# Patient Record
Sex: FEMALE | Race: BLACK OR AFRICAN AMERICAN | NOT HISPANIC OR LATINO | Employment: OTHER | URBAN - METROPOLITAN AREA
[De-identification: names, ages, dates, MRNs, and addresses within clinical notes are randomized per-mention and may not be internally consistent; named-entity substitution may affect disease eponyms.]

---

## 2017-01-02 ENCOUNTER — TELEPHONE (OUTPATIENT)
Dept: FAMILY MEDICINE CLINIC | Facility: CLINIC | Age: 82
End: 2017-01-02

## 2017-01-02 DIAGNOSIS — M48.00 CENTRAL SPINAL STENOSIS: Primary | ICD-10-CM

## 2017-01-02 NOTE — TELEPHONE ENCOUNTER
Talked patient to inform her of her severe central spinal stenosis also some foramen which are no nerve root have told this patient with I will set her up with pain management she absolutely refuses any kind of surgery so will not send her to a neurosurgeon have put a request through to Tamara for this referral to pain management

## 2017-02-23 RX ORDER — LEVOTHYROXINE SODIUM 0.03 MG/1
TABLET ORAL
Qty: 90 TABLET | Refills: 1 | Status: SHIPPED | OUTPATIENT
Start: 2017-02-23 | End: 2017-09-06 | Stop reason: SDUPTHER

## 2017-02-23 RX ORDER — SERTRALINE HYDROCHLORIDE 100 MG/1
TABLET, FILM COATED ORAL
Qty: 30 TABLET | Refills: 2 | Status: SHIPPED | OUTPATIENT
Start: 2017-02-23 | End: 2017-05-31 | Stop reason: SDUPTHER

## 2017-03-01 ENCOUNTER — OFFICE VISIT (OUTPATIENT)
Dept: FAMILY MEDICINE CLINIC | Facility: CLINIC | Age: 82
End: 2017-03-01

## 2017-03-01 VITALS
HEART RATE: 85 BPM | TEMPERATURE: 99.1 F | SYSTOLIC BLOOD PRESSURE: 140 MMHG | WEIGHT: 180 LBS | HEIGHT: 61 IN | BODY MASS INDEX: 33.99 KG/M2 | OXYGEN SATURATION: 98 % | DIASTOLIC BLOOD PRESSURE: 74 MMHG

## 2017-03-01 DIAGNOSIS — E11.22 TYPE 2 DIABETES MELLITUS WITH STAGE 1 CHRONIC KIDNEY DISEASE, WITHOUT LONG-TERM CURRENT USE OF INSULIN (HCC): ICD-10-CM

## 2017-03-01 DIAGNOSIS — Z87.19 HISTORY OF DIVERTICULITIS OF COLON: ICD-10-CM

## 2017-03-01 DIAGNOSIS — F02.80 EARLY ONSET ALZHEIMER'S DEMENTIA WITHOUT BEHAVIORAL DISTURBANCE (HCC): ICD-10-CM

## 2017-03-01 DIAGNOSIS — E03.4 HYPOTHYROIDISM DUE TO ACQUIRED ATROPHY OF THYROID: ICD-10-CM

## 2017-03-01 DIAGNOSIS — K21.9 GASTROESOPHAGEAL REFLUX DISEASE WITHOUT ESOPHAGITIS: ICD-10-CM

## 2017-03-01 DIAGNOSIS — Z79.899 HIGH RISK MEDICATION USE: ICD-10-CM

## 2017-03-01 DIAGNOSIS — G30.0 EARLY ONSET ALZHEIMER'S DEMENTIA WITHOUT BEHAVIORAL DISTURBANCE (HCC): ICD-10-CM

## 2017-03-01 DIAGNOSIS — N28.9 RENAL INSUFFICIENCY: Primary | ICD-10-CM

## 2017-03-01 DIAGNOSIS — N18.1 TYPE 2 DIABETES MELLITUS WITH STAGE 1 CHRONIC KIDNEY DISEASE, WITHOUT LONG-TERM CURRENT USE OF INSULIN (HCC): ICD-10-CM

## 2017-03-01 DIAGNOSIS — E78.5 HYPERLIPIDEMIA, UNSPECIFIED HYPERLIPIDEMIA TYPE: ICD-10-CM

## 2017-03-01 PROCEDURE — 99214 OFFICE O/P EST MOD 30 MIN: CPT | Performed by: FAMILY MEDICINE

## 2017-03-01 RX ORDER — SENNOSIDES 8.6 MG
CAPSULE ORAL
COMMUNITY
End: 2022-08-22 | Stop reason: SDUPTHER

## 2017-03-01 NOTE — PROGRESS NOTES
HPI  Wendie Houser is a 82 y.o. female who is here for follow up after recent hospitalization at [facility] apparently for renal insufficiency and hypoglycemia.  Reports metformin was discontinued completely again apparently because of renal insufficiency.  Also Bumex discontinued but patient resumed because of abdominal swelling and chest discomfort.  Has noted some yellowish discoloration of her stool and urine.  Also has had some hoarseness and sore throat.      Review of Systems   HENT: Positive for hearing loss, postnasal drip, sinus pressure, sore throat, trouble swallowing and voice change.    Respiratory: Positive for cough and chest tightness.    Gastrointestinal: Positive for abdominal distention, constipation and nausea.        PPI therapy discontinued when discharged from the hospital but had to resume because of reflux symptoms   Genitourinary: Positive for frequency.   Musculoskeletal: Positive for arthralgias and back pain.   Neurological: Positive for weakness.   Psychiatric/Behavioral: Positive for sleep disturbance.   All other systems reviewed and are negative.        Past Medical History   Diagnosis Date   • Anemia    • Arthritis    • CHF (congestive heart failure)    • Congestive heart failure    • Depression    • Diabetes mellitus      type 2   • Diverticulitis of colon 08/25/2016     Acute uncomplicated diverticulitis at the distal transverse colon   • Early onset Alzheimer's dementia    • GERD (gastroesophageal reflux disease)    • Heart disease    • Hyperlipidemia    • Hypertension    • Hypothyroidism    • Kidney disease    • Sleep apnea        Past Surgical History   Procedure Laterality Date   • Gallbladder surgery  1960     Open   • Total knee arthroplasty Left 2012     Dr. Soler   • Eye surgery  2009   • Endoscopy  04/15/2011   • Colonoscopy  2011     Dr. Cain   • Breast cyst excision Right 1989     Benign   • Vaginal hysterectomy  1999       Family History   Problem  Relation Age of Onset   • Other Mother      cardiac disorder   • Hypertension Mother    • Heart disease Mother    • Hypertension Sister    • Heart attack Sister    • Cancer Brother    • Hypertension Brother    • Heart attack Brother    • Heart disease Father        Social History     Social History   • Marital status:      Spouse name: N/A   • Number of children: N/A   • Years of education: N/A     Occupational History   • Not on file.     Social History Main Topics   • Smoking status: Never Smoker   • Smokeless tobacco: Not on file   • Alcohol use No   • Drug use: No   • Sexual activity: No     Other Topics Concern   • Not on file     Social History Narrative         Physical Exam   Constitutional: She is oriented to person, place, and time. She appears well-developed and well-nourished.   HENT:   Head: Normocephalic.   Mouth/Throat: Oropharynx is clear and moist.   Voice is slightly hoarse   Eyes: Conjunctivae and EOM are normal. Pupils are equal, round, and reactive to light.   Neck: Normal range of motion. Neck supple. No JVD present. No tracheal deviation present.   Cardiovascular: Normal rate and regular rhythm.    Pulmonary/Chest: Effort normal and breath sounds normal. She has no wheezes. She exhibits no tenderness.   Abdominal: She exhibits no distension and no mass.   Musculoskeletal: She exhibits deformity. She exhibits no edema.   Osteoarthritic changes, ambulates with a cane   Lymphadenopathy:     She has no cervical adenopathy.   Neurological: She is alert and oriented to person, place, and time. She exhibits normal muscle tone. Coordination normal.   Skin: Skin is warm and dry.   Psychiatric: She has a normal mood and affect. Her behavior is normal. Judgment and thought content normal.   Vitals reviewed.        Assessment/Plan    Wendie was seen today for follow-up, chronic kidney disease and hypoglycemia.    Diagnoses and all orders for this visit:    Renal insufficiency  -     Comprehensive  Metabolic Panel    Hypothyroidism due to acquired atrophy of thyroid  -     TSH+Free T4    Type 2 diabetes mellitus with stage 1 chronic kidney disease, without long-term current use of insulin  -     Hemoglobin A1c    Early onset Alzheimer's dementia without behavioral disturbance    High risk medication use  -     Comprehensive Metabolic Panel    Hyperlipidemia, unspecified hyperlipidemia type  -     Lipid Panel    History of diverticulitis of colon    Gastroesophageal reflux disease without esophagitis      Patient with multiple underlying medical issues as noted reviewing previous records especially from Dr. Bustos.  Was followed in the past by renal specialist and reports being hospitalized last month with renal insufficiency and hypoglycemia.  All records reviewed and actually creatinine levels were not significantly elevated when left the hospital.  Reports some yellowish discoloration of urine and concerned about liver function.  Known history of diverticulitis but no current symptoms.  Discussed taking Bumex when necessary only.  Otherwise continue current therapy and follow-up in 3 months.  Further adjustments as needed especially depending on above noted blood tests    This note includes information entered using a voice recognition dictation system.  Though reviewed, some nonsensible errors may remain.

## 2017-03-02 LAB
ALBUMIN SERPL-MCNC: 4.1 G/DL (ref 3.5–5.2)
ALBUMIN/GLOB SERPL: 1.2 G/DL
ALP SERPL-CCNC: 79 U/L (ref 39–117)
ALT SERPL-CCNC: 9 U/L (ref 1–33)
AST SERPL-CCNC: 14 U/L (ref 1–32)
BILIRUB SERPL-MCNC: 0.5 MG/DL (ref 0.1–1.2)
BUN SERPL-MCNC: 12 MG/DL (ref 8–23)
BUN/CREAT SERPL: 12 (ref 7–25)
CALCIUM SERPL-MCNC: 9.4 MG/DL (ref 8.6–10.5)
CHLORIDE SERPL-SCNC: 102 MMOL/L (ref 98–107)
CHOLEST SERPL-MCNC: 160 MG/DL (ref 0–200)
CO2 SERPL-SCNC: 24.9 MMOL/L (ref 22–29)
CREAT SERPL-MCNC: 1 MG/DL (ref 0.57–1)
GLOBULIN SER CALC-MCNC: 3.3 GM/DL
GLUCOSE SERPL-MCNC: 125 MG/DL (ref 65–99)
HBA1C MFR BLD: 6 % (ref 4.8–5.6)
HDLC SERPL-MCNC: 54 MG/DL (ref 40–60)
INTERPRETATION: NORMAL
LDLC SERPL CALC-MCNC: 74 MG/DL (ref 0–100)
Lab: NORMAL
POTASSIUM SERPL-SCNC: 3.5 MMOL/L (ref 3.5–5.2)
PROT SERPL-MCNC: 7.4 G/DL (ref 6–8.5)
SODIUM SERPL-SCNC: 142 MMOL/L (ref 136–145)
T4 FREE SERPL-MCNC: 0.88 NG/DL (ref 0.93–1.7)
TRIGL SERPL-MCNC: 158 MG/DL (ref 0–150)
TSH SERPL DL<=0.005 MIU/L-ACNC: 2.75 MIU/ML (ref 0.27–4.2)
VLDLC SERPL CALC-MCNC: 31.6 MG/DL (ref 5–40)

## 2017-03-03 ENCOUNTER — TELEPHONE (OUTPATIENT)
Dept: FAMILY MEDICINE CLINIC | Facility: CLINIC | Age: 82
End: 2017-03-03

## 2017-03-03 RX ORDER — AZITHROMYCIN 250 MG/1
TABLET, FILM COATED ORAL
Qty: 6 TABLET | Refills: 0 | Status: SHIPPED | OUTPATIENT
Start: 2017-03-03 | End: 2017-05-02

## 2017-03-03 RX ORDER — GUAIFENESIN AND CODEINE PHOSPHATE 100; 10 MG/5ML; MG/5ML
5 SOLUTION ORAL EVERY 4 HOURS PRN
Qty: 180 ML | Refills: 0 | Status: SHIPPED | OUTPATIENT
Start: 2017-03-03 | End: 2017-05-02

## 2017-03-03 NOTE — TELEPHONE ENCOUNTER
Says she was seen this week and is still coughing up mucus, throat hurts due to cough,etc. Asking if she can have something called to [facility] in chart..    [phone]

## 2017-03-09 RX ORDER — VALSARTAN 80 MG/1
80 TABLET ORAL DAILY
Qty: 90 TABLET | Refills: 3 | Status: SHIPPED | OUTPATIENT
Start: 2017-03-09 | End: 2017-05-02 | Stop reason: SDUPTHER

## 2017-04-24 ENCOUNTER — DOCUMENTATION (OUTPATIENT)
Dept: FAMILY MEDICINE CLINIC | Facility: CLINIC | Age: 82
End: 2017-04-24

## 2017-04-25 ENCOUNTER — TELEPHONE (OUTPATIENT)
Dept: FAMILY MEDICINE CLINIC | Facility: CLINIC | Age: 82
End: 2017-04-25

## 2017-04-25 NOTE — TELEPHONE ENCOUNTER
----- Message from Luis Mcqueen MD sent at 2017  6:02 PM EDT -----  Inform daughter letter printed.    ----- Message -----     From: Nat Amara     Sent: 2017   1:59 PM       To: Luis Mcqueen MD    PT'S DAUGHTER (GODFREY MOODY,  [birth year]/60) IS REQUESTING A LETTER FOR HER JOB SAYING SHE'S BEEN HELPING HER MOTHER WITH HER COOKING AND TRANSPORTING HER TO THE DOCTOR.  SHE HAD FMLA PAPERS COMPLETED BY DR MAGALLANES AND THEY ARE IN THE CHART 16.  SHE SAYS SHE HAS NOT WORKED SINCE  AND FEELS SHE STILL CAN'T GO BACK TO WORK.    GODFREY [phone]

## 2017-05-01 ENCOUNTER — TELEPHONE (OUTPATIENT)
Dept: FAMILY MEDICINE CLINIC | Facility: CLINIC | Age: 82
End: 2017-05-01

## 2017-05-02 ENCOUNTER — OFFICE VISIT (OUTPATIENT)
Dept: FAMILY MEDICINE CLINIC | Facility: CLINIC | Age: 82
End: 2017-05-02

## 2017-05-02 VITALS
TEMPERATURE: 98.1 F | RESPIRATION RATE: 17 BRPM | SYSTOLIC BLOOD PRESSURE: 130 MMHG | WEIGHT: 188.8 LBS | OXYGEN SATURATION: 98 % | BODY MASS INDEX: 35.65 KG/M2 | DIASTOLIC BLOOD PRESSURE: 76 MMHG | HEIGHT: 61 IN | HEART RATE: 67 BPM

## 2017-05-02 DIAGNOSIS — R53.83 FATIGUE, UNSPECIFIED TYPE: ICD-10-CM

## 2017-05-02 DIAGNOSIS — IMO0002 DIVERTICULITIS OF GASTROINTESTINAL TRACT: ICD-10-CM

## 2017-05-02 DIAGNOSIS — E03.4 HYPOTHYROIDISM DUE TO ACQUIRED ATROPHY OF THYROID: ICD-10-CM

## 2017-05-02 DIAGNOSIS — E11.22 TYPE 2 DIABETES MELLITUS WITH STAGE 1 CHRONIC KIDNEY DISEASE, WITHOUT LONG-TERM CURRENT USE OF INSULIN (HCC): Primary | ICD-10-CM

## 2017-05-02 DIAGNOSIS — IMO0002 ADULT BODY MASS INDEX GREATER THAN 30: ICD-10-CM

## 2017-05-02 DIAGNOSIS — I10 ESSENTIAL HYPERTENSION: ICD-10-CM

## 2017-05-02 DIAGNOSIS — N28.9 RENAL INSUFFICIENCY: ICD-10-CM

## 2017-05-02 DIAGNOSIS — N18.1 TYPE 2 DIABETES MELLITUS WITH STAGE 1 CHRONIC KIDNEY DISEASE, WITHOUT LONG-TERM CURRENT USE OF INSULIN (HCC): Primary | ICD-10-CM

## 2017-05-02 DIAGNOSIS — M48.061 LUMBAR CANAL STENOSIS: ICD-10-CM

## 2017-05-02 DIAGNOSIS — E87.8 DISORDER OF ELECTROLYTES: ICD-10-CM

## 2017-05-02 DIAGNOSIS — Z79.899 HIGH RISK MEDICATION USE: ICD-10-CM

## 2017-05-02 PROBLEM — E87.5 HYPERKALEMIA: Status: ACTIVE | Noted: 2017-02-02

## 2017-05-02 PROBLEM — E16.2 HYPOGLYCEMIA: Status: ACTIVE | Noted: 2017-02-02

## 2017-05-02 PROBLEM — S37.009A INJURY OF KIDNEY: Status: ACTIVE | Noted: 2017-02-02

## 2017-05-02 PROBLEM — K44.9 HIATAL HERNIA: Status: ACTIVE | Noted: 2017-05-02

## 2017-05-02 PROBLEM — K58.9 IRRITABLE BOWEL SYNDROME: Status: ACTIVE | Noted: 2017-05-02

## 2017-05-02 LAB
BASOPHILS # BLD AUTO: 0.02 10*3/MM3 (ref 0–0.2)
BASOPHILS NFR BLD AUTO: 0.3 % (ref 0–1.5)
BUN SERPL-MCNC: 12 MG/DL (ref 8–23)
BUN/CREAT SERPL: 13.3 (ref 7–25)
CALCIUM SERPL-MCNC: 9.1 MG/DL (ref 8.6–10.5)
CHLORIDE SERPL-SCNC: 104 MMOL/L (ref 98–107)
CO2 SERPL-SCNC: 26.4 MMOL/L (ref 22–29)
CREAT SERPL-MCNC: 0.9 MG/DL (ref 0.57–1)
EOSINOPHIL # BLD AUTO: 0.17 10*3/MM3 (ref 0–0.7)
EOSINOPHIL NFR BLD AUTO: 2.4 % (ref 0.3–6.2)
ERYTHROCYTE [DISTWIDTH] IN BLOOD BY AUTOMATED COUNT: 15.7 % (ref 11.7–13)
ERYTHROCYTE [SEDIMENTATION RATE] IN BLOOD BY WESTERGREN METHOD: 15 MM/HR (ref 0–30)
GLUCOSE SERPL-MCNC: 122 MG/DL (ref 65–99)
HBA1C MFR BLD: 5.7 % (ref 4.8–5.6)
HCT VFR BLD AUTO: 36.7 % (ref 35.6–45.5)
HGB BLD-MCNC: 11.6 G/DL (ref 11.9–15.5)
IMM GRANULOCYTES # BLD: 0 10*3/MM3 (ref 0–0.03)
IMM GRANULOCYTES NFR BLD: 0 % (ref 0–0.5)
LYMPHOCYTES # BLD AUTO: 3.44 10*3/MM3 (ref 0.9–4.8)
LYMPHOCYTES NFR BLD AUTO: 49 % (ref 19.6–45.3)
MCH RBC QN AUTO: 27.3 PG (ref 26.9–32)
MCHC RBC AUTO-ENTMCNC: 31.6 G/DL (ref 32.4–36.3)
MCV RBC AUTO: 86.4 FL (ref 80.5–98.2)
MONOCYTES # BLD AUTO: 0.65 10*3/MM3 (ref 0.2–1.2)
MONOCYTES NFR BLD AUTO: 9.3 % (ref 5–12)
NEUTROPHILS # BLD AUTO: 2.74 10*3/MM3 (ref 1.9–8.1)
NEUTROPHILS NFR BLD AUTO: 39 % (ref 42.7–76)
PLATELET # BLD AUTO: 217 10*3/MM3 (ref 140–500)
POTASSIUM SERPL-SCNC: 3.7 MMOL/L (ref 3.5–5.2)
RBC # BLD AUTO: 4.25 10*6/MM3 (ref 3.9–5.2)
SODIUM SERPL-SCNC: 143 MMOL/L (ref 136–145)
T4 FREE SERPL-MCNC: 1.03 NG/DL (ref 0.93–1.7)
TSH SERPL DL<=0.005 MIU/L-ACNC: 2.36 MIU/ML (ref 0.27–4.2)
WBC # BLD AUTO: 7.02 10*3/MM3 (ref 4.5–10.7)

## 2017-05-02 PROCEDURE — 99214 OFFICE O/P EST MOD 30 MIN: CPT | Performed by: FAMILY MEDICINE

## 2017-05-02 RX ORDER — VALSARTAN 160 MG/1
160 TABLET ORAL DAILY
Qty: 30 TABLET | Refills: 5 | Status: SHIPPED | OUTPATIENT
Start: 2017-05-02 | End: 2017-12-04 | Stop reason: SDUPTHER

## 2017-05-02 RX ORDER — METHYLPREDNISOLONE 4 MG/1
TABLET ORAL
Qty: 21 TABLET | Refills: 0 | Status: SHIPPED | OUTPATIENT
Start: 2017-05-02 | End: 2017-06-05

## 2017-05-05 DIAGNOSIS — E78.5 HYPERLIPIDEMIA, UNSPECIFIED HYPERLIPIDEMIA TYPE: ICD-10-CM

## 2017-05-05 RX ORDER — PRAVASTATIN SODIUM 40 MG
40 TABLET ORAL DAILY
Qty: 90 TABLET | Refills: 1 | Status: SHIPPED | OUTPATIENT
Start: 2017-05-05 | End: 2017-11-15 | Stop reason: SDUPTHER

## 2017-05-31 RX ORDER — SERTRALINE HYDROCHLORIDE 100 MG/1
TABLET, FILM COATED ORAL
Qty: 30 TABLET | Refills: 3 | Status: SHIPPED | OUTPATIENT
Start: 2017-05-31 | End: 2017-10-09 | Stop reason: SDUPTHER

## 2017-06-05 ENCOUNTER — OFFICE VISIT (OUTPATIENT)
Dept: FAMILY MEDICINE CLINIC | Facility: CLINIC | Age: 82
End: 2017-06-05

## 2017-06-05 VITALS
HEART RATE: 73 BPM | TEMPERATURE: 98.3 F | HEIGHT: 61 IN | WEIGHT: 185 LBS | DIASTOLIC BLOOD PRESSURE: 74 MMHG | SYSTOLIC BLOOD PRESSURE: 136 MMHG | RESPIRATION RATE: 16 BRPM | OXYGEN SATURATION: 94 % | BODY MASS INDEX: 34.93 KG/M2

## 2017-06-05 DIAGNOSIS — F02.80 EARLY ONSET ALZHEIMER'S DEMENTIA WITHOUT BEHAVIORAL DISTURBANCE (HCC): ICD-10-CM

## 2017-06-05 DIAGNOSIS — G30.0 EARLY ONSET ALZHEIMER'S DEMENTIA WITHOUT BEHAVIORAL DISTURBANCE (HCC): ICD-10-CM

## 2017-06-05 DIAGNOSIS — N28.9 RENAL INSUFFICIENCY: Primary | ICD-10-CM

## 2017-06-05 DIAGNOSIS — D64.9 ANEMIA, UNSPECIFIED TYPE: ICD-10-CM

## 2017-06-05 DIAGNOSIS — J06.9 VIRAL UPPER RESPIRATORY TRACT INFECTION: ICD-10-CM

## 2017-06-05 DIAGNOSIS — R39.9 UTI SYMPTOMS: ICD-10-CM

## 2017-06-05 DIAGNOSIS — J30.9 ALLERGIC RHINITIS, UNSPECIFIED ALLERGIC RHINITIS TRIGGER, UNSPECIFIED RHINITIS SEASONALITY: ICD-10-CM

## 2017-06-05 LAB
BASOPHILS # BLD AUTO: 0.03 10*3/MM3 (ref 0–0.2)
BASOPHILS NFR BLD AUTO: 0.3 % (ref 0–1.5)
BILIRUB BLD-MCNC: NEGATIVE MG/DL
BUN SERPL-MCNC: 11 MG/DL (ref 8–23)
BUN/CREAT SERPL: 12.8 (ref 7–25)
CALCIUM SERPL-MCNC: 9.2 MG/DL (ref 8.6–10.5)
CHLORIDE SERPL-SCNC: 99 MMOL/L (ref 98–107)
CLARITY, POC: CLEAR
CO2 SERPL-SCNC: 26.6 MMOL/L (ref 22–29)
COLOR UR: ABNORMAL
CREAT SERPL-MCNC: 0.86 MG/DL (ref 0.57–1)
EOSINOPHIL # BLD AUTO: 0.17 10*3/MM3 (ref 0–0.7)
EOSINOPHIL NFR BLD AUTO: 2 % (ref 0.3–6.2)
ERYTHROCYTE [DISTWIDTH] IN BLOOD BY AUTOMATED COUNT: 15.5 % (ref 11.7–13)
GLUCOSE SERPL-MCNC: 130 MG/DL (ref 65–99)
GLUCOSE UR STRIP-MCNC: NEGATIVE MG/DL
HCT VFR BLD AUTO: 40 % (ref 35.6–45.5)
HGB BLD-MCNC: 12.5 G/DL (ref 11.9–15.5)
IMM GRANULOCYTES # BLD: 0.03 10*3/MM3 (ref 0–0.03)
IMM GRANULOCYTES NFR BLD: 0.3 % (ref 0–0.5)
KETONES UR QL: NEGATIVE
LEUKOCYTE EST, POC: NEGATIVE
LYMPHOCYTES # BLD AUTO: 3.67 10*3/MM3 (ref 0.9–4.8)
LYMPHOCYTES NFR BLD AUTO: 42.8 % (ref 19.6–45.3)
MCH RBC QN AUTO: 26.5 PG (ref 26.9–32)
MCHC RBC AUTO-ENTMCNC: 31.3 G/DL (ref 32.4–36.3)
MCV RBC AUTO: 84.9 FL (ref 80.5–98.2)
MONOCYTES # BLD AUTO: 0.8 10*3/MM3 (ref 0.2–1.2)
MONOCYTES NFR BLD AUTO: 9.3 % (ref 5–12)
NEUTROPHILS # BLD AUTO: 3.88 10*3/MM3 (ref 1.9–8.1)
NEUTROPHILS NFR BLD AUTO: 45.3 % (ref 42.7–76)
NITRITE UR-MCNC: NEGATIVE MG/ML
PH UR: 6 [PH] (ref 5–8)
PLATELET # BLD AUTO: 222 10*3/MM3 (ref 140–500)
POTASSIUM SERPL-SCNC: 3.2 MMOL/L (ref 3.5–5.2)
PROT UR STRIP-MCNC: NEGATIVE MG/DL
RBC # BLD AUTO: 4.71 10*6/MM3 (ref 3.9–5.2)
RBC # UR STRIP: ABNORMAL /UL
SODIUM SERPL-SCNC: 140 MMOL/L (ref 136–145)
SP GR UR: 1.02 (ref 1–1.03)
UROBILINOGEN UR QL: NORMAL
WBC # BLD AUTO: 8.58 10*3/MM3 (ref 4.5–10.7)

## 2017-06-05 PROCEDURE — 99214 OFFICE O/P EST MOD 30 MIN: CPT | Performed by: FAMILY MEDICINE

## 2017-06-05 PROCEDURE — 81003 URINALYSIS AUTO W/O SCOPE: CPT | Performed by: FAMILY MEDICINE

## 2017-06-05 NOTE — PROGRESS NOTES
HPI  Wendie Houser is a 82 y.o. female who is here for follow up of dementia as well as hypertension.  Recently started on a new medication which she is concerned because she was told it was for heart failure and she was told she does not have heart failure.  This was discussed, specifically medicine is primarily for hypertension.  Daughter also is concerned about high dose and discussed with patient cannot go by milligrams etc.  Reports just general fatigue and has had some congestion and cough.  Does take allergy medication and has had significant mucus in her lungs.  This started 2 weeks ago and does seem to be improving.      Review of Systems   Constitutional: Positive for fatigue.   HENT: Positive for congestion and sore throat.    Respiratory: Positive for cough.    Gastrointestinal: Negative for blood in stool.        Reports mucus in stool   Neurological: Positive for weakness.   Psychiatric/Behavioral: Positive for confusion.   All other systems reviewed and are negative.        Past Medical History:   Diagnosis Date   • Anemia    • Arthritis    • CHF (congestive heart failure)    • Congestive heart failure    • Depression    • Diabetes mellitus     type 2   • Diverticulitis of colon 08/25/2016    Acute uncomplicated diverticulitis at the distal transverse colon   • Early onset Alzheimer's dementia    • GERD (gastroesophageal reflux disease)    • Heart disease    • Hyperlipidemia    • Hypertension    • Hypothyroidism    • Kidney disease    • Sleep apnea        Past Surgical History:   Procedure Laterality Date   • BREAST CYST EXCISION Right 1989    Benign   • COLONOSCOPY  2011    Dr. Cain   • ENDOSCOPY  04/15/2011   • EYE SURGERY  2009   • GALLBLADDER SURGERY  1960    Open   • TOTAL KNEE ARTHROPLASTY Left 2012    Dr. Soler   • VAGINAL HYSTERECTOMY  1999       Family History   Problem Relation Age of Onset   • Other Mother      cardiac disorder   • Hypertension Mother    • Heart disease Mother    •  Hypertension Sister    • Heart attack Sister    • Cancer Brother    • Hypertension Brother    • Heart attack Brother    • Heart disease Father        Social History     Social History   • Marital status:      Spouse name: N/A   • Number of children: N/A   • Years of education: N/A     Occupational History   • Not on file.     Social History Main Topics   • Smoking status: Never Smoker   • Smokeless tobacco: Not on file   • Alcohol use No   • Drug use: No   • Sexual activity: No     Other Topics Concern   • Not on file     Social History Narrative         Physical Exam   Constitutional: She is oriented to person, place, and time. She appears well-developed and well-nourished. No distress.   HENT:   Head: Normocephalic.   Mouth/Throat: Oropharynx is clear and moist.   Eyes: Conjunctivae are normal. Pupils are equal, round, and reactive to light. No scleral icterus.   Neck: Normal range of motion. No JVD present.   Cardiovascular: Normal rate and regular rhythm.    Pulmonary/Chest: Effort normal. No respiratory distress. She has no wheezes. She has no rales.   Abdominal: Soft.   Musculoskeletal: She exhibits no edema.   Lymphadenopathy:     She has no cervical adenopathy.   Neurological: She is alert and oriented to person, place, and time. She exhibits normal muscle tone. Coordination normal.   Skin: Skin is warm and dry.   Psychiatric: She has a normal mood and affect. Her behavior is normal. Judgment and thought content normal.   Nursing note and vitals reviewed.        Assessment/Plan    Wendie was seen today for diabetes, cough, uri, fatigue and altered mental status.    Diagnoses and all orders for this visit:    Renal insufficiency  -     Basic Metabolic Panel    Early onset Alzheimer's dementia without behavioral disturbance    Anemia, unspecified type  -     CBC & Differential    Viral upper respiratory tract infection    Allergic rhinitis, unspecified allergic rhinitis trigger, unspecified rhinitis  seasonality        Patient is here for follow-up of hypertension and dementia.  Reports general fatigue and has had some upper respiratory symptoms for the last couple of weeks which seem to be improving.  Most likely an allergic component as well as possible viral illness.  Since improving recommend continuing symptomatic therapy.  Recently started on new blood pressure medication and will get routine lab.  Was noted to be borderline anemic on previous lab work which will be rechecked.  Overall status seems fairly stable on current regimen which will be continued including follow-up in 3 months.    This note includes information entered using a voice recognition dictation system.  Though reviewed, some nonsensible errors may remain.

## 2017-06-06 RX ORDER — POTASSIUM CHLORIDE 750 MG/1
10 TABLET, FILM COATED, EXTENDED RELEASE ORAL 2 TIMES DAILY
Qty: 180 TABLET | Refills: 3 | Status: SHIPPED | OUTPATIENT
Start: 2017-06-06 | End: 2018-08-13 | Stop reason: SDUPTHER

## 2017-06-12 RX ORDER — DONEPEZIL HYDROCHLORIDE 10 MG/1
TABLET, FILM COATED ORAL
Qty: 90 TABLET | Refills: 1 | Status: SHIPPED | OUTPATIENT
Start: 2017-06-12 | End: 2017-12-18 | Stop reason: SDUPTHER

## 2017-07-21 ENCOUNTER — OFFICE VISIT (OUTPATIENT)
Dept: FAMILY MEDICINE CLINIC | Facility: CLINIC | Age: 82
End: 2017-07-21

## 2017-07-21 VITALS
DIASTOLIC BLOOD PRESSURE: 68 MMHG | HEIGHT: 61 IN | WEIGHT: 186.4 LBS | RESPIRATION RATE: 17 BRPM | HEART RATE: 83 BPM | BODY MASS INDEX: 35.19 KG/M2 | SYSTOLIC BLOOD PRESSURE: 132 MMHG | OXYGEN SATURATION: 97 % | TEMPERATURE: 98.6 F

## 2017-07-21 DIAGNOSIS — J06.9 ACUTE URI: Primary | ICD-10-CM

## 2017-07-21 DIAGNOSIS — F02.80 EARLY ONSET ALZHEIMER'S DEMENTIA WITHOUT BEHAVIORAL DISTURBANCE (HCC): ICD-10-CM

## 2017-07-21 DIAGNOSIS — IMO0002 ADULT BODY MASS INDEX GREATER THAN 30: ICD-10-CM

## 2017-07-21 DIAGNOSIS — G30.0 EARLY ONSET ALZHEIMER'S DEMENTIA WITHOUT BEHAVIORAL DISTURBANCE (HCC): ICD-10-CM

## 2017-07-21 PROCEDURE — 99213 OFFICE O/P EST LOW 20 MIN: CPT | Performed by: FAMILY MEDICINE

## 2017-07-21 RX ORDER — AZITHROMYCIN 250 MG/1
TABLET, FILM COATED ORAL
Qty: 6 TABLET | Refills: 0 | Status: SHIPPED | OUTPATIENT
Start: 2017-07-21 | End: 2017-10-12

## 2017-07-21 NOTE — PROGRESS NOTES
HPI  Wendie Houser is a 82 y.o. female who is here for follow up of general fatigue and weakness.  Was recently at a [place] emergency room with negative evaluation but was later called urine culture indicated infection and she was placed on antibiotic probably amoxicillin.  She reports symptoms improved while she was taking the antibiotic but recurred as soon as they were stopped.  Denies any specific symptoms except for persistent cough and congestion.  Does have known diverticulosis but denies diarrhea or blood in the stool.  No fever or chills.      Review of Systems   Constitutional: Positive for fatigue.   HENT: Positive for congestion.    Respiratory: Positive for cough and shortness of breath.    Gastrointestinal: Negative for abdominal distention, abdominal pain, blood in stool, diarrhea, nausea and vomiting.   Genitourinary: Negative for dysuria, frequency and urgency.   Neurological: Positive for weakness.         Past Medical History:   Diagnosis Date   • Anemia    • Arthritis    • CHF (congestive heart failure)    • Congestive heart failure    • Depression    • Diabetes mellitus     type 2   • Diverticulitis of colon 08/25/2016    Acute uncomplicated diverticulitis at the distal transverse colon   • Early onset Alzheimer's dementia    • GERD (gastroesophageal reflux disease)    • Heart disease    • Hyperlipidemia    • Hypertension    • Hypothyroidism    • Kidney disease    • Sleep apnea        Past Surgical History:   Procedure Laterality Date   • BREAST CYST EXCISION Right 1989    Benign   • COLONOSCOPY  2011    Dr. Cain   • ENDOSCOPY  04/15/2011   • EYE SURGERY  2009   • GALLBLADDER SURGERY  1960    Open   • TOTAL KNEE ARTHROPLASTY Left 2012    Dr. Soler   • VAGINAL HYSTERECTOMY  1999       Family History   Problem Relation Age of Onset   • Other Mother      cardiac disorder   • Hypertension Mother    • Heart disease Mother    • Hypertension Sister    • Heart attack Sister    • Cancer Brother     • Hypertension Brother    • Heart attack Brother    • Heart disease Father        Social History     Social History   • Marital status:      Spouse name: N/A   • Number of children: N/A   • Years of education: N/A     Occupational History   • Not on file.     Social History Main Topics   • Smoking status: Never Smoker   • Smokeless tobacco: Not on file   • Alcohol use No   • Drug use: No   • Sexual activity: No     Other Topics Concern   • Not on file     Social History Narrative         Physical Exam   Constitutional: She is oriented to person, place, and time. She appears well-developed and well-nourished. No distress.   HENT:   Head: Normocephalic.   Mouth/Throat: Oropharynx is clear and moist.   Eyes: EOM are normal. Pupils are equal, round, and reactive to light.   Neck: Normal range of motion. Neck supple. No JVD present. No thyromegaly present.   Cardiovascular: Normal rate, regular rhythm and normal heart sounds.    Pulmonary/Chest: Effort normal and breath sounds normal.   Abdominal: Soft. Bowel sounds are normal. She exhibits no distension. There is no tenderness.   Musculoskeletal: She exhibits no edema or deformity.   Neurological: She is alert and oriented to person, place, and time. Coordination normal.   Skin: Skin is warm and dry.   Psychiatric: She has a normal mood and affect. Her behavior is normal. Judgment and thought content normal. Cognition and memory are impaired.   Nursing note and vitals reviewed.        Assessment/Plan    Wendie was seen today for fatigue, sore throat, nasal congestion, cough and shortness of breath.    Diagnoses and all orders for this visit:    Acute URI  -     azithromycin (ZITHROMAX) 250 MG tablet; Take 2 tablets the first day, then 1 tablet daily for 4 days.    Early onset Alzheimer's dementia without behavioral disturbance    Adult body mass index greater than 30      Patient is here with very nonspecific symptoms.  Complains of generalized weakness and  fatigue.  Recently given antibiotic for urinary tract infection and reports improvement of symptoms which rapidly recurred after antibiotics stopped.  There is no symptoms of urinary tract infection and in fact last culture actually grew less than 100,000 organisms.  Has known diverticulosis but there is absolutely no abdominal tenderness nor other evidence of diverticulitis.  Had recent lab work done twice and basically unremarkable.  Potassium level decreased but improved with supplement.  Reports persistent respiratory symptoms and will give empiric antibiotic therapy.  If symptoms persist consider repeating lab work and adding magnesium and vitamin D levels?    This note includes information entered using a voice recognition dictation system.  Though reviewed, some nonsensible errors may remain.

## 2017-09-06 RX ORDER — LEVOTHYROXINE SODIUM 0.03 MG/1
TABLET ORAL
Qty: 90 TABLET | Refills: 1 | Status: SHIPPED | OUTPATIENT
Start: 2017-09-06 | End: 2018-03-08 | Stop reason: SDUPTHER

## 2017-10-09 ENCOUNTER — TELEPHONE (OUTPATIENT)
Dept: FAMILY MEDICINE CLINIC | Facility: CLINIC | Age: 82
End: 2017-10-09

## 2017-10-09 RX ORDER — SERTRALINE HYDROCHLORIDE 100 MG/1
TABLET, FILM COATED ORAL
Qty: 30 TABLET | Refills: 5 | Status: SHIPPED | OUTPATIENT
Start: 2017-10-09 | End: 2018-05-01 | Stop reason: SDUPTHER

## 2017-10-09 NOTE — TELEPHONE ENCOUNTER
----- Message from Nat Maloney sent at 10/9/2017  2:01 PM EDT -----  sertraline (ZOLOFT) 100 MG tablet   Sig: TAKE 1 TABLET BY MOUTH ONCE DAILY.      [id number] [place], [place] - [address] [facility] [phone] [facility] [phone]

## 2017-10-12 ENCOUNTER — OFFICE VISIT (OUTPATIENT)
Dept: FAMILY MEDICINE CLINIC | Facility: CLINIC | Age: 82
End: 2017-10-12

## 2017-10-12 VITALS
DIASTOLIC BLOOD PRESSURE: 72 MMHG | HEIGHT: 61 IN | HEART RATE: 75 BPM | BODY MASS INDEX: 35.87 KG/M2 | OXYGEN SATURATION: 98 % | SYSTOLIC BLOOD PRESSURE: 130 MMHG | RESPIRATION RATE: 16 BRPM | TEMPERATURE: 98.4 F | WEIGHT: 190 LBS

## 2017-10-12 DIAGNOSIS — Z23 IMMUNIZATION DUE: ICD-10-CM

## 2017-10-12 DIAGNOSIS — E11.22 TYPE 2 DIABETES MELLITUS WITH STAGE 1 CHRONIC KIDNEY DISEASE, WITHOUT LONG-TERM CURRENT USE OF INSULIN (HCC): ICD-10-CM

## 2017-10-12 DIAGNOSIS — K58.1 IRRITABLE BOWEL SYNDROME WITH CONSTIPATION: Primary | ICD-10-CM

## 2017-10-12 DIAGNOSIS — F02.80 EARLY ONSET ALZHEIMER'S DEMENTIA WITHOUT BEHAVIORAL DISTURBANCE (HCC): ICD-10-CM

## 2017-10-12 DIAGNOSIS — Z79.899 HIGH RISK MEDICATION USE: ICD-10-CM

## 2017-10-12 DIAGNOSIS — K21.9 GASTROESOPHAGEAL REFLUX DISEASE WITHOUT ESOPHAGITIS: ICD-10-CM

## 2017-10-12 DIAGNOSIS — E78.5 HYPERLIPIDEMIA, UNSPECIFIED HYPERLIPIDEMIA TYPE: ICD-10-CM

## 2017-10-12 DIAGNOSIS — N28.9 RENAL INSUFFICIENCY: ICD-10-CM

## 2017-10-12 DIAGNOSIS — G30.0 EARLY ONSET ALZHEIMER'S DEMENTIA WITHOUT BEHAVIORAL DISTURBANCE (HCC): ICD-10-CM

## 2017-10-12 DIAGNOSIS — E03.4 HYPOTHYROIDISM DUE TO ACQUIRED ATROPHY OF THYROID: ICD-10-CM

## 2017-10-12 DIAGNOSIS — M48.061 SPINAL STENOSIS OF LUMBAR REGION, UNSPECIFIED WHETHER NEUROGENIC CLAUDICATION PRESENT: ICD-10-CM

## 2017-10-12 DIAGNOSIS — N18.1 TYPE 2 DIABETES MELLITUS WITH STAGE 1 CHRONIC KIDNEY DISEASE, WITHOUT LONG-TERM CURRENT USE OF INSULIN (HCC): ICD-10-CM

## 2017-10-12 DIAGNOSIS — J30.9 ALLERGIC RHINITIS, UNSPECIFIED CHRONICITY, UNSPECIFIED SEASONALITY, UNSPECIFIED TRIGGER: ICD-10-CM

## 2017-10-12 DIAGNOSIS — Z12.31 ENCOUNTER FOR SCREENING MAMMOGRAM FOR BREAST CANCER: ICD-10-CM

## 2017-10-12 DIAGNOSIS — Z00.00 HEALTH CARE MAINTENANCE: ICD-10-CM

## 2017-10-12 PROCEDURE — 99214 OFFICE O/P EST MOD 30 MIN: CPT | Performed by: FAMILY MEDICINE

## 2017-10-12 PROCEDURE — G0008 ADMIN INFLUENZA VIRUS VAC: HCPCS | Performed by: FAMILY MEDICINE

## 2017-10-12 PROCEDURE — 90662 IIV NO PRSV INCREASED AG IM: CPT | Performed by: FAMILY MEDICINE

## 2017-10-12 RX ORDER — SULFAMETHOXAZOLE AND TRIMETHOPRIM 800; 160 MG/1; MG/1
TABLET ORAL
Refills: 0 | COMMUNITY
Start: 2017-10-03 | End: 2018-06-08

## 2017-10-12 NOTE — PROGRESS NOTES
HPI  Wendie Houser is a 82 y.o. female who is here for follow up of diabetes hypertension arthritis and multiple medical problems.  Recently seen by urologist and placed on continuous antibiotic therapy she says because of intestinal infection?  SPECT for recurrent urinary tract infections.  Previous diagnosis of IBS with constipation and treatments discussed.  Patient insists needs antibiotic therapy for multiple infections etc.      Review of Systems   Gastrointestinal: Positive for abdominal pain and constipation. Negative for blood in stool.   Musculoskeletal: Positive for arthralgias, back pain and gait problem.   Psychiatric/Behavioral: Positive for sleep disturbance and suicidal ideas.         Past Medical History:   Diagnosis Date   • Anemia    • Arthritis    • CHF (congestive heart failure)    • Congestive heart failure    • Depression    • Diabetes mellitus     type 2   • Diverticulitis of colon 08/25/2016    Acute uncomplicated diverticulitis at the distal transverse colon   • Early onset Alzheimer's dementia    • GERD (gastroesophageal reflux disease)    • Heart disease    • Hyperlipidemia    • Hypertension    • Hypothyroidism    • Kidney disease    • Sleep apnea        Past Surgical History:   Procedure Laterality Date   • BREAST CYST EXCISION Right 1989    Benign   • COLONOSCOPY  2011    Dr. Cain   • ENDOSCOPY  04/15/2011   • EYE SURGERY  2009   • GALLBLADDER SURGERY  1960    Open   • TOTAL KNEE ARTHROPLASTY Left 2012    Dr. Soler   • VAGINAL HYSTERECTOMY  1999       Family History   Problem Relation Age of Onset   • Other Mother      cardiac disorder   • Hypertension Mother    • Heart disease Mother    • Hypertension Sister    • Heart attack Sister    • Cancer Brother    • Hypertension Brother    • Heart attack Brother    • Heart disease Father        Social History     Social History   • Marital status:      Spouse name: N/A   • Number of children: N/A   • Years of education: N/A      Occupational History   • Not on file.     Social History Main Topics   • Smoking status: Never Smoker   • Smokeless tobacco: Never Used   • Alcohol use No   • Drug use: No   • Sexual activity: No     Other Topics Concern   • Not on file     Social History Narrative         Physical Exam   Constitutional: She is oriented to person, place, and time. She appears well-developed and well-nourished. No distress.   HENT:   Head: Normocephalic.   Mouth/Throat: Oropharynx is clear and moist.   Eyes: Conjunctivae are normal. Pupils are equal, round, and reactive to light.   Neck: Normal range of motion. No thyromegaly present.   Cardiovascular: Normal rate and regular rhythm.    Pulmonary/Chest: Effort normal. No respiratory distress.   Abdominal: Soft. She exhibits no distension and no mass. There is no tenderness.   Musculoskeletal: She exhibits edema and deformity.   Ambulates with walker   Neurological: She is alert and oriented to person, place, and time. Coordination abnormal.   Skin: Skin is warm and dry.   Psychiatric: She has a normal mood and affect. Her behavior is normal. Judgment and thought content normal.   Vitals reviewed.        Assessment/Plan    Wendie was seen today for diabetes and hypothyroidism.    Diagnoses and all orders for this visit:    Irritable bowel syndrome with constipation    Renal insufficiency    Hypothyroidism due to acquired atrophy of thyroid  -     TSH+Free T4    High risk medication use  -     CBC & Differential  -     Comprehensive Metabolic Panel    Health care maintenance    Type 2 diabetes mellitus with stage 1 chronic kidney disease, without long-term current use of insulin  -     Comprehensive Metabolic Panel  -     Hemoglobin A1c  -     Microalbumin / Creatinine Urine Ratio - Urine, Clean Catch    Hyperlipidemia, unspecified hyperlipidemia type  -     Lipid Panel    Allergic rhinitis, unspecified chronicity, unspecified seasonality, unspecified trigger    Early onset  Alzheimer's dementia without behavioral disturbance    Encounter for screening mammogram for breast cancer  -     Mammo Screening Bilateral With CAD; Future    Gastroesophageal reflux disease without esophagitis    Spinal stenosis of lumbar region, unspecified whether neurogenic claudication present    Other orders  -     linaclotide (LINZESS) 145 MCG capsule capsule; Take 1 capsule by mouth Every Morning Before Breakfast.      Patient is here for routine follow-up of multiple medical problems some of which are noted above.  Reports continued abdominal discomfort and requesting anabiotic therapy but is getting Bactrim from recent visit to urologist.  Does have history of diverticulosis but see no physical evidence of diverticulitis.  Reported history of IBS with constipation.  Uses daily laxative.  Recommend changing over to Linzess to see if gets relief from intestinal discomfort as well as constipation issues.  Routine lab will be obtained as discussed and follow-up in 3 months or as needed.    This note includes information entered using a voice recognition dictation system.  Though reviewed, some nonsensible errors may remain.

## 2017-10-13 LAB
ALBUMIN SERPL-MCNC: 4 G/DL (ref 3.5–5.2)
ALBUMIN/CREAT UR: ABNORMAL MG/G CREAT (ref 0–30)
ALBUMIN/GLOB SERPL: 1.2 G/DL
ALP SERPL-CCNC: 85 U/L (ref 39–117)
ALT SERPL-CCNC: 12 U/L (ref 1–33)
AST SERPL-CCNC: 15 U/L (ref 1–32)
BASOPHILS # BLD AUTO: 0.03 10*3/MM3 (ref 0–0.2)
BASOPHILS NFR BLD AUTO: 0.3 % (ref 0–1.5)
BILIRUB SERPL-MCNC: 0.2 MG/DL (ref 0.1–1.2)
BUN SERPL-MCNC: 14 MG/DL (ref 8–23)
BUN/CREAT SERPL: 12 (ref 7–25)
CALCIUM SERPL-MCNC: 9.3 MG/DL (ref 8.6–10.5)
CHLORIDE SERPL-SCNC: 101 MMOL/L (ref 98–107)
CHOLEST SERPL-MCNC: 150 MG/DL (ref 0–200)
CO2 SERPL-SCNC: 24.8 MMOL/L (ref 22–29)
CREAT SERPL-MCNC: 1.17 MG/DL (ref 0.57–1)
CREAT UR-MCNC: 123.7 MG/DL
EOSINOPHIL # BLD AUTO: 0.16 10*3/MM3 (ref 0–0.7)
EOSINOPHIL NFR BLD AUTO: 1.7 % (ref 0.3–6.2)
ERYTHROCYTE [DISTWIDTH] IN BLOOD BY AUTOMATED COUNT: 16.2 % (ref 11.7–13)
GLOBULIN SER CALC-MCNC: 3.4 GM/DL
GLUCOSE SERPL-MCNC: 113 MG/DL (ref 65–99)
HBA1C MFR BLD: 6.13 % (ref 4.8–5.6)
HCT VFR BLD AUTO: 39.3 % (ref 35.6–45.5)
HDLC SERPL-MCNC: 49 MG/DL (ref 40–60)
HGB BLD-MCNC: 12.2 G/DL (ref 11.9–15.5)
IMM GRANULOCYTES # BLD: 0.02 10*3/MM3 (ref 0–0.03)
IMM GRANULOCYTES NFR BLD: 0.2 % (ref 0–0.5)
INTERPRETATION: NORMAL
LDLC SERPL CALC-MCNC: 76 MG/DL (ref 0–100)
LYMPHOCYTES # BLD AUTO: 4.64 10*3/MM3 (ref 0.9–4.8)
LYMPHOCYTES NFR BLD AUTO: 48.2 % (ref 19.6–45.3)
MCH RBC QN AUTO: 26.7 PG (ref 26.9–32)
MCHC RBC AUTO-ENTMCNC: 31 G/DL (ref 32.4–36.3)
MCV RBC AUTO: 86 FL (ref 80.5–98.2)
MICROALBUMIN UR-MCNC: <3 UG/ML
MONOCYTES # BLD AUTO: 0.77 10*3/MM3 (ref 0.2–1.2)
MONOCYTES NFR BLD AUTO: 8 % (ref 5–12)
NEUTROPHILS # BLD AUTO: 4 10*3/MM3 (ref 1.9–8.1)
NEUTROPHILS NFR BLD AUTO: 41.6 % (ref 42.7–76)
PLATELET # BLD AUTO: 229 10*3/MM3 (ref 140–500)
POTASSIUM SERPL-SCNC: 4 MMOL/L (ref 3.5–5.2)
PROT SERPL-MCNC: 7.4 G/DL (ref 6–8.5)
RBC # BLD AUTO: 4.57 10*6/MM3 (ref 3.9–5.2)
SODIUM SERPL-SCNC: 138 MMOL/L (ref 136–145)
T4 FREE SERPL-MCNC: 0.87 NG/DL (ref 0.93–1.7)
TRIGL SERPL-MCNC: 124 MG/DL (ref 0–150)
TSH SERPL DL<=0.005 MIU/L-ACNC: 2.78 MIU/ML (ref 0.27–4.2)
VLDLC SERPL CALC-MCNC: 24.8 MG/DL (ref 5–40)
WBC # BLD AUTO: 9.62 10*3/MM3 (ref 4.5–10.7)

## 2017-10-18 ENCOUNTER — TRANSCRIBE ORDERS (OUTPATIENT)
Dept: ADMINISTRATIVE | Facility: HOSPITAL | Age: 82
End: 2017-10-18

## 2017-10-18 DIAGNOSIS — Z12.31 VISIT FOR SCREENING MAMMOGRAM: Primary | ICD-10-CM

## 2017-11-15 DIAGNOSIS — E78.5 HYPERLIPIDEMIA, UNSPECIFIED HYPERLIPIDEMIA TYPE: ICD-10-CM

## 2017-11-15 RX ORDER — PRAVASTATIN SODIUM 40 MG
TABLET ORAL
Qty: 90 TABLET | Refills: 3 | Status: SHIPPED | OUTPATIENT
Start: 2017-11-15 | End: 2018-10-23 | Stop reason: SDUPTHER

## 2017-11-29 ENCOUNTER — OFFICE VISIT (OUTPATIENT)
Dept: FAMILY MEDICINE CLINIC | Facility: CLINIC | Age: 82
End: 2017-11-29

## 2017-11-29 VITALS
HEART RATE: 81 BPM | HEIGHT: 61 IN | OXYGEN SATURATION: 98 % | SYSTOLIC BLOOD PRESSURE: 122 MMHG | WEIGHT: 185 LBS | DIASTOLIC BLOOD PRESSURE: 68 MMHG | BODY MASS INDEX: 34.93 KG/M2 | TEMPERATURE: 98.7 F | RESPIRATION RATE: 16 BRPM

## 2017-11-29 DIAGNOSIS — J06.9 ACUTE URI: Primary | ICD-10-CM

## 2017-11-29 LAB
EXPIRATION DATE: NORMAL
FLUAV AG NPH QL: NEGATIVE
FLUBV AG NPH QL: NEGATIVE
INTERNAL CONTROL: NORMAL
Lab: NORMAL

## 2017-11-29 PROCEDURE — 87804 INFLUENZA ASSAY W/OPTIC: CPT | Performed by: FAMILY MEDICINE

## 2017-11-29 PROCEDURE — 99213 OFFICE O/P EST LOW 20 MIN: CPT | Performed by: FAMILY MEDICINE

## 2017-11-29 RX ORDER — CETIRIZINE HYDROCHLORIDE 10 MG/1
5 TABLET ORAL DAILY
Qty: 30 TABLET | Refills: 2 | Status: SHIPPED | OUTPATIENT
Start: 2017-11-29 | End: 2022-08-22 | Stop reason: SDUPTHER

## 2017-11-29 RX ORDER — GUAIFENESIN 600 MG/1
1200 TABLET, EXTENDED RELEASE ORAL 2 TIMES DAILY
Qty: 40 TABLET | Refills: 2 | Status: SHIPPED | OUTPATIENT
Start: 2017-11-29 | End: 2018-06-08

## 2017-11-29 RX ORDER — AZITHROMYCIN 250 MG/1
TABLET, FILM COATED ORAL
Qty: 6 TABLET | Refills: 0 | Status: SHIPPED | OUTPATIENT
Start: 2017-11-29 | End: 2018-01-19

## 2017-11-29 NOTE — PROGRESS NOTES
HPI  Wendie Houser is a 82 y.o. female who is here for 1 week history of fatigue congestion runny nose and cough.  Some abdominal distention as well as musculoskeletal aches and pains.  Generalized weakness and fatigue.      Review of Systems   Constitutional: Positive for chills and fatigue. Negative for diaphoresis and fever.   HENT: Positive for congestion, rhinorrhea and sinus pressure.    Respiratory: Positive for cough.    Gastrointestinal: Positive for abdominal distention.   Musculoskeletal: Positive for arthralgias, back pain and neck stiffness.   Neurological: Positive for weakness.   All other systems reviewed and are negative.        Past Medical History:   Diagnosis Date   • Anemia    • Arthritis    • CHF (congestive heart failure)    • Congestive heart failure    • Depression    • Diabetes mellitus     type 2   • Diverticulitis of colon 08/25/2016    Acute uncomplicated diverticulitis at the distal transverse colon   • Early onset Alzheimer's dementia    • GERD (gastroesophageal reflux disease)    • Heart disease    • Hyperlipidemia    • Hypertension    • Hypothyroidism    • Kidney disease    • Sleep apnea        Past Surgical History:   Procedure Laterality Date   • BREAST CYST EXCISION Right 1989    Benign   • COLONOSCOPY  2011    Dr. Cain   • ENDOSCOPY  04/15/2011   • EYE SURGERY  2009   • GALLBLADDER SURGERY  1960    Open   • TOTAL KNEE ARTHROPLASTY Left 2012    Dr. Soler   • VAGINAL HYSTERECTOMY  1999       Family History   Problem Relation Age of Onset   • Other Mother      cardiac disorder   • Hypertension Mother    • Heart disease Mother    • Hypertension Sister    • Heart attack Sister    • Cancer Brother    • Hypertension Brother    • Heart attack Brother    • Heart disease Father        Social History     Social History   • Marital status:      Spouse name: N/A   • Number of children: N/A   • Years of education: N/A     Occupational History   • Not on file.     Social History  Main Topics   • Smoking status: Never Smoker   • Smokeless tobacco: Never Used   • Alcohol use No   • Drug use: No   • Sexual activity: No     Other Topics Concern   • Not on file     Social History Narrative         Physical Exam   Constitutional: She is oriented to person, place, and time. She appears well-developed and well-nourished. No distress.   HENT:   Head: Normocephalic.   Nose: Nose normal.   Mouth/Throat: Oropharynx is clear and moist.   Eyes: Conjunctivae are normal. Pupils are equal, round, and reactive to light.   Neck: Neck supple.   Cardiovascular: Normal rate and regular rhythm.    Pulmonary/Chest: Effort normal. No respiratory distress. She has no wheezes. She has no rales.   Abdominal: Soft. There is no tenderness.   Lymphadenopathy:     She has no cervical adenopathy.   Neurological: She is alert and oriented to person, place, and time. Coordination normal.   Skin: Skin is warm and dry. No rash noted.   Psychiatric: She has a normal mood and affect. Her behavior is normal. Judgment and thought content normal.   Vitals reviewed.        Assessment/Plan    Wendie was seen today for cough, nasal congestion, generalized body aches, extremity weakness and coughing up blood.    Diagnoses and all orders for this visit:    Acute URI  -     azithromycin (ZITHROMAX) 250 MG tablet; Take 2 tablets the first day, then 1 tablet daily for 4 days.  -     guaiFENesin (MUCINEX) 600 MG 12 hr tablet; Take 2 tablets by mouth 2 (Two) Times a Day.  -     cetirizine (zyrTEC) 10 MG tablet; Take 0.5 tablets by mouth Daily.  -     POCT Influenza A/B        Patient presents with symptoms consistent with acute upper respiratory infection.  Screen for influenza was negative.  Will start empiric therapy as noted above and call if symptoms worsen or persist    This note includes information entered using a voice recognition dictation system.  Though reviewed, some nonsensible errors may remain.

## 2017-12-04 DIAGNOSIS — I10 ESSENTIAL HYPERTENSION: ICD-10-CM

## 2017-12-04 RX ORDER — VALSARTAN 160 MG/1
TABLET ORAL
Qty: 30 TABLET | Refills: 5 | Status: SHIPPED | OUTPATIENT
Start: 2017-12-04 | End: 2018-07-23 | Stop reason: RX

## 2017-12-18 RX ORDER — DONEPEZIL HYDROCHLORIDE 10 MG/1
TABLET, FILM COATED ORAL
Qty: 90 TABLET | Refills: 1 | Status: SHIPPED | OUTPATIENT
Start: 2017-12-18 | End: 2018-06-25 | Stop reason: SDUPTHER

## 2018-01-19 ENCOUNTER — OFFICE VISIT (OUTPATIENT)
Dept: FAMILY MEDICINE CLINIC | Facility: CLINIC | Age: 83
End: 2018-01-19

## 2018-01-19 VITALS
TEMPERATURE: 98.3 F | SYSTOLIC BLOOD PRESSURE: 122 MMHG | HEART RATE: 108 BPM | OXYGEN SATURATION: 98 % | HEIGHT: 61 IN | BODY MASS INDEX: 35.3 KG/M2 | WEIGHT: 187 LBS | DIASTOLIC BLOOD PRESSURE: 64 MMHG

## 2018-01-19 DIAGNOSIS — G30.0 EARLY ONSET ALZHEIMER'S DEMENTIA WITHOUT BEHAVIORAL DISTURBANCE (HCC): ICD-10-CM

## 2018-01-19 DIAGNOSIS — E11.22 TYPE 2 DIABETES MELLITUS WITH STAGE 1 CHRONIC KIDNEY DISEASE, WITHOUT LONG-TERM CURRENT USE OF INSULIN (HCC): ICD-10-CM

## 2018-01-19 DIAGNOSIS — Z79.899 HIGH RISK MEDICATION USE: ICD-10-CM

## 2018-01-19 DIAGNOSIS — N28.9 RENAL INSUFFICIENCY: ICD-10-CM

## 2018-01-19 DIAGNOSIS — E03.4 HYPOTHYROIDISM DUE TO ACQUIRED ATROPHY OF THYROID: ICD-10-CM

## 2018-01-19 DIAGNOSIS — K58.1 IRRITABLE BOWEL SYNDROME WITH CONSTIPATION: ICD-10-CM

## 2018-01-19 DIAGNOSIS — N18.1 TYPE 2 DIABETES MELLITUS WITH STAGE 1 CHRONIC KIDNEY DISEASE, WITHOUT LONG-TERM CURRENT USE OF INSULIN (HCC): ICD-10-CM

## 2018-01-19 DIAGNOSIS — J30.9 ALLERGIC RHINITIS, UNSPECIFIED CHRONICITY, UNSPECIFIED SEASONALITY, UNSPECIFIED TRIGGER: Primary | ICD-10-CM

## 2018-01-19 DIAGNOSIS — M19.90 ARTHRITIS: ICD-10-CM

## 2018-01-19 DIAGNOSIS — M48.061 SPINAL STENOSIS OF LUMBAR REGION, UNSPECIFIED WHETHER NEUROGENIC CLAUDICATION PRESENT: ICD-10-CM

## 2018-01-19 DIAGNOSIS — F02.80 EARLY ONSET ALZHEIMER'S DEMENTIA WITHOUT BEHAVIORAL DISTURBANCE (HCC): ICD-10-CM

## 2018-01-19 LAB
BUN SERPL-MCNC: 17 MG/DL (ref 8–23)
BUN/CREAT SERPL: 15.6 (ref 7–25)
CALCIUM SERPL-MCNC: 9.2 MG/DL (ref 8.6–10.5)
CHLORIDE SERPL-SCNC: 99 MMOL/L (ref 98–107)
CO2 SERPL-SCNC: 21.6 MMOL/L (ref 22–29)
CREAT SERPL-MCNC: 1.09 MG/DL (ref 0.57–1)
GLUCOSE SERPL-MCNC: 149 MG/DL (ref 65–99)
HBA1C MFR BLD: 6.41 % (ref 4.8–5.6)
POTASSIUM SERPL-SCNC: 4.1 MMOL/L (ref 3.5–5.2)
SODIUM SERPL-SCNC: 135 MMOL/L (ref 136–145)

## 2018-01-19 PROCEDURE — 99213 OFFICE O/P EST LOW 20 MIN: CPT | Performed by: FAMILY MEDICINE

## 2018-01-19 RX ORDER — HYDROCODONE BITARTRATE AND ACETAMINOPHEN 5; 325 MG/1; MG/1
1 TABLET ORAL EVERY 4 HOURS PRN
Qty: 40 TABLET | Refills: 0 | Status: SHIPPED | OUTPATIENT
Start: 2018-01-19 | End: 2018-04-20 | Stop reason: SDUPTHER

## 2018-01-19 NOTE — PROGRESS NOTES
HPI  Wendie Houser is a 82 y.o. female who is here for follow up of diabetes hypertension and hypothyroidism and other medical problems listed below.  Also some questionable mild dementia.  Continues to investigate assisted living situations but overall is holding fairly steady at this time.  Continues with arthritic pains and is requesting pain medications for occasional when necessary use      Review of Systems   Respiratory: Negative for cough and shortness of breath.    Cardiovascular: Negative for chest pain.   Musculoskeletal: Positive for arthralgias.   Allergic/Immunologic: Positive for environmental allergies.   All other systems reviewed and are negative.        Past Medical History:   Diagnosis Date   • Anemia    • Arthritis    • CHF (congestive heart failure)    • Congestive heart failure    • Depression    • Diabetes mellitus     type 2   • Diverticulitis of colon 08/25/2016    Acute uncomplicated diverticulitis at the distal transverse colon   • Early onset Alzheimer's dementia    • GERD (gastroesophageal reflux disease)    • Heart disease    • Hyperlipidemia    • Hypertension    • Hypothyroidism    • Kidney disease    • Sleep apnea        Past Surgical History:   Procedure Laterality Date   • BREAST CYST EXCISION Right 1989    Benign   • COLONOSCOPY  2011    Dr. Cain   • ENDOSCOPY  04/15/2011   • EYE SURGERY  2009   • GALLBLADDER SURGERY  1960    Open   • TOTAL KNEE ARTHROPLASTY Left 2012    Dr. Soler   • VAGINAL HYSTERECTOMY  1999       Family History   Problem Relation Age of Onset   • Other Mother      cardiac disorder   • Hypertension Mother    • Heart disease Mother    • Hypertension Sister    • Heart attack Sister    • Cancer Brother    • Hypertension Brother    • Heart attack Brother    • Heart disease Father        Social History     Social History   • Marital status:      Spouse name: N/A   • Number of children: N/A   • Years of education: N/A     Occupational History   • Not  on file.     Social History Main Topics   • Smoking status: Never Smoker   • Smokeless tobacco: Never Used   • Alcohol use No   • Drug use: No   • Sexual activity: No     Other Topics Concern   • Not on file     Social History Narrative         Physical Exam   Constitutional: She is oriented to person, place, and time. She appears well-developed and well-nourished. No distress.   HENT:   Head: Normocephalic.   Mouth/Throat: Oropharynx is clear and moist.   Eyes: Conjunctivae are normal. Pupils are equal, round, and reactive to light.   Neck: Normal range of motion.   Cardiovascular: Normal rate and regular rhythm.    Pulmonary/Chest: Effort normal. No respiratory distress.   Abdominal: Soft. She exhibits no distension. There is no tenderness.   Musculoskeletal: She exhibits deformity. She exhibits no edema.   Osteoarthritic changes especially knees.  Ambulates using a cane with significant limps   Neurological: She is alert and oriented to person, place, and time. She exhibits normal muscle tone. Coordination normal.   Skin: Skin is warm and dry.   Psychiatric: She has a normal mood and affect. Her behavior is normal. Judgment and thought content normal.   Nursing note and vitals reviewed.        Assessment/Plan    Wendie was seen today for diabetes, hypothyroidism and hyperlipidemia.    Diagnoses and all orders for this visit:    Allergic rhinitis, unspecified chronicity, unspecified seasonality, unspecified trigger    Type 2 diabetes mellitus with stage 1 chronic kidney disease, without long-term current use of insulin  -     Hemoglobin A1c    Hypothyroidism due to acquired atrophy of thyroid    Early onset Alzheimer's dementia without behavioral disturbance    High risk medication use    Renal insufficiency  -     Basic Metabolic Panel    Spinal stenosis of lumbar region, unspecified whether neurogenic claudication present    Irritable bowel syndrome with constipation    Arthritis  -     HYDROcodone-acetaminophen  (NORCO) 5-325 MG per tablet; Take 1 tablet by mouth Every 4 (Four) Hours As Needed for Moderate Pain .      Patient is here for routine follow-up especially of above-noted medical problems.  Seems to be very stable physically and mentally over the last 3 months.  Reviewed most recent lab work which was all basically unremarkable.  Will again follow-up in 3 months including thyroid panel at that time.    This note includes information entered using a voice recognition dictation system.  Though reviewed, some nonsensible errors may remain.

## 2018-03-08 RX ORDER — LEVOTHYROXINE SODIUM 0.03 MG/1
TABLET ORAL
Qty: 90 TABLET | Refills: 3 | Status: SHIPPED | OUTPATIENT
Start: 2018-03-08 | End: 2018-10-23 | Stop reason: SDUPTHER

## 2018-04-20 ENCOUNTER — OFFICE VISIT (OUTPATIENT)
Dept: FAMILY MEDICINE CLINIC | Facility: CLINIC | Age: 83
End: 2018-04-20

## 2018-04-20 VITALS
DIASTOLIC BLOOD PRESSURE: 60 MMHG | SYSTOLIC BLOOD PRESSURE: 114 MMHG | WEIGHT: 178.8 LBS | HEIGHT: 61 IN | TEMPERATURE: 98 F | RESPIRATION RATE: 16 BRPM | OXYGEN SATURATION: 98 % | HEART RATE: 75 BPM | BODY MASS INDEX: 33.76 KG/M2

## 2018-04-20 DIAGNOSIS — E03.4 HYPOTHYROIDISM DUE TO ACQUIRED ATROPHY OF THYROID: Primary | ICD-10-CM

## 2018-04-20 DIAGNOSIS — N28.9 RENAL INSUFFICIENCY: ICD-10-CM

## 2018-04-20 DIAGNOSIS — G30.0 EARLY ONSET ALZHEIMER'S DEMENTIA WITHOUT BEHAVIORAL DISTURBANCE (HCC): ICD-10-CM

## 2018-04-20 DIAGNOSIS — E11.22 TYPE 2 DIABETES MELLITUS WITH STAGE 1 CHRONIC KIDNEY DISEASE, WITHOUT LONG-TERM CURRENT USE OF INSULIN (HCC): ICD-10-CM

## 2018-04-20 DIAGNOSIS — Z79.899 HIGH RISK MEDICATION USE: ICD-10-CM

## 2018-04-20 DIAGNOSIS — N18.1 TYPE 2 DIABETES MELLITUS WITH STAGE 1 CHRONIC KIDNEY DISEASE, WITHOUT LONG-TERM CURRENT USE OF INSULIN (HCC): ICD-10-CM

## 2018-04-20 DIAGNOSIS — F02.80 EARLY ONSET ALZHEIMER'S DEMENTIA WITHOUT BEHAVIORAL DISTURBANCE (HCC): ICD-10-CM

## 2018-04-20 DIAGNOSIS — M19.90 ARTHRITIS: ICD-10-CM

## 2018-04-20 LAB
ALBUMIN SERPL-MCNC: 3.7 G/DL (ref 3.5–5.2)
ALBUMIN/GLOB SERPL: 1.1 G/DL
ALP SERPL-CCNC: 67 U/L (ref 39–117)
ALT SERPL-CCNC: 7 U/L (ref 1–33)
AST SERPL-CCNC: 12 U/L (ref 1–32)
BASOPHILS # BLD AUTO: 0.05 10*3/MM3 (ref 0–0.2)
BASOPHILS NFR BLD AUTO: 0.5 % (ref 0–1.5)
BILIRUB SERPL-MCNC: <0.2 MG/DL (ref 0.1–1.2)
BUN SERPL-MCNC: 22 MG/DL (ref 8–23)
BUN/CREAT SERPL: 22.4 (ref 7–25)
CALCIUM SERPL-MCNC: 9.4 MG/DL (ref 8.6–10.5)
CHLORIDE SERPL-SCNC: 103 MMOL/L (ref 98–107)
CO2 SERPL-SCNC: 22.9 MMOL/L (ref 22–29)
CREAT SERPL-MCNC: 0.98 MG/DL (ref 0.57–1)
EOSINOPHIL # BLD AUTO: 0.14 10*3/MM3 (ref 0–0.7)
EOSINOPHIL NFR BLD AUTO: 1.5 % (ref 0.3–6.2)
ERYTHROCYTE [DISTWIDTH] IN BLOOD BY AUTOMATED COUNT: 16.4 % (ref 11.7–13)
GFR SERPLBLD CREATININE-BSD FMLA CKD-EPI: 54 ML/MIN/1.73
GFR SERPLBLD CREATININE-BSD FMLA CKD-EPI: 66 ML/MIN/1.73
GLOBULIN SER CALC-MCNC: 3.3 GM/DL
GLUCOSE SERPL-MCNC: 105 MG/DL (ref 65–99)
HBA1C MFR BLD: 6.3 % (ref 4.8–5.6)
HCT VFR BLD AUTO: 40.3 % (ref 35.6–45.5)
HGB BLD-MCNC: 12.6 G/DL (ref 11.9–15.5)
IMM GRANULOCYTES # BLD: 0.02 10*3/MM3 (ref 0–0.03)
IMM GRANULOCYTES NFR BLD: 0.2 % (ref 0–0.5)
LYMPHOCYTES # BLD AUTO: 3.93 10*3/MM3 (ref 0.9–4.8)
LYMPHOCYTES NFR BLD AUTO: 41.7 % (ref 19.6–45.3)
MCH RBC QN AUTO: 27.2 PG (ref 26.9–32)
MCHC RBC AUTO-ENTMCNC: 31.3 G/DL (ref 32.4–36.3)
MCV RBC AUTO: 86.9 FL (ref 80.5–98.2)
MONOCYTES # BLD AUTO: 0.6 10*3/MM3 (ref 0.2–1.2)
MONOCYTES NFR BLD AUTO: 6.4 % (ref 5–12)
NEUTROPHILS # BLD AUTO: 4.68 10*3/MM3 (ref 1.9–8.1)
NEUTROPHILS NFR BLD AUTO: 49.7 % (ref 42.7–76)
PLATELET # BLD AUTO: 271 10*3/MM3 (ref 140–500)
POTASSIUM SERPL-SCNC: 4.3 MMOL/L (ref 3.5–5.2)
PROT SERPL-MCNC: 7 G/DL (ref 6–8.5)
RBC # BLD AUTO: 4.64 10*6/MM3 (ref 3.9–5.2)
SODIUM SERPL-SCNC: 140 MMOL/L (ref 136–145)
T4 FREE SERPL-MCNC: 0.93 NG/DL (ref 0.93–1.7)
TSH SERPL DL<=0.005 MIU/L-ACNC: 1.66 MIU/ML (ref 0.27–4.2)
WBC # BLD AUTO: 9.42 10*3/MM3 (ref 4.5–10.7)

## 2018-04-20 PROCEDURE — 99213 OFFICE O/P EST LOW 20 MIN: CPT | Performed by: FAMILY MEDICINE

## 2018-04-20 RX ORDER — HYDROCODONE BITARTRATE AND ACETAMINOPHEN 5; 325 MG/1; MG/1
1 TABLET ORAL EVERY 4 HOURS PRN
Qty: 40 TABLET | Refills: 0 | Status: SHIPPED | OUTPATIENT
Start: 2018-04-20 | End: 2018-06-08 | Stop reason: SDUPTHER

## 2018-04-20 NOTE — PROGRESS NOTES
HPI  Wendie Houser is a 83 y.o. female who is here for follow up of diabetes hypothyroidism and hypertension.  Overall denies any new complaints and seems to be doing well on current regimen.      Review of Systems   All other systems reviewed and are negative.        Past Medical History:   Diagnosis Date   • Anemia    • Arthritis    • CHF (congestive heart failure)    • Congestive heart failure    • Depression    • Diabetes mellitus     type 2   • Diverticulitis of colon 08/25/2016    Acute uncomplicated diverticulitis at the distal transverse colon   • Early onset Alzheimer's dementia    • GERD (gastroesophageal reflux disease)    • Heart disease    • Hyperlipidemia    • Hypertension    • Hypothyroidism    • Kidney disease    • Sleep apnea        Past Surgical History:   Procedure Laterality Date   • BREAST CYST EXCISION Right 1989    Benign   • COLONOSCOPY  2011    Dr. Cain   • ENDOSCOPY  04/15/2011   • EYE SURGERY  2009   • GALLBLADDER SURGERY  1960    Open   • TOTAL KNEE ARTHROPLASTY Left 2012    Dr. Soler   • VAGINAL HYSTERECTOMY  1999       Family History   Problem Relation Age of Onset   • Other Mother      cardiac disorder   • Hypertension Mother    • Heart disease Mother    • Hypertension Sister    • Heart attack Sister    • Cancer Brother    • Hypertension Brother    • Heart attack Brother    • Heart disease Father        Social History     Social History   • Marital status:      Spouse name: N/A   • Number of children: N/A   • Years of education: N/A     Occupational History   • Not on file.     Social History Main Topics   • Smoking status: Never Smoker   • Smokeless tobacco: Never Used   • Alcohol use No   • Drug use: No   • Sexual activity: No     Other Topics Concern   • Not on file     Social History Narrative   • No narrative on file         Physical Exam   Constitutional: She is oriented to person, place, and time. She appears well-developed and well-nourished. No distress.   HENT:    Head: Normocephalic and atraumatic.   Nose: Nose normal.   Mouth/Throat: Oropharynx is clear and moist.   Eyes: EOM are normal. Pupils are equal, round, and reactive to light.   Neck: Normal range of motion. Neck supple.   Cardiovascular: Normal rate and regular rhythm.    Pulmonary/Chest: Effort normal. No respiratory distress.   Musculoskeletal: She exhibits no edema or deformity.   Neurological: She is alert and oriented to person, place, and time. Coordination normal.   Skin: Skin is warm and dry.   Psychiatric: She has a normal mood and affect. Her behavior is normal. Judgment and thought content normal.   Vitals reviewed.        Assessment/Plan    Wendie was seen today for diabetes.    Diagnoses and all orders for this visit:    Hypothyroidism due to acquired atrophy of thyroid  -     TSH+Free T4    Type 2 diabetes mellitus with stage 1 chronic kidney disease, without long-term current use of insulin  -     Hemoglobin A1c    Renal insufficiency  -     Comprehensive Metabolic Panel    High risk medication use  -     CBC & Differential  -     Comprehensive Metabolic Panel    Early onset Alzheimer's dementia without behavioral disturbance    Arthritis  -     HYDROcodone-acetaminophen (NORCO) 5-325 MG per tablet; Take 1 tablet by mouth Every 4 (Four) Hours As Needed for Moderate Pain .        Patient is here for routine follow-up of above-noted medical problems.  Seems to be doing very well on current regimen which will be continued including follow-up every 3 months.    This note includes information entered using a voice recognition dictation system.  Though reviewed, some nonsensible errors may remain.

## 2018-05-01 ENCOUNTER — TELEPHONE (OUTPATIENT)
Dept: FAMILY MEDICINE CLINIC | Facility: CLINIC | Age: 83
End: 2018-05-01

## 2018-05-01 RX ORDER — SERTRALINE HYDROCHLORIDE 100 MG/1
TABLET, FILM COATED ORAL
Qty: 90 TABLET | Refills: 3 | Status: SHIPPED | OUTPATIENT
Start: 2018-05-01 | End: 2018-10-23 | Stop reason: SDUPTHER

## 2018-06-08 ENCOUNTER — OFFICE VISIT (OUTPATIENT)
Dept: FAMILY MEDICINE CLINIC | Facility: CLINIC | Age: 83
End: 2018-06-08

## 2018-06-08 VITALS
HEART RATE: 83 BPM | TEMPERATURE: 98.6 F | SYSTOLIC BLOOD PRESSURE: 106 MMHG | BODY MASS INDEX: 33.04 KG/M2 | DIASTOLIC BLOOD PRESSURE: 70 MMHG | HEIGHT: 61 IN | WEIGHT: 175 LBS | RESPIRATION RATE: 16 BRPM

## 2018-06-08 DIAGNOSIS — E03.4 HYPOTHYROIDISM DUE TO ACQUIRED ATROPHY OF THYROID: ICD-10-CM

## 2018-06-08 DIAGNOSIS — Z02.9 ADMINISTRATIVE ENCOUNTER: Primary | ICD-10-CM

## 2018-06-08 DIAGNOSIS — E11.22 TYPE 2 DIABETES MELLITUS WITH STAGE 1 CHRONIC KIDNEY DISEASE, WITHOUT LONG-TERM CURRENT USE OF INSULIN (HCC): ICD-10-CM

## 2018-06-08 DIAGNOSIS — M19.90 ARTHRITIS: ICD-10-CM

## 2018-06-08 DIAGNOSIS — M48.061 SPINAL STENOSIS OF LUMBAR REGION, UNSPECIFIED WHETHER NEUROGENIC CLAUDICATION PRESENT: ICD-10-CM

## 2018-06-08 DIAGNOSIS — N18.1 TYPE 2 DIABETES MELLITUS WITH STAGE 1 CHRONIC KIDNEY DISEASE, WITHOUT LONG-TERM CURRENT USE OF INSULIN (HCC): ICD-10-CM

## 2018-06-08 PROCEDURE — 99213 OFFICE O/P EST LOW 20 MIN: CPT | Performed by: FAMILY MEDICINE

## 2018-06-08 RX ORDER — HYDROCODONE BITARTRATE AND ACETAMINOPHEN 5; 325 MG/1; MG/1
1 TABLET ORAL EVERY 4 HOURS PRN
Qty: 40 TABLET | Refills: 0 | Status: SHIPPED | OUTPATIENT
Start: 2018-06-08 | End: 2018-07-23 | Stop reason: SDUPTHER

## 2018-06-08 NOTE — PROGRESS NOTES
HPI  Wendie Houser is a 83 y.o. female who is here to have forms completed for assisted living.  Multiple forms filled out.  Main problem is arthritis and spinal stenosis which is causing a lot of pain.  Also requesting pain pills which she uses on a limited basis.       Review of Systems   Musculoskeletal: Positive for arthralgias, back pain and gait problem.   All other systems reviewed and are negative.        Past Medical History:   Diagnosis Date   • Anemia    • Arthritis    • CHF (congestive heart failure)    • Congestive heart failure    • Depression    • Diabetes mellitus     type 2   • Diverticulitis of colon 08/25/2016    Acute uncomplicated diverticulitis at the distal transverse colon   • Early onset Alzheimer's dementia    • GERD (gastroesophageal reflux disease)    • Heart disease    • Hyperlipidemia    • Hypertension    • Hypothyroidism    • Kidney disease    • Sleep apnea        Past Surgical History:   Procedure Laterality Date   • BREAST CYST EXCISION Right 1989    Benign   • COLONOSCOPY  2011    Dr. Cain   • ENDOSCOPY  04/15/2011   • EYE SURGERY  2009   • GALLBLADDER SURGERY  1960    Open   • TOTAL KNEE ARTHROPLASTY Left 2012    Dr. Soler   • VAGINAL HYSTERECTOMY  1999       Family History   Problem Relation Age of Onset   • Other Mother         cardiac disorder   • Hypertension Mother    • Heart disease Mother    • Hypertension Sister    • Heart attack Sister    • Cancer Brother    • Hypertension Brother    • Heart attack Brother    • Heart disease Father        Social History     Social History   • Marital status:      Spouse name: N/A   • Number of children: N/A   • Years of education: N/A     Occupational History   • Not on file.     Social History Main Topics   • Smoking status: Never Smoker   • Smokeless tobacco: Never Used   • Alcohol use No   • Drug use: No   • Sexual activity: No     Other Topics Concern   • Not on file     Social History Narrative   • No narrative on file          Physical Exam   Constitutional: She is oriented to person, place, and time. She appears well-developed and well-nourished. No distress.   HENT:   Head: Normocephalic.   Nose: Nose normal.   Mouth/Throat: Oropharynx is clear and moist.   Eyes: EOM are normal. Pupils are equal, round, and reactive to light.   Neck: Normal range of motion. No thyromegaly present.   Cardiovascular: Normal rate and regular rhythm.    Pulmonary/Chest: Effort normal. No respiratory distress.   Abdominal: Soft. She exhibits no distension.   Musculoskeletal: Normal range of motion. She exhibits no edema or deformity.   Ambulates with great difficulty using a cane or walker   Neurological: She is alert and oriented to person, place, and time. Coordination normal.   Skin: Skin is warm and dry.   Psychiatric: She has a normal mood and affect. Her speech is normal and behavior is normal. Judgment and thought content normal. Cognition and memory are normal.   Patient was started on Aricept by previous physician because of strong family history of Alzheimer's dementia   Nursing note and vitals reviewed.        Assessment/Plan    Wendie was seen today for annual exam.    Diagnoses and all orders for this visit:    Administrative encounter    Arthritis  -     HYDROcodone-acetaminophen (NORCO) 5-325 MG per tablet; Take 1 tablet by mouth Every 4 (Four) Hours As Needed for Moderate Pain .    Hypothyroidism due to acquired atrophy of thyroid    Type 2 diabetes mellitus with stage 1 chronic kidney disease, without long-term current use of insulin    Spinal stenosis of lumbar region, unspecified whether neurogenic claudication present      Patient with multiple ongoing medical issues some of which are noted above.  Mostly here to have several pages of forms completed to qualify for assisted living.  Has obvious severe arthritic and spinal changes.  Overall seems to be doing very well on current regimen including occasional use of pain medication.   No evidence of misuse nor abuse of medicine which will be continued.  Will keep close monitoring every 3 months.    This note includes information entered using a voice recognition dictation system.  Though reviewed, some nonsensible errors may remain.

## 2018-06-26 RX ORDER — DONEPEZIL HYDROCHLORIDE 10 MG/1
TABLET, FILM COATED ORAL
Qty: 90 TABLET | Refills: 1 | Status: SHIPPED | OUTPATIENT
Start: 2018-06-26 | End: 2018-10-23 | Stop reason: SDUPTHER

## 2018-07-23 ENCOUNTER — OFFICE VISIT (OUTPATIENT)
Dept: FAMILY MEDICINE CLINIC | Facility: CLINIC | Age: 83
End: 2018-07-23

## 2018-07-23 VITALS
DIASTOLIC BLOOD PRESSURE: 68 MMHG | TEMPERATURE: 98.2 F | BODY MASS INDEX: 32.81 KG/M2 | OXYGEN SATURATION: 98 % | HEIGHT: 61 IN | WEIGHT: 173.8 LBS | SYSTOLIC BLOOD PRESSURE: 128 MMHG | HEART RATE: 93 BPM

## 2018-07-23 DIAGNOSIS — E03.4 HYPOTHYROIDISM DUE TO ACQUIRED ATROPHY OF THYROID: ICD-10-CM

## 2018-07-23 DIAGNOSIS — J30.9 ALLERGIC RHINITIS, UNSPECIFIED CHRONICITY, UNSPECIFIED SEASONALITY, UNSPECIFIED TRIGGER: ICD-10-CM

## 2018-07-23 DIAGNOSIS — K58.1 IRRITABLE BOWEL SYNDROME WITH CONSTIPATION: ICD-10-CM

## 2018-07-23 DIAGNOSIS — Z79.899 HIGH RISK MEDICATION USE: ICD-10-CM

## 2018-07-23 DIAGNOSIS — N28.9 RENAL INSUFFICIENCY: ICD-10-CM

## 2018-07-23 DIAGNOSIS — F02.80 EARLY ONSET ALZHEIMER'S DEMENTIA WITHOUT BEHAVIORAL DISTURBANCE (HCC): Primary | ICD-10-CM

## 2018-07-23 DIAGNOSIS — G30.0 EARLY ONSET ALZHEIMER'S DEMENTIA WITHOUT BEHAVIORAL DISTURBANCE (HCC): Primary | ICD-10-CM

## 2018-07-23 DIAGNOSIS — E11.9 CONTROLLED TYPE 2 DIABETES MELLITUS WITHOUT COMPLICATION, WITHOUT LONG-TERM CURRENT USE OF INSULIN (HCC): ICD-10-CM

## 2018-07-23 DIAGNOSIS — I10 HYPERTENSION, UNSPECIFIED TYPE: ICD-10-CM

## 2018-07-23 DIAGNOSIS — M19.90 ARTHRITIS: ICD-10-CM

## 2018-07-23 LAB
BUN SERPL-MCNC: 20 MG/DL (ref 8–23)
BUN/CREAT SERPL: 13.7 (ref 7–25)
CALCIUM SERPL-MCNC: 9.2 MG/DL (ref 8.6–10.5)
CHLORIDE SERPL-SCNC: 99 MMOL/L (ref 98–107)
CO2 SERPL-SCNC: 22.8 MMOL/L (ref 22–29)
CREAT SERPL-MCNC: 1.46 MG/DL (ref 0.57–1)
GLUCOSE SERPL-MCNC: 92 MG/DL (ref 65–99)
HBA1C MFR BLD: 5.9 % (ref 4.8–5.6)
POTASSIUM SERPL-SCNC: 4.9 MMOL/L (ref 3.5–5.2)
SODIUM SERPL-SCNC: 136 MMOL/L (ref 136–145)
T4 FREE SERPL-MCNC: 1.04 NG/DL (ref 0.93–1.7)
TSH SERPL DL<=0.005 MIU/L-ACNC: 1.33 MIU/ML (ref 0.27–4.2)

## 2018-07-23 PROCEDURE — 99213 OFFICE O/P EST LOW 20 MIN: CPT | Performed by: FAMILY MEDICINE

## 2018-07-23 RX ORDER — HYDROCODONE BITARTRATE AND ACETAMINOPHEN 5; 325 MG/1; MG/1
1 TABLET ORAL EVERY 4 HOURS PRN
Qty: 40 TABLET | Refills: 0 | Status: SHIPPED | OUTPATIENT
Start: 2018-07-23 | End: 2018-10-23 | Stop reason: SDUPTHER

## 2018-07-23 RX ORDER — LOSARTAN POTASSIUM 50 MG/1
50 TABLET ORAL DAILY
Qty: 30 TABLET | Refills: 5 | Status: SHIPPED | OUTPATIENT
Start: 2018-07-23 | End: 2018-10-23 | Stop reason: SDUPTHER

## 2018-07-23 NOTE — PROGRESS NOTES
HPI  Wendie Houser is a 83 y.o. female who is here for follow up of hypertension diabetes hypothyroidism and reported dementia.  Living at assisted living facility and reports getting a better diet and is losing some weight.  Overall denies any new complaints.  Has decreased blood pressure medicine to every other day and discussed changing to a lower dose especially with current recall of valsartan?      Review of Systems   Constitutional: Negative for unexpected weight change.   Gastrointestinal: Positive for diarrhea and nausea.   Genitourinary: Positive for decreased urine volume.   Musculoskeletal: Positive for arthralgias and back pain.   Neurological: Positive for dizziness.   All other systems reviewed and are negative.        Past Medical History:   Diagnosis Date   • Anemia    • Arthritis    • CHF (congestive heart failure) (CMS/HCC)    • Congestive heart failure (CMS/HCC)    • Depression    • Diabetes mellitus (CMS/HCC)     type 2   • Diverticulitis of colon 08/25/2016    Acute uncomplicated diverticulitis at the distal transverse colon   • Early onset Alzheimer's dementia    • GERD (gastroesophageal reflux disease)    • Heart disease    • Hyperlipidemia    • Hypertension    • Hypothyroidism    • Kidney disease    • Sleep apnea        Past Surgical History:   Procedure Laterality Date   • BREAST CYST EXCISION Right 1989    Benign   • COLONOSCOPY  2011    Dr. Cain   • ENDOSCOPY  04/15/2011   • EYE SURGERY  2009   • GALLBLADDER SURGERY  1960    Open   • TOTAL KNEE ARTHROPLASTY Left 2012    Dr. Soler   • VAGINAL HYSTERECTOMY  1999       Family History   Problem Relation Age of Onset   • Other Mother         cardiac disorder   • Hypertension Mother    • Heart disease Mother    • Hypertension Sister    • Heart attack Sister    • Cancer Brother    • Hypertension Brother    • Heart attack Brother    • Heart disease Father        Social History     Social History   • Marital status:      Spouse  name: N/A   • Number of children: N/A   • Years of education: N/A     Occupational History   • Not on file.     Social History Main Topics   • Smoking status: Never Smoker   • Smokeless tobacco: Never Used   • Alcohol use No   • Drug use: No   • Sexual activity: No     Other Topics Concern   • Not on file     Social History Narrative   • No narrative on file         Physical Exam   Constitutional: She is oriented to person, place, and time. She appears well-developed and well-nourished. No distress.   HENT:   Head: Normocephalic and atraumatic.   Nose: Nose normal.   Mouth/Throat: Oropharynx is clear and moist.   Eyes: Pupils are equal, round, and reactive to light. EOM are normal.   Neck: Normal range of motion. Neck supple.   Cardiovascular: Normal rate and regular rhythm.    Pulmonary/Chest: Effort normal.   Abdominal: Soft. She exhibits no distension.   Musculoskeletal: She exhibits no edema or deformity.   Neurological: She is alert and oriented to person, place, and time. Coordination normal.   Skin: Skin is warm and dry.   Psychiatric: She has a normal mood and affect. Her behavior is normal. Judgment and thought content normal.   Vitals reviewed.        Assessment/Plan    Wendie was seen today for hypothyroidism.    Diagnoses and all orders for this visit:    Early onset Alzheimer's dementia without behavioral disturbance    Allergic rhinitis, unspecified chronicity, unspecified seasonality, unspecified trigger    Hypothyroidism due to acquired atrophy of thyroid  -     TSH+Free T4    High risk medication use    Renal insufficiency  -     Basic Metabolic Panel    Irritable bowel syndrome with constipation    Hypertension, unspecified type  -     losartan (COZAAR) 50 MG tablet; Take 1 tablet by mouth Daily.    Controlled type 2 diabetes mellitus without complication, without long-term current use of insulin (CMS/[facility])  -     losartan (COZAAR) 50 MG tablet; Take 1 tablet by mouth Daily.  -     Basic Metabolic  Panel  -     Hemoglobin A1c    Arthritis  -     HYDROcodone-acetaminophen (NORCO) 5-325 MG per tablet; Take 1 tablet by mouth Every 4 (Four) Hours As Needed for Moderate Pain .        Patient is here for routine follow-up of above-noted medical problems.  Reports getting a better diet and is losing some weight.  Had decreased blood pressure medicine to every other day and discussed changing blood pressure medication and monitoring blood pressure.  Especially in view of recent recall of valsartan?  Uses occasional pain medication for her arthritis and obviously there is no evidence of misuse or abuse of medication.  Follow-up in 3 months or as needed.    This note includes information entered using a voice recognition dictation system.  Though reviewed, some nonsensible errors may remain.

## 2018-08-13 ENCOUNTER — OFFICE VISIT (OUTPATIENT)
Dept: FAMILY MEDICINE CLINIC | Facility: CLINIC | Age: 83
End: 2018-08-13

## 2018-08-13 VITALS
OXYGEN SATURATION: 98 % | SYSTOLIC BLOOD PRESSURE: 120 MMHG | BODY MASS INDEX: 33.3 KG/M2 | HEIGHT: 61 IN | TEMPERATURE: 98.3 F | HEART RATE: 82 BPM | DIASTOLIC BLOOD PRESSURE: 62 MMHG | WEIGHT: 176.4 LBS

## 2018-08-13 DIAGNOSIS — N18.1 TYPE 2 DIABETES MELLITUS WITH STAGE 1 CHRONIC KIDNEY DISEASE, WITHOUT LONG-TERM CURRENT USE OF INSULIN (HCC): ICD-10-CM

## 2018-08-13 DIAGNOSIS — E11.22 TYPE 2 DIABETES MELLITUS WITH STAGE 1 CHRONIC KIDNEY DISEASE, WITHOUT LONG-TERM CURRENT USE OF INSULIN (HCC): ICD-10-CM

## 2018-08-13 DIAGNOSIS — M25.551 ARTHRALGIA OF RIGHT HIP: ICD-10-CM

## 2018-08-13 DIAGNOSIS — M19.90 ARTHRITIS: Primary | ICD-10-CM

## 2018-08-13 DIAGNOSIS — N28.9 RENAL INSUFFICIENCY: ICD-10-CM

## 2018-08-13 PROCEDURE — 99214 OFFICE O/P EST MOD 30 MIN: CPT | Performed by: FAMILY MEDICINE

## 2018-08-13 RX ORDER — BLOOD-GLUCOSE METER
1 KIT MISCELLANEOUS DAILY
Qty: 1 EACH | Refills: 12 | Status: SHIPPED | OUTPATIENT
Start: 2018-08-13 | End: 2021-07-27

## 2018-08-13 RX ORDER — METHYLPREDNISOLONE 4 MG/1
TABLET ORAL
Qty: 21 TABLET | Refills: 0 | Status: SHIPPED | OUTPATIENT
Start: 2018-08-13 | End: 2018-10-23

## 2018-08-13 RX ORDER — POTASSIUM CHLORIDE 750 MG/1
10 TABLET, FILM COATED, EXTENDED RELEASE ORAL 2 TIMES DAILY WITH MEALS
Qty: 180 TABLET | Refills: 3 | Status: SHIPPED | OUTPATIENT
Start: 2018-08-13 | End: 2018-10-23 | Stop reason: SDUPTHER

## 2018-08-13 NOTE — PROGRESS NOTES
HPI  Wendie Houser is a 83 y.o. female who is here for a man right groin which led to emergency room visit last Friday.  X-rays and urinalysis were obtained and was told she had severe arthritis in her right hip.  No treatment was given.  Patient is requesting a scooter to help her get down to the cafeteria to eat.  Discussed with patient this would probably require physical therapy evaluation.  Patient also reports some excess sweating though no fever or chills specifically.      Review of Systems   Constitutional: Positive for diaphoresis and fatigue.   Gastrointestinal: Positive for diarrhea.   Musculoskeletal: Positive for arthralgias, back pain and myalgias.   All other systems reviewed and are negative.        Past Medical History:   Diagnosis Date   • Anemia    • Arthritis    • CHF (congestive heart failure) (CMS/HCC)    • Congestive heart failure (CMS/HCC)    • Depression    • Diabetes mellitus (CMS/HCC)     type 2   • Diverticulitis of colon 08/25/2016    Acute uncomplicated diverticulitis at the distal transverse colon   • Early onset Alzheimer's dementia    • GERD (gastroesophageal reflux disease)    • Heart disease    • Hyperlipidemia    • Hypertension    • Hypothyroidism    • Kidney disease    • Sleep apnea        Past Surgical History:   Procedure Laterality Date   • BREAST CYST EXCISION Right 1989    Benign   • COLONOSCOPY  2011    Dr. Cain   • ENDOSCOPY  04/15/2011   • EYE SURGERY  2009   • GALLBLADDER SURGERY  1960    Open   • TOTAL KNEE ARTHROPLASTY Left 2012    Dr. Soler   • VAGINAL HYSTERECTOMY  1999       Family History   Problem Relation Age of Onset   • Other Mother         cardiac disorder   • Hypertension Mother    • Heart disease Mother    • Hypertension Sister    • Heart attack Sister    • Cancer Brother    • Hypertension Brother    • Heart attack Brother    • Heart disease Father        Social History     Social History   • Marital status:      Spouse name: N/A   • Number  of children: N/A   • Years of education: N/A     Occupational History   • Not on file.     Social History Main Topics   • Smoking status: Never Smoker   • Smokeless tobacco: Never Used   • Alcohol use No   • Drug use: No   • Sexual activity: No     Other Topics Concern   • Not on file     Social History Narrative   • No narrative on file         Physical Exam   Constitutional: She is oriented to person, place, and time. She appears well-developed and well-nourished.   HENT:   Head: Normocephalic and atraumatic.   Mouth/Throat: Oropharynx is clear and moist.   Eyes: Pupils are equal, round, and reactive to light. Conjunctivae and EOM are normal.   Neck: Normal range of motion.   Cardiovascular: Normal rate and regular rhythm.    Pulmonary/Chest: Effort normal. No respiratory distress.   Abdominal: Soft. She exhibits no distension. There is no tenderness.   Musculoskeletal: She exhibits edema and deformity.   Ambulates with significant limp related to right hip pain   Neurological: She is alert and oriented to person, place, and time. Coordination abnormal.   Skin: Skin is warm and dry.   Psychiatric: She has a normal mood and affect. Her behavior is normal. Judgment and thought content normal.   Vitals reviewed.        Assessment/Plan    Wendie was seen today for arthritis and back pain.    Diagnoses and all orders for this visit:    Arthritis  -     MethylPREDNISolone (MEDROL) 4 MG tablet; follow package directions  -     CBC & Differential    Arthralgia of right hip  -     MethylPREDNISolone (MEDROL) 4 MG tablet; follow package directions  -     Ambulatory Referral to Home Health  -     CBC & Differential    Renal insufficiency  -     Basic Metabolic Panel  -     CBC & Differential    Type 2 diabetes mellitus with stage 1 chronic kidney disease, without long-term current use of insulin (CMS/[facility])    Other orders  -     glucose monitor monitoring kit; 1 each Daily.        Patient presents with right hip pain which  apparently is limiting her mobility.  Discussed orthopedic consultation but patient refuses any consideration of surgical intervention.  Discussed possible cortisone injection but this is not done at this office.  Will give brief course of Medrol dose pack to see if helps.  Informed patient may affect her diabetes briefly.    This note includes information entered using a voice recognition dictation system.  Though reviewed, some nonsensible errors may remain.

## 2018-08-14 ENCOUNTER — TELEPHONE (OUTPATIENT)
Dept: FAMILY MEDICINE CLINIC | Facility: CLINIC | Age: 83
End: 2018-08-14

## 2018-08-14 DIAGNOSIS — E11.22 TYPE 2 DIABETES MELLITUS WITH STAGE 1 CHRONIC KIDNEY DISEASE, WITHOUT LONG-TERM CURRENT USE OF INSULIN (HCC): Primary | ICD-10-CM

## 2018-08-14 DIAGNOSIS — N18.1 TYPE 2 DIABETES MELLITUS WITH STAGE 1 CHRONIC KIDNEY DISEASE, WITHOUT LONG-TERM CURRENT USE OF INSULIN (HCC): Primary | ICD-10-CM

## 2018-08-14 LAB
BASOPHILS # BLD AUTO: 0 X10E3/UL (ref 0–0.2)
BASOPHILS NFR BLD AUTO: 0 %
BUN SERPL-MCNC: 19 MG/DL (ref 8–27)
BUN/CREAT SERPL: 18 (ref 12–28)
CALCIUM SERPL-MCNC: 9.2 MG/DL (ref 8.7–10.3)
CHLORIDE SERPL-SCNC: 107 MMOL/L (ref 96–106)
CO2 SERPL-SCNC: 21 MMOL/L (ref 20–29)
CREAT SERPL-MCNC: 1.06 MG/DL (ref 0.57–1)
EOSINOPHIL # BLD AUTO: 0.2 X10E3/UL (ref 0–0.4)
EOSINOPHIL NFR BLD AUTO: 2 %
ERYTHROCYTE [DISTWIDTH] IN BLOOD BY AUTOMATED COUNT: 15.5 % (ref 12.3–15.4)
GLUCOSE SERPL-MCNC: 111 MG/DL (ref 65–99)
HCT VFR BLD AUTO: 38.4 % (ref 34–46.6)
HGB BLD-MCNC: 12.5 G/DL (ref 11.1–15.9)
IMM GRANULOCYTES # BLD: 0 X10E3/UL (ref 0–0.1)
IMM GRANULOCYTES NFR BLD: 0 %
LYMPHOCYTES # BLD AUTO: 3.8 X10E3/UL (ref 0.7–3.1)
LYMPHOCYTES NFR BLD AUTO: 47 %
MCH RBC QN AUTO: 27.3 PG (ref 26.6–33)
MCHC RBC AUTO-ENTMCNC: 32.6 G/DL (ref 31.5–35.7)
MCV RBC AUTO: 84 FL (ref 79–97)
MONOCYTES # BLD AUTO: 0.5 X10E3/UL (ref 0.1–0.9)
MONOCYTES NFR BLD AUTO: 6 %
NEUTROPHILS # BLD AUTO: 3.6 X10E3/UL (ref 1.4–7)
NEUTROPHILS NFR BLD AUTO: 45 %
PLATELET # BLD AUTO: 247 X10E3/UL (ref 150–379)
POTASSIUM SERPL-SCNC: 5 MMOL/L (ref 3.5–5.2)
RBC # BLD AUTO: 4.58 X10E6/UL (ref 3.77–5.28)
SODIUM SERPL-SCNC: 141 MMOL/L (ref 134–144)
WBC # BLD AUTO: 8.1 X10E3/UL (ref 3.4–10.8)

## 2018-08-14 RX ORDER — LANCETS 30 GAUGE
1 EACH MISCELLANEOUS DAILY
Qty: 100 EACH | Refills: 5 | Status: SHIPPED | OUTPATIENT
Start: 2018-08-14 | End: 2021-07-27

## 2018-08-14 NOTE — TELEPHONE ENCOUNTER
----- Message from Daksha Gonzalez sent at 8/14/2018  2:18 PM EDT -----  esvin ordered the meter but not the supplies. Please send a script for the lancets and test strips     srini alfonso in computer

## 2018-10-02 ENCOUNTER — TRANSCRIBE ORDERS (OUTPATIENT)
Dept: ADMINISTRATIVE | Facility: HOSPITAL | Age: 83
End: 2018-10-02

## 2018-10-02 DIAGNOSIS — Z12.31 VISIT FOR SCREENING MAMMOGRAM: Primary | ICD-10-CM

## 2018-10-16 ENCOUNTER — APPOINTMENT (OUTPATIENT)
Dept: MAMMOGRAPHY | Facility: HOSPITAL | Age: 83
End: 2018-10-16
Attending: FAMILY MEDICINE

## 2018-10-23 ENCOUNTER — OFFICE VISIT (OUTPATIENT)
Dept: FAMILY MEDICINE CLINIC | Facility: CLINIC | Age: 83
End: 2018-10-23

## 2018-10-23 VITALS
WEIGHT: 179 LBS | BODY MASS INDEX: 33.79 KG/M2 | TEMPERATURE: 98 F | HEART RATE: 78 BPM | HEIGHT: 61 IN | OXYGEN SATURATION: 98 %

## 2018-10-23 DIAGNOSIS — J30.9 ALLERGIC RHINITIS, UNSPECIFIED SEASONALITY, UNSPECIFIED TRIGGER: Primary | ICD-10-CM

## 2018-10-23 DIAGNOSIS — E03.4 HYPOTHYROIDISM DUE TO ACQUIRED ATROPHY OF THYROID: ICD-10-CM

## 2018-10-23 DIAGNOSIS — G47.30 SLEEP APNEA, UNSPECIFIED TYPE: ICD-10-CM

## 2018-10-23 DIAGNOSIS — N18.1 TYPE 2 DIABETES MELLITUS WITH STAGE 1 CHRONIC KIDNEY DISEASE, WITHOUT LONG-TERM CURRENT USE OF INSULIN (HCC): ICD-10-CM

## 2018-10-23 DIAGNOSIS — E11.22 TYPE 2 DIABETES MELLITUS WITH STAGE 1 CHRONIC KIDNEY DISEASE, WITHOUT LONG-TERM CURRENT USE OF INSULIN (HCC): ICD-10-CM

## 2018-10-23 DIAGNOSIS — E87.5 HYPERKALEMIA: ICD-10-CM

## 2018-10-23 DIAGNOSIS — Z79.899 POLYPHARMACY: ICD-10-CM

## 2018-10-23 DIAGNOSIS — E83.42 HYPOMAGNESEMIA: ICD-10-CM

## 2018-10-23 DIAGNOSIS — I10 HYPERTENSION, UNSPECIFIED TYPE: ICD-10-CM

## 2018-10-23 DIAGNOSIS — K21.9 GASTROESOPHAGEAL REFLUX DISEASE WITHOUT ESOPHAGITIS: ICD-10-CM

## 2018-10-23 DIAGNOSIS — G30.0 EARLY ONSET ALZHEIMER'S DEMENTIA WITHOUT BEHAVIORAL DISTURBANCE (HCC): ICD-10-CM

## 2018-10-23 DIAGNOSIS — M19.90 ARTHRITIS: ICD-10-CM

## 2018-10-23 DIAGNOSIS — F02.80 EARLY ONSET ALZHEIMER'S DEMENTIA WITHOUT BEHAVIORAL DISTURBANCE (HCC): ICD-10-CM

## 2018-10-23 DIAGNOSIS — E78.5 HYPERLIPIDEMIA, UNSPECIFIED HYPERLIPIDEMIA TYPE: ICD-10-CM

## 2018-10-23 DIAGNOSIS — N28.9 RENAL INSUFFICIENCY: ICD-10-CM

## 2018-10-23 DIAGNOSIS — Z79.899 HIGH RISK MEDICATION USE: ICD-10-CM

## 2018-10-23 DIAGNOSIS — F41.8 MIXED ANXIETY DEPRESSIVE DISORDER: ICD-10-CM

## 2018-10-23 DIAGNOSIS — E11.9 CONTROLLED TYPE 2 DIABETES MELLITUS WITHOUT COMPLICATION, WITHOUT LONG-TERM CURRENT USE OF INSULIN (HCC): ICD-10-CM

## 2018-10-23 LAB
ALBUMIN SERPL-MCNC: 4.2 G/DL (ref 3.5–5.2)
ALBUMIN/GLOB SERPL: 1.2 G/DL
ALP SERPL-CCNC: 84 U/L (ref 39–117)
ALT SERPL-CCNC: 9 U/L (ref 1–33)
AST SERPL-CCNC: 12 U/L (ref 1–32)
BASOPHILS # BLD AUTO: 0.04 10*3/MM3 (ref 0–0.2)
BASOPHILS NFR BLD AUTO: 0.5 % (ref 0–1.5)
BILIRUB SERPL-MCNC: 0.2 MG/DL (ref 0.1–1.2)
BUN SERPL-MCNC: 22 MG/DL (ref 8–23)
BUN/CREAT SERPL: 20.2 (ref 7–25)
CALCIUM SERPL-MCNC: 9.7 MG/DL (ref 8.6–10.5)
CHLORIDE SERPL-SCNC: 101 MMOL/L (ref 98–107)
CO2 SERPL-SCNC: 25.3 MMOL/L (ref 22–29)
CREAT SERPL-MCNC: 1.09 MG/DL (ref 0.57–1)
EOSINOPHIL # BLD AUTO: 0.18 10*3/MM3 (ref 0–0.7)
EOSINOPHIL NFR BLD AUTO: 2.1 % (ref 0.3–6.2)
ERYTHROCYTE [DISTWIDTH] IN BLOOD BY AUTOMATED COUNT: 15.6 % (ref 11.7–13)
GLOBULIN SER CALC-MCNC: 3.4 GM/DL
GLUCOSE SERPL-MCNC: 100 MG/DL (ref 65–99)
HCT VFR BLD AUTO: 40.5 % (ref 35.6–45.5)
HGB BLD-MCNC: 12.7 G/DL (ref 11.9–15.5)
IMM GRANULOCYTES # BLD: 0.02 10*3/MM3 (ref 0–0.03)
IMM GRANULOCYTES NFR BLD: 0.2 % (ref 0–0.5)
LYMPHOCYTES # BLD AUTO: 4.4 10*3/MM3 (ref 0.9–4.8)
LYMPHOCYTES NFR BLD AUTO: 52.1 % (ref 19.6–45.3)
MCH RBC QN AUTO: 26.7 PG (ref 26.9–32)
MCHC RBC AUTO-ENTMCNC: 31.4 G/DL (ref 32.4–36.3)
MCV RBC AUTO: 85.1 FL (ref 80.5–98.2)
MONOCYTES # BLD AUTO: 0.58 10*3/MM3 (ref 0.2–1.2)
MONOCYTES NFR BLD AUTO: 6.9 % (ref 5–12)
NEUTROPHILS # BLD AUTO: 3.24 10*3/MM3 (ref 1.9–8.1)
NEUTROPHILS NFR BLD AUTO: 38.4 % (ref 42.7–76)
PLATELET # BLD AUTO: 226 10*3/MM3 (ref 140–500)
POTASSIUM SERPL-SCNC: 4.4 MMOL/L (ref 3.5–5.2)
PROT SERPL-MCNC: 7.6 G/DL (ref 6–8.5)
RBC # BLD AUTO: 4.76 10*6/MM3 (ref 3.9–5.2)
SODIUM SERPL-SCNC: 138 MMOL/L (ref 136–145)
WBC # BLD AUTO: 8.44 10*3/MM3 (ref 4.5–10.7)

## 2018-10-23 PROCEDURE — 99213 OFFICE O/P EST LOW 20 MIN: CPT | Performed by: FAMILY MEDICINE

## 2018-10-23 RX ORDER — HYDROCODONE BITARTRATE AND ACETAMINOPHEN 5; 325 MG/1; MG/1
1 TABLET ORAL EVERY 4 HOURS PRN
Qty: 40 TABLET | Refills: 0 | Status: SHIPPED | OUTPATIENT
Start: 2018-10-23 | End: 2019-01-08 | Stop reason: SDUPTHER

## 2018-10-23 RX ORDER — LOSARTAN POTASSIUM 50 MG/1
25 TABLET ORAL DAILY
Qty: 45 TABLET | Refills: 3 | Status: SHIPPED | OUTPATIENT
Start: 2018-10-23 | End: 2019-12-07 | Stop reason: SDUPTHER

## 2018-10-23 RX ORDER — DONEPEZIL HYDROCHLORIDE 10 MG/1
10 TABLET, FILM COATED ORAL NIGHTLY
Qty: 90 TABLET | Refills: 3 | Status: SHIPPED | OUTPATIENT
Start: 2018-10-23 | End: 2019-04-23 | Stop reason: SDUPTHER

## 2018-10-23 RX ORDER — LEVOTHYROXINE SODIUM 0.03 MG/1
25 TABLET ORAL DAILY
Qty: 90 TABLET | Refills: 3 | Status: SHIPPED | OUTPATIENT
Start: 2018-10-23 | End: 2019-11-22 | Stop reason: SDUPTHER

## 2018-10-23 RX ORDER — POTASSIUM CHLORIDE 750 MG/1
10 TABLET, FILM COATED, EXTENDED RELEASE ORAL 2 TIMES DAILY WITH MEALS
Qty: 180 TABLET | Refills: 3 | Status: SHIPPED | OUTPATIENT
Start: 2018-10-23 | End: 2019-11-26 | Stop reason: SDUPTHER

## 2018-10-23 RX ORDER — PRAVASTATIN SODIUM 40 MG
40 TABLET ORAL NIGHTLY
Qty: 90 TABLET | Refills: 3 | Status: SHIPPED | OUTPATIENT
Start: 2018-10-23 | End: 2019-11-22 | Stop reason: SDUPTHER

## 2018-10-23 RX ORDER — OMEPRAZOLE 20 MG/1
20 CAPSULE, DELAYED RELEASE ORAL DAILY
Qty: 90 CAPSULE | Refills: 3 | Status: SHIPPED | OUTPATIENT
Start: 2018-10-23 | End: 2021-04-27 | Stop reason: SDUPTHER

## 2018-10-23 RX ORDER — SERTRALINE HYDROCHLORIDE 100 MG/1
TABLET, FILM COATED ORAL
Qty: 90 TABLET | Refills: 3 | Status: SHIPPED | OUTPATIENT
Start: 2018-10-23 | End: 2020-01-13

## 2018-10-23 NOTE — PROGRESS NOTES
HPI  Wendie Houser is a 83 y.o. female who is here for follow up multiple ongoing medical issues as noted below.  Patient complains of general arthritic pains which is well controlled with occasional pain medication.  Needs prescriptions sent to mail-order pharmacy and this has all been taken care of.  Overall patient seems to be doing extremely well on current regimen which will be continued including close follow-up every 3 months      Review of Systems   Musculoskeletal: Positive for arthralgias and back pain.         Past Medical History:   Diagnosis Date   • Anemia    • Arthritis    • CHF (congestive heart failure) (CMS/HCC)    • Congestive heart failure (CMS/HCC)    • Depression    • Diabetes mellitus (CMS/HCC)     type 2   • Diverticulitis of colon 08/25/2016    Acute uncomplicated diverticulitis at the distal transverse colon   • Early onset Alzheimer's dementia    • GERD (gastroesophageal reflux disease)    • Heart disease    • Hyperlipidemia    • Hypertension    • Hypothyroidism    • Kidney disease    • Sleep apnea        Past Surgical History:   Procedure Laterality Date   • BREAST CYST EXCISION Right 1989    Benign   • COLONOSCOPY  2011    Dr. Cain   • ENDOSCOPY  04/15/2011   • EYE SURGERY  2009   • GALLBLADDER SURGERY  1960    Open   • TOTAL KNEE ARTHROPLASTY Left 2012    Dr. Soler   • VAGINAL HYSTERECTOMY  1999       Family History   Problem Relation Age of Onset   • Other Mother         cardiac disorder   • Hypertension Mother    • Heart disease Mother    • Hypertension Sister    • Heart attack Sister    • Cancer Brother    • Hypertension Brother    • Heart attack Brother    • Heart disease Father        Social History     Social History   • Marital status:      Spouse name: N/A   • Number of children: N/A   • Years of education: N/A     Occupational History   • Not on file.     Social History Main Topics   • Smoking status: Never Smoker   • Smokeless tobacco: Never Used   • Alcohol use  No   • Drug use: No   • Sexual activity: No     Other Topics Concern   • Not on file     Social History Narrative   • No narrative on file         Physical Exam   Constitutional: She is oriented to person, place, and time. She appears well-developed and well-nourished. No distress.   HENT:   Head: Normocephalic and atraumatic.   Mouth/Throat: Oropharynx is clear and moist.   Eyes: Pupils are equal, round, and reactive to light. Conjunctivae are normal.   Neck: Normal range of motion. No thyromegaly present.   Cardiovascular: Normal rate, regular rhythm and normal heart sounds.    Pulmonary/Chest: Effort normal and breath sounds normal.   Abdominal: Soft.   Musculoskeletal: She exhibits edema and deformity.   Ambulates with walker   Neurological: She is alert and oriented to person, place, and time. Coordination normal.   Skin: Skin is warm and dry.   Psychiatric: She has a normal mood and affect. Her behavior is normal. Judgment and thought content normal.   Nursing note and vitals reviewed.        Assessment/Plan    Wendie was seen today for med refill.    Diagnoses and all orders for this visit:    Allergic rhinitis, unspecified seasonality, unspecified trigger    Sleep apnea, unspecified type    Gastroesophageal reflux disease without esophagitis  -     omeprazole (priLOSEC) 20 MG capsule; Take 1 capsule by mouth Daily.    Hypothyroidism due to acquired atrophy of thyroid  -     levothyroxine (SYNTHROID, LEVOTHROID) 25 MCG tablet; Take 1 tablet by mouth Daily.    Type 2 diabetes mellitus with stage 1 chronic kidney disease, without long-term current use of insulin (CMS/[facility])    Early onset Alzheimer's dementia without behavioral disturbance  -     donepezil (ARICEPT) 10 MG tablet; Take 1 tablet by mouth Every Night.    Arthritis  -     HYDROcodone-acetaminophen (NORCO) 5-325 MG per tablet; Take 1 tablet by mouth Every 4 (Four) Hours As Needed for Moderate Pain .    Renal insufficiency  -     CBC & Differential  -      Comprehensive Metabolic Panel    High risk medication use  -     CBC & Differential  -     Comprehensive Metabolic Panel    Hyperkalemia    Hypomagnesemia    Hyperlipidemia, unspecified hyperlipidemia type  -     pravastatin (PRAVACHOL) 40 MG tablet; Take 1 tablet by mouth Every Night.    Hypertension, unspecified type  -     potassium chloride (K-DUR) 10 MEQ CR tablet; Take 1 tablet by mouth 2 (Two) Times a Day With Meals.  -     losartan (COZAAR) 50 MG tablet; Take 0.5 tablets by mouth Daily.    Controlled type 2 diabetes mellitus without complication, without long-term current use of insulin (CMS/[facility])  -     losartan (COZAAR) 50 MG tablet; Take 0.5 tablets by mouth Daily.    Mixed anxiety depressive disorder  -     sertraline (ZOLOFT) 100 MG tablet; TAKE 1 TABLET BY MOUTH ONCE DAILY.    Polypharmacy      Patient here for routine follow-up of multiple medical problems as noted above.  Patient is doing amazingly well on current regimen which will be continued including close follow-up every 3 months.  She takes an occasional pain pill for arthritic pains with good control.  Obviously patient is not abusing or misusing her medications.  Will continue to monitor closely including office visits every 3 months.    This note includes information entered using a voice recognition dictation system.  Though reviewed, some nonsensible errors may remain.

## 2018-11-19 ENCOUNTER — APPOINTMENT (OUTPATIENT)
Dept: CT IMAGING | Facility: HOSPITAL | Age: 83
End: 2018-11-19

## 2018-11-19 ENCOUNTER — HOSPITAL ENCOUNTER (EMERGENCY)
Facility: HOSPITAL | Age: 83
Discharge: HOME | End: 2018-11-19
Attending: EMERGENCY MEDICINE | Admitting: EMERGENCY MEDICINE

## 2018-11-19 VITALS
OXYGEN SATURATION: 98 % | BODY MASS INDEX: 34.96 KG/M2 | TEMPERATURE: 98.2 F | HEART RATE: 76 BPM | DIASTOLIC BLOOD PRESSURE: 65 MMHG | RESPIRATION RATE: 16 BRPM | SYSTOLIC BLOOD PRESSURE: 130 MMHG | HEIGHT: 60 IN

## 2018-11-19 DIAGNOSIS — N94.89 ADNEXAL MASS: ICD-10-CM

## 2018-11-19 DIAGNOSIS — K57.92 ACUTE DIVERTICULITIS: ICD-10-CM

## 2018-11-19 DIAGNOSIS — M54.6 ACUTE RIGHT-SIDED THORACIC BACK PAIN: Primary | ICD-10-CM

## 2018-11-19 LAB
BILIRUB UR QL STRIP: NEGATIVE
CLARITY UR: CLEAR
COLOR UR: YELLOW
GLUCOSE UR STRIP-MCNC: NEGATIVE MG/DL
HGB UR QL STRIP.AUTO: NEGATIVE
KETONES UR QL STRIP: NEGATIVE
LEUKOCYTE ESTERASE UR QL STRIP.AUTO: NEGATIVE
NITRITE UR QL STRIP: NEGATIVE
PH UR STRIP.AUTO: 6 [PH] (ref 5–8)
PROT UR QL STRIP: NEGATIVE
SP GR UR STRIP: 1.01 (ref 1–1.03)
UROBILINOGEN UR QL STRIP: NORMAL

## 2018-11-19 PROCEDURE — 74176 CT ABD & PELVIS W/O CONTRAST: CPT

## 2018-11-19 PROCEDURE — 99284 EMERGENCY DEPT VISIT MOD MDM: CPT

## 2018-11-19 PROCEDURE — 96372 THER/PROPH/DIAG INJ SC/IM: CPT

## 2018-11-19 PROCEDURE — 81003 URINALYSIS AUTO W/O SCOPE: CPT | Performed by: EMERGENCY MEDICINE

## 2018-11-19 PROCEDURE — 25010000002 HYDROMORPHONE PER 4 MG: Performed by: EMERGENCY MEDICINE

## 2018-11-19 RX ORDER — ONDANSETRON 4 MG/1
4 TABLET, ORALLY DISINTEGRATING ORAL ONCE
Status: COMPLETED | OUTPATIENT
Start: 2018-11-19 | End: 2018-11-19

## 2018-11-19 RX ORDER — METRONIDAZOLE 500 MG/1
500 TABLET ORAL 3 TIMES DAILY
Qty: 21 TABLET | Refills: 0 | Status: SHIPPED | OUTPATIENT
Start: 2018-11-19 | End: 2019-07-09

## 2018-11-19 RX ORDER — CEPHALEXIN 500 MG/1
500 CAPSULE ORAL 3 TIMES DAILY
Qty: 21 CAPSULE | Refills: 0 | Status: SHIPPED | OUTPATIENT
Start: 2018-11-19 | End: 2019-01-08

## 2018-11-19 RX ORDER — HYDROMORPHONE HYDROCHLORIDE 1 MG/ML
0.5 INJECTION, SOLUTION INTRAMUSCULAR; INTRAVENOUS; SUBCUTANEOUS ONCE
Status: COMPLETED | OUTPATIENT
Start: 2018-11-19 | End: 2018-11-19

## 2018-11-19 RX ORDER — CYCLOBENZAPRINE HCL 5 MG
5 TABLET ORAL 3 TIMES DAILY PRN
Qty: 15 TABLET | Refills: 0 | Status: SHIPPED | OUTPATIENT
Start: 2018-11-19 | End: 2019-01-08 | Stop reason: SDUPTHER

## 2018-11-19 RX ADMIN — ONDANSETRON 4 MG: 4 TABLET, ORALLY DISINTEGRATING ORAL at 18:59

## 2018-11-19 RX ADMIN — HYDROMORPHONE HYDROCHLORIDE 0.5 MG: 1 INJECTION, SOLUTION INTRAMUSCULAR; INTRAVENOUS; SUBCUTANEOUS at 18:59

## 2018-11-19 NOTE — ED PROVIDER NOTES
" EMERGENCY DEPARTMENT ENCOUNTER    Room Number:  38/38  PCP: Luis Mcqueen MD  Historian: Patient, Family      HPI  Chief Complaint: Back Pain  Context: Wendie Houser is an 83 y.o. female with h/o chronic pain for which pt takes Oxycodone currently. Pt presents to the ED c/o intermittent episodes of \"spasmic\" right-sided mid/lower back pain onset earlier today. No radiation. No known injury sustained to the back recently. Pt reports that her back pain worsens with movement and improves with remaining still. Pt denies skin rash, documented fever, bowel/bladder incontinence, motor/sensory deficits, saddle anesthesia, urinary retention, abdominal pain, dysuria, hematuria, and chest pain. However, pt has had urinary frequency, nausea, and vomiting. There are no other complaints at this time.       Pain Location: Right mid/lower back  Radiation: None  Character: \"spasmic\"  Duration: Onset earlier today  Severity: Moderate  Progression: Waxes and wanes  Aggravating Factors: Movement  Alleviating Factors: Remaining still         PAST MEDICAL HISTORY  Active Ambulatory Problems     Diagnosis Date Noted   • Arthritis 01/25/2016   • Early onset Alzheimer's dementia 01/25/2016   • Fatigue 01/25/2016   • Gastroesophageal reflux disease 01/25/2016   • Hypomagnesemia 01/25/2016   • Sleep apnea 01/25/2016   • Type 2 diabetes mellitus (CMS/[facility]) 01/25/2016   • Renal insufficiency 10/11/2016   • Hypothyroidism 10/11/2016   • Lumbar canal stenosis 10/11/2016   • Shortness of breath 03/20/2016   • Disorder of electrolytes 07/29/2014   • Hiatal hernia 05/02/2017   • Hyperkalemia 02/02/2017   • Hypoglycemia 02/02/2017   • Irritable bowel syndrome 05/02/2017   • Allergic rhinitis 06/05/2017   • High risk medication use 10/12/2017   • Mixed anxiety depressive disorder 10/23/2018   • Polypharmacy 10/23/2018     Resolved Ambulatory Problems     Diagnosis Date Noted   • Abdominal pain 01/25/2016   • Anemia 01/25/2016   • Anxiety " 01/25/2016   • Diverticulitis of intestine 01/25/2016   • Gastritis 01/25/2016   • Hyperlipidemia 01/25/2016   • Hypertension 01/25/2016   • Cramps of lower extremity 01/25/2016   • Left lower quadrant pain 01/25/2016   • Nausea 01/25/2016   • Renal insufficiency 01/25/2016   • Flu-like symptoms 02/23/2016   • Alopecia 02/23/2016   • 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   • Chest pain in adult 03/22/2016   • Chronic systolic congestive heart failure (CMS/HCC) 03/28/2016   • Diverticulosis large intestine w/o perforation or abscess w/o bleeding 03/28/2016   • Weight gain 03/28/2016   • Vomiting without nausea 08/25/2016   • Fever and chills 08/25/2016   • Diverticulitis of gastrointestinal tract 08/25/2016   • Pain of upper abdomen 08/25/2016   • Muscle cramps 08/25/2016   • Congestive heart failure (CMS/HCC) 10/11/2016     Past Medical History:   Diagnosis Date   • Anemia    • Arthritis    • CHF (congestive heart failure) (CMS/[facility])    • Congestive heart failure (CMS/[facility])    • Depression    • Diabetes mellitus (CMS/[facility])    • Diverticulitis of colon 08/25/2016   • Early onset Alzheimer's dementia    • GERD (gastroesophageal reflux disease)    • Heart disease    • Hyperlipidemia    • Hypertension    • Hypothyroidism    • Kidney disease    • Sleep apnea          PAST SURGICAL HISTORY  Past Surgical History:   Procedure Laterality Date   • BREAST CYST EXCISION Right 1989    Benign   • COLONOSCOPY  2011    Dr. Cain   • ENDOSCOPY  04/15/2011   • EYE SURGERY  2009   • GALLBLADDER SURGERY  1960    Open   • TOTAL KNEE ARTHROPLASTY Left 2012    Dr. Soler   • VAGINAL HYSTERECTOMY  1999         FAMILY HISTORY  Family History   Problem Relation Age of Onset   • Other Mother         cardiac disorder   • Hypertension Mother    • Heart disease Mother    • Hypertension Sister    • Heart attack Sister    • Cancer Brother    • Hypertension Brother    • Heart attack Brother    • Heart disease Father          SOCIAL HISTORY  Social History      Socioeconomic History   • Marital status:      Spouse name: Not on file   • Number of children: Not on file   • Years of education: Not on file   • Highest education level: Not on file   Social Needs   • Financial resource strain: Not on file   • Food insecurity - worry: Not on file   • Food insecurity - inability: Not on file   • Transportation needs - medical: Not on file   • Transportation needs - non-medical: Not on file   Occupational History   • Not on file   Tobacco Use   • Smoking status: Never Smoker   • Smokeless tobacco: Never Used   Substance and Sexual Activity   • Alcohol use: No   • Drug use: No   • Sexual activity: No   Other Topics Concern   • Not on file   Social History Narrative   • Not on file         ALLERGIES  Patient has no known allergies.        REVIEW OF SYSTEMS  Review of Systems   Constitutional: Negative for chills and fever.   HENT: Negative for congestion, rhinorrhea and sore throat.    Eyes: Negative for pain.   Respiratory: Negative for cough.    Cardiovascular: Negative for chest pain, palpitations and leg swelling.   Gastrointestinal: Positive for nausea and vomiting. Negative for abdominal pain and diarrhea.   Genitourinary: Positive for frequency (urinary). Negative for difficulty urinating, dysuria and hematuria.   Musculoskeletal: Positive for back pain. Negative for myalgias, neck pain and neck stiffness.   Skin: Negative for rash.   Neurological: Negative for dizziness, speech difficulty, weakness, light-headedness, numbness and headaches.   Psychiatric/Behavioral: Negative.    All other systems reviewed and are negative.           PHYSICAL EXAM  ED Triage Vitals [11/19/18 1654]   Temp Heart Rate Resp BP SpO2   97.2 °F (36.2 °C) 73 16 130/50 99 %      Temp src Heart Rate Source Patient Position BP Location FiO2 (%)   Tympanic -- -- -- --       Physical Exam   Constitutional: She is oriented to person, place, and time. No distress.   HENT:   Head: Normocephalic.    Mouth/Throat: Mucous membranes are normal.   Eyes: EOM are normal. Pupils are equal, round, and reactive to light.   Neck: Normal range of motion. Neck supple.   Cardiovascular: Normal rate, regular rhythm and normal heart sounds.   Pulmonary/Chest: Effort normal and breath sounds normal. No respiratory distress. She has no decreased breath sounds. She has no wheezes. She has no rhonchi. She has no rales.   Abdominal: Soft. There is no tenderness. There is CVA tenderness (right-sided). There is no rebound and no guarding.   Musculoskeletal: Normal range of motion.   Sensation is intact to light touch throughout the bilateral lower extremities. Muscle strength is 5/5 and symmetrical with plantar flexion and dorsiflexion. Patellar reflexes are 2+ and equal bilaterally. DP and PT pulses are 2+ bilaterally.    Neurological: She is alert and oriented to person, place, and time. She has normal sensation.   Skin: Skin is warm and dry.   Psychiatric: Mood and affect normal.   Nursing note and vitals reviewed.          LAB RESULTS  Recent Results (from the past 24 hour(s))   Urinalysis With Microscopic If Indicated (No Culture) - Urine, Clean Catch    Collection Time: 11/19/18  7:09 PM   Result Value Ref Range    Color, UA Yellow Yellow, Straw    Appearance, UA Clear Clear    pH, UA 6.0 5.0 - 8.0    Specific Gravity, UA 1.013 1.005 - 1.030    Glucose, UA Negative Negative    Ketones, UA Negative Negative    Bilirubin, UA Negative Negative    Blood, UA Negative Negative    Protein, UA Negative Negative    Leuk Esterase, UA Negative Negative    Nitrite, UA Negative Negative    Urobilinogen, UA 0.2 E.U./dL 0.2 - 1.0 E.U./dL       Ordered the above labs and reviewed the results.        RADIOLOGY  CT Abdomen Pelvis Without Contrast   Final Result   1.  Findings of acute diverticulitis involving the mid to distal   descending colon.   2.  1.6 cm left adnexal cystic lesion which is minimally increased in   size since 2016.  Findings are atypical for a patient of this age and   further evaluation with nonemergent pelvic sonogram is recommended.       Findings were discussed with Drew Thomas by me by telephone at 7:20   PM 11/19/2018.       This report was finalized on 11/19/2018 7:29 PM by Dr. Spenser Warren M.D.               Ordered the above noted radiological studies. Reviewed by me in PACS.  Spoke with Dr. Warren (radiologist) regarding CT Abd scan results.          PROCEDURES  Procedures      MEDICATIONS GIVEN IN ER  Medications   HYDROmorphone (DILAUDID) injection 0.5 mg (0.5 mg Intramuscular Given 11/19/18 1859)   ondansetron ODT (ZOFRAN-ODT) disintegrating tablet 4 mg (4 mg Oral Given 11/19/18 1859)             PROGRESS AND CONSULTS  ED Course as of Nov 19 2320 Mon Nov 19, 2018 1952 7:52 PM  Patient here with right sided back pain.  Seems very musculoskeletal.  CT to make sure that it is not a stone shows no kidney stones but inflammation consistent with diverticulitis.  Will treat with muscle relaxer.  Keflex and flagyl.   She will also need ultrasound follow up for adnexal cyst.    [SL]      ED Course User Index  [SL] Brennen Thomas MD       6:40 PM:  UA and CT Abd ordered for further evaluation. Dilaudid and Zofran ordered to treat for back pain.     7:48 PM:  Rechecked pt. Pt reports that she feels better after administration of pain medicine. Informed pt and family that pt's CT Abd shows acute diverticulitis. There is also incidental finding of a left adnexal cyst for which pt was strongly advised to f/u with a GYN for pelvic US. Pt will be prescribed with rx for antibiotics and muscle relaxer. Pt was advised to f/u with PMD. Strict RTER warnings given. Pt and family agree with plan for discharge.             MEDICAL DECISION MAKING      MDM  Number of Diagnoses or Management Options     Amount and/or Complexity of Data Reviewed  Clinical lab tests: ordered and reviewed (Pt's UA results were  reviewed.)  Tests in the radiology section of CPT®: ordered and reviewed (CT Abd:  1.  Findings of acute diverticulitis involving the mid to distal  descending colon.  2.  1.6 cm left adnexal cystic lesion which is minimally increased in  size since 2016. Findings are atypical for a patient of this age and  further evaluation with nonemergent pelvic sonogram is recommended.)  Discussion of test results with the performing providers: yes (CT Abd results d/w radiologist.   )    Patient Progress  Patient progress: stable             DIAGNOSIS  Final diagnoses:   Acute right-sided thoracic back pain   Acute diverticulitis   Adnexal mass           DISPOSITION  Pt discharged.    DISCHARGE    Patient discharged in stable condition.    Reviewed implications of results, diagnosis, meds, responsibility to follow up, warning signs and symptoms of possible worsening, potential complications and reasons to return to ER.    Patient/Family voiced understanding of above instructions.    Discussed plan for discharge, as there is no emergent indication for admission. Pt/family is agreeable and understands need for follow up and repeat testing. Pt is aware that discharge does not mean that nothing is wrong but it indicates no emergency is present that requires admission and they must continue care with follow-up as given below or physician of their choice.     FOLLOW-UP  Luis Mcqueen MD  [address] [id number] [phone]    Schedule an appointment as soon as possible for a visit            Medication List      New Prescriptions    cephalexin 500 MG capsule  Commonly known as:  KEFLEX  Take 1 capsule by mouth 3 (Three) Times a Day.     cyclobenzaprine 5 MG tablet  Commonly known as:  FLEXERIL  Take 1 tablet by mouth 3 (Three) Times a Day As Needed for Muscle Spasms.     metroNIDAZOLE 500 MG tablet  Commonly known as:  FLAGYL  Take 1 tablet by mouth 3 (Three) Times a Day.             Latest Documented Vital  Signs:  As of 11:02 PM  BP- 130/65 HR- 76 Temp- 98.2 °F (36.8 °C) (Oral) O2 sat- 98%        --  Documentation assistance provided by chiquis Kowalski for Dr. William MD.  Information recorded by the scribe was done at my direction and has been verified and validated by me.         Caity Kowalski  11/19/18 8505       Brennen Thomas MD  11/20/18 2108

## 2018-11-20 ENCOUNTER — EPISODE CHANGES (OUTPATIENT)
Dept: SOCIAL WORK | Facility: HOSPITAL | Age: 83
End: 2018-11-20

## 2018-11-20 ENCOUNTER — PATIENT OUTREACH (OUTPATIENT)
Dept: CASE MANAGEMENT | Facility: OTHER | Age: 83
End: 2018-11-20

## 2018-11-20 NOTE — OUTREACH NOTE
Care Management Plan 11/20/2018   Lifestyle Goals Decrease falls risk;Eat a healthy diet;Fewer ER/urgent care visits;Fewer exacerbations;Less pain;Medication management;Routine eye exam;Routine follow-up with doctor(s);Routine foot care;Self monitor blood sugar   Barriers Pain   Self Management Get flu/pneumonia shot;Home Glucose Monitoring;Medication Adherence   Suggested Appointments Other (See Comment)   Suggested Appointments PCP for follow-up following ED visit 1/19/18   Annual Wellness Visit:  (No Data)   Annual Wellness Visit:  CA has been informed by office that PCP does physicals and not AWV's.   Care Gaps Addressed Flu Shot;Diabetic A1C   Specific Disease Process Teaching Diabetes   Does patient have depression diagnosis? Yes   Advanced Directives: Send Information   Ed Visits past 12 months: 3 or more   Medication Adherence Medications understood   Health Literacy Fair     The main concerns and/or symptoms the patient would like to address are: Diverticulitis, herniated disc, and muscle spasms      Education/instruction provided by Care Coordinator: Patient manages medication regimen and self administration of medications at  [facility].  States adherent to regimen and discussed the importance of completing the entire prescribed antibiotic.  States relief of muscle spasms with Flexeril and pain management regimen.  Reports has been off medication for DM for several years and controlled by diet as provided by [employer] [place].  Adherent to blood glucose monitoring daily and this morning blood sugar was 77.  States PCP monitors A1C routinely.  Requests CA assist with scheduling a follow-up appt with Dr. Mcqueen.  Called PCP office and answering service requests I call back, office closed for lunch currently. She requested information be mailed regarding advance directives and enrollment for MY CHART.  EMail to .to forward informational packet to patient Follow-up call to patient regarding PCP appt  and review of gaps in care planned.. Spoke with Dr. Richar Mcdonald's office and appt scheduled for Monday, 11/26/18 at 2PM.  Patient notified and agreeable..          Follow Up Outreach Due: As needed.    Vinay Fonseca RN

## 2018-11-26 ENCOUNTER — OFFICE VISIT (OUTPATIENT)
Dept: FAMILY MEDICINE CLINIC | Facility: CLINIC | Age: 83
End: 2018-11-26

## 2018-11-26 VITALS
DIASTOLIC BLOOD PRESSURE: 60 MMHG | OXYGEN SATURATION: 97 % | TEMPERATURE: 97.9 F | HEIGHT: 60 IN | SYSTOLIC BLOOD PRESSURE: 90 MMHG | BODY MASS INDEX: 35.38 KG/M2 | WEIGHT: 180.2 LBS | RESPIRATION RATE: 16 BRPM | HEART RATE: 87 BPM

## 2018-11-26 DIAGNOSIS — N94.9 ADNEXAL CYST: Primary | ICD-10-CM

## 2018-11-26 DIAGNOSIS — K57.20 DIVERTICULITIS OF LARGE INTESTINE WITH PERFORATION AND ABSCESS WITHOUT BLEEDING: ICD-10-CM

## 2018-11-26 PROCEDURE — 99213 OFFICE O/P EST LOW 20 MIN: CPT | Performed by: FAMILY MEDICINE

## 2018-11-26 NOTE — PROGRESS NOTES
ALEXANDRA Houser is a 83 y.o. female who is here for follow up after recent emergency room visit for right abdominal back pain.  CT scan showed diverticulitis of transverse colon and patient was placed on antibiotic therapy.  Also noted to have an adnexal cyst and ultrasound recommended.  Patient was given muscle relaxers because diagnosed with muscle spasms.  Overall seems to be doing well.  No fever nor chills.  Abdomen exam today is totally nontender      Review of Systems   Constitutional: Negative for chills, diaphoresis and fever.   Gastrointestinal: Positive for abdominal distention. Negative for blood in stool, diarrhea, nausea and vomiting.   Musculoskeletal: Positive for arthralgias.         Past Medical History:   Diagnosis Date   • Anemia    • Arthritis    • CHF (congestive heart failure) (CMS/HCC)    • Congestive heart failure (CMS/HCC)    • Depression    • Diabetes mellitus (CMS/HCC)     type 2   • Diverticulitis of colon 08/25/2016    Acute uncomplicated diverticulitis at the distal transverse colon   • Early onset Alzheimer's dementia    • GERD (gastroesophageal reflux disease)    • Heart disease    • Hyperlipidemia    • Hypertension    • Hypothyroidism    • Kidney disease    • Sleep apnea        Past Surgical History:   Procedure Laterality Date   • BREAST CYST EXCISION Right 1989    Benign   • COLONOSCOPY  2011    Dr. Cain   • ENDOSCOPY  04/15/2011   • EYE SURGERY  2009   • GALLBLADDER SURGERY  1960    Open   • TOTAL KNEE ARTHROPLASTY Left 2012    Dr. Soler   • VAGINAL HYSTERECTOMY  1999       Family History   Problem Relation Age of Onset   • Other Mother         cardiac disorder   • Hypertension Mother    • Heart disease Mother    • Hypertension Sister    • Heart attack Sister    • Cancer Brother    • Hypertension Brother    • Heart attack Brother    • Heart disease Father        Social History     Socioeconomic History   • Marital status:      Spouse name: Not on file   • Number  of children: Not on file   • Years of education: Not on file   • Highest education level: Not on file   Social Needs   • Financial resource strain: Not on file   • Food insecurity - worry: Not on file   • Food insecurity - inability: Not on file   • Transportation needs - medical: Not on file   • Transportation needs - non-medical: Not on file   Occupational History   • Not on file   Tobacco Use   • Smoking status: Never Smoker   • Smokeless tobacco: Never Used   Substance and Sexual Activity   • Alcohol use: No   • Drug use: No   • Sexual activity: No   Other Topics Concern   • Not on file   Social History Narrative   • Not on file         Physical Exam   Constitutional: She appears well-developed and well-nourished. No distress.   HENT:   Head: Normocephalic.   Nose: Nose normal.   Mouth/Throat: Oropharynx is clear and moist.   Eyes: Conjunctivae are normal. Pupils are equal, round, and reactive to light. No scleral icterus.   Cardiovascular: Normal rate and regular rhythm.   Pulmonary/Chest: Effort normal. No respiratory distress.   Abdominal: Soft. She exhibits no distension and no mass. There is no tenderness. There is no guarding.   Neurological: She is alert. Coordination normal.   Skin: Skin is warm and dry.   Psychiatric: She has a normal mood and affect. Her behavior is normal. Judgment and thought content normal.   Nursing note and vitals reviewed.        Assessment/Plan    Wendie was seen today for diverticulitis and spasms.    Diagnoses and all orders for this visit:    Adnexal cyst  -     US Pelvis Complete; Future    Diverticulitis of large intestine with perforation and abscess without bleeding    Patient here for follow-up of recent emergency room visit for diverticulitis.  Symptoms seem to have totally resolved.  CT scan also indicated   Cyst and ultrasound recommended which will be ordered.  Blood pressure is somewhat low and patient had increased blood pressure medication but will decrease back to  one half tablet daily.  Denies any dizziness or instability at this time.  Otherwise keep routine follow-up appointment or if symptoms recur.  Do recommend finishing antibiotic given by emergency room.    This note includes information entered using a voice recognition dictation system.  Though reviewed, some nonsensible errors may remain.

## 2018-11-28 DIAGNOSIS — N94.9 ADNEXAL CYST: Primary | ICD-10-CM

## 2018-12-05 ENCOUNTER — HOSPITAL ENCOUNTER (OUTPATIENT)
Dept: ULTRASOUND IMAGING | Facility: HOSPITAL | Age: 83
End: 2018-12-05
Attending: FAMILY MEDICINE

## 2018-12-11 ENCOUNTER — HOSPITAL ENCOUNTER (OUTPATIENT)
Dept: ULTRASOUND IMAGING | Facility: HOSPITAL | Age: 83
Discharge: HOME | End: 2018-12-11
Attending: FAMILY MEDICINE | Admitting: FAMILY MEDICINE

## 2018-12-11 ENCOUNTER — HOSPITAL ENCOUNTER (OUTPATIENT)
Dept: MAMMOGRAPHY | Facility: HOSPITAL | Age: 83
Discharge: HOME | End: 2018-12-11
Attending: FAMILY MEDICINE

## 2018-12-11 DIAGNOSIS — Z12.31 VISIT FOR SCREENING MAMMOGRAM: ICD-10-CM

## 2018-12-11 DIAGNOSIS — N94.9 ADNEXAL CYST: ICD-10-CM

## 2018-12-11 PROCEDURE — 76830 TRANSVAGINAL US NON-OB: CPT

## 2018-12-11 PROCEDURE — 77063 BREAST TOMOSYNTHESIS BI: CPT

## 2018-12-11 PROCEDURE — 76857 US EXAM PELVIC LIMITED: CPT

## 2018-12-11 PROCEDURE — 77067 SCR MAMMO BI INCL CAD: CPT

## 2018-12-12 ENCOUNTER — PATIENT OUTREACH (OUTPATIENT)
Dept: CASE MANAGEMENT | Facility: OTHER | Age: 83
End: 2018-12-12

## 2018-12-12 NOTE — OUTREACH NOTE
No issues or concerns voiced.  Had ultrasound of the pelvis and mammogram and waiting to hear results from . Participating in [place] activities at [facility].

## 2019-01-08 ENCOUNTER — OFFICE VISIT (OUTPATIENT)
Dept: FAMILY MEDICINE CLINIC | Facility: CLINIC | Age: 84
End: 2019-01-08

## 2019-01-08 ENCOUNTER — PATIENT OUTREACH (OUTPATIENT)
Dept: CASE MANAGEMENT | Facility: OTHER | Age: 84
End: 2019-01-08

## 2019-01-08 VITALS
DIASTOLIC BLOOD PRESSURE: 70 MMHG | HEART RATE: 82 BPM | SYSTOLIC BLOOD PRESSURE: 120 MMHG | HEIGHT: 60 IN | BODY MASS INDEX: 35.53 KG/M2 | WEIGHT: 181 LBS | TEMPERATURE: 98.6 F | OXYGEN SATURATION: 98 % | RESPIRATION RATE: 16 BRPM

## 2019-01-08 DIAGNOSIS — G30.0 EARLY ONSET ALZHEIMER'S DEMENTIA WITHOUT BEHAVIORAL DISTURBANCE (HCC): ICD-10-CM

## 2019-01-08 DIAGNOSIS — Z02.9 ADMINISTRATIVE ENCOUNTER: ICD-10-CM

## 2019-01-08 DIAGNOSIS — M19.90 ARTHRITIS: ICD-10-CM

## 2019-01-08 DIAGNOSIS — F02.80 EARLY ONSET ALZHEIMER'S DEMENTIA WITHOUT BEHAVIORAL DISTURBANCE (HCC): ICD-10-CM

## 2019-01-08 DIAGNOSIS — N28.9 RENAL INSUFFICIENCY: ICD-10-CM

## 2019-01-08 DIAGNOSIS — N18.1 TYPE 2 DIABETES MELLITUS WITH STAGE 1 CHRONIC KIDNEY DISEASE, WITHOUT LONG-TERM CURRENT USE OF INSULIN (HCC): Primary | ICD-10-CM

## 2019-01-08 DIAGNOSIS — E03.4 HYPOTHYROIDISM DUE TO ACQUIRED ATROPHY OF THYROID: ICD-10-CM

## 2019-01-08 DIAGNOSIS — E11.22 TYPE 2 DIABETES MELLITUS WITH STAGE 1 CHRONIC KIDNEY DISEASE, WITHOUT LONG-TERM CURRENT USE OF INSULIN (HCC): Primary | ICD-10-CM

## 2019-01-08 LAB
BUN SERPL-MCNC: 14 MG/DL (ref 8–23)
BUN/CREAT SERPL: 14.6 (ref 7–25)
CALCIUM SERPL-MCNC: 9.6 MG/DL (ref 8.6–10.5)
CHLORIDE SERPL-SCNC: 102 MMOL/L (ref 98–107)
CO2 SERPL-SCNC: 22.3 MMOL/L (ref 22–29)
CREAT SERPL-MCNC: 0.96 MG/DL (ref 0.57–1)
GLUCOSE SERPL-MCNC: 118 MG/DL (ref 65–99)
HBA1C MFR BLD: 6 % (ref 4.8–5.6)
POTASSIUM SERPL-SCNC: 4.5 MMOL/L (ref 3.5–5.2)
SODIUM SERPL-SCNC: 137 MMOL/L (ref 136–145)
T4 FREE SERPL-MCNC: 0.89 NG/DL (ref 0.93–1.7)
TSH SERPL DL<=0.005 MIU/L-ACNC: 1 MIU/ML (ref 0.27–4.2)

## 2019-01-08 PROCEDURE — 99214 OFFICE O/P EST MOD 30 MIN: CPT | Performed by: FAMILY MEDICINE

## 2019-01-08 RX ORDER — CYCLOBENZAPRINE HCL 5 MG
5 TABLET ORAL 3 TIMES DAILY PRN
Qty: 15 TABLET | Refills: 5 | Status: SHIPPED | OUTPATIENT
Start: 2019-01-08 | End: 2019-01-08 | Stop reason: SDUPTHER

## 2019-01-08 RX ORDER — SULFAMETHOXAZOLE AND TRIMETHOPRIM 400; 80 MG/1; MG/1
TABLET ORAL
Refills: 12 | COMMUNITY
Start: 2018-12-27 | End: 2019-04-09

## 2019-01-08 RX ORDER — CYCLOBENZAPRINE HCL 5 MG
5 TABLET ORAL 3 TIMES DAILY PRN
Qty: 15 TABLET | Refills: 5 | Status: SHIPPED | OUTPATIENT
Start: 2019-01-08 | End: 2020-03-06

## 2019-01-08 RX ORDER — HYDROCODONE BITARTRATE AND ACETAMINOPHEN 5; 325 MG/1; MG/1
1 TABLET ORAL EVERY 4 HOURS PRN
Qty: 40 TABLET | Refills: 0 | Status: SHIPPED | OUTPATIENT
Start: 2019-01-08 | End: 2019-04-09 | Stop reason: SDUPTHER

## 2019-01-08 RX ORDER — MAGNESIUM OXIDE 400 MG/1
400 TABLET ORAL DAILY
COMMUNITY
End: 2022-05-10

## 2019-01-08 NOTE — PROGRESS NOTES
HPI  Wendie Houser is a 83 y.o. female who is here for follow up of multiple medical problems including heart failure diabetes hypothyroidism generalized arthritis and spinal stenosis.  Also requesting forms completed for VA assistance with housing?  Reports recent visit to hospital for muscle spasms etc.  Continues to use occasional pain medication.      Review of Systems   HENT: Positive for sinus pressure and sore throat.    Musculoskeletal: Positive for arthralgias, back pain and myalgias.   Psychiatric/Behavioral: Positive for sleep disturbance.   All other systems reviewed and are negative.        Past Medical History:   Diagnosis Date   • Anemia    • Arthritis    • CHF (congestive heart failure) (CMS/[facility])    • Congestive heart failure (CMS/HCC)    • Depression    • Diabetes mellitus (CMS/[facility])     type 2   • Diverticulitis of colon 08/25/2016    Acute uncomplicated diverticulitis at the distal transverse colon   • Early onset Alzheimer's dementia    • GERD (gastroesophageal reflux disease)    • Heart disease    • Hyperlipidemia    • Hypertension    • Hypothyroidism    • Kidney disease    • Sleep apnea        Past Surgical History:   Procedure Laterality Date   • BREAST CYST EXCISION Right 1989    Benign   • COLONOSCOPY  2011    Dr. Cain   • ENDOSCOPY  04/15/2011   • EYE SURGERY  2009   • GALLBLADDER SURGERY  1960    Open   • TOTAL KNEE ARTHROPLASTY Left 2012    Dr. Soler   • VAGINAL HYSTERECTOMY  1999       Family History   Problem Relation Age of Onset   • Other Mother         cardiac disorder   • Hypertension Mother    • Heart disease Mother    • Hypertension Sister    • Heart attack Sister    • Cancer Brother    • Hypertension Brother    • Heart attack Brother    • Heart disease Father        Social History     Socioeconomic History   • Marital status:      Spouse name: Not on file   • Number of children: Not on file   • Years of education: Not on file   • Highest education level: Not on file    Social Needs   • Financial resource strain: Not on file   • Food insecurity - worry: Not on file   • Food insecurity - inability: Not on file   • Transportation needs - medical: Not on file   • Transportation needs - non-medical: Not on file   Occupational History   • Not on file   Tobacco Use   • Smoking status: Never Smoker   • Smokeless tobacco: Never Used   Substance and Sexual Activity   • Alcohol use: No   • Drug use: No   • Sexual activity: No   Other Topics Concern   • Not on file   Social History Narrative   • Not on file         Physical Exam   Constitutional: She is oriented to person, place, and time. She appears well-developed and well-nourished.   HENT:   Nose: Nose normal.   Mouth/Throat: Oropharynx is clear and moist.   Eyes: Conjunctivae and EOM are normal. Pupils are equal, round, and reactive to light.   Neck: Normal range of motion.   Cardiovascular: Normal rate, regular rhythm and normal heart sounds.   Pulmonary/Chest: Effort normal. No respiratory distress. She has no wheezes. She has no rales.   Abdominal: Soft. She exhibits no distension. There is no tenderness.   Musculoskeletal: She exhibits deformity.   Osteoarthritic changes especially of the knees.  Ambulates with walker   Lymphadenopathy:     She has no cervical adenopathy.   Neurological: She is alert and oriented to person, place, and time.   Skin: Skin is warm and dry. No rash noted. No pallor.   Psychiatric: She has a normal mood and affect. Her behavior is normal. Judgment normal.   Nursing note and vitals reviewed.        Assessment/Plan    Wendie was seen today for diabetes and hypothyroidism.    Diagnoses and all orders for this visit:    Type 2 diabetes mellitus with stage 1 chronic kidney disease, without long-term current use of insulin (CMS/[facility])  -     Hemoglobin A1c    Hypothyroidism due to acquired atrophy of thyroid  -     TSH+Free T4    Early onset Alzheimer's dementia without behavioral disturbance    Renal  insufficiency  -     Basic Metabolic Panel  -     Hemoglobin A1c    Arthritis  -     HYDROcodone-acetaminophen (NORCO) 5-325 MG per tablet; Take 1 tablet by mouth Every 4 (Four) Hours As Needed for Moderate Pain .    Administrative encounter    Other orders  -     Discontinue: cyclobenzaprine (FLEXERIL) 5 MG tablet; Take 1 tablet by mouth 3 (Three) Times a Day As Needed for Muscle Spasms.  -     cyclobenzaprine (FLEXERIL) 5 MG tablet; Take 1 tablet by mouth 3 (Three) Times a Day As Needed for Muscle Spasms.      Patient with multiple ongoing medical issues some of which are noted above.  Requesting forms completed for housing assistance.  These page is completed.  Overall status seems to be fairly stable on current regimen which will be continued including occasional pain pills and muscle relaxers.  We will continue to monitor closely every 3 months or as needed.  There is no evidence of abuse or misuse of medications.    This note includes information entered using a voice recognition dictation system.  Though reviewed, some nonsensible errors may remain.

## 2019-01-16 ENCOUNTER — PATIENT OUTREACH (OUTPATIENT)
Dept: CASE MANAGEMENT | Facility: OTHER | Age: 84
End: 2019-01-16

## 2019-01-16 NOTE — OUTREACH NOTE
Complains of hip pain due to arthritis.  Review of pain management regimen and flexeril schedule which she administers on a as needed basis.Self administration of medications at [facility] and adherent to regimen. Has follow-up to discuss the possibility of a total hip replacement.  Reports did not receive letter from Dr. Mcqueen with lab results that was mailed 1/9/19.  Reviewed the contents of the letter and without questions and informed of lab results from 1/8/19.

## 2019-02-14 ENCOUNTER — PATIENT OUTREACH (OUTPATIENT)
Dept: CASE MANAGEMENT | Facility: OTHER | Age: 84
End: 2019-02-14

## 2019-02-14 NOTE — OUTREACH NOTE
Patient has met care plan goals, all care gaps have been addressed, and the patient with the support from the [facility] has a strong sense of health self-management. Requested assistance today from PCP Practice Manager, Daksha, to schedule AWV.. Mychart activation has been discussed and declined.  Daughter is listed as health care surrogate in the EMR.  Voicemail was full today.

## 2019-02-15 ENCOUNTER — PATIENT OUTREACH (OUTPATIENT)
Dept: CASE MANAGEMENT | Facility: OTHER | Age: 84
End: 2019-02-15

## 2019-02-15 NOTE — OUTREACH NOTE
"AWV scheduled for 4/9/19 at 9AM per Daksha.  CA alerted patient of time change and reports unable to \"make early morning appts,\" needs for appts to be scheduled in the afternoon or late morning.  Email to [facility] and notified that patient unable to be scheduled for 9AM appts.  "

## 2019-02-18 ENCOUNTER — EPISODE CHANGES (OUTPATIENT)
Dept: CASE MANAGEMENT | Facility: OTHER | Age: 84
End: 2019-02-18

## 2019-03-24 ENCOUNTER — DOCUMENTATION (OUTPATIENT)
Dept: FAMILY MEDICINE CLINIC | Facility: CLINIC | Age: 84
End: 2019-03-24

## 2019-03-24 RX ORDER — LEVOFLOXACIN 500 MG/1
500 TABLET, FILM COATED ORAL DAILY
Qty: 7 TABLET | Refills: 0 | Status: SHIPPED | OUTPATIENT
Start: 2019-03-24 | End: 2019-03-24 | Stop reason: SDUPTHER

## 2019-03-24 RX ORDER — LEVOFLOXACIN 500 MG/1
500 TABLET, FILM COATED ORAL DAILY
Qty: 7 TABLET | Refills: 0 | Status: SHIPPED | OUTPATIENT
Start: 2019-03-24 | End: 2019-04-09

## 2019-03-28 ENCOUNTER — PATIENT OUTREACH (OUTPATIENT)
Dept: CASE MANAGEMENT | Facility: OTHER | Age: 84
End: 2019-03-28

## 2019-03-28 ENCOUNTER — APPOINTMENT (OUTPATIENT)
Dept: GENERAL RADIOLOGY | Facility: HOSPITAL | Age: 84
End: 2019-03-28

## 2019-03-28 ENCOUNTER — HOSPITAL ENCOUNTER (EMERGENCY)
Facility: HOSPITAL | Age: 84
Discharge: HOME | End: 2019-03-28
Attending: EMERGENCY MEDICINE | Admitting: EMERGENCY MEDICINE

## 2019-03-28 ENCOUNTER — EPISODE CHANGES (OUTPATIENT)
Dept: CASE MANAGEMENT | Facility: OTHER | Age: 84
End: 2019-03-28

## 2019-03-28 VITALS
BODY MASS INDEX: 33.42 KG/M2 | RESPIRATION RATE: 18 BRPM | HEART RATE: 83 BPM | DIASTOLIC BLOOD PRESSURE: 55 MMHG | SYSTOLIC BLOOD PRESSURE: 115 MMHG | HEIGHT: 62 IN | OXYGEN SATURATION: 97 % | TEMPERATURE: 97.5 F | WEIGHT: 181.6 LBS

## 2019-03-28 DIAGNOSIS — M17.11 ARTHRITIS OF KNEE, RIGHT: ICD-10-CM

## 2019-03-28 DIAGNOSIS — G89.29 CHRONIC PAIN OF RIGHT KNEE: Primary | ICD-10-CM

## 2019-03-28 DIAGNOSIS — M25.561 CHRONIC PAIN OF RIGHT KNEE: Primary | ICD-10-CM

## 2019-03-28 PROCEDURE — 99284 EMERGENCY DEPT VISIT MOD MDM: CPT

## 2019-03-28 PROCEDURE — 73560 X-RAY EXAM OF KNEE 1 OR 2: CPT

## 2019-03-28 RX ORDER — HYDROCODONE BITARTRATE AND ACETAMINOPHEN 5; 325 MG/1; MG/1
1 TABLET ORAL EVERY 6 HOURS PRN
Qty: 16 TABLET | Refills: 0 | Status: SHIPPED | OUTPATIENT
Start: 2019-03-28 | End: 2019-04-09 | Stop reason: SDUPTHER

## 2019-03-28 RX ORDER — OXYCODONE HYDROCHLORIDE AND ACETAMINOPHEN 5; 325 MG/1; MG/1
1 TABLET ORAL ONCE
Status: COMPLETED | OUTPATIENT
Start: 2019-03-28 | End: 2019-03-28

## 2019-03-28 RX ADMIN — OXYCODONE HYDROCHLORIDE AND ACETAMINOPHEN 1 TABLET: 5; 325 TABLET ORAL at 02:37

## 2019-03-28 NOTE — OUTREACH NOTE
Review of ED AVS and plans to have PCP, Dr. Mcqueen, enter a referral for an orthopedic referral versus the follow-up with Dr. Soler for a total knee  Replacement. [facility] has placed a call to Dr. Mcqueen's office today.  Discussed follow-upa ppt scheduled with Dr. Mcqueen  on 4/9/19(CA previously scheduled this appt for AWV). .  Son or [facility] transportation services provide transport for appts.    Son is picking up patient's pain medication today.  Educated regarding principles of pain medication regimen and has senna for bowel regimen to prevent constipation.  has been difficult to sleep due to knee pain and feeling some relief today.  Facility able to provide meals in her room. Manages medication administration but [facility] there is [facility] staff available to assist with medication administration if needed.  Has a walker and wheelchair and maintains fall preventive safety measures.No further needs or concerns voiced.

## 2019-04-09 ENCOUNTER — OFFICE VISIT (OUTPATIENT)
Dept: FAMILY MEDICINE CLINIC | Facility: CLINIC | Age: 84
End: 2019-04-09

## 2019-04-09 VITALS
TEMPERATURE: 98.2 F | DIASTOLIC BLOOD PRESSURE: 72 MMHG | WEIGHT: 177 LBS | HEIGHT: 62 IN | BODY MASS INDEX: 32.57 KG/M2 | OXYGEN SATURATION: 95 % | SYSTOLIC BLOOD PRESSURE: 128 MMHG | HEART RATE: 94 BPM

## 2019-04-09 DIAGNOSIS — N28.9 RENAL INSUFFICIENCY: Primary | ICD-10-CM

## 2019-04-09 DIAGNOSIS — M48.061 SPINAL STENOSIS OF LUMBAR REGION, UNSPECIFIED WHETHER NEUROGENIC CLAUDICATION PRESENT: ICD-10-CM

## 2019-04-09 DIAGNOSIS — R30.0 DYSURIA: ICD-10-CM

## 2019-04-09 DIAGNOSIS — Z79.899 HIGH RISK MEDICATION USE: ICD-10-CM

## 2019-04-09 DIAGNOSIS — K21.9 GASTROESOPHAGEAL REFLUX DISEASE WITHOUT ESOPHAGITIS: ICD-10-CM

## 2019-04-09 DIAGNOSIS — G30.0 EARLY ONSET ALZHEIMER'S DEMENTIA WITHOUT BEHAVIORAL DISTURBANCE (HCC): ICD-10-CM

## 2019-04-09 DIAGNOSIS — E03.4 HYPOTHYROIDISM DUE TO ACQUIRED ATROPHY OF THYROID: ICD-10-CM

## 2019-04-09 DIAGNOSIS — M19.90 ARTHRITIS: ICD-10-CM

## 2019-04-09 DIAGNOSIS — F02.80 EARLY ONSET ALZHEIMER'S DEMENTIA WITHOUT BEHAVIORAL DISTURBANCE (HCC): ICD-10-CM

## 2019-04-09 DIAGNOSIS — K58.1 IRRITABLE BOWEL SYNDROME WITH CONSTIPATION: ICD-10-CM

## 2019-04-09 DIAGNOSIS — F41.8 MIXED ANXIETY DEPRESSIVE DISORDER: ICD-10-CM

## 2019-04-09 PROCEDURE — 99214 OFFICE O/P EST MOD 30 MIN: CPT | Performed by: FAMILY MEDICINE

## 2019-04-09 RX ORDER — HYDROCODONE BITARTRATE AND ACETAMINOPHEN 5; 325 MG/1; MG/1
1 TABLET ORAL EVERY 4 HOURS PRN
Qty: 40 TABLET | Refills: 0 | Status: SHIPPED | OUTPATIENT
Start: 2019-04-09 | End: 2019-07-09 | Stop reason: SDUPTHER

## 2019-04-09 NOTE — PROGRESS NOTES
HPI  Wendie Houser is a 84 y.o. female who is here for follow up of ongoing medical issues including heart failure renal failure generalized arthritis.  Apparently has had previous knee replacement but says cannot tolerate another procedure.  Asking about injections in the knee and this was discussed.  Apparently has read about some type of clinic with special injections?  Also ask about stem cell injections.  Recommend discussing this with her orthopedist but she says he does not give these injections?  Also ask about daily antibiotic therapy to prevent bladder infections.  Apparently stopped when recently placed on antibiotic by covering physician and has not resumed.  Does report urinary urgency and frequency.  Has noted some hyperpigmentation on one hand but this seems to be nonspecific.  Reported diagnosis of diabetes but actually A1c levels have been below diabetic level without any medications.      Review of Systems   Endocrine: Positive for heat intolerance and polydipsia.   Genitourinary: Positive for frequency.   Musculoskeletal: Positive for arthralgias and back pain.   Neurological: Positive for weakness.         Past Medical History:   Diagnosis Date   • Anemia    • Arthritis    • CHF (congestive heart failure) (CMS/HCC)    • Congestive heart failure (CMS/HCC)    • Depression    • Diabetes mellitus (CMS/HCC)     type 2   • Diverticulitis of colon 08/25/2016    Acute uncomplicated diverticulitis at the distal transverse colon   • Early onset Alzheimer's dementia    • GERD (gastroesophageal reflux disease)    • Heart disease    • Hyperlipidemia    • Hypertension    • Hypothyroidism    • Kidney disease    • Sleep apnea        Past Surgical History:   Procedure Laterality Date   • BREAST CYST EXCISION Right 1989    Benign   • COLONOSCOPY  2011    Dr. Cain   • ENDOSCOPY  04/15/2011   • EYE SURGERY  2009   • GALLBLADDER SURGERY  1960    Open   • TOTAL KNEE ARTHROPLASTY Left 2012    Dr. Soler   •  VAGINAL HYSTERECTOMY  1999       Family History   Problem Relation Age of Onset   • Other Mother         cardiac disorder   • Hypertension Mother    • Heart disease Mother    • Hypertension Sister    • Heart attack Sister    • Cancer Brother    • Hypertension Brother    • Heart attack Brother    • Heart disease Father        Social History     Socioeconomic History   • Marital status:      Spouse name: Not on file   • Number of children: Not on file   • Years of education: Not on file   • Highest education level: Not on file   Tobacco Use   • Smoking status: Never Smoker   • Smokeless tobacco: Never Used   Substance and Sexual Activity   • Alcohol use: No   • Drug use: No   • Sexual activity: No         Physical Exam   Constitutional: She is oriented to person, place, and time. She appears well-developed and well-nourished. No distress.   HENT:   Nose: Nose normal.   Mouth/Throat: Oropharynx is clear and moist. No oropharyngeal exudate.   Eyes: Conjunctivae are normal. Pupils are equal, round, and reactive to light.   Neck: Normal range of motion.   Cardiovascular: Normal rate, regular rhythm and normal heart sounds.   Pulmonary/Chest: Effort normal. No respiratory distress. She has no wheezes. She has no rales.   Abdominal: Soft. She exhibits no distension. There is no tenderness.   Musculoskeletal: She exhibits deformity. She exhibits no edema.   Osteoarthritic changes of the knees.  Minimal arthritis changes of the hands.  Ambulates with walker   Lymphadenopathy:     She has no cervical adenopathy.   Neurological: She is alert and oriented to person, place, and time. She exhibits normal muscle tone. Coordination normal.   Skin: Skin is warm and dry.        Psychiatric: She has a normal mood and affect. Her behavior is normal. Judgment and thought content normal.   Nursing note and vitals reviewed.        Assessment/Plan    Wendie was seen today for follow-up and diabetes.    Diagnoses and all orders for  this visit:    Renal insufficiency  -     POC Urinalysis Dipstick, Automated    High risk medication use  -     CBC & Differential  -     Comprehensive Metabolic Panel    Spinal stenosis of lumbar region, unspecified whether neurogenic claudication present    Mixed anxiety depressive disorder    Irritable bowel syndrome with constipation    Early onset Alzheimer's dementia without behavioral disturbance    Hypothyroidism due to acquired atrophy of thyroid  -     TSH+Free T4    Dysuria  -     POC Urinalysis Dipstick, Automated    Arthritis  -     HYDROcodone-acetaminophen (NORCO) 5-325 MG per tablet; Take 1 tablet by mouth Every 4 (Four) Hours As Needed for Moderate Pain .    Gastroesophageal reflux disease without esophagitis        Patient overall here for routine follow-up of above-noted medical problems.  Basically has no new complaints.  Has stopped antibiotic therapy because had recurrent urinary tract infections even while taking?  Complains of severe arthritis of the knees with pain and treatment options discussed.  Reports cannot undergo another knee replacement because of medical issues.  Informed injections not well proven to be beneficial.  Says cannot tolerate cortisone injections.  Reported history of diabetes and checks blood sugars at home but most recent A1c levels indicate below 6.5 without any medication.  Overall stable on current regimen which will be continued including follow-up every 3 months.  Does get pain medication for severe knee pain which obviously is not being misused or abused.  Will continue to monitor every 3 months.    Spent 25 minutes discussing treatment options for knees.  Also discussed risks versus benefits of prophylactic antibiotic therapy for urinary tract infections.  For now recommend staying off antibiotic and will recheck urine and send for culture if indicated.  Hyperpigmentation and arthritic changes all discussed also.    This note includes information entered using  a voice recognition dictation system.  Though reviewed, some nonsensible errors may remain.  Patient has been erroneously marked as diabetic. Based on the available clinical information, she does not have diabetes and should therefore be excluded from diabetic health maintenance and quality measures for the remainder of the reporting period.    This note includes information entered using a voice recognition dictation system.  Though reviewed, some nonsensible errors may remain.

## 2019-04-10 ENCOUNTER — PATIENT OUTREACH (OUTPATIENT)
Dept: CASE MANAGEMENT | Facility: OTHER | Age: 84
End: 2019-04-10

## 2019-04-10 LAB
ALBUMIN SERPL-MCNC: 4.3 G/DL (ref 3.5–5.2)
ALBUMIN/GLOB SERPL: 1.3 G/DL
ALP SERPL-CCNC: 94 U/L (ref 39–117)
ALT SERPL-CCNC: 8 U/L (ref 1–33)
AST SERPL-CCNC: 13 U/L (ref 1–32)
BASOPHILS # BLD AUTO: 0.04 10*3/MM3 (ref 0–0.2)
BASOPHILS NFR BLD AUTO: 0.5 % (ref 0–1.5)
BILIRUB SERPL-MCNC: 0.3 MG/DL (ref 0.2–1.2)
BUN SERPL-MCNC: 16 MG/DL (ref 8–23)
BUN/CREAT SERPL: 13.4 (ref 7–25)
CALCIUM SERPL-MCNC: 9.6 MG/DL (ref 8.6–10.5)
CHLORIDE SERPL-SCNC: 103 MMOL/L (ref 98–107)
CO2 SERPL-SCNC: 23.8 MMOL/L (ref 22–29)
CREAT SERPL-MCNC: 1.19 MG/DL (ref 0.57–1)
EOSINOPHIL # BLD AUTO: 0.12 10*3/MM3 (ref 0–0.4)
EOSINOPHIL NFR BLD AUTO: 1.5 % (ref 0.3–6.2)
ERYTHROCYTE [DISTWIDTH] IN BLOOD BY AUTOMATED COUNT: 15.4 % (ref 12.3–15.4)
GLOBULIN SER CALC-MCNC: 3.3 GM/DL
GLUCOSE SERPL-MCNC: 110 MG/DL (ref 65–99)
HCT VFR BLD AUTO: 42.1 % (ref 34–46.6)
HGB BLD-MCNC: 12.8 G/DL (ref 12–15.9)
IMM GRANULOCYTES # BLD AUTO: 0.03 10*3/MM3 (ref 0–0.05)
IMM GRANULOCYTES NFR BLD AUTO: 0.4 % (ref 0–0.5)
LYMPHOCYTES # BLD AUTO: 3.17 10*3/MM3 (ref 0.7–3.1)
LYMPHOCYTES NFR BLD AUTO: 40.1 % (ref 19.6–45.3)
MCH RBC QN AUTO: 26.2 PG (ref 26.6–33)
MCHC RBC AUTO-ENTMCNC: 30.4 G/DL (ref 31.5–35.7)
MCV RBC AUTO: 86.1 FL (ref 79–97)
MONOCYTES # BLD AUTO: 0.68 10*3/MM3 (ref 0.1–0.9)
MONOCYTES NFR BLD AUTO: 8.6 % (ref 5–12)
NEUTROPHILS # BLD AUTO: 3.87 10*3/MM3 (ref 1.4–7)
NEUTROPHILS NFR BLD AUTO: 48.9 % (ref 42.7–76)
NRBC BLD AUTO-RTO: 0 /100 WBC (ref 0–0)
PLATELET # BLD AUTO: 262 10*3/MM3 (ref 140–450)
POTASSIUM SERPL-SCNC: 4.5 MMOL/L (ref 3.5–5.2)
PROT SERPL-MCNC: 7.6 G/DL (ref 6–8.5)
RBC # BLD AUTO: 4.89 10*6/MM3 (ref 3.77–5.28)
SODIUM SERPL-SCNC: 141 MMOL/L (ref 136–145)
T4 FREE SERPL-MCNC: 1.04 NG/DL (ref 0.93–1.7)
TSH SERPL DL<=0.005 MIU/L-ACNC: 0.74 MIU/ML (ref 0.27–4.2)
WBC # BLD AUTO: 7.91 10*3/MM3 (ref 3.4–10.8)

## 2019-04-17 ENCOUNTER — PATIENT OUTREACH (OUTPATIENT)
Dept: CASE MANAGEMENT | Facility: OTHER | Age: 84
End: 2019-04-17

## 2019-04-23 DIAGNOSIS — F02.80 EARLY ONSET ALZHEIMER'S DEMENTIA WITHOUT BEHAVIORAL DISTURBANCE (HCC): ICD-10-CM

## 2019-04-23 DIAGNOSIS — G30.0 EARLY ONSET ALZHEIMER'S DEMENTIA WITHOUT BEHAVIORAL DISTURBANCE (HCC): ICD-10-CM

## 2019-04-23 RX ORDER — DONEPEZIL HYDROCHLORIDE 10 MG/1
10 TABLET, FILM COATED ORAL NIGHTLY
Qty: 14 TABLET | Refills: 3 | Status: SHIPPED | OUTPATIENT
Start: 2019-04-23 | End: 2019-07-29 | Stop reason: SDUPTHER

## 2019-04-30 ENCOUNTER — PATIENT OUTREACH (OUTPATIENT)
Dept: CASE MANAGEMENT | Facility: OTHER | Age: 84
End: 2019-04-30

## 2019-04-30 NOTE — OUTREACH NOTE
Care Coordination Note    Requested assitance with scheduling AWV.     Vinay Fonseca RN    4/30/2019, 11:30 AM

## 2019-04-30 NOTE — OUTREACH NOTE
Patient Outreach NotePatient has met care plan goals, all care gaps have been addressed, and patient has a strong sense of health self-management. The patient's annual wellness visit was scheduled by CA and not performed by PCP. .Declined my chart.  Daughter, Trish, is designated healthcare surrogate.  Will discuss with Dr. Mcqueen  at next visit follow-up for lipid panel. States knee pain subsiding.  Denies need for further CA intervention.      Vinay Fonseca RN    4/30/2019, 11:20 AM

## 2019-05-01 ENCOUNTER — EPISODE CHANGES (OUTPATIENT)
Dept: CASE MANAGEMENT | Facility: OTHER | Age: 84
End: 2019-05-01

## 2019-05-07 ENCOUNTER — EPISODE CHANGES (OUTPATIENT)
Dept: CASE MANAGEMENT | Facility: OTHER | Age: 84
End: 2019-05-07

## 2019-07-09 ENCOUNTER — OFFICE VISIT (OUTPATIENT)
Dept: FAMILY MEDICINE CLINIC | Facility: CLINIC | Age: 84
End: 2019-07-09

## 2019-07-09 VITALS
OXYGEN SATURATION: 98 % | HEART RATE: 86 BPM | TEMPERATURE: 98.5 F | SYSTOLIC BLOOD PRESSURE: 110 MMHG | WEIGHT: 174.4 LBS | BODY MASS INDEX: 32.09 KG/M2 | DIASTOLIC BLOOD PRESSURE: 66 MMHG | RESPIRATION RATE: 16 BRPM | HEIGHT: 62 IN

## 2019-07-09 DIAGNOSIS — E03.4 HYPOTHYROIDISM DUE TO ACQUIRED ATROPHY OF THYROID: ICD-10-CM

## 2019-07-09 DIAGNOSIS — M15.9 GENERALIZED OSTEOARTHRITIS: ICD-10-CM

## 2019-07-09 DIAGNOSIS — F41.8 MIXED ANXIETY DEPRESSIVE DISORDER: ICD-10-CM

## 2019-07-09 DIAGNOSIS — M48.061 SPINAL STENOSIS OF LUMBAR REGION WITHOUT NEUROGENIC CLAUDICATION: Primary | ICD-10-CM

## 2019-07-09 DIAGNOSIS — Z79.899 HIGH RISK MEDICATION USE: ICD-10-CM

## 2019-07-09 DIAGNOSIS — M19.90 ARTHRITIS: ICD-10-CM

## 2019-07-09 PROCEDURE — 99213 OFFICE O/P EST LOW 20 MIN: CPT | Performed by: FAMILY MEDICINE

## 2019-07-09 RX ORDER — HYDROCODONE BITARTRATE AND ACETAMINOPHEN 5; 325 MG/1; MG/1
1 TABLET ORAL EVERY 4 HOURS PRN
Qty: 40 TABLET | Refills: 0 | Status: SHIPPED | OUTPATIENT
Start: 2019-07-09 | End: 2019-09-03 | Stop reason: SDUPTHER

## 2019-07-09 RX ORDER — ACETAMINOPHEN 500 MG
650 TABLET ORAL
COMMUNITY
Start: 2019-06-01 | End: 2022-08-22 | Stop reason: SDUPTHER

## 2019-07-09 NOTE — PROGRESS NOTES
HPI  Wendie Houser is a 84 y.o. female who is here for follow up on medical problems including arthritis of right knee and hip.  Previous left knee replacement.  Requesting physical therapy evaluation through [facility].  Fax number given.  Patient has history of diabetes well controlled without medication.  Also hyperlipidemia hypertension and other issues.  Anxiety depressive disorder as well as Alzheimer's dementia?      Review of Systems   Musculoskeletal: Positive for arthralgias, back pain and myalgias.   All other systems reviewed and are negative.        Past Medical History:   Diagnosis Date   • Anemia    • Arthritis    • CHF (congestive heart failure) (CMS/HCC)    • Congestive heart failure (CMS/HCC)    • Depression    • Diabetes mellitus (CMS/HCC)     type 2   • Diverticulitis of colon 08/25/2016    Acute uncomplicated diverticulitis at the distal transverse colon   • Early onset Alzheimer's dementia    • GERD (gastroesophageal reflux disease)    • Heart disease    • Hyperlipidemia    • Hypertension    • Hypothyroidism    • Kidney disease    • Sleep apnea        Past Surgical History:   Procedure Laterality Date   • BREAST CYST EXCISION Right 1989    Benign   • COLONOSCOPY  2011    Dr. Cain   • ENDOSCOPY  04/15/2011   • EYE SURGERY  2009   • GALLBLADDER SURGERY  1960    Open   • TOTAL KNEE ARTHROPLASTY Left 2012    Dr. Soler   • VAGINAL HYSTERECTOMY  1999       Family History   Problem Relation Age of Onset   • Other Mother         cardiac disorder   • Hypertension Mother    • Heart disease Mother    • Hypertension Sister    • Heart attack Sister    • Cancer Brother    • Hypertension Brother    • Heart attack Brother    • Heart disease Father        Social History     Socioeconomic History   • Marital status:      Spouse name: Not on file   • Number of children: Not on file   • Years of education: Not on file   • Highest education level: Not on file   Tobacco Use   • Smoking status: Never Smoker    • Smokeless tobacco: Never Used   Substance and Sexual Activity   • Alcohol use: No   • Drug use: No   • Sexual activity: No         Physical Exam   Constitutional: She is oriented to person, place, and time. She appears well-developed and well-nourished. No distress.   HENT:   Head: Normocephalic.   Nose: Nose normal.   Mouth/Throat: Oropharynx is clear and moist.   Eyes: Conjunctivae and EOM are normal. Pupils are equal, round, and reactive to light.   Neck: Normal range of motion.   Cardiovascular: Normal rate and regular rhythm.   Pulmonary/Chest: Effort normal. No respiratory distress.   Abdominal: Soft. Bowel sounds are normal.   Musculoskeletal: Normal range of motion. She exhibits no edema or deformity.   Neurological: She is alert and oriented to person, place, and time. She exhibits normal muscle tone. Coordination normal.   Skin: Skin is warm and dry.   Psychiatric: She has a normal mood and affect. Her behavior is normal. Judgment and thought content normal.   Nursing note and vitals reviewed.        Assessment/Plan    Wendie was seen today for diabetes.    Diagnoses and all orders for this visit:    Spinal stenosis of lumbar region without neurogenic claudication  -     Ambulatory Referral to Physical Therapy Evaluate and treat; Full weight bearing    Generalized osteoarthritis  -     Ambulatory Referral to Physical Therapy Evaluate and treat; Full weight bearing    Arthritis  -     HYDROcodone-acetaminophen (NORCO) 5-325 MG per tablet; Take 1 tablet by mouth Every 4 (Four) Hours As Needed for Moderate Pain .    Mixed anxiety depressive disorder    High risk medication use    Hypothyroidism due to acquired atrophy of thyroid      Patient here for routine follow-up of multiple medical problems some of which are noted above.  Requesting referral for physical therapy evaluation and treatment.  Lab work from 3 months ago reviewed and unremarkable.  Will recheck in 3 months and repeat lab including lipid  panel at that time.  Also at that time try to straighten out immunization status?  May have to go to a pharmacy where insurance coverage can be verified by a better computer system.    This note includes information entered using a voice recognition dictation system.  Though reviewed, some nonsensible errors may remain.

## 2019-07-29 ENCOUNTER — TELEPHONE (OUTPATIENT)
Dept: FAMILY MEDICINE CLINIC | Facility: CLINIC | Age: 84
End: 2019-07-29

## 2019-07-29 DIAGNOSIS — F02.80 EARLY ONSET ALZHEIMER'S DEMENTIA WITHOUT BEHAVIORAL DISTURBANCE (HCC): ICD-10-CM

## 2019-07-29 DIAGNOSIS — G30.0 EARLY ONSET ALZHEIMER'S DEMENTIA WITHOUT BEHAVIORAL DISTURBANCE (HCC): ICD-10-CM

## 2019-07-29 RX ORDER — DONEPEZIL HYDROCHLORIDE 10 MG/1
10 TABLET, FILM COATED ORAL NIGHTLY
Qty: 14 TABLET | Refills: 3 | Status: SHIPPED | OUTPATIENT
Start: 2019-07-29 | End: 2019-09-27 | Stop reason: SDUPTHER

## 2019-07-29 NOTE — TELEPHONE ENCOUNTER
COMPLETED.    Thank you.    ----- Message from Daksha Gonzalez sent at 7/29/2019 11:13 AM EDT -----  Patient lost her med that helps with memory. Can you please send in another refill    donepezil (ARICEPT) 10 MG tablet [[id number]]   Order Details   Dose: 10 mg Route: Oral Frequency: Nightly  Dispense Quantity: 14 tablet Refills: 3 Fills remaining: --         Sig: Take 1 tablet by mouth Every Night.      srini in chart

## 2019-07-29 NOTE — TELEPHONE ENCOUNTER
----- Message from Daksha Gonzalez sent at 7/29/2019 11:13 AM EDT -----  Patient lost her med that helps with memory. Can you please send in another refill    donepezil (ARICEPT) 10 MG tablet [[id number]]   Order Details   Dose: 10 mg Route: Oral Frequency: Nightly  Dispense Quantity: 14 tablet Refills: 3 Fills remaining: --         Sig: Take 1 tablet by mouth Every Night.      srini in chart

## 2019-09-03 ENCOUNTER — OFFICE VISIT (OUTPATIENT)
Dept: FAMILY MEDICINE CLINIC | Facility: CLINIC | Age: 84
End: 2019-09-03

## 2019-09-03 VITALS
HEART RATE: 78 BPM | DIASTOLIC BLOOD PRESSURE: 68 MMHG | HEIGHT: 60 IN | WEIGHT: 172.8 LBS | SYSTOLIC BLOOD PRESSURE: 124 MMHG | BODY MASS INDEX: 33.92 KG/M2 | TEMPERATURE: 98 F | OXYGEN SATURATION: 97 % | RESPIRATION RATE: 16 BRPM

## 2019-09-03 DIAGNOSIS — M19.90 ARTHRITIS: ICD-10-CM

## 2019-09-03 DIAGNOSIS — R32 URINARY INCONTINENCE, UNSPECIFIED TYPE: Primary | ICD-10-CM

## 2019-09-03 PROCEDURE — 99213 OFFICE O/P EST LOW 20 MIN: CPT | Performed by: FAMILY MEDICINE

## 2019-09-03 RX ORDER — CEPHALEXIN 250 MG/1
CAPSULE ORAL
Refills: 11 | COMMUNITY
Start: 2019-07-17 | End: 2019-09-03

## 2019-09-03 RX ORDER — HYDROCODONE BITARTRATE AND ACETAMINOPHEN 5; 325 MG/1; MG/1
1 TABLET ORAL EVERY 4 HOURS PRN
Qty: 40 TABLET | Refills: 0 | Status: SHIPPED | OUTPATIENT
Start: 2019-09-03 | End: 2019-10-09 | Stop reason: SDUPTHER

## 2019-09-03 NOTE — PROGRESS NOTES
HPI  Wendie Houser is a 84 y.o. female who is here for follow up continued bladder difficulties.  Apparently has urinary frequency and urgency and is requesting a new urologist.  Discussed options including seeing a GYN urologist for other treatment options.  Patient asked about increasing medicine for Alzheimer's and informed do not feel this is necessary.  Also requesting refill of pain medications for continued arthritic pains especially in the hip.      Review of Systems   Genitourinary: Positive for frequency and urgency.         Past Medical History:   Diagnosis Date   • Anemia    • Arthritis    • CHF (congestive heart failure) (CMS/[facility])    • Congestive heart failure (CMS/HCC)    • Depression    • Diabetes mellitus (CMS/[facility])     type 2   • Diverticulitis of colon 08/25/2016    Acute uncomplicated diverticulitis at the distal transverse colon   • Early onset Alzheimer's dementia    • GERD (gastroesophageal reflux disease)    • Heart disease    • Hyperlipidemia    • Hypertension    • Hypothyroidism    • Kidney disease    • Sleep apnea        Past Surgical History:   Procedure Laterality Date   • BREAST CYST EXCISION Right 1989    Benign   • COLONOSCOPY  2011    Dr. Cain   • ENDOSCOPY  04/15/2011   • EYE SURGERY  2009   • GALLBLADDER SURGERY  1960    Open   • TOTAL KNEE ARTHROPLASTY Left 2012    Dr. Soler   • VAGINAL HYSTERECTOMY  1999       Family History   Problem Relation Age of Onset   • Other Mother         cardiac disorder   • Hypertension Mother    • Heart disease Mother    • Hypertension Sister    • Heart attack Sister    • Cancer Brother    • Hypertension Brother    • Heart attack Brother    • Heart disease Father        Social History     Socioeconomic History   • Marital status:      Spouse name: Not on file   • Number of children: Not on file   • Years of education: Not on file   • Highest education level: Not on file   Tobacco Use   • Smoking status: Never Smoker   • Smokeless tobacco:  Never Used   Substance and Sexual Activity   • Alcohol use: No   • Drug use: No   • Sexual activity: No         Physical Exam   Constitutional: She is oriented to person, place, and time. She appears well-developed and well-nourished. No distress.   HENT:   Head: Normocephalic.   Nose: Nose normal.   Mouth/Throat: Oropharynx is clear and moist.   Eyes: Conjunctivae are normal. Pupils are equal, round, and reactive to light.   Pulmonary/Chest: Effort normal. No respiratory distress.   Abdominal: Soft. She exhibits no distension and no mass.   Musculoskeletal:   Ambulates with walker   Neurological: She is alert and oriented to person, place, and time.   Skin: Skin is warm and dry.   Psychiatric: She has a normal mood and affect. Her behavior is normal. Judgment and thought content normal.   Nursing note and vitals reviewed.        Assessment/Plan    Wendie was seen today for chronic kidney disease, back pain and hip pain.    Diagnoses and all orders for this visit:    Urinary incontinence, unspecified type  -     Ambulatory Referral to Urology    Arthritis  -     HYDROcodone-acetaminophen (NORCO) 5-325 MG per tablet; Take 1 tablet by mouth Every 4 (Four) Hours As Needed for Moderate Pain .      And mostly presents requesting referral to a new urologist because of persistent urinary symptoms.  Recently placed on Keflex?  Probably needs good GYN exam and will refer to a uro-gynecologist.  Otherwise continue current therapy.  Will refill pain medication that are used only on occasional basis.  Obviously no evidence of abuse nor misuse of medication.    This note includes information entered using a voice recognition dictation system.  Though reviewed, some nonsensible errors may remain.

## 2019-09-27 DIAGNOSIS — F02.80 EARLY ONSET ALZHEIMER'S DEMENTIA WITHOUT BEHAVIORAL DISTURBANCE (HCC): ICD-10-CM

## 2019-09-27 DIAGNOSIS — G30.0 EARLY ONSET ALZHEIMER'S DEMENTIA WITHOUT BEHAVIORAL DISTURBANCE (HCC): ICD-10-CM

## 2019-09-27 RX ORDER — DONEPEZIL HYDROCHLORIDE 10 MG/1
10 TABLET, FILM COATED ORAL NIGHTLY
Qty: 14 TABLET | Refills: 0 | Status: SHIPPED | OUTPATIENT
Start: 2019-09-27 | End: 2019-10-09 | Stop reason: SDUPTHER

## 2019-10-09 ENCOUNTER — OFFICE VISIT (OUTPATIENT)
Dept: FAMILY MEDICINE CLINIC | Facility: CLINIC | Age: 84
End: 2019-10-09

## 2019-10-09 VITALS
SYSTOLIC BLOOD PRESSURE: 118 MMHG | BODY MASS INDEX: 33.77 KG/M2 | HEART RATE: 54 BPM | OXYGEN SATURATION: 98 % | WEIGHT: 172 LBS | RESPIRATION RATE: 16 BRPM | HEIGHT: 60 IN | TEMPERATURE: 98.2 F | DIASTOLIC BLOOD PRESSURE: 68 MMHG

## 2019-10-09 DIAGNOSIS — G30.0 EARLY ONSET ALZHEIMER'S DEMENTIA WITHOUT BEHAVIORAL DISTURBANCE (HCC): ICD-10-CM

## 2019-10-09 DIAGNOSIS — E78.5 HYPERLIPIDEMIA, UNSPECIFIED HYPERLIPIDEMIA TYPE: ICD-10-CM

## 2019-10-09 DIAGNOSIS — F02.80 EARLY ONSET ALZHEIMER'S DEMENTIA WITHOUT BEHAVIORAL DISTURBANCE (HCC): ICD-10-CM

## 2019-10-09 DIAGNOSIS — R53.83 FATIGUE, UNSPECIFIED TYPE: ICD-10-CM

## 2019-10-09 DIAGNOSIS — M19.90 ARTHRITIS: ICD-10-CM

## 2019-10-09 DIAGNOSIS — F41.8 MIXED ANXIETY DEPRESSIVE DISORDER: ICD-10-CM

## 2019-10-09 DIAGNOSIS — Z23 IMMUNIZATION DUE: Primary | ICD-10-CM

## 2019-10-09 DIAGNOSIS — M48.061 SPINAL STENOSIS OF LUMBAR REGION WITHOUT NEUROGENIC CLAUDICATION: ICD-10-CM

## 2019-10-09 DIAGNOSIS — K58.1 IRRITABLE BOWEL SYNDROME WITH CONSTIPATION: ICD-10-CM

## 2019-10-09 DIAGNOSIS — E03.4 HYPOTHYROIDISM DUE TO ACQUIRED ATROPHY OF THYROID: ICD-10-CM

## 2019-10-09 DIAGNOSIS — N28.9 RENAL INSUFFICIENCY: ICD-10-CM

## 2019-10-09 DIAGNOSIS — Z79.899 HIGH RISK MEDICATION USE: ICD-10-CM

## 2019-10-09 DIAGNOSIS — E16.2 HYPOGLYCEMIA: ICD-10-CM

## 2019-10-09 DIAGNOSIS — Z79.899 POLYPHARMACY: ICD-10-CM

## 2019-10-09 PROCEDURE — 99214 OFFICE O/P EST MOD 30 MIN: CPT | Performed by: FAMILY MEDICINE

## 2019-10-09 PROCEDURE — 90653 IIV ADJUVANT VACCINE IM: CPT | Performed by: FAMILY MEDICINE

## 2019-10-09 PROCEDURE — G0008 ADMIN INFLUENZA VIRUS VAC: HCPCS | Performed by: FAMILY MEDICINE

## 2019-10-09 RX ORDER — HYDROCODONE BITARTRATE AND ACETAMINOPHEN 5; 325 MG/1; MG/1
1 TABLET ORAL EVERY 4 HOURS PRN
Qty: 40 TABLET | Refills: 0 | Status: SHIPPED | OUTPATIENT
Start: 2019-10-09 | End: 2020-01-08

## 2019-10-09 RX ORDER — DONEPEZIL HYDROCHLORIDE 10 MG/1
10 TABLET, FILM COATED ORAL NIGHTLY
Qty: 90 TABLET | Refills: 3 | Status: SHIPPED | OUTPATIENT
Start: 2019-10-09 | End: 2020-01-08

## 2019-10-09 RX ORDER — CEPHALEXIN 250 MG/1
CAPSULE ORAL
Refills: 11 | COMMUNITY
Start: 2019-09-30 | End: 2020-01-08

## 2019-10-09 NOTE — PROGRESS NOTES
HPI  Wendie Houser is a 84 y.o. female who is here for follow up ongoing medical issues including heart failure dementia hypertension hypothyroidism.  Questionable history of diabetes and monitors blood sugars closely?  Arthritic complaints walks with a walker.  Takes occasional pain medications in seems to be functioning extremely well on current regimen.  Recently started on daily Keflex therapy apparently for recurrent urinary tract infections.  Ask about seeing infectious disease specialist but recommend more appropriate to continue follow-up with urologist.      Review of Systems   Constitutional: Positive for fatigue.   Musculoskeletal: Positive for arthralgias, back pain and gait problem.   All other systems reviewed and are negative.        Past Medical History:   Diagnosis Date   • Anemia    • Arthritis    • CHF (congestive heart failure) (CMS/HCC)    • Congestive heart failure (CMS/HCC)    • Depression    • Diabetes mellitus (CMS/HCC)     type 2   • Diverticulitis of colon 08/25/2016    Acute uncomplicated diverticulitis at the distal transverse colon   • Early onset Alzheimer's dementia (CMS/HCC)    • GERD (gastroesophageal reflux disease)    • Heart disease    • Hyperlipidemia    • Hypertension    • Hypothyroidism    • Kidney disease    • Sleep apnea        Past Surgical History:   Procedure Laterality Date   • BREAST CYST EXCISION Right 1989    Benign   • COLONOSCOPY  2011    Dr. Cain   • ENDOSCOPY  04/15/2011   • EYE SURGERY  2009   • GALLBLADDER SURGERY  1960    Open   • TOTAL KNEE ARTHROPLASTY Left 2012    Dr. Soler   • VAGINAL HYSTERECTOMY  1999       Family History   Problem Relation Age of Onset   • Other Mother         cardiac disorder   • Hypertension Mother    • Heart disease Mother    • Hypertension Sister    • Heart attack Sister    • Cancer Brother    • Hypertension Brother    • Heart attack Brother    • Heart disease Father        Social History     Socioeconomic History   • Marital  status:      Spouse name: Not on file   • Number of children: Not on file   • Years of education: Not on file   • Highest education level: Not on file   Tobacco Use   • Smoking status: Never Smoker   • Smokeless tobacco: Never Used   Substance and Sexual Activity   • Alcohol use: No   • Drug use: No   • Sexual activity: No         Physical Exam   Constitutional: She is oriented to person, place, and time. She appears well-developed and well-nourished.   HENT:   Head: Normocephalic and atraumatic.   Eyes: Conjunctivae and EOM are normal. Pupils are equal, round, and reactive to light.   Neck: Normal range of motion. Neck supple.   Cardiovascular: Normal rate, regular rhythm and normal heart sounds.   Pulmonary/Chest: Effort normal.   Abdominal: Soft. She exhibits no distension.   Musculoskeletal: She exhibits deformity. She exhibits no edema.   Ambulates with walker   Neurological: She is alert and oriented to person, place, and time. Coordination normal.   Skin: Skin is dry.   Psychiatric: She has a normal mood and affect. Her behavior is normal. Judgment and thought content normal.   Nursing note and vitals reviewed.        Assessment/Plan    Wendie was seen today for hypothyroidism, flu vaccine and urinary tract infection.    Diagnoses and all orders for this visit:    Immunization due  -     Fluad Quad >65 years ([id number])    Hypothyroidism due to acquired atrophy of thyroid  -     TSH+Free T4    Early onset Alzheimer's dementia without behavioral disturbance (CMS/HCC)    Renal insufficiency  -     CBC & Differential  -     Comprehensive Metabolic Panel    High risk medication use  -     CBC & Differential  -     Comprehensive Metabolic Panel    Mixed anxiety depressive disorder    Polypharmacy    Fatigue, unspecified type    Irritable bowel syndrome with constipation    Hypoglycemia  -     Hemoglobin A1c    Spinal stenosis of lumbar region without neurogenic claudication    Hyperlipidemia, unspecified  hyperlipidemia type  -     Lipid Panel    Arthritis  -     HYDROcodone-acetaminophen (NORCO) 5-325 MG per tablet; Take 1 tablet by mouth Every 4 (Four) Hours As Needed for Moderate Pain .      Routine follow-up of above-noted medical problems.  Overall patient seems to be very stable on current regimen which will be continued.  Recently started on daily antibiotic therapy to hopefully prevent recurrent urinary tract infections.  Also here for routine lab work as noted above.  For now continue current therapy including pain medications which are used infrequently for her severe arthritic pains.  Will continue to monitor closely every 3 months.  No evidence of abuse nor misuse of medication.    This note includes information entered using a voice recognition dictation system.  Though reviewed, some nonsensible errors may remain.

## 2019-10-10 LAB
ALBUMIN SERPL-MCNC: 4 G/DL (ref 3.5–4.7)
ALBUMIN/GLOB SERPL: 1.3 {RATIO} (ref 1.2–2.2)
ALP SERPL-CCNC: 85 IU/L (ref 39–117)
ALT SERPL-CCNC: 10 IU/L (ref 0–32)
AST SERPL-CCNC: 16 IU/L (ref 0–40)
BASOPHILS # BLD AUTO: 0 X10E3/UL (ref 0–0.2)
BASOPHILS NFR BLD AUTO: 0 %
BILIRUB SERPL-MCNC: 0.3 MG/DL (ref 0–1.2)
BUN SERPL-MCNC: 17 MG/DL (ref 8–27)
BUN/CREAT SERPL: 17 (ref 12–28)
CALCIUM SERPL-MCNC: 9.3 MG/DL (ref 8.7–10.3)
CHLORIDE SERPL-SCNC: 105 MMOL/L (ref 96–106)
CHOLEST SERPL-MCNC: 172 MG/DL (ref 100–199)
CO2 SERPL-SCNC: 21 MMOL/L (ref 20–29)
CREAT SERPL-MCNC: 1.03 MG/DL (ref 0.57–1)
EOSINOPHIL # BLD AUTO: 0.2 X10E3/UL (ref 0–0.4)
EOSINOPHIL NFR BLD AUTO: 2 %
ERYTHROCYTE [DISTWIDTH] IN BLOOD BY AUTOMATED COUNT: 15.6 % (ref 12.3–15.4)
GLOBULIN SER CALC-MCNC: 3 G/DL (ref 1.5–4.5)
GLUCOSE SERPL-MCNC: 98 MG/DL (ref 65–99)
HBA1C MFR BLD: 5.9 % (ref 4.8–5.6)
HCT VFR BLD AUTO: 38.6 % (ref 34–46.6)
HDLC SERPL-MCNC: 54 MG/DL
HGB BLD-MCNC: 12.4 G/DL (ref 11.1–15.9)
IMM GRANULOCYTES # BLD AUTO: 0 X10E3/UL (ref 0–0.1)
IMM GRANULOCYTES NFR BLD AUTO: 0 %
LDLC SERPL CALC-MCNC: 94 MG/DL (ref 0–99)
LYMPHOCYTES # BLD AUTO: 3.9 X10E3/UL (ref 0.7–3.1)
LYMPHOCYTES NFR BLD AUTO: 48 %
MCH RBC QN AUTO: 26.8 PG (ref 26.6–33)
MCHC RBC AUTO-ENTMCNC: 32.1 G/DL (ref 31.5–35.7)
MCV RBC AUTO: 84 FL (ref 79–97)
MONOCYTES # BLD AUTO: 0.7 X10E3/UL (ref 0.1–0.9)
MONOCYTES NFR BLD AUTO: 9 %
MORPHOLOGY BLD-IMP: (no result)
NEUTROPHILS # BLD AUTO: 3.3 X10E3/UL (ref 1.4–7)
NEUTROPHILS NFR BLD AUTO: 41 %
PLATELET # BLD AUTO: 217 X10E3/UL (ref 150–450)
POTASSIUM SERPL-SCNC: 4.8 MMOL/L (ref 3.5–5.2)
PROT SERPL-MCNC: 7 G/DL (ref 6–8.5)
RBC # BLD AUTO: 4.62 X10E6/UL (ref 3.77–5.28)
SODIUM SERPL-SCNC: 141 MMOL/L (ref 134–144)
T4 FREE SERPL-MCNC: 0.96 NG/DL (ref 0.82–1.77)
TRIGL SERPL-MCNC: 120 MG/DL (ref 0–149)
TSH SERPL DL<=0.005 MIU/L-ACNC: 0.65 UIU/ML (ref 0.45–4.5)
VLDLC SERPL CALC-MCNC: 24 MG/DL (ref 5–40)
WBC # BLD AUTO: 8.1 X10E3/UL (ref 3.4–10.8)

## 2019-11-12 ENCOUNTER — TELEPHONE (OUTPATIENT)
Dept: FAMILY MEDICINE CLINIC | Facility: CLINIC | Age: 84
End: 2019-11-12

## 2019-11-12 NOTE — TELEPHONE ENCOUNTER
Does seem like I do recall doing this letter but as usual have no clue where could be found in epic?  As alternative recommend faxing last 3 office visits.  Have checked letters and try to look through media to find a copy of letter but actually it seems like I filled out some type of form?

## 2019-11-12 NOTE — TELEPHONE ENCOUNTER
[facility] [employer] fax [phone]    Please send medical records letter with diagnosis to above. She says the letter about her medical history,etc is already typed up by .

## 2019-11-22 DIAGNOSIS — E03.4 HYPOTHYROIDISM DUE TO ACQUIRED ATROPHY OF THYROID: ICD-10-CM

## 2019-11-22 DIAGNOSIS — E78.5 HYPERLIPIDEMIA, UNSPECIFIED HYPERLIPIDEMIA TYPE: ICD-10-CM

## 2019-11-22 RX ORDER — PRAVASTATIN SODIUM 40 MG
TABLET ORAL
Qty: 90 TABLET | Refills: 4 | Status: SHIPPED | OUTPATIENT
Start: 2019-11-22 | End: 2020-01-08

## 2019-11-22 RX ORDER — LEVOTHYROXINE SODIUM 0.03 MG/1
TABLET ORAL
Qty: 90 TABLET | Refills: 4 | Status: SHIPPED | OUTPATIENT
Start: 2019-11-22 | End: 2020-07-21

## 2019-11-26 DIAGNOSIS — I10 HYPERTENSION, UNSPECIFIED TYPE: ICD-10-CM

## 2019-11-26 RX ORDER — POTASSIUM CHLORIDE 750 MG/1
TABLET, FILM COATED, EXTENDED RELEASE ORAL
Qty: 180 TABLET | Refills: 3 | Status: SHIPPED | OUTPATIENT
Start: 2019-11-26 | End: 2020-11-24

## 2019-12-07 DIAGNOSIS — E11.9 CONTROLLED TYPE 2 DIABETES MELLITUS WITHOUT COMPLICATION, WITHOUT LONG-TERM CURRENT USE OF INSULIN (HCC): ICD-10-CM

## 2019-12-07 DIAGNOSIS — I10 HYPERTENSION, UNSPECIFIED TYPE: ICD-10-CM

## 2019-12-07 RX ORDER — LOSARTAN POTASSIUM 50 MG/1
TABLET ORAL
Qty: 45 TABLET | Refills: 4 | Status: SHIPPED | OUTPATIENT
Start: 2019-12-07 | End: 2021-03-01

## 2020-01-08 ENCOUNTER — OFFICE VISIT (OUTPATIENT)
Dept: FAMILY MEDICINE CLINIC | Facility: CLINIC | Age: 85
End: 2020-01-08

## 2020-01-08 VITALS
WEIGHT: 171.96 LBS | BODY MASS INDEX: 33.76 KG/M2 | HEIGHT: 60 IN | TEMPERATURE: 98.1 F | DIASTOLIC BLOOD PRESSURE: 70 MMHG | OXYGEN SATURATION: 98 % | HEART RATE: 91 BPM | SYSTOLIC BLOOD PRESSURE: 132 MMHG

## 2020-01-08 DIAGNOSIS — F02.80 EARLY ONSET ALZHEIMER'S DEMENTIA WITHOUT BEHAVIORAL DISTURBANCE (HCC): Primary | ICD-10-CM

## 2020-01-08 DIAGNOSIS — Z79.899 POLYPHARMACY: ICD-10-CM

## 2020-01-08 DIAGNOSIS — N28.9 RENAL INSUFFICIENCY: ICD-10-CM

## 2020-01-08 DIAGNOSIS — M19.90 ARTHRITIS: ICD-10-CM

## 2020-01-08 DIAGNOSIS — G30.0 EARLY ONSET ALZHEIMER'S DEMENTIA WITHOUT BEHAVIORAL DISTURBANCE (HCC): Primary | ICD-10-CM

## 2020-01-08 DIAGNOSIS — K58.1 IRRITABLE BOWEL SYNDROME WITH CONSTIPATION: ICD-10-CM

## 2020-01-08 DIAGNOSIS — F41.8 MIXED ANXIETY DEPRESSIVE DISORDER: ICD-10-CM

## 2020-01-08 DIAGNOSIS — E03.4 HYPOTHYROIDISM DUE TO ACQUIRED ATROPHY OF THYROID: ICD-10-CM

## 2020-01-08 DIAGNOSIS — Z79.899 HIGH RISK MEDICATION USE: ICD-10-CM

## 2020-01-08 PROCEDURE — 99213 OFFICE O/P EST LOW 20 MIN: CPT | Performed by: FAMILY MEDICINE

## 2020-01-08 RX ORDER — HYDROCODONE BITARTRATE AND ACETAMINOPHEN 5; 325 MG/1; MG/1
1 TABLET ORAL EVERY 4 HOURS PRN
Qty: 40 TABLET | Refills: 0 | Status: SHIPPED | OUTPATIENT
Start: 2020-01-08 | End: 2020-03-05 | Stop reason: SDUPTHER

## 2020-01-08 NOTE — PROGRESS NOTES
HPI  Wendie Houser is a 84 y.o. female who is here for follow up of multiple medical problems including heart failure renal insufficiency dementia and hypothyroidism.  Complains of general fatigue and depressive symptoms.  Was having some trouble swallowing because of mucus but has improved with Mucinex.  Requesting Valium for her depression but informed patient do not recommend this medication especially with her age and other medical problems and medications.      Review of Systems   Constitutional: Positive for fatigue.   HENT: Positive for trouble swallowing.    Respiratory: Positive for cough and choking.    All other systems reviewed and are negative.        Past Medical History:   Diagnosis Date   • Anemia    • Arthritis    • CHF (congestive heart failure) (CMS/HCC)    • Congestive heart failure (CMS/HCC)    • Depression    • Diabetes mellitus (CMS/HCC)     type 2   • Diverticulitis of colon 08/25/2016    Acute uncomplicated diverticulitis at the distal transverse colon   • Early onset Alzheimer's dementia (CMS/HCC)    • GERD (gastroesophageal reflux disease)    • Heart disease    • Hyperlipidemia    • Hypertension    • Hypothyroidism    • Kidney disease    • Sleep apnea        Past Surgical History:   Procedure Laterality Date   • BREAST CYST EXCISION Right 1989    Benign   • COLONOSCOPY  2011    Dr. Cain   • ENDOSCOPY  04/15/2011   • EYE SURGERY  2009   • GALLBLADDER SURGERY  1960    Open   • TOTAL KNEE ARTHROPLASTY Left 2012    Dr. Soler   • VAGINAL HYSTERECTOMY  1999       Family History   Problem Relation Age of Onset   • Other Mother         cardiac disorder   • Hypertension Mother    • Heart disease Mother    • Hypertension Sister    • Heart attack Sister    • Cancer Brother    • Hypertension Brother    • Heart attack Brother    • Heart disease Father        Social History     Socioeconomic History   • Marital status:      Spouse name: Not on file   • Number of children: Not on file   •  Years of education: Not on file   • Highest education level: Not on file   Tobacco Use   • Smoking status: Never Smoker   • Smokeless tobacco: Never Used   Substance and Sexual Activity   • Alcohol use: No   • Drug use: No   • Sexual activity: Never         Physical Exam   Constitutional: She is oriented to person, place, and time. She appears well-developed and well-nourished.   HENT:   Head: Normocephalic and atraumatic.   Nose: Nose normal.   Mouth/Throat: Oropharynx is clear and moist. No oropharyngeal exudate.   Eyes: Pupils are equal, round, and reactive to light. Conjunctivae and EOM are normal.   Neck: Normal range of motion. Neck supple.   Cardiovascular: Normal rate, regular rhythm and normal heart sounds.   Pulmonary/Chest: Effort normal.   Abdominal: Soft. Bowel sounds are normal.   Musculoskeletal: Normal range of motion. She exhibits no edema or deformity.   Neurological: She is alert and oriented to person, place, and time. She exhibits normal muscle tone. Coordination normal.   Skin: Skin is warm and dry.   Psychiatric: She has a normal mood and affect. Her behavior is normal. Judgment and thought content normal.   Vitals reviewed.        Assessment/Plan    Wendie was seen today for follow-up.    Diagnoses and all orders for this visit:    Early onset Alzheimer's dementia without behavioral disturbance (CMS/HCC)    Arthritis  -     HYDROcodone-acetaminophen (NORCO) 5-325 MG per tablet; Take 1 tablet by mouth Every 4 (Four) Hours As Needed for Moderate Pain .    Mixed anxiety depressive disorder    Polypharmacy    Hypothyroidism due to acquired atrophy of thyroid    Irritable bowel syndrome with constipation    Renal insufficiency    High risk medication use        Patient here for routine follow-up of above-noted medical problems.  Main complaint is fatigue.  Also arthritic pains with cold damp weather etc.  Takes occasional pain medication and risks were discussed.  There is no evidence of abuse nor  misuse of medication.  Requesting Valium for depression but as discussed this is contraindicated.  Overall stable on current regimen which will be continued with close monitoring every 3 months.    This note includes information entered using a voice recognition dictation system.  Though reviewed, some nonsensible errors may remain.

## 2020-01-11 DIAGNOSIS — F41.8 MIXED ANXIETY DEPRESSIVE DISORDER: ICD-10-CM

## 2020-01-13 RX ORDER — SERTRALINE HYDROCHLORIDE 100 MG/1
TABLET, FILM COATED ORAL
Qty: 90 TABLET | Refills: 1 | Status: SHIPPED | OUTPATIENT
Start: 2020-01-13 | End: 2020-07-13

## 2020-02-10 ENCOUNTER — TELEPHONE (OUTPATIENT)
Dept: FAMILY MEDICINE CLINIC | Facility: CLINIC | Age: 85
End: 2020-02-10

## 2020-02-10 DIAGNOSIS — F02.80 EARLY ONSET ALZHEIMER'S DEMENTIA WITHOUT BEHAVIORAL DISTURBANCE (HCC): Primary | ICD-10-CM

## 2020-02-10 DIAGNOSIS — G30.0 EARLY ONSET ALZHEIMER'S DEMENTIA WITHOUT BEHAVIORAL DISTURBANCE (HCC): Primary | ICD-10-CM

## 2020-02-10 RX ORDER — DONEPEZIL HYDROCHLORIDE 5 MG/1
5 TABLET, FILM COATED ORAL NIGHTLY
Qty: 30 TABLET | Refills: 2 | Status: SHIPPED | OUTPATIENT
Start: 2020-02-10 | End: 2020-05-15 | Stop reason: SDUPTHER

## 2020-02-10 NOTE — TELEPHONE ENCOUNTER
PLEASE CALL PT SHE IS NOT AT HOME AND CAN NOT FIND HER DEMENTIA MEDICATION AND SHE DOES NOT KNOW THE NAME OF IT.  PLEASE CALL [phone].

## 2020-03-05 DIAGNOSIS — M19.90 ARTHRITIS: ICD-10-CM

## 2020-03-05 RX ORDER — HYDROCODONE BITARTRATE AND ACETAMINOPHEN 5; 325 MG/1; MG/1
1 TABLET ORAL EVERY 4 HOURS PRN
Qty: 40 TABLET | Refills: 0 | Status: SHIPPED | OUTPATIENT
Start: 2020-03-05 | End: 2020-04-16 | Stop reason: SDUPTHER

## 2020-03-05 NOTE — TELEPHONE ENCOUNTER
Pt is calling to get a refill on her Hydrocodone and is requesting this script to be printed out.  Last Edi is today.  Last office visit 1/8/20.  Last fill was 1/8/20 #40

## 2020-03-06 RX ORDER — CYCLOBENZAPRINE HCL 5 MG
TABLET ORAL
Qty: 15 TABLET | Refills: 5 | Status: SHIPPED | OUTPATIENT
Start: 2020-03-06 | End: 2020-04-07 | Stop reason: SDUPTHER

## 2020-03-06 RX ORDER — CYCLOBENZAPRINE HCL 5 MG
TABLET ORAL
Qty: 15 TABLET | Refills: 5 | Status: SHIPPED | OUTPATIENT
Start: 2020-03-06 | End: 2020-10-20 | Stop reason: SDUPTHER

## 2020-04-07 ENCOUNTER — OFFICE VISIT (OUTPATIENT)
Dept: FAMILY MEDICINE CLINIC | Facility: CLINIC | Age: 85
End: 2020-04-07

## 2020-04-07 VITALS — BODY MASS INDEX: 33.58 KG/M2 | HEIGHT: 60 IN

## 2020-04-07 DIAGNOSIS — F02.80 EARLY ONSET ALZHEIMER'S DEMENTIA WITHOUT BEHAVIORAL DISTURBANCE (HCC): ICD-10-CM

## 2020-04-07 DIAGNOSIS — N28.9 RENAL INSUFFICIENCY: ICD-10-CM

## 2020-04-07 DIAGNOSIS — G30.0 EARLY ONSET ALZHEIMER'S DEMENTIA WITHOUT BEHAVIORAL DISTURBANCE (HCC): ICD-10-CM

## 2020-04-07 DIAGNOSIS — K58.1 IRRITABLE BOWEL SYNDROME WITH CONSTIPATION: Primary | ICD-10-CM

## 2020-04-07 DIAGNOSIS — E03.4 HYPOTHYROIDISM DUE TO ACQUIRED ATROPHY OF THYROID: ICD-10-CM

## 2020-04-07 DIAGNOSIS — J30.9 ALLERGIC RHINITIS, UNSPECIFIED SEASONALITY, UNSPECIFIED TRIGGER: ICD-10-CM

## 2020-04-07 DIAGNOSIS — M19.90 ARTHRITIS: ICD-10-CM

## 2020-04-07 PROCEDURE — 99442 PR PHYS/QHP TELEPHONE EVALUATION 11-20 MIN: CPT | Performed by: FAMILY MEDICINE

## 2020-04-07 NOTE — PROGRESS NOTES
You have chosen to receive care through a telephone visit today. Do you consent to use a telephone visit for your medical care today? Yes

## 2020-04-07 NOTE — PROGRESS NOTES
HPI  Wendie Houser is a 85 y.o. female who is here for follow up by phone via telephone visit.  General aches and pains, but otherwise doing okay.  Told can call when needs any refills, new complaints, etc.      Review of Systems   Respiratory: Negative for chest tightness and shortness of breath.    Musculoskeletal: Positive for arthralgias.   All other systems reviewed and are negative.        Past Medical History:   Diagnosis Date   • Anemia    • Arthritis    • CHF (congestive heart failure) (CMS/HCC)    • Congestive heart failure (CMS/HCC)    • Depression    • Diabetes mellitus (CMS/HCC)     type 2   • Diverticulitis of colon 08/25/2016    Acute uncomplicated diverticulitis at the distal transverse colon   • Early onset Alzheimer's dementia (CMS/HCC)    • GERD (gastroesophageal reflux disease)    • Heart disease    • Hyperlipidemia    • Hypertension    • Hypothyroidism    • Kidney disease    • Sleep apnea        Past Surgical History:   Procedure Laterality Date   • BREAST CYST EXCISION Right 1989    Benign   • COLONOSCOPY  2011    Dr. Cian   • ENDOSCOPY  04/15/2011   • EYE SURGERY  2009   • GALLBLADDER SURGERY  1960    Open   • TOTAL KNEE ARTHROPLASTY Left 2012    Dr. Soler   • VAGINAL HYSTERECTOMY  1999       Family History   Problem Relation Age of Onset   • Other Mother         cardiac disorder   • Hypertension Mother    • Heart disease Mother    • Hypertension Sister    • Heart attack Sister    • Cancer Brother    • Hypertension Brother    • Heart attack Brother    • Heart disease Father        Social History     Socioeconomic History   • Marital status:      Spouse name: Not on file   • Number of children: Not on file   • Years of education: Not on file   • Highest education level: Not on file   Tobacco Use   • Smoking status: Never Smoker   • Smokeless tobacco: Never Used   Substance and Sexual Activity   • Alcohol use: No   • Drug use: No   • Sexual activity: Never         Physical Exam    Constitutional: She is oriented to person, place, and time. No distress.   Neurological: She is alert and oriented to person, place, and time.         Assessment/Plan    Wendie was seen today for follow-up and heartburn.    Diagnoses and all orders for this visit:    Irritable bowel syndrome with constipation    Hypothyroidism due to acquired atrophy of thyroid    Early onset Alzheimer's dementia without behavioral disturbance (CMS/HCC)    Arthritis    Renal insufficiency    Allergic rhinitis, unspecified seasonality, unspecified trigger      Routine follow up,  Seems to be stable on current regimen.  Hopefully office appointment by July, can get lab work, etc.  This visit has been rescheduled as a phone visit to comply with patient safety concerns in accordance with CDC recommendations. Total time of discussion was 15 minutes.

## 2020-04-16 DIAGNOSIS — M19.90 ARTHRITIS: ICD-10-CM

## 2020-04-16 NOTE — TELEPHONE ENCOUNTER
Patient is calling in requesting a refill of her  HYDROcodone-acetaminophen (NORCO) 5-325 MG per tablet  Last refill 03/05/20  Lov 04/07/20  Patient has verified [facility] on [address]  PLEASE ADVISE  Patient can be called back at  [phone]

## 2020-04-17 DIAGNOSIS — M19.90 ARTHRITIS: ICD-10-CM

## 2020-04-17 RX ORDER — HYDROCODONE BITARTRATE AND ACETAMINOPHEN 5; 325 MG/1; MG/1
1 TABLET ORAL EVERY 4 HOURS PRN
Qty: 40 TABLET | Refills: 0 | Status: SHIPPED | OUTPATIENT
Start: 2020-04-17 | End: 2020-05-27 | Stop reason: SDUPTHER

## 2020-04-17 RX ORDER — HYDROCODONE BITARTRATE AND ACETAMINOPHEN 5; 325 MG/1; MG/1
1 TABLET ORAL EVERY 4 HOURS PRN
Qty: 40 TABLET | Refills: 0 | Status: SHIPPED | OUTPATIENT
Start: 2020-04-17 | End: 2020-04-17 | Stop reason: SDUPTHER

## 2020-04-28 ENCOUNTER — TELEPHONE (OUTPATIENT)
Dept: FAMILY MEDICINE CLINIC | Facility: CLINIC | Age: 85
End: 2020-04-28

## 2020-04-28 NOTE — TELEPHONE ENCOUNTER
Patient states that she has been having diarrhea, green in color for 2 weeks.  She was wanting to get an appointment, but does not have access to My Chart.  Patient is asking if Dr. Mcqueen can prescribe something for her.    [address] [place] confirmed.    Patient call back [phone]

## 2020-04-29 ENCOUNTER — OFFICE VISIT (OUTPATIENT)
Dept: FAMILY MEDICINE CLINIC | Facility: CLINIC | Age: 85
End: 2020-04-29

## 2020-04-29 VITALS
OXYGEN SATURATION: 98 % | BODY MASS INDEX: 33.58 KG/M2 | HEART RATE: 92 BPM | SYSTOLIC BLOOD PRESSURE: 126 MMHG | TEMPERATURE: 97.3 F | DIASTOLIC BLOOD PRESSURE: 80 MMHG | HEIGHT: 60 IN

## 2020-04-29 DIAGNOSIS — E03.4 HYPOTHYROIDISM DUE TO ACQUIRED ATROPHY OF THYROID: ICD-10-CM

## 2020-04-29 DIAGNOSIS — F41.8 MIXED ANXIETY DEPRESSIVE DISORDER: ICD-10-CM

## 2020-04-29 DIAGNOSIS — M25.551 HIP PAIN, CHRONIC, RIGHT: ICD-10-CM

## 2020-04-29 DIAGNOSIS — E11.22 CONTROLLED TYPE 2 DIABETES MELLITUS WITH STAGE 2 CHRONIC KIDNEY DISEASE, WITHOUT LONG-TERM CURRENT USE OF INSULIN (HCC): ICD-10-CM

## 2020-04-29 DIAGNOSIS — M48.061 SPINAL STENOSIS OF LUMBAR REGION WITHOUT NEUROGENIC CLAUDICATION: ICD-10-CM

## 2020-04-29 DIAGNOSIS — R19.8 ALTERNATING CONSTIPATION AND DIARRHEA: ICD-10-CM

## 2020-04-29 DIAGNOSIS — K58.1 IRRITABLE BOWEL SYNDROME WITH CONSTIPATION: Primary | ICD-10-CM

## 2020-04-29 DIAGNOSIS — Z79.899 POLYPHARMACY: ICD-10-CM

## 2020-04-29 DIAGNOSIS — Z79.899 HIGH RISK MEDICATION USE: ICD-10-CM

## 2020-04-29 DIAGNOSIS — N18.2 CONTROLLED TYPE 2 DIABETES MELLITUS WITH STAGE 2 CHRONIC KIDNEY DISEASE, WITHOUT LONG-TERM CURRENT USE OF INSULIN (HCC): ICD-10-CM

## 2020-04-29 DIAGNOSIS — G89.29 HIP PAIN, CHRONIC, RIGHT: ICD-10-CM

## 2020-04-29 DIAGNOSIS — N28.9 RENAL INSUFFICIENCY: ICD-10-CM

## 2020-04-29 PROCEDURE — 99214 OFFICE O/P EST MOD 30 MIN: CPT | Performed by: FAMILY MEDICINE

## 2020-04-29 NOTE — PROGRESS NOTES
HPI  Wendie Houser is a 85 y.o. female who is here for greenish diarrhea that lasted 3 to 4 days.  Actually no diarrhea today.  Patient concerned possibly related to gallbladder removal.  Also read could be side effects to Zoloft or her laxatives.  Discussed obviously should discontinue laxatives if having diarrhea.  Has had history of irritable bowel syndrome as well as diverticulitis in the past.  Also does take pain medications for spinal stenosis.  Son reports having increased difficulty walking and especially having severe right hip pain.  Has known severe arthritis in the right knee and again known spinal stenosis.  All of this was discussed.      Review of Systems   Gastrointestinal: Positive for constipation and diarrhea. Negative for abdominal distention, abdominal pain, blood in stool, nausea, rectal pain and vomiting.   Musculoskeletal: Positive for arthralgias, back pain and gait problem.   Neurological: Positive for dizziness.   Psychiatric/Behavioral: Negative for agitation, behavioral problems and confusion.   All other systems reviewed and are negative.        Past Medical History:   Diagnosis Date   • Anemia    • Arthritis    • CHF (congestive heart failure) (CMS/HCC)    • Congestive heart failure (CMS/HCC)    • Depression    • Diabetes mellitus (CMS/HCC)     type 2   • Diverticulitis of colon 08/25/2016    Acute uncomplicated diverticulitis at the distal transverse colon   • Early onset Alzheimer's dementia (CMS/HCC)    • GERD (gastroesophageal reflux disease)    • Heart disease    • Hyperlipidemia    • Hypertension    • Hypothyroidism    • Kidney disease    • Sleep apnea        Past Surgical History:   Procedure Laterality Date   • BREAST CYST EXCISION Right 1989    Benign   • COLONOSCOPY  2011    Dr. Cain   • ENDOSCOPY  04/15/2011   • EYE SURGERY  2009   • GALLBLADDER SURGERY  1960    Open   • TOTAL KNEE ARTHROPLASTY Left 2012    Dr. Soler   • VAGINAL HYSTERECTOMY  1999       Family  History   Problem Relation Age of Onset   • Other Mother         cardiac disorder   • Hypertension Mother    • Heart disease Mother    • Hypertension Sister    • Heart attack Sister    • Cancer Brother    • Hypertension Brother    • Heart attack Brother    • Heart disease Father        Social History     Socioeconomic History   • Marital status:      Spouse name: Not on file   • Number of children: Not on file   • Years of education: Not on file   • Highest education level: Not on file   Tobacco Use   • Smoking status: Never Smoker   • Smokeless tobacco: Never Used   Substance and Sexual Activity   • Alcohol use: No   • Drug use: No   • Sexual activity: Never         Physical Exam   Constitutional: She is oriented to person, place, and time. She appears well-developed and well-nourished. No distress.   Eyes: Pupils are equal, round, and reactive to light. Conjunctivae and EOM are normal. No scleral icterus.   Cardiovascular: Normal rate and regular rhythm.   Pulmonary/Chest: Effort normal.   Abdominal: Soft. She exhibits no distension. There is no tenderness. There is no guarding.   Musculoskeletal:   Mostly confined to wheelchair   Neurological: She is alert and oriented to person, place, and time.   Skin: Skin is warm and dry.   Psychiatric: She has a normal mood and affect. Her behavior is normal. Judgment and thought content normal.   Vitals reviewed.        Assessment/Plan    Wendie was seen today for diarrhea.    Diagnoses and all orders for this visit:    Irritable bowel syndrome with constipation    Hypothyroidism due to acquired atrophy of thyroid  -     TSH+Free T4    Renal insufficiency    Polypharmacy  -     CBC & Differential  -     Comprehensive Metabolic Panel    Spinal stenosis of lumbar region without neurogenic claudication  -     Sedimentation Rate    Mixed anxiety depressive disorder    High risk medication use  -     CBC & Differential  -     Comprehensive Metabolic Panel    Controlled  type 2 diabetes mellitus with stage 2 chronic kidney disease, without long-term current use of insulin (CMS/[facility])  -     Hemoglobin A1c    Alternating constipation and diarrhea  -     cholestyramine light 4 g powder; Take 1 packet by mouth 2 (Two) Times a Day As Needed (diarrhea).    Hip pain, chronic, right        Patient presents with several day history of greenish diarrhea.  Discussed possibly related to previous gallbladder removal and discussed trial of Questran on an as-needed basis.  However does have history of IBS with constipation and warned about possible side effects, specifically constipation if uses every day.  Also reports worsening right hip pain and at some point should probably get x-ray of the hip.  Does have known arthritis in the right knee as well as spinal stenosis but is at high risk of also significant arthritis in the hip and at some point may need orthopedic consultation?  Will get routine labs as noted.  Consider Medicare wellness evaluation hopefully in 3 months when pandemic restrictions have hopefully lifted.    This note includes information entered using a voice recognition dictation system.  Though reviewed, some nonsensible errors may remain.    Patient has been erroneously marked as diabetic. Based on the available clinical information, she does not have diabetes and should therefore be excluded from diabetic health maintenance and quality measures for the remainder of the reporting period.

## 2020-04-30 LAB
ALBUMIN SERPL-MCNC: 4 G/DL (ref 3.6–4.6)
ALBUMIN/GLOB SERPL: 1.4 {RATIO} (ref 1.2–2.2)
ALP SERPL-CCNC: 94 IU/L (ref 39–117)
ALT SERPL-CCNC: 9 IU/L (ref 0–32)
AST SERPL-CCNC: 18 IU/L (ref 0–40)
BASOPHILS # BLD AUTO: 0 X10E3/UL (ref 0–0.2)
BASOPHILS NFR BLD AUTO: 1 %
BILIRUB SERPL-MCNC: <0.2 MG/DL (ref 0–1.2)
BUN SERPL-MCNC: 18 MG/DL (ref 8–27)
BUN/CREAT SERPL: 18 (ref 12–28)
CALCIUM SERPL-MCNC: 9.3 MG/DL (ref 8.7–10.3)
CHLORIDE SERPL-SCNC: 106 MMOL/L (ref 96–106)
CO2 SERPL-SCNC: 21 MMOL/L (ref 20–29)
CREAT SERPL-MCNC: 1 MG/DL (ref 0.57–1)
EOSINOPHIL # BLD AUTO: 0.2 X10E3/UL (ref 0–0.4)
EOSINOPHIL NFR BLD AUTO: 3 %
ERYTHROCYTE [DISTWIDTH] IN BLOOD BY AUTOMATED COUNT: 15.1 % (ref 11.7–15.4)
ERYTHROCYTE [SEDIMENTATION RATE] IN BLOOD BY WESTERGREN METHOD: 57 MM/HR (ref 0–40)
GLOBULIN SER CALC-MCNC: 2.9 G/DL (ref 1.5–4.5)
GLUCOSE SERPL-MCNC: 115 MG/DL (ref 65–99)
HBA1C MFR BLD: 6.1 % (ref 4.8–5.6)
HCT VFR BLD AUTO: 36.4 % (ref 34–46.6)
HGB BLD-MCNC: 11.8 G/DL (ref 11.1–15.9)
IMM GRANULOCYTES # BLD AUTO: 0 X10E3/UL (ref 0–0.1)
IMM GRANULOCYTES NFR BLD AUTO: 0 %
LYMPHOCYTES # BLD AUTO: 3.1 X10E3/UL (ref 0.7–3.1)
LYMPHOCYTES NFR BLD AUTO: 42 %
MCH RBC QN AUTO: 26.9 PG (ref 26.6–33)
MCHC RBC AUTO-ENTMCNC: 32.4 G/DL (ref 31.5–35.7)
MCV RBC AUTO: 83 FL (ref 79–97)
MONOCYTES # BLD AUTO: 0.8 X10E3/UL (ref 0.1–0.9)
MONOCYTES NFR BLD AUTO: 10 %
NEUTROPHILS # BLD AUTO: 3.3 X10E3/UL (ref 1.4–7)
NEUTROPHILS NFR BLD AUTO: 44 %
PLATELET # BLD AUTO: 236 X10E3/UL (ref 150–450)
POTASSIUM SERPL-SCNC: 4.8 MMOL/L (ref 3.5–5.2)
PROT SERPL-MCNC: 6.9 G/DL (ref 6–8.5)
RBC # BLD AUTO: 4.38 X10E6/UL (ref 3.77–5.28)
SODIUM SERPL-SCNC: 141 MMOL/L (ref 134–144)
T4 FREE SERPL-MCNC: 1.09 NG/DL (ref 0.82–1.77)
TSH SERPL DL<=0.005 MIU/L-ACNC: 0.01 UIU/ML (ref 0.45–4.5)
WBC # BLD AUTO: 7.4 X10E3/UL (ref 3.4–10.8)

## 2020-05-15 DIAGNOSIS — F02.80 EARLY ONSET ALZHEIMER'S DEMENTIA WITHOUT BEHAVIORAL DISTURBANCE (HCC): ICD-10-CM

## 2020-05-15 DIAGNOSIS — G30.0 EARLY ONSET ALZHEIMER'S DEMENTIA WITHOUT BEHAVIORAL DISTURBANCE (HCC): ICD-10-CM

## 2020-05-15 RX ORDER — DONEPEZIL HYDROCHLORIDE 10 MG/1
10 TABLET, FILM COATED ORAL NIGHTLY
Qty: 90 TABLET | Refills: 3 | Status: SHIPPED | OUTPATIENT
Start: 2020-05-15 | End: 2021-05-18

## 2020-05-27 ENCOUNTER — HOSPITAL ENCOUNTER (OUTPATIENT)
Dept: GENERAL RADIOLOGY | Facility: HOSPITAL | Age: 85
Discharge: HOME | End: 2020-05-27
Admitting: FAMILY MEDICINE

## 2020-05-27 ENCOUNTER — TELEPHONE (OUTPATIENT)
Dept: FAMILY MEDICINE CLINIC | Facility: CLINIC | Age: 85
End: 2020-05-27

## 2020-05-27 DIAGNOSIS — M25.551 PAIN OF RIGHT HIP JOINT: ICD-10-CM

## 2020-05-27 DIAGNOSIS — M25.551 PAIN OF RIGHT HIP JOINT: Primary | ICD-10-CM

## 2020-05-27 DIAGNOSIS — M19.90 ARTHRITIS: ICD-10-CM

## 2020-05-27 DIAGNOSIS — Z20.828 EXPOSURE TO SARS VIRUS: Primary | ICD-10-CM

## 2020-05-27 PROCEDURE — 73502 X-RAY EXAM HIP UNI 2-3 VIEWS: CPT

## 2020-05-27 RX ORDER — HYDROCODONE BITARTRATE AND ACETAMINOPHEN 5; 325 MG/1; MG/1
1 TABLET ORAL EVERY 4 HOURS PRN
Qty: 40 TABLET | Refills: 0 | Status: SHIPPED | OUTPATIENT
Start: 2020-05-27 | End: 2020-08-04 | Stop reason: SDUPTHER

## 2020-05-27 NOTE — TELEPHONE ENCOUNTER
Pt called to get the order placed for her hip x-ray. She also wants to get test for CO-VID WHEN SHE GOES TO GET THE X-RAY DONE. CAN YOU PLEASE PLACE ORDERS FOR THE PATIENT TO HAVE THE X-RAY AND COVID TEST COMPLETED.

## 2020-05-28 DIAGNOSIS — M16.11 PRIMARY OSTEOARTHRITIS OF RIGHT HIP: Primary | ICD-10-CM

## 2020-05-28 RX ORDER — DICLOFENAC SODIUM 75 MG/1
75 TABLET, DELAYED RELEASE ORAL 2 TIMES DAILY
Qty: 30 TABLET | Refills: 1 | Status: SHIPPED | OUTPATIENT
Start: 2020-05-28 | End: 2020-07-20

## 2020-07-08 DIAGNOSIS — M16.11 PRIMARY OSTEOARTHRITIS OF RIGHT HIP: ICD-10-CM

## 2020-07-08 RX ORDER — DICLOFENAC SODIUM 75 MG/1
TABLET, DELAYED RELEASE ORAL
Qty: 30 TABLET | Refills: 1 | OUTPATIENT
Start: 2020-07-08

## 2020-07-11 DIAGNOSIS — F41.8 MIXED ANXIETY DEPRESSIVE DISORDER: ICD-10-CM

## 2020-07-13 RX ORDER — SERTRALINE HYDROCHLORIDE 100 MG/1
TABLET, FILM COATED ORAL
Qty: 90 TABLET | Refills: 3 | Status: SHIPPED | OUTPATIENT
Start: 2020-07-13 | End: 2021-07-07

## 2020-07-16 ENCOUNTER — TELEPHONE (OUTPATIENT)
Dept: FAMILY MEDICINE CLINIC | Facility: CLINIC | Age: 85
End: 2020-07-16

## 2020-07-16 NOTE — TELEPHONE ENCOUNTER
"PATIENT REPORTS THAT HER MEDICINE FOR HER ARTHRITIS HAS BEEN BOTHERING HER STOMACH. SHE REPORTS SHE HAS BEEN HAVING \"STOMACH ISSUES\", DIARRHEA AND A LACK OF APPETITE.     SHE ALSO REPORTS SHE HAD A FEVER ON Monday AND HAS HAD SOME CHILLS THIS WEEK BUT NOTHING CURRENTLY.     SHE MADE AN APPOINTMENT FOR Monday BUT FEELSL MANUEL SHE NEEDS TO BE SEEN MUCH SOONER THEN THIS.     PLEASE ADVISE     PATIENT CALLBACK [phone]      "

## 2020-07-20 ENCOUNTER — OFFICE VISIT (OUTPATIENT)
Dept: FAMILY MEDICINE CLINIC | Facility: CLINIC | Age: 85
End: 2020-07-20

## 2020-07-20 VITALS
HEIGHT: 60 IN | WEIGHT: 166 LBS | OXYGEN SATURATION: 98 % | SYSTOLIC BLOOD PRESSURE: 120 MMHG | BODY MASS INDEX: 32.59 KG/M2 | DIASTOLIC BLOOD PRESSURE: 70 MMHG | RESPIRATION RATE: 20 BRPM | HEART RATE: 102 BPM | TEMPERATURE: 96.8 F

## 2020-07-20 DIAGNOSIS — F02.80 EARLY ONSET ALZHEIMER'S DEMENTIA WITHOUT BEHAVIORAL DISTURBANCE (HCC): ICD-10-CM

## 2020-07-20 DIAGNOSIS — E83.42 HYPOMAGNESEMIA: ICD-10-CM

## 2020-07-20 DIAGNOSIS — E07.9 THYROID DYSFUNCTION: ICD-10-CM

## 2020-07-20 DIAGNOSIS — E87.5 HYPERKALEMIA: ICD-10-CM

## 2020-07-20 DIAGNOSIS — Z79.899 HIGH RISK MEDICATION USE: Primary | ICD-10-CM

## 2020-07-20 DIAGNOSIS — K58.1 IRRITABLE BOWEL SYNDROME WITH CONSTIPATION: ICD-10-CM

## 2020-07-20 DIAGNOSIS — G30.0 EARLY ONSET ALZHEIMER'S DEMENTIA WITHOUT BEHAVIORAL DISTURBANCE (HCC): ICD-10-CM

## 2020-07-20 PROCEDURE — 99213 OFFICE O/P EST LOW 20 MIN: CPT | Performed by: FAMILY MEDICINE

## 2020-07-20 RX ORDER — PRAVASTATIN SODIUM 40 MG
TABLET ORAL
COMMUNITY
Start: 2020-05-20 | End: 2021-04-27 | Stop reason: SDUPTHER

## 2020-07-20 NOTE — PROGRESS NOTES
HPI  Wendie Houser is a 85 y.o. female who is here for follow up of recent diarrhea.  Says symptoms have improved and no worsening after discontinuing NSAID therapy as discussed.  Reports significant weight loss.  But overall says feeling much better.      Review of Systems   Constitutional: Positive for appetite change and unexpected weight change.   Gastrointestinal: Positive for abdominal pain and diarrhea.         Past Medical History:   Diagnosis Date   • Anemia    • Arthritis    • CHF (congestive heart failure) (CMS/HCC)    • Congestive heart failure (CMS/HCC)    • Depression    • Diabetes mellitus (CMS/HCC)     type 2   • Diverticulitis of colon 08/25/2016    Acute uncomplicated diverticulitis at the distal transverse colon   • Early onset Alzheimer's dementia (CMS/HCC)    • GERD (gastroesophageal reflux disease)    • Heart disease    • Hyperlipidemia    • Hypertension    • Hypothyroidism    • Kidney disease    • Sleep apnea        Past Surgical History:   Procedure Laterality Date   • BREAST CYST EXCISION Right 1989    Benign   • COLONOSCOPY  2011    Dr. Cain   • ENDOSCOPY  04/15/2011   • EYE SURGERY  2009   • GALLBLADDER SURGERY  1960    Open   • TOTAL KNEE ARTHROPLASTY Left 2012    Dr. Soler   • VAGINAL HYSTERECTOMY  1999       Family History   Problem Relation Age of Onset   • Other Mother         cardiac disorder   • Hypertension Mother    • Heart disease Mother    • Hypertension Sister    • Heart attack Sister    • Cancer Brother    • Hypertension Brother    • Heart attack Brother    • Heart disease Father        Social History     Socioeconomic History   • Marital status:      Spouse name: Not on file   • Number of children: Not on file   • Years of education: Not on file   • Highest education level: Not on file   Tobacco Use   • Smoking status: Never Smoker   • Smokeless tobacco: Never Used   Substance and Sexual Activity   • Alcohol use: No   • Drug use: No   • Sexual activity: Never          Physical Exam   Constitutional: She is oriented to person, place, and time. She appears well-developed and well-nourished.   HENT:   Head: Normocephalic and atraumatic.   Eyes: Pupils are equal, round, and reactive to light. EOM are normal.   Neck: Normal range of motion.   Cardiovascular: Normal rate.   Pulmonary/Chest: Effort normal. No respiratory distress.   Abdominal: Soft.   Musculoskeletal: Normal range of motion.   Neurological: She is alert and oriented to person, place, and time.   Skin: Skin is warm and dry.   Psychiatric: She has a normal mood and affect. Her behavior is normal. Judgment and thought content normal.   Nursing note and vitals reviewed.        Assessment/Plan    Wendie was seen today for diabetes.    Diagnoses and all orders for this visit:    High risk medication use  -     CBC & Differential  -     Comprehensive Metabolic Panel    Hypomagnesemia  -     Magnesium    Thyroid dysfunction  -     TSH+Free T4    Hyperkalemia  -     Comprehensive Metabolic Panel    Irritable bowel syndrome with constipation    Early onset Alzheimer's dementia without behavioral disturbance (CMS/HCC)      Patient here for follow-up after recent episode diarrhea abdominal discomfort.  Symptoms apparently have resolved.  Discontinued NSAID therapy and patient reports actually feeling better.  This again was discussed with patient and her son.  Some weight loss noted and will be followed.  Most recent lab work reviewed and will be rechecked especially thyroid function which she really does not make any sense?  Overall doing well on current regimen which will be continued with follow-up in 3 months for Medicare wellness evaluation.    This note includes information entered using a voice recognition dictation system.  Though reviewed, some nonsensible errors may remain.

## 2020-07-21 LAB
ALBUMIN SERPL-MCNC: 3.8 G/DL (ref 3.5–5.2)
ALBUMIN/GLOB SERPL: 1.1 G/DL
ALP SERPL-CCNC: 86 U/L (ref 39–117)
ALT SERPL-CCNC: 21 U/L (ref 1–33)
AST SERPL-CCNC: 23 U/L (ref 1–32)
BASOPHILS # BLD AUTO: 0.06 10*3/MM3 (ref 0–0.2)
BASOPHILS NFR BLD AUTO: 0.7 % (ref 0–1.5)
BILIRUB SERPL-MCNC: 0.3 MG/DL (ref 0–1.2)
BUN SERPL-MCNC: 13 MG/DL (ref 8–23)
BUN/CREAT SERPL: 13.4 (ref 7–25)
CALCIUM SERPL-MCNC: 9.5 MG/DL (ref 8.6–10.5)
CHLORIDE SERPL-SCNC: 101 MMOL/L (ref 98–107)
CO2 SERPL-SCNC: 25.6 MMOL/L (ref 22–29)
CREAT SERPL-MCNC: 0.97 MG/DL (ref 0.57–1)
EOSINOPHIL # BLD AUTO: 0.18 10*3/MM3 (ref 0–0.4)
EOSINOPHIL NFR BLD AUTO: 2.1 % (ref 0.3–6.2)
ERYTHROCYTE [DISTWIDTH] IN BLOOD BY AUTOMATED COUNT: 14.5 % (ref 12.3–15.4)
GLOBULIN SER CALC-MCNC: 3.6 GM/DL
GLUCOSE SERPL-MCNC: 146 MG/DL (ref 65–99)
HCT VFR BLD AUTO: 35.9 % (ref 34–46.6)
HGB BLD-MCNC: 11.4 G/DL (ref 12–15.9)
IMM GRANULOCYTES # BLD AUTO: 0.05 10*3/MM3 (ref 0–0.05)
IMM GRANULOCYTES NFR BLD AUTO: 0.6 % (ref 0–0.5)
LYMPHOCYTES # BLD AUTO: 2.82 10*3/MM3 (ref 0.7–3.1)
LYMPHOCYTES NFR BLD AUTO: 33.5 % (ref 19.6–45.3)
MAGNESIUM SERPL-MCNC: 2.1 MG/DL (ref 1.6–2.4)
MCH RBC QN AUTO: 25.1 PG (ref 26.6–33)
MCHC RBC AUTO-ENTMCNC: 31.8 G/DL (ref 31.5–35.7)
MCV RBC AUTO: 78.9 FL (ref 79–97)
MONOCYTES # BLD AUTO: 0.78 10*3/MM3 (ref 0.1–0.9)
MONOCYTES NFR BLD AUTO: 9.3 % (ref 5–12)
NEUTROPHILS # BLD AUTO: 4.52 10*3/MM3 (ref 1.7–7)
NEUTROPHILS NFR BLD AUTO: 53.8 % (ref 42.7–76)
NRBC BLD AUTO-RTO: 0 /100 WBC (ref 0–0.2)
PLATELET # BLD AUTO: 330 10*3/MM3 (ref 140–450)
POTASSIUM SERPL-SCNC: 4.5 MMOL/L (ref 3.5–5.2)
PROT SERPL-MCNC: 7.4 G/DL (ref 6–8.5)
RBC # BLD AUTO: 4.55 10*6/MM3 (ref 3.77–5.28)
SODIUM SERPL-SCNC: 140 MMOL/L (ref 136–145)
T4 FREE SERPL-MCNC: 2.04 NG/DL (ref 0.93–1.7)
TSH SERPL DL<=0.005 MIU/L-ACNC: <0.005 UIU/ML (ref 0.27–4.2)
WBC # BLD AUTO: 8.41 10*3/MM3 (ref 3.4–10.8)

## 2020-07-31 DIAGNOSIS — R19.8 ALTERNATING CONSTIPATION AND DIARRHEA: ICD-10-CM

## 2020-07-31 RX ORDER — CHOLESTYRAMINE LIGHT 4 G/5.7G
POWDER, FOR SUSPENSION ORAL
Qty: 60 EACH | OUTPATIENT
Start: 2020-07-31

## 2020-08-04 DIAGNOSIS — M19.90 ARTHRITIS: ICD-10-CM

## 2020-08-04 NOTE — TELEPHONE ENCOUNTER
Caller: Wendie Houser    Relationship: Self    Best call back number: [phone]     Medication needed:   Requested Prescriptions     Pending Prescriptions Disp Refills   • HYDROcodone-acetaminophen (NORCO) 5-325 MG per tablet 40 tablet 0     Sig: Take 1 tablet by mouth Every 4 (Four) Hours As Needed for Moderate Pain .       When do you need the refill by: ASAP as the patient has only about 4 pills left    What details did the patient provide when requesting the medication: NA    Does the patient have less than a 3 day supply:  [x] Yes  [] No    What is the patient's preferred pharmacy: [facility]ORE #[id number] [facility] [address] AT SEC OF [facility] & [facility] - [phone] [facility] [phone] FX

## 2020-08-05 RX ORDER — HYDROCODONE BITARTRATE AND ACETAMINOPHEN 5; 325 MG/1; MG/1
1 TABLET ORAL EVERY 4 HOURS PRN
Qty: 40 TABLET | Refills: 0 | Status: SHIPPED | OUTPATIENT
Start: 2020-08-05 | End: 2020-09-21 | Stop reason: SDUPTHER

## 2020-09-21 DIAGNOSIS — M19.90 ARTHRITIS: ICD-10-CM

## 2020-09-21 RX ORDER — HYDROCODONE BITARTRATE AND ACETAMINOPHEN 5; 325 MG/1; MG/1
1 TABLET ORAL EVERY 4 HOURS PRN
Qty: 40 TABLET | Refills: 0 | Status: SHIPPED | OUTPATIENT
Start: 2020-09-21 | End: 2020-11-04 | Stop reason: SDUPTHER

## 2020-09-21 NOTE — TELEPHONE ENCOUNTER
Caller: Wendie Houser    Relationship: Self    Best call back number: [phone]     Medication needed:   Requested Prescriptions     Pending Prescriptions Disp Refills   • HYDROcodone-acetaminophen (NORCO) 5-325 MG per tablet 40 tablet 0     Sig: Take 1 tablet by mouth Every 4 (Four) Hours As Needed for Moderate Pain .       When do you need the refill by: ASAP    Does the patient have less than a 3 day supply:  [x] Yes  [] No    What is the patient's preferred pharmacy: [facility] DR[facility] STORE #[id number] [place] [id number] [facility] [address] [facility] OF [facility] & [address] - [phone] [facility] [phone] FX

## 2020-10-20 ENCOUNTER — OFFICE VISIT (OUTPATIENT)
Dept: FAMILY MEDICINE CLINIC | Facility: CLINIC | Age: 85
End: 2020-10-20

## 2020-10-20 VITALS
SYSTOLIC BLOOD PRESSURE: 136 MMHG | BODY MASS INDEX: 34.16 KG/M2 | HEIGHT: 60 IN | DIASTOLIC BLOOD PRESSURE: 70 MMHG | TEMPERATURE: 96.3 F | HEART RATE: 90 BPM | WEIGHT: 174 LBS | OXYGEN SATURATION: 100 %

## 2020-10-20 DIAGNOSIS — F02.80 EARLY ONSET ALZHEIMER'S DEMENTIA WITHOUT BEHAVIORAL DISTURBANCE (HCC): ICD-10-CM

## 2020-10-20 DIAGNOSIS — Z00.00 MEDICARE ANNUAL WELLNESS VISIT, INITIAL: Primary | ICD-10-CM

## 2020-10-20 DIAGNOSIS — E66.9 OBESITY (BMI 30-39.9): ICD-10-CM

## 2020-10-20 DIAGNOSIS — Z23 FLU VACCINE NEED: ICD-10-CM

## 2020-10-20 DIAGNOSIS — E03.4 HYPOTHYROIDISM DUE TO ACQUIRED ATROPHY OF THYROID: ICD-10-CM

## 2020-10-20 DIAGNOSIS — E78.5 HYPERLIPIDEMIA, UNSPECIFIED HYPERLIPIDEMIA TYPE: ICD-10-CM

## 2020-10-20 DIAGNOSIS — M48.061 SPINAL STENOSIS OF LUMBAR REGION WITHOUT NEUROGENIC CLAUDICATION: ICD-10-CM

## 2020-10-20 DIAGNOSIS — G30.0 EARLY ONSET ALZHEIMER'S DEMENTIA WITHOUT BEHAVIORAL DISTURBANCE (HCC): ICD-10-CM

## 2020-10-20 DIAGNOSIS — Z79.899 HIGH RISK MEDICATION USE: ICD-10-CM

## 2020-10-20 DIAGNOSIS — M19.90 ARTHRITIS: ICD-10-CM

## 2020-10-20 DIAGNOSIS — F41.8 MIXED ANXIETY DEPRESSIVE DISORDER: ICD-10-CM

## 2020-10-20 PROCEDURE — 90694 VACC AIIV4 NO PRSRV 0.5ML IM: CPT | Performed by: FAMILY MEDICINE

## 2020-10-20 PROCEDURE — G0008 ADMIN INFLUENZA VIRUS VAC: HCPCS | Performed by: FAMILY MEDICINE

## 2020-10-20 PROCEDURE — G0439 PPPS, SUBSEQ VISIT: HCPCS | Performed by: FAMILY MEDICINE

## 2020-10-20 RX ORDER — CYCLOBENZAPRINE HCL 5 MG
5 TABLET ORAL 3 TIMES DAILY PRN
Qty: 30 TABLET | Refills: 5 | Status: SHIPPED | OUTPATIENT
Start: 2020-10-20 | End: 2021-11-03

## 2020-10-20 RX ORDER — LEVOTHYROXINE SODIUM 25 MCG
TABLET ORAL
COMMUNITY
Start: 2020-08-18 | End: 2020-10-20

## 2020-10-20 RX ORDER — CEPHALEXIN 250 MG/1
CAPSULE ORAL
COMMUNITY
Start: 2020-08-03 | End: 2021-07-27

## 2020-10-20 NOTE — PROGRESS NOTES
The ABCs of the Annual Wellness Visit  Initial Medicare Wellness Visit    Chief Complaint   Patient presents with   • Medicare Wellness-subsequent       Subjective   History of Present Illness:  Wendie Houser is a 85 y.o. female who presents for an Initial Medicare Wellness Visit.    HEALTH RISK ASSESSMENT    Recent Hospitalizations:  No hospitalization(s) within the last year.    Current Medical Providers:  Patient Care Team:  Luis Mcqueen MD as PCP - General (Family Medicine)  Luis Mcqueen MD as PCP - Claims Attributed  Diego Veronica MD as Consulting Physician (Urology)    Smoking Status:  Social History     Tobacco Use   Smoking Status Never Smoker   Smokeless Tobacco Never Used       Alcohol Consumption:  Social History     Substance and Sexual Activity   Alcohol Use No       Depression Screen:   PHQ-2/PHQ-9 Depression Screening 10/20/2020   Little interest or pleasure in doing things 3   Feeling down, depressed, or hopeless 0   Trouble falling or staying asleep, or sleeping too much 0   Feeling tired or having little energy 3   Poor appetite or overeating 0   Feeling bad about yourself - or that you are a failure or have let yourself or your family down 0   Trouble concentrating on things, such as reading the newspaper or watching television 0   Moving or speaking so slowly that other people could have noticed. Or the opposite - being so fidgety or restless that you have been moving around a lot more than usual 3   Thoughts that you would be better off dead, or of hurting yourself in some way 0   Total Score 9   If you checked off any problems, how difficult have these problems made it for you to do your work, take care of things at home, or get along with other people? Not difficult at all       Fall Risk Screen:  STEADI Fall Risk Assessment was completed, and patient is at LOW risk for falls.Assessment completed on:7/20/2020    Health Habits and Functional and Cognitive Screening:  Functional &  Cognitive Status 10/20/2020   Do you have difficulty preparing food and eating? No   Do you have difficulty bathing yourself, getting dressed or grooming yourself? No   Do you have difficulty using the toilet? No   Do you have difficulty moving around from place to place? Yes   Do you have trouble with steps or getting out of a bed or a chair? Yes   Current Diet Well Balanced Diet   Dental Exam Up to date   Eye Exam Not up to date   Exercise (times per week) 0 times per week   Current Exercise Activities Include None   Do you need help using the phone?  No   Are you deaf or do you have serious difficulty hearing?  Yes   Do you need help with transportation? Yes   Do you need help shopping? Yes   Do you need help preparing meals?  No   Do you need help with housework?  Yes   Do you need help with laundry? Yes   Do you need help taking your medications? No   Do you need help managing money? No   Do you ever drive or ride in a car without wearing a seat belt? No   Have you felt unusual stress, anger or loneliness in the last month? No   Who do you live with? Alone   If you need help, do you have trouble finding someone available to you? No   Have you been bothered in the last four weeks by sexual problems? No   Do you have difficulty concentrating, remembering or making decisions? No         Does the patient have evidence of cognitive impairment? NO    Asprin use counseling:Does not need ASA (and currently is not on it)    Age-appropriate Screening Schedule:  Refer to the list below for future screening recommendations based on patient's age, sex and/or medical conditions. Orders for these recommended tests are listed in the plan section. The patient has been provided with a written plan.    Health Maintenance   Topic Date Due   • TDAP/TD VACCINES (1 - Tdap) 02/05/1954   • ZOSTER VACCINE (1 of 2) 02/05/1987   • INFLUENZA VACCINE  08/01/2020   • LIPID PANEL  10/09/2020   • MAMMOGRAM  12/11/2020          The following  portions of the patient's history were reviewed and updated as appropriate: allergies, past family history, past social history and past surgical history.    Outpatient Medications Prior to Visit   Medication Sig Dispense Refill   • acetaminophen (TYLENOL) 325 MG tablet Take 650 mg by mouth.     • cephalexin (KEFLEX) 250 MG capsule TK 1 C PO QHS     • cetirizine (zyrTEC) 10 MG tablet Take 0.5 tablets by mouth Daily. 30 tablet 2   • donepezil (ARICEPT) 10 MG tablet Take 1 tablet by mouth Every Night. 90 tablet 3   • glucose blood test strip 1 each by Other route Daily. 50 each 12   • glucose monitor monitoring kit 1 each Daily. 1 each 12   • HYDROcodone-acetaminophen (NORCO) 5-325 MG per tablet Take 1 tablet by mouth Every 4 (Four) Hours As Needed for Moderate Pain . 40 tablet 0   • Lancets misc 1 each Daily. 100 each 5   • losartan (COZAAR) 50 MG tablet TAKE ONE-HALF (1/2) TABLET DAILY 45 tablet 4   • magnesium oxide (MAG-OX) 400 MG tablet Take 400 mg by mouth Daily.     • Multiple Vitamin (MULTI VITAMIN DAILY PO) Take  by mouth.     • omeprazole (priLOSEC) 20 MG capsule Take 1 capsule by mouth Daily. 90 capsule 3   • potassium chloride (K-DUR) 10 MEQ CR tablet TAKE 1 TABLET TWICE A DAY WITH MEALS 180 tablet 3   • pravastatin (PRAVACHOL) 40 MG tablet      • Sennosides (SENNA) 8.6 MG capsule Take  by mouth.     • sertraline (ZOLOFT) 100 MG tablet TAKE 1 TABLET DAILY 90 tablet 3   • cyclobenzaprine (FLEXERIL) 5 MG tablet TAKE 1 TABLET BY MOUTH THREE TIMES DAILY AS NEEDED FOR MUSCLE SPASMS 15 tablet 5   • Synthroid 25 MCG tablet        No facility-administered medications prior to visit.        Patient Active Problem List   Diagnosis   • Arthritis   • Early onset Alzheimer's dementia (CMS/HCC)   • Fatigue   • Gastroesophageal reflux disease   • Hypomagnesemia   • Sleep apnea   • Renal insufficiency   • Hypothyroidism   • Lumbar canal stenosis   • Shortness of breath   • Disorder of electrolytes   • Hiatal hernia   •  "Hyperkalemia   • Hypoglycemia   • Irritable bowel syndrome   • Allergic rhinitis   • High risk medication use   • Mixed anxiety depressive disorder   • Polypharmacy   • Obesity (BMI 30-39.9)       Advanced Care Planning:  ACP discussion was held with the patient during this visit. Patient does not have an advance directive, information provided.    Review of Systems   Musculoskeletal: Positive for arthralgias.   All other systems reviewed and are negative.      Compared to one year ago, the patient feels her physical health is better.  Compared to one year ago, the patient feels her mental health is better.    Reviewed chart for potential of high risk medication in the elderly: yes  Reviewed chart for potential of harmful drug interactions in the elderly:no    Objective         Vitals:    10/20/20 1353 10/20/20 1431   BP:  136/70   BP Location:  Left arm   Patient Position:  Sitting   Cuff Size:  Large Adult   Pulse: 90    Temp: 96.3 °F (35.7 °C)    TempSrc: Temporal    SpO2: 100%    Weight: 78.9 kg (174 lb)    Height: 152.4 cm (60\")        Body mass index is 33.98 kg/m².  Discussed the patient's BMI with her. The BMI is above average; BMI management plan is completed.    Physical Exam  Vitals signs and nursing note reviewed.   Constitutional:       Appearance: She is well-developed. She is obese. She is not ill-appearing.   HENT:      Head: Normocephalic and atraumatic.   Eyes:      Conjunctiva/sclera: Conjunctivae normal.      Pupils: Pupils are equal, round, and reactive to light.   Neck:      Musculoskeletal: Normal range of motion.      Thyroid: No thyromegaly.   Cardiovascular:      Rate and Rhythm: Normal rate and regular rhythm.      Heart sounds: Normal heart sounds.   Pulmonary:      Effort: Pulmonary effort is normal. No respiratory distress.      Breath sounds: Normal breath sounds.   Abdominal:      General: There is no distension.      Palpations: Abdomen is soft. There is no mass.      Tenderness: " There is no abdominal tenderness.      Hernia: No hernia is present.   Musculoskeletal:         General: No tenderness or deformity.      Comments: Ambulates with walker   Lymphadenopathy:      Cervical: No cervical adenopathy.   Skin:     General: Skin is warm and dry.      Coloration: Skin is not pale.      Findings: No rash.   Neurological:      General: No focal deficit present.      Mental Status: She is alert and oriented to person, place, and time.      Motor: No abnormal muscle tone.      Coordination: Coordination normal.   Psychiatric:         Behavior: Behavior normal.         Thought Content: Thought content normal.         Judgment: Judgment normal.               Assessment/Plan   Medicare Risks and Personalized Health Plan  CMS Preventative Services Quick Reference  Advance Directive Discussion  Dementia/Memory   Obesity/Overweight     The above risks/problems have been discussed with the patient.  Pertinent information has been shared with the patient in the After Visit Summary.  Follow up plans and orders are seen below in the Assessment/Plan Section.    Diagnoses and all orders for this visit:    1. Medicare annual wellness visit, initial (Primary)    2. Hypothyroidism due to acquired atrophy of thyroid  -     TSH+Free T4    3. High risk medication use  -     CBC & Differential  -     Comprehensive Metabolic Panel    4. Early onset Alzheimer's dementia without behavioral disturbance (CMS/[facility])    5. Spinal stenosis of lumbar region without neurogenic claudication    6. Hyperlipidemia, unspecified hyperlipidemia type  -     Lipid Panel    7. Mixed anxiety depressive disorder    8. Arthritis    9. Obesity (BMI 30-39.9)    Other orders  -     cyclobenzaprine (FLEXERIL) 5 MG tablet; Take 1 tablet by mouth 3 (Three) Times a Day As Needed for Muscle Spasms. for muscle spams  Dispense: 30 tablet; Refill: 5      Follow Up:  Patient here for Medicare wellness evaluation as well as routine follow-up of  multiple medical problems some of which are noted above.  Other than continued arthritic complaints seems to be stable on current regimen which will be continued.  This includes muscle relaxers and pain medication which will be monitored closely including follow-up every 3 months.  Also routine lab will be obtained as discussed and especially concerned about diabetes and thyroid levels?    This note includes information entered using a voice recognition dictation system.  Though reviewed, some nonsensible errors may remain.      An After Visit Summary and PPPS were given to the patient.

## 2020-10-21 LAB
ALBUMIN SERPL-MCNC: 3.8 G/DL (ref 3.5–5.2)
ALBUMIN/GLOB SERPL: 1.3 G/DL
ALP SERPL-CCNC: 101 U/L (ref 39–117)
ALT SERPL-CCNC: 13 U/L (ref 1–33)
AST SERPL-CCNC: 21 U/L (ref 1–32)
BASOPHILS # BLD AUTO: 0.05 10*3/MM3 (ref 0–0.2)
BASOPHILS NFR BLD AUTO: 0.7 % (ref 0–1.5)
BILIRUB SERPL-MCNC: 0.2 MG/DL (ref 0–1.2)
BUN SERPL-MCNC: 14 MG/DL (ref 8–23)
BUN/CREAT SERPL: 14.7 (ref 7–25)
CALCIUM SERPL-MCNC: 9.2 MG/DL (ref 8.6–10.5)
CHLORIDE SERPL-SCNC: 102 MMOL/L (ref 98–107)
CHOLEST SERPL-MCNC: 133 MG/DL (ref 0–200)
CO2 SERPL-SCNC: 26.1 MMOL/L (ref 22–29)
CREAT SERPL-MCNC: 0.95 MG/DL (ref 0.57–1)
EOSINOPHIL # BLD AUTO: 0.15 10*3/MM3 (ref 0–0.4)
EOSINOPHIL NFR BLD AUTO: 2 % (ref 0.3–6.2)
ERYTHROCYTE [DISTWIDTH] IN BLOOD BY AUTOMATED COUNT: 16.6 % (ref 12.3–15.4)
GLOBULIN SER CALC-MCNC: 3 GM/DL
GLUCOSE SERPL-MCNC: 106 MG/DL (ref 65–99)
HCT VFR BLD AUTO: 38.4 % (ref 34–46.6)
HDLC SERPL-MCNC: 59 MG/DL (ref 40–60)
HGB BLD-MCNC: 12.2 G/DL (ref 12–15.9)
IMM GRANULOCYTES # BLD AUTO: 0.02 10*3/MM3 (ref 0–0.05)
IMM GRANULOCYTES NFR BLD AUTO: 0.3 % (ref 0–0.5)
LDLC SERPL CALC-MCNC: 51 MG/DL (ref 0–100)
LYMPHOCYTES # BLD AUTO: 3.22 10*3/MM3 (ref 0.7–3.1)
LYMPHOCYTES NFR BLD AUTO: 43 % (ref 19.6–45.3)
MCH RBC QN AUTO: 25.8 PG (ref 26.6–33)
MCHC RBC AUTO-ENTMCNC: 31.8 G/DL (ref 31.5–35.7)
MCV RBC AUTO: 81.2 FL (ref 79–97)
MONOCYTES # BLD AUTO: 0.73 10*3/MM3 (ref 0.1–0.9)
MONOCYTES NFR BLD AUTO: 9.8 % (ref 5–12)
NEUTROPHILS # BLD AUTO: 3.31 10*3/MM3 (ref 1.7–7)
NEUTROPHILS NFR BLD AUTO: 44.2 % (ref 42.7–76)
NRBC BLD AUTO-RTO: 0 /100 WBC (ref 0–0.2)
PLATELET # BLD AUTO: 234 10*3/MM3 (ref 140–450)
POTASSIUM SERPL-SCNC: 4.6 MMOL/L (ref 3.5–5.2)
PROT SERPL-MCNC: 6.8 G/DL (ref 6–8.5)
RBC # BLD AUTO: 4.73 10*6/MM3 (ref 3.77–5.28)
SODIUM SERPL-SCNC: 137 MMOL/L (ref 136–145)
T4 FREE SERPL-MCNC: 1.16 NG/DL (ref 0.93–1.7)
TRIGL SERPL-MCNC: 130 MG/DL (ref 0–150)
TSH SERPL DL<=0.005 MIU/L-ACNC: 0.01 UIU/ML (ref 0.27–4.2)
VLDLC SERPL CALC-MCNC: 23 MG/DL (ref 5–40)
WBC # BLD AUTO: 7.48 10*3/MM3 (ref 3.4–10.8)

## 2020-11-04 DIAGNOSIS — M19.90 ARTHRITIS: ICD-10-CM

## 2020-11-04 RX ORDER — HYDROCODONE BITARTRATE AND ACETAMINOPHEN 5; 325 MG/1; MG/1
1 TABLET ORAL EVERY 4 HOURS PRN
Qty: 40 TABLET | Refills: 0 | Status: SHIPPED | OUTPATIENT
Start: 2020-11-04 | End: 2020-12-09 | Stop reason: SDUPTHER

## 2020-11-04 NOTE — TELEPHONE ENCOUNTER
Caller: CorrinaWendie    Relationship: Self    Best call back number: [phone]    Medication needed:   Requested Prescriptions     Pending Prescriptions Disp Refills   • HYDROcodone-acetaminophen (NORCO) 5-325 MG per tablet 40 tablet 0     Sig: Take 1 tablet by mouth Every 4 (Four) Hours As Needed for Moderate Pain .       When do you need the refill by: 11/4/20    What details did the patient provide when requesting the medication: n/a  Does the patient have less than a 3 day supply:  [x] Yes  [] No    What is the patient's preferred pharmacy:    [facility] DR[facility] STORE #[id number] [facility] [address] AT SEC OF [facility] - [phone] [facility] [phone]   [phone]

## 2020-11-20 DIAGNOSIS — I10 HYPERTENSION, UNSPECIFIED TYPE: ICD-10-CM

## 2020-11-24 RX ORDER — POTASSIUM CHLORIDE 750 MG/1
TABLET, FILM COATED, EXTENDED RELEASE ORAL
Qty: 180 TABLET | Refills: 3 | Status: SHIPPED | OUTPATIENT
Start: 2020-11-24 | End: 2021-11-15

## 2020-12-09 ENCOUNTER — TELEPHONE (OUTPATIENT)
Dept: FAMILY MEDICINE CLINIC | Facility: CLINIC | Age: 85
End: 2020-12-09

## 2020-12-09 DIAGNOSIS — M19.90 ARTHRITIS: ICD-10-CM

## 2020-12-09 RX ORDER — HYDROCODONE BITARTRATE AND ACETAMINOPHEN 5; 325 MG/1; MG/1
1 TABLET ORAL EVERY 4 HOURS PRN
Qty: 40 TABLET | Refills: 0 | Status: CANCELLED | OUTPATIENT
Start: 2020-12-09

## 2020-12-09 RX ORDER — HYDROCODONE BITARTRATE AND ACETAMINOPHEN 5; 325 MG/1; MG/1
1 TABLET ORAL EVERY 4 HOURS PRN
Qty: 40 TABLET | Refills: 0 | Status: SHIPPED | OUTPATIENT
Start: 2020-12-09 | End: 2021-01-13 | Stop reason: SDUPTHER

## 2020-12-09 NOTE — TELEPHONE ENCOUNTER
Caller: Wendie Houser    Relationship: Self    Best call back number: [phone]    Medication needed:   Requested Prescriptions     Pending Prescriptions Disp Refills   • HYDROcodone-acetaminophen (NORCO) 5-325 MG per tablet 40 tablet 0     Sig: Take 1 tablet by mouth Every 4 (Four) Hours As Needed for Moderate Pain .       When do you need the refill by: ASAP    What details did the patient provide when requesting the medication: PATIENT ONLY HAS 3 PILLS LEFT    Does the patient have less than a 3 day supply:  [] Yes  [] No    What is the patient's preferred pharmacy: [facility] DRUG STORE #[id number] [facility] [address] AT SEC OF [facility] & [facility] - [phone] [facility] [phone] FX

## 2021-01-13 DIAGNOSIS — M19.90 ARTHRITIS: ICD-10-CM

## 2021-01-13 NOTE — TELEPHONE ENCOUNTER
Caller: Wendie Houser    Relationship: Self    Best call back number: [phone]    Medication needed:   Requested Prescriptions     Pending Prescriptions Disp Refills   • HYDROcodone-acetaminophen (NORCO) 5-325 MG per tablet 40 tablet 0     Sig: Take 1 tablet by mouth Every 4 (Four) Hours As Needed for Moderate Pain .       When do you need the refill by: ASAP    What details did the patient provide when requesting the medication: HAS 3 PILLS REMAINING    Does the patient have less than a 3 day supply:  [x] Yes  [] No    What is the patient's preferred pharmacy: [facility] #[id number] [facility] [address] AT SEC OF [facility]([address] & [facility] - [phone] [facility] [phone] FX

## 2021-01-14 RX ORDER — HYDROCODONE BITARTRATE AND ACETAMINOPHEN 5; 325 MG/1; MG/1
1 TABLET ORAL EVERY 4 HOURS PRN
Qty: 40 TABLET | Refills: 0 | Status: SHIPPED | OUTPATIENT
Start: 2021-01-14 | End: 2021-02-18 | Stop reason: SDUPTHER

## 2021-02-14 DIAGNOSIS — E03.4 HYPOTHYROIDISM DUE TO ACQUIRED ATROPHY OF THYROID: ICD-10-CM

## 2021-02-15 RX ORDER — PRAVASTATIN SODIUM 40 MG
TABLET ORAL
Qty: 90 TABLET | Refills: 3 | OUTPATIENT
Start: 2021-02-15

## 2021-02-15 RX ORDER — LEVOTHYROXINE SODIUM 0.03 MG/1
TABLET ORAL
Qty: 90 TABLET | Refills: 3 | OUTPATIENT
Start: 2021-02-15

## 2021-02-18 DIAGNOSIS — M19.90 ARTHRITIS: ICD-10-CM

## 2021-02-18 RX ORDER — HYDROCODONE BITARTRATE AND ACETAMINOPHEN 5; 325 MG/1; MG/1
1 TABLET ORAL EVERY 4 HOURS PRN
Qty: 40 TABLET | Refills: 0 | Status: SHIPPED | OUTPATIENT
Start: 2021-02-18 | End: 2021-03-15 | Stop reason: SDUPTHER

## 2021-02-18 NOTE — TELEPHONE ENCOUNTER
Caller: Wendie Houser    Relationship: Self    Best call back number: [phone]     Medication needed:    HYDROcodone-acetaminophen (NORCO) 5-325 MG per tablet     When do you need the refill by: 02/18/21    Does the patient have less than a 3 day supply:  [x] Yes  [] No    What is the patient's preferred pharmacy:      [facility] #[id number] [facility] [address] AT SEC OF [facility] & [facility] - [phone] [facility] [phone]   [phone]

## 2021-03-01 DIAGNOSIS — E11.9 CONTROLLED TYPE 2 DIABETES MELLITUS WITHOUT COMPLICATION, WITHOUT LONG-TERM CURRENT USE OF INSULIN (HCC): ICD-10-CM

## 2021-03-01 DIAGNOSIS — I10 HYPERTENSION, UNSPECIFIED TYPE: ICD-10-CM

## 2021-03-01 RX ORDER — LOSARTAN POTASSIUM 50 MG/1
TABLET ORAL
Qty: 45 TABLET | Refills: 3 | Status: SHIPPED | OUTPATIENT
Start: 2021-03-01 | End: 2022-02-24

## 2021-03-15 DIAGNOSIS — M19.90 ARTHRITIS: ICD-10-CM

## 2021-03-15 RX ORDER — HYDROCODONE BITARTRATE AND ACETAMINOPHEN 5; 325 MG/1; MG/1
1 TABLET ORAL EVERY 4 HOURS PRN
Qty: 40 TABLET | Refills: 0 | Status: SHIPPED | OUTPATIENT
Start: 2021-03-15 | End: 2021-04-16 | Stop reason: SDUPTHER

## 2021-04-16 DIAGNOSIS — M19.90 ARTHRITIS: ICD-10-CM

## 2021-04-16 RX ORDER — HYDROCODONE BITARTRATE AND ACETAMINOPHEN 5; 325 MG/1; MG/1
1 TABLET ORAL EVERY 4 HOURS PRN
Qty: 40 TABLET | Refills: 0 | Status: SHIPPED | OUTPATIENT
Start: 2021-04-16 | End: 2021-06-02 | Stop reason: SDUPTHER

## 2021-04-16 NOTE — TELEPHONE ENCOUNTER
Caller: CorrinaWendie    Relationship: Self    Best call back number: [phone]    Medication needed:   Requested Prescriptions     Pending Prescriptions Disp Refills   • HYDROcodone-acetaminophen (NORCO) 5-325 MG per tablet 40 tablet 0     Sig: Take 1 tablet by mouth Every 4 (Four) Hours As Needed for Moderate Pain .       When do you need the refill by: 4/19/21    What additional details did the patient provide when requesting the medication: PATIENT HAS 4 DAYS OF MEDICATION REMAINING.    Does the patient have less than a 3 day supply:  [] Yes  [x] No    What is the patient's preferred pharmacy:  [facility] #[id number] [facility] [address] AT SEC OF [facility]([address] & [place] - [phone] [facility] [phone]   [phone]

## 2021-04-16 NOTE — TELEPHONE ENCOUNTER
LOV 10/2020.  Notified her that she was due for an appointment.  Called pt and scheduled her for 4/27.

## 2021-04-27 ENCOUNTER — OFFICE VISIT (OUTPATIENT)
Dept: FAMILY MEDICINE CLINIC | Facility: CLINIC | Age: 86
End: 2021-04-27

## 2021-04-27 VITALS
HEART RATE: 98 BPM | SYSTOLIC BLOOD PRESSURE: 128 MMHG | OXYGEN SATURATION: 98 % | TEMPERATURE: 97.7 F | DIASTOLIC BLOOD PRESSURE: 72 MMHG | WEIGHT: 175 LBS | HEIGHT: 60 IN | BODY MASS INDEX: 34.36 KG/M2

## 2021-04-27 DIAGNOSIS — E03.4 HYPOTHYROIDISM DUE TO ACQUIRED ATROPHY OF THYROID: Primary | ICD-10-CM

## 2021-04-27 DIAGNOSIS — F41.8 MIXED ANXIETY DEPRESSIVE DISORDER: ICD-10-CM

## 2021-04-27 DIAGNOSIS — E78.5 HYPERLIPIDEMIA, UNSPECIFIED HYPERLIPIDEMIA TYPE: ICD-10-CM

## 2021-04-27 DIAGNOSIS — M48.061 SPINAL STENOSIS OF LUMBAR REGION WITHOUT NEUROGENIC CLAUDICATION: ICD-10-CM

## 2021-04-27 DIAGNOSIS — Z79.899 HIGH RISK MEDICATION USE: ICD-10-CM

## 2021-04-27 DIAGNOSIS — K21.9 GASTROESOPHAGEAL REFLUX DISEASE WITHOUT ESOPHAGITIS: ICD-10-CM

## 2021-04-27 DIAGNOSIS — Z79.899 POLYPHARMACY: ICD-10-CM

## 2021-04-27 DIAGNOSIS — N28.9 RENAL INSUFFICIENCY: ICD-10-CM

## 2021-04-27 DIAGNOSIS — E11.65 CONTROLLED TYPE 2 DIABETES MELLITUS WITH HYPERGLYCEMIA, WITHOUT LONG-TERM CURRENT USE OF INSULIN (HCC): ICD-10-CM

## 2021-04-27 DIAGNOSIS — M19.90 ARTHRITIS: ICD-10-CM

## 2021-04-27 DIAGNOSIS — R53.83 FATIGUE, UNSPECIFIED TYPE: ICD-10-CM

## 2021-04-27 PROCEDURE — 99214 OFFICE O/P EST MOD 30 MIN: CPT | Performed by: FAMILY MEDICINE

## 2021-04-27 RX ORDER — PRAVASTATIN SODIUM 40 MG
40 TABLET ORAL NIGHTLY
Qty: 90 TABLET | Refills: 3 | Status: SHIPPED | OUTPATIENT
Start: 2021-04-27 | End: 2022-04-04

## 2021-04-27 RX ORDER — OMEPRAZOLE 20 MG/1
20 CAPSULE, DELAYED RELEASE ORAL DAILY
Qty: 90 CAPSULE | Refills: 3 | Status: SHIPPED | OUTPATIENT
Start: 2021-04-27 | End: 2022-08-22 | Stop reason: SDUPTHER

## 2021-04-27 NOTE — PROGRESS NOTES
HPI  Wendie Houser is a 86 y.o. female who is here for follow up of multiple ongoing medical issues including significant arthritis.  Also reported history of diabetes under good control with no medication.  Reported history of Alzheimer's dementia which again seems to be fairly stable with current regimen.  Patient request blood tests for cancer screening and this was discussed.      Review of Systems   Musculoskeletal: Positive for arthralgias.   All other systems reviewed and are negative.        Past Medical History:   Diagnosis Date   • Anemia    • Arthritis    • CHF (congestive heart failure) (CMS/HCC)    • Congestive heart failure (CMS/HCC)    • Depression    • Diabetes mellitus (CMS/HCC)     type 2   • Diverticulitis of colon 08/25/2016    Acute uncomplicated diverticulitis at the distal transverse colon   • Early onset Alzheimer's dementia (CMS/HCC)    • GERD (gastroesophageal reflux disease)    • Heart disease    • Hyperlipidemia    • Hypertension    • Hypothyroidism    • Kidney disease    • Sleep apnea        Past Surgical History:   Procedure Laterality Date   • BREAST CYST EXCISION Right 1989    Benign   • COLONOSCOPY  2011    Dr. Cain   • ENDOSCOPY  04/15/2011   • EYE SURGERY  2009   • GALLBLADDER SURGERY  1960    Open   • TOTAL KNEE ARTHROPLASTY Left 2012    Dr. Soler   • VAGINAL HYSTERECTOMY  1999       Family History   Problem Relation Age of Onset   • Other Mother         cardiac disorder   • Hypertension Mother    • Heart disease Mother    • Hypertension Sister    • Heart attack Sister    • Cancer Brother    • Hypertension Brother    • Heart attack Brother    • Heart disease Father        Social History     Socioeconomic History   • Marital status:      Spouse name: Not on file   • Number of children: Not on file   • Years of education: Not on file   • Highest education level: Not on file   Tobacco Use   • Smoking status: Never Smoker   • Smokeless tobacco: Never Used   Substance  and Sexual Activity   • Alcohol use: No   • Drug use: No   • Sexual activity: Never       Vitals:    04/27/21 1531   BP: 128/72   Pulse: 98   Temp: 97.7 °F (36.5 °C)   SpO2: 98%        Body mass index is 34.18 kg/m².      Physical Exam  Vitals and nursing note reviewed.   Constitutional:       General: She is not in acute distress.     Appearance: Normal appearance. She is well-developed.   HENT:      Head: Normocephalic and atraumatic.   Eyes:      Conjunctiva/sclera: Conjunctivae normal.      Pupils: Pupils are equal, round, and reactive to light.   Neck:      Thyroid: No thyromegaly.   Cardiovascular:      Rate and Rhythm: Normal rate and regular rhythm.      Heart sounds: Normal heart sounds.   Pulmonary:      Effort: Pulmonary effort is normal. No respiratory distress.      Breath sounds: Normal breath sounds.   Abdominal:      General: There is no distension.      Palpations: Abdomen is soft. There is no mass.      Tenderness: There is no abdominal tenderness.      Hernia: No hernia is present.   Musculoskeletal:         General: No tenderness or deformity. Normal range of motion.      Cervical back: Normal range of motion.   Lymphadenopathy:      Cervical: No cervical adenopathy.   Skin:     General: Skin is warm and dry.      Coloration: Skin is not pale.      Findings: No rash.   Neurological:      General: No focal deficit present.      Mental Status: She is alert and oriented to person, place, and time.      Motor: No abnormal muscle tone.      Coordination: Coordination normal.   Psychiatric:         Mood and Affect: Mood normal.         Behavior: Behavior normal.         Thought Content: Thought content normal.         Judgment: Judgment normal.           Assessment/Plan    Diagnoses and all orders for this visit:    1. Hypothyroidism due to acquired atrophy of thyroid (Primary)  -     TSH+Free T4    2. Gastroesophageal reflux disease without esophagitis  -     omeprazole (priLOSEC) 20 MG capsule; Take  1 capsule by mouth Daily.  Dispense: 90 capsule; Refill: 3    3. Arthritis    4. Mixed anxiety depressive disorder    5. Renal insufficiency  -     CBC & Differential  -     Comprehensive Metabolic Panel    6. Polypharmacy    7. Controlled type 2 diabetes mellitus with hyperglycemia, without long-term current use of insulin (CMS/[facility])  -     Hemoglobin A1c    8. Fatigue, unspecified type    9. Hyperlipidemia, unspecified hyperlipidemia type  -     Lipid Panel  -     pravastatin (PRAVACHOL) 40 MG tablet; Take 1 tablet by mouth Every Night.  Dispense: 90 tablet; Refill: 3    10. Spinal stenosis of lumbar region without neurogenic claudication    11. High risk medication use  -     CBC & Differential  -     Comprehensive Metabolic Panel      Patient here for routine 3-month follow-up of multiple medical issues some of which are noted above.  Overall seems to be stable on current regimen.  This includes pain medication for spinal stenosis and arthritic pains.  Reported history of dementia but mentation actually seems to be holding fairly steady.  Discussed routine health maintenance issues including monitoring diabetes.  Will continue follow-up every 3 months.  No evidence of abuse nor misuse of controlled medication which will be continued with close monitoring.    This note includes information entered using a voice recognition dictation system.  Though reviewed, some nonsensible errors may remain.

## 2021-04-28 LAB
ALBUMIN SERPL-MCNC: 3.8 G/DL (ref 3.6–4.6)
ALBUMIN/GLOB SERPL: 1.3 {RATIO} (ref 1.2–2.2)
ALP SERPL-CCNC: 99 IU/L (ref 39–117)
ALT SERPL-CCNC: 10 IU/L (ref 0–32)
AST SERPL-CCNC: 18 IU/L (ref 0–40)
BASOPHILS # BLD AUTO: 0 X10E3/UL (ref 0–0.2)
BASOPHILS NFR BLD AUTO: 1 %
BILIRUB SERPL-MCNC: <0.2 MG/DL (ref 0–1.2)
BUN SERPL-MCNC: 14 MG/DL (ref 8–27)
BUN/CREAT SERPL: 18 (ref 12–28)
CALCIUM SERPL-MCNC: 9.4 MG/DL (ref 8.7–10.3)
CHLORIDE SERPL-SCNC: 107 MMOL/L (ref 96–106)
CHOLEST SERPL-MCNC: 147 MG/DL (ref 100–199)
CO2 SERPL-SCNC: 23 MMOL/L (ref 20–29)
CREAT SERPL-MCNC: 0.8 MG/DL (ref 0.57–1)
EOSINOPHIL # BLD AUTO: 0.1 X10E3/UL (ref 0–0.4)
EOSINOPHIL NFR BLD AUTO: 1 %
ERYTHROCYTE [DISTWIDTH] IN BLOOD BY AUTOMATED COUNT: 15.5 % (ref 11.7–15.4)
GLOBULIN SER CALC-MCNC: 3 G/DL (ref 1.5–4.5)
GLUCOSE SERPL-MCNC: 126 MG/DL (ref 65–99)
HBA1C MFR BLD: 6.2 % (ref 4.8–5.6)
HCT VFR BLD AUTO: 36.6 % (ref 34–46.6)
HDLC SERPL-MCNC: 40 MG/DL
HGB BLD-MCNC: 11.4 G/DL (ref 11.1–15.9)
IMM GRANULOCYTES # BLD AUTO: 0 X10E3/UL (ref 0–0.1)
IMM GRANULOCYTES NFR BLD AUTO: 0 %
LDLC SERPL CALC-MCNC: 78 MG/DL (ref 0–99)
LYMPHOCYTES # BLD AUTO: 3.3 X10E3/UL (ref 0.7–3.1)
LYMPHOCYTES NFR BLD AUTO: 42 %
MCH RBC QN AUTO: 25.4 PG (ref 26.6–33)
MCHC RBC AUTO-ENTMCNC: 31.1 G/DL (ref 31.5–35.7)
MCV RBC AUTO: 82 FL (ref 79–97)
MONOCYTES # BLD AUTO: 1 X10E3/UL (ref 0.1–0.9)
MONOCYTES NFR BLD AUTO: 13 %
NEUTROPHILS # BLD AUTO: 3.5 X10E3/UL (ref 1.4–7)
NEUTROPHILS NFR BLD AUTO: 43 %
PLATELET # BLD AUTO: 227 X10E3/UL (ref 150–450)
POTASSIUM SERPL-SCNC: 4.2 MMOL/L (ref 3.5–5.2)
PROT SERPL-MCNC: 6.8 G/DL (ref 6–8.5)
RBC # BLD AUTO: 4.48 X10E6/UL (ref 3.77–5.28)
SODIUM SERPL-SCNC: 143 MMOL/L (ref 134–144)
T4 FREE SERPL-MCNC: 1.43 NG/DL (ref 0.82–1.77)
TRIGL SERPL-MCNC: 168 MG/DL (ref 0–149)
TSH SERPL DL<=0.005 MIU/L-ACNC: <0.005 UIU/ML (ref 0.45–4.5)
VLDLC SERPL CALC-MCNC: 29 MG/DL (ref 5–40)
WBC # BLD AUTO: 8 X10E3/UL (ref 3.4–10.8)

## 2021-05-18 DIAGNOSIS — F02.80 EARLY ONSET ALZHEIMER'S DEMENTIA WITHOUT BEHAVIORAL DISTURBANCE (HCC): ICD-10-CM

## 2021-05-18 DIAGNOSIS — G30.0 EARLY ONSET ALZHEIMER'S DEMENTIA WITHOUT BEHAVIORAL DISTURBANCE (HCC): ICD-10-CM

## 2021-05-18 RX ORDER — DONEPEZIL HYDROCHLORIDE 10 MG/1
TABLET, FILM COATED ORAL
Qty: 90 TABLET | Refills: 3 | Status: SHIPPED | OUTPATIENT
Start: 2021-05-18 | End: 2022-06-27

## 2021-06-02 DIAGNOSIS — M19.90 ARTHRITIS: ICD-10-CM

## 2021-06-02 RX ORDER — HYDROCODONE BITARTRATE AND ACETAMINOPHEN 5; 325 MG/1; MG/1
1 TABLET ORAL EVERY 4 HOURS PRN
Qty: 40 TABLET | Refills: 0 | Status: SHIPPED | OUTPATIENT
Start: 2021-06-02 | End: 2021-07-12 | Stop reason: SDUPTHER

## 2021-06-02 NOTE — TELEPHONE ENCOUNTER
Caller: Wendie Houser    Relationship: Self    Best call back number: [phone]    Medication needed:   Requested Prescriptions     Pending Prescriptions Disp Refills   • HYDROcodone-acetaminophen (NORCO) 5-325 MG per tablet 40 tablet 0     Sig: Take 1 tablet by mouth Every 4 (Four) Hours As Needed for Moderate Pain .       When do you need the refill by: TODAY    What additional details did the patient provide when requesting the medication:   2 TABLETS LEFT    Does the patient have less than a 3 day supply:  [x] Yes  [] No    What is the patient's preferred pharmacy: [facility]ORE #[id number] [place], [place] - [address] AT SEC OF [facility]([address] & [facility] - [phone] [facility] [phone] FX

## 2021-07-07 DIAGNOSIS — F41.8 MIXED ANXIETY DEPRESSIVE DISORDER: ICD-10-CM

## 2021-07-07 RX ORDER — SERTRALINE HYDROCHLORIDE 100 MG/1
TABLET, FILM COATED ORAL
Qty: 90 TABLET | Refills: 3 | Status: SHIPPED | OUTPATIENT
Start: 2021-07-07 | End: 2022-05-10

## 2021-07-12 DIAGNOSIS — M19.90 ARTHRITIS: ICD-10-CM

## 2021-07-12 RX ORDER — HYDROCODONE BITARTRATE AND ACETAMINOPHEN 5; 325 MG/1; MG/1
1 TABLET ORAL EVERY 4 HOURS PRN
Qty: 40 TABLET | Refills: 0 | Status: SHIPPED | OUTPATIENT
Start: 2021-07-12 | End: 2021-08-19 | Stop reason: SDUPTHER

## 2021-07-12 NOTE — TELEPHONE ENCOUNTER
Caller: Wendie Houser    Relationship: Self    Best call back number: [phone]     Medication needed:   Requested Prescriptions     Pending Prescriptions Disp Refills   • HYDROcodone-acetaminophen (NORCO) 5-325 MG per tablet 40 tablet 0     Sig: Take 1 tablet by mouth Every 4 (Four) Hours As Needed for Moderate Pain .       When do you need the refill by: ASAP    Does the patient have less than a 3 day supply:  [x] Yes  [] No    What is the patient's preferred pharmacy: [facility]ORE #[id number] [place] [id number] AT [facility] OF [facility]([address] & [place] - [phone] [facility] [phone] FX

## 2021-07-27 ENCOUNTER — HOME HEALTH ADMISSION (OUTPATIENT)
Dept: HOME HEALTH SERVICES | Facility: HOME HEALTHCARE | Age: 86
End: 2021-07-27

## 2021-07-27 ENCOUNTER — OFFICE VISIT (OUTPATIENT)
Dept: FAMILY MEDICINE CLINIC | Facility: CLINIC | Age: 86
End: 2021-07-27

## 2021-07-27 VITALS
DIASTOLIC BLOOD PRESSURE: 70 MMHG | HEART RATE: 65 BPM | TEMPERATURE: 96.6 F | OXYGEN SATURATION: 97 % | WEIGHT: 167.2 LBS | BODY MASS INDEX: 32.83 KG/M2 | SYSTOLIC BLOOD PRESSURE: 110 MMHG | HEIGHT: 60 IN | RESPIRATION RATE: 18 BRPM

## 2021-07-27 DIAGNOSIS — N18.1 TYPE 2 DIABETES MELLITUS WITH STAGE 1 CHRONIC KIDNEY DISEASE, WITHOUT LONG-TERM CURRENT USE OF INSULIN (HCC): ICD-10-CM

## 2021-07-27 DIAGNOSIS — Z79.899 HIGH RISK MEDICATION USE: ICD-10-CM

## 2021-07-27 DIAGNOSIS — F02.80 EARLY ONSET ALZHEIMER'S DEMENTIA WITHOUT BEHAVIORAL DISTURBANCE (HCC): ICD-10-CM

## 2021-07-27 DIAGNOSIS — F41.8 MIXED ANXIETY DEPRESSIVE DISORDER: ICD-10-CM

## 2021-07-27 DIAGNOSIS — G30.0 EARLY ONSET ALZHEIMER'S DEMENTIA WITHOUT BEHAVIORAL DISTURBANCE (HCC): ICD-10-CM

## 2021-07-27 DIAGNOSIS — E11.22 TYPE 2 DIABETES MELLITUS WITH STAGE 1 CHRONIC KIDNEY DISEASE, WITHOUT LONG-TERM CURRENT USE OF INSULIN (HCC): ICD-10-CM

## 2021-07-27 DIAGNOSIS — E11.65 CONTROLLED TYPE 2 DIABETES MELLITUS WITH HYPERGLYCEMIA, WITHOUT LONG-TERM CURRENT USE OF INSULIN (HCC): ICD-10-CM

## 2021-07-27 DIAGNOSIS — M48.061 SPINAL STENOSIS OF LUMBAR REGION WITHOUT NEUROGENIC CLAUDICATION: ICD-10-CM

## 2021-07-27 DIAGNOSIS — M15.9 GENERALIZED OSTEOARTHRITIS: ICD-10-CM

## 2021-07-27 DIAGNOSIS — Z79.899 POLYPHARMACY: Primary | ICD-10-CM

## 2021-07-27 DIAGNOSIS — E03.4 HYPOTHYROIDISM DUE TO ACQUIRED ATROPHY OF THYROID: ICD-10-CM

## 2021-07-27 PROBLEM — F41.0 PANIC ATTACK: Status: ACTIVE | Noted: 2021-07-27

## 2021-07-27 PROCEDURE — 99213 OFFICE O/P EST LOW 20 MIN: CPT | Performed by: FAMILY MEDICINE

## 2021-07-27 RX ORDER — LANCETS 30 GAUGE
1 EACH MISCELLANEOUS DAILY
Qty: 100 EACH | Refills: 5 | Status: SHIPPED | OUTPATIENT
Start: 2021-07-27 | End: 2022-05-10 | Stop reason: SDUPTHER

## 2021-07-27 RX ORDER — BLOOD-GLUCOSE METER
1 KIT MISCELLANEOUS DAILY
Qty: 1 EACH | Refills: 11 | Status: SHIPPED | OUTPATIENT
Start: 2021-07-27

## 2021-07-27 NOTE — PROGRESS NOTES
HPI  Wendie Houser is a 86 y.o. female who is here for follow up of multiple medical issues including previous history of diabetes.  Severe arthritic pains mostly confined to wheelchair.  Also history of heart failure dementia and spinal stenosis.  Patient mostly confined to wheelchair and would like to resume home physical therapy as discussed.  Also would like to resume blood sugar monitoring etc.      Review of Systems   Musculoskeletal: Positive for arthralgias, back pain and gait problem.   All other systems reviewed and are negative.        Past Medical History:   Diagnosis Date   • Anemia    • Arthritis    • CHF (congestive heart failure) (CMS/HCC)    • Congestive heart failure (CMS/HCC)    • Depression    • Diabetes mellitus (CMS/HCC)     type 2   • Diverticulitis of colon 08/25/2016    Acute uncomplicated diverticulitis at the distal transverse colon   • Early onset Alzheimer's dementia (CMS/HCC)    • GERD (gastroesophageal reflux disease)    • Heart disease    • Hyperlipidemia    • Hypertension    • Hypothyroidism    • Kidney disease    • Sleep apnea        Past Surgical History:   Procedure Laterality Date   • BREAST CYST EXCISION Right 1989    Benign   • COLONOSCOPY  2011    Dr. Cain   • ENDOSCOPY  04/15/2011   • EYE SURGERY  2009   • GALLBLADDER SURGERY  1960    Open   • TOTAL KNEE ARTHROPLASTY Left 2012    Dr. Soler   • VAGINAL HYSTERECTOMY  1999       Family History   Problem Relation Age of Onset   • Other Mother         cardiac disorder   • Hypertension Mother    • Heart disease Mother    • Hypertension Sister    • Heart attack Sister    • Cancer Brother    • Hypertension Brother    • Heart attack Brother    • Heart disease Father        Social History     Socioeconomic History   • Marital status:      Spouse name: Not on file   • Number of children: Not on file   • Years of education: Not on file   • Highest education level: Not on file   Tobacco Use   • Smoking status: Never Smoker    • Smokeless tobacco: Never Used   Substance and Sexual Activity   • Alcohol use: No   • Drug use: No   • Sexual activity: Never       Vitals:    07/27/21 1504   BP: 110/70   Pulse: 65   Resp: 18   Temp: 96.6 °F (35.9 °C)   SpO2: 97%        Body mass index is 32.65 kg/m².      Physical Exam  Vitals and nursing note reviewed.   Constitutional:       General: She is not in acute distress.     Appearance: She is well-developed.   HENT:      Head: Normocephalic and atraumatic.      Nose:      Comments: Patient and son with mask.  Provider with mask and shield  Eyes:      Conjunctiva/sclera: Conjunctivae normal.      Pupils: Pupils are equal, round, and reactive to light.   Neck:      Thyroid: No thyromegaly.   Cardiovascular:      Rate and Rhythm: Normal rate and regular rhythm.      Heart sounds: Normal heart sounds.   Pulmonary:      Effort: Pulmonary effort is normal. No respiratory distress.      Breath sounds: Normal breath sounds.   Abdominal:      General: There is no distension.      Palpations: Abdomen is soft. There is no mass.      Tenderness: There is no abdominal tenderness.      Hernia: No hernia is present.   Musculoskeletal:         General: No tenderness or deformity. Normal range of motion.      Cervical back: Normal range of motion.   Lymphadenopathy:      Cervical: No cervical adenopathy.   Skin:     General: Skin is warm and dry.      Coloration: Skin is not pale.      Findings: No rash.   Neurological:      General: No focal deficit present.      Mental Status: She is alert and oriented to person, place, and time.      Motor: No abnormal muscle tone.      Coordination: Coordination normal.   Psychiatric:         Mood and Affect: Mood normal.         Behavior: Behavior normal.         Thought Content: Thought content normal.         Judgment: Judgment normal.           Assessment/Plan    Diagnoses and all orders for this visit:    1. Polypharmacy (Primary)    2. High risk medication use    3.  Hypothyroidism due to acquired atrophy of thyroid    4. Mixed anxiety depressive disorder    5. Spinal stenosis of lumbar region without neurogenic claudication  -     Ambulatory Referral to Home Health    6. Early onset Alzheimer's dementia without behavioral disturbance (CMS/[facility])    7. Controlled type 2 diabetes mellitus with hyperglycemia, without long-term current use of insulin (CMS/[facility])    8. Generalized osteoarthritis  -     Ambulatory Referral to Home Health    9. Type 2 diabetes mellitus with stage 1 chronic kidney disease, without long-term current use of insulin (CMS/[facility])  -     glucose monitor monitoring kit; 1 each Daily.  Dispense: 1 each; Refill: 11  -     glucose blood test strip; 1 each by Other route Daily.  Dispense: 50 each; Refill: 12  -     Lancets misc; 1 each Daily.  Dispense: 100 each; Refill: 5      Patient here for routine follow-up of above-noted medical issues.  Patient mostly confined to wheelchair and would like to have physical therapy to possibly resume ambulation or at least ability to transfer etc.  Otherwise stable on current regimen which includes pain medication as needed for arthritis.  There is no evidence of abuse nor misuse of medication which will be continued with close monitoring.    This note includes information entered using a voice recognition dictation system.

## 2021-08-19 ENCOUNTER — TELEPHONE (OUTPATIENT)
Dept: FAMILY MEDICINE CLINIC | Facility: CLINIC | Age: 86
End: 2021-08-19

## 2021-08-19 DIAGNOSIS — M19.90 ARTHRITIS: ICD-10-CM

## 2021-08-19 RX ORDER — HYDROCODONE BITARTRATE AND ACETAMINOPHEN 5; 325 MG/1; MG/1
1 TABLET ORAL EVERY 4 HOURS PRN
Qty: 40 TABLET | Refills: 0 | Status: SHIPPED | OUTPATIENT
Start: 2021-08-19 | End: 2021-10-08 | Stop reason: SDUPTHER

## 2021-08-19 NOTE — TELEPHONE ENCOUNTER
Rx Refill Note  Requested Prescriptions     Pending Prescriptions Disp Refills   • HYDROcodone-acetaminophen (NORCO) 5-325 MG per tablet 40 tablet 0     Sig: Take 1 tablet by mouth Every 4 (Four) Hours As Needed for Moderate Pain .      Last office visit with prescribing clinician: 7/27/2021      Next office visit with prescribing clinician: 10/29/2021            Jeniffer Howard MA  08/19/21, 14:21 EDT

## 2021-08-19 NOTE — TELEPHONE ENCOUNTER
Caller: CorrinaWendie    Relationship: Self    Best call back number: [phone]    Medication needed:   Requested Prescriptions     Pending Prescriptions Disp Refills   • HYDROcodone-acetaminophen (NORCO) 5-325 MG per tablet 40 tablet 0     Sig: Take 1 tablet by mouth Every 4 (Four) Hours As Needed for Moderate Pain .       When do you need the refill by: ASAP     What additional details did the patient provide when requesting the medication: HAS 2 PILLS LEFT     Does the patient have less than a 3 day supply:  [x] Yes  [] No    What is the patient's preferred pharmacy: [facility] #[id number] [facility] [address] AT SEC OF [facility]([address] & [facility] - [phone] [facility] [phone] FX

## 2021-08-31 ENCOUNTER — OFFICE VISIT (OUTPATIENT)
Dept: FAMILY MEDICINE CLINIC | Facility: CLINIC | Age: 86
End: 2021-08-31

## 2021-08-31 VITALS
HEART RATE: 83 BPM | WEIGHT: 168.2 LBS | BODY MASS INDEX: 33.02 KG/M2 | DIASTOLIC BLOOD PRESSURE: 78 MMHG | OXYGEN SATURATION: 96 % | TEMPERATURE: 96.8 F | HEIGHT: 60 IN | SYSTOLIC BLOOD PRESSURE: 110 MMHG | RESPIRATION RATE: 18 BRPM

## 2021-08-31 DIAGNOSIS — F02.80 EARLY ONSET ALZHEIMER'S DEMENTIA WITHOUT BEHAVIORAL DISTURBANCE (HCC): ICD-10-CM

## 2021-08-31 DIAGNOSIS — M15.9 GENERALIZED OSTEOARTHRITIS: ICD-10-CM

## 2021-08-31 DIAGNOSIS — M48.061 SPINAL STENOSIS OF LUMBAR REGION WITHOUT NEUROGENIC CLAUDICATION: Primary | ICD-10-CM

## 2021-08-31 DIAGNOSIS — N28.9 RENAL INSUFFICIENCY: ICD-10-CM

## 2021-08-31 DIAGNOSIS — G30.0 EARLY ONSET ALZHEIMER'S DEMENTIA WITHOUT BEHAVIORAL DISTURBANCE (HCC): ICD-10-CM

## 2021-08-31 PROCEDURE — 99213 OFFICE O/P EST LOW 20 MIN: CPT | Performed by: FAMILY MEDICINE

## 2021-08-31 NOTE — PROGRESS NOTES
HPI  Wendie Houser is a 86 y.o. female who is here for follow up of ambulatory difficulties requiring wheelchair.  Apparently needs paperwork completed to get a scooter of some type.  Also reports having difficulty getting glucometers?  These were ordered previously and told if pharmacy needs paperwork filled out will need to send to the office.      Review of Systems   Musculoskeletal: Positive for arthralgias and gait problem.   All other systems reviewed and are negative.        Past Medical History:   Diagnosis Date   • Anemia    • Arthritis    • CHF (congestive heart failure) (CMS/HCC)    • Congestive heart failure (CMS/HCC)    • Depression    • Diabetes mellitus (CMS/HCC)     type 2   • Diverticulitis of colon 08/25/2016    Acute uncomplicated diverticulitis at the distal transverse colon   • Early onset Alzheimer's dementia (CMS/HCC)    • GERD (gastroesophageal reflux disease)    • Heart disease    • Hyperlipidemia    • Hypertension    • Hypothyroidism    • Kidney disease    • Sleep apnea        Past Surgical History:   Procedure Laterality Date   • BREAST CYST EXCISION Right 1989    Benign   • COLONOSCOPY  2011    Dr. Cain   • ENDOSCOPY  04/15/2011   • EYE SURGERY  2009   • GALLBLADDER SURGERY  1960    Open   • TOTAL KNEE ARTHROPLASTY Left 2012    Dr. Soler   • VAGINAL HYSTERECTOMY  1999       Family History   Problem Relation Age of Onset   • Other Mother         cardiac disorder   • Hypertension Mother    • Heart disease Mother    • Hypertension Sister    • Heart attack Sister    • Cancer Brother    • Hypertension Brother    • Heart attack Brother    • Heart disease Father        Social History     Socioeconomic History   • Marital status:      Spouse name: Not on file   • Number of children: Not on file   • Years of education: Not on file   • Highest education level: Not on file   Tobacco Use   • Smoking status: Never Smoker   • Smokeless tobacco: Never Used   Substance and Sexual Activity    • Alcohol use: No   • Drug use: No   • Sexual activity: Never       Vitals:    08/31/21 1300   BP: 110/78   Pulse: 83   Resp: 18   Temp: 96.8 °F (36 °C)   SpO2: 96%        Body mass index is 32.85 kg/m².      Physical Exam  Vitals and nursing note reviewed.   Constitutional:       General: She is not in acute distress.     Appearance: She is well-developed. She is obese.   HENT:      Head: Normocephalic and atraumatic.      Nose:      Comments: Patient and daughter with mask.  Provider with mask and shield  Eyes:      Conjunctiva/sclera: Conjunctivae normal.      Pupils: Pupils are equal, round, and reactive to light.   Neck:      Thyroid: No thyromegaly.   Cardiovascular:      Rate and Rhythm: Normal rate and regular rhythm.      Heart sounds: Normal heart sounds.   Pulmonary:      Effort: Pulmonary effort is normal. No respiratory distress.      Breath sounds: Normal breath sounds.   Abdominal:      General: There is no distension.      Palpations: Abdomen is soft. There is no mass.      Tenderness: There is no abdominal tenderness.      Hernia: No hernia is present.   Musculoskeletal:         General: No tenderness or deformity. Normal range of motion.      Cervical back: Normal range of motion.   Lymphadenopathy:      Cervical: No cervical adenopathy.   Skin:     General: Skin is warm and dry.      Coloration: Skin is not pale.      Findings: No rash.   Neurological:      General: No focal deficit present.      Mental Status: She is alert and oriented to person, place, and time.      Motor: No abnormal muscle tone.      Coordination: Coordination normal.   Psychiatric:         Mood and Affect: Mood normal.         Thought Content: Thought content normal.         Judgment: Judgment normal.           Assessment/Plan    Diagnoses and all orders for this visit:    1. Spinal stenosis of lumbar region without neurogenic claudication (Primary)    2. Early onset Alzheimer's dementia without behavioral disturbance  (CMS/[facility])    3. Generalized osteoarthritis      Patient here mostly to have papers completed to get a scooter.  Papers completed and signed.  Patient is immobilized and basically confined to wheelchair because of her spinal stenosis and generalized osteoarthritis.  Also has a very mild dementia but remains very functional on her own.  Also reported difficulty getting glucose monitor which was ordered back in July?  Most recent A1c revealed good blood sugar control and blood work will be rechecked at next visit in October.    This note includes information entered using a voice recognition dictation system.

## 2021-10-08 DIAGNOSIS — M19.90 ARTHRITIS: ICD-10-CM

## 2021-10-08 RX ORDER — HYDROCODONE BITARTRATE AND ACETAMINOPHEN 5; 325 MG/1; MG/1
1 TABLET ORAL EVERY 4 HOURS PRN
Qty: 40 TABLET | Refills: 0 | Status: SHIPPED | OUTPATIENT
Start: 2021-10-08 | End: 2021-11-17 | Stop reason: SDUPTHER

## 2021-10-08 NOTE — TELEPHONE ENCOUNTER
Rx Refill Note  Requested Prescriptions     Pending Prescriptions Disp Refills   • HYDROcodone-acetaminophen (NORCO) 5-325 MG per tablet 40 tablet 0     Sig: Take 1 tablet by mouth Every 4 (Four) Hours As Needed for Moderate Pain .      Last office visit with prescribing clinician: 8/31/2021      Next office visit with prescribing clinician: 10/29/2021            Bright Streeter MA  10/08/21, 11:22 EDT

## 2021-10-08 NOTE — TELEPHONE ENCOUNTER
Rx Refill Note  Requested Prescriptions     Pending Prescriptions Disp Refills   • HYDROcodone-acetaminophen (NORCO) 5-325 MG per tablet 40 tablet 0     Sig: Take 1 tablet by mouth Every 4 (Four) Hours As Needed for Moderate Pain .      Last office visit with prescribing clinician: 8/31/2021      Next office visit with prescribing clinician: 10/29/2021            Bright Streeter MA  10/08/21, 15:36 EDT

## 2021-10-08 NOTE — TELEPHONE ENCOUNTER
Caller: Wendie Houser    Relationship: Self      Medication requested (name and dosage): HYDROcodone-acetaminophen (NORCO) 5-325 MG per tablet    Pharmacy where request should be sent:   [facility] #[id number] - [place] [address] AT SEC OF [facility] & [facility] - [phone] [facility] [phone] FX      Best call back number:[phone]    Does the patient have less than a 3 day supply:  [x] Yes  [] No    Marcin Jeong Rep   10/08/21 10:07 EDT

## 2021-10-29 ENCOUNTER — OFFICE VISIT (OUTPATIENT)
Dept: FAMILY MEDICINE CLINIC | Facility: CLINIC | Age: 86
End: 2021-10-29

## 2021-10-29 VITALS
HEART RATE: 96 BPM | OXYGEN SATURATION: 96 % | RESPIRATION RATE: 18 BRPM | DIASTOLIC BLOOD PRESSURE: 78 MMHG | BODY MASS INDEX: 33.85 KG/M2 | TEMPERATURE: 96.5 F | WEIGHT: 172.4 LBS | SYSTOLIC BLOOD PRESSURE: 120 MMHG | HEIGHT: 60 IN

## 2021-10-29 DIAGNOSIS — G30.1 LATE ONSET ALZHEIMER'S DEMENTIA WITHOUT BEHAVIORAL DISTURBANCE (HCC): ICD-10-CM

## 2021-10-29 DIAGNOSIS — J30.9 ALLERGIC RHINITIS, UNSPECIFIED SEASONALITY, UNSPECIFIED TRIGGER: ICD-10-CM

## 2021-10-29 DIAGNOSIS — E11.65 CONTROLLED TYPE 2 DIABETES MELLITUS WITH HYPERGLYCEMIA, WITHOUT LONG-TERM CURRENT USE OF INSULIN (HCC): ICD-10-CM

## 2021-10-29 DIAGNOSIS — Z12.31 BREAST CANCER SCREENING BY MAMMOGRAM: ICD-10-CM

## 2021-10-29 DIAGNOSIS — Z00.00 MEDICARE ANNUAL WELLNESS VISIT, SUBSEQUENT: Primary | ICD-10-CM

## 2021-10-29 DIAGNOSIS — E03.4 HYPOTHYROIDISM DUE TO ACQUIRED ATROPHY OF THYROID: ICD-10-CM

## 2021-10-29 DIAGNOSIS — F02.80 LATE ONSET ALZHEIMER'S DEMENTIA WITHOUT BEHAVIORAL DISTURBANCE (HCC): ICD-10-CM

## 2021-10-29 DIAGNOSIS — Z23 NEED FOR INFLUENZA VACCINATION: ICD-10-CM

## 2021-10-29 DIAGNOSIS — Z79.899 HIGH RISK MEDICATION USE: ICD-10-CM

## 2021-10-29 DIAGNOSIS — F41.8 MIXED ANXIETY DEPRESSIVE DISORDER: ICD-10-CM

## 2021-10-29 DIAGNOSIS — Z78.0 POST-MENOPAUSAL: ICD-10-CM

## 2021-10-29 DIAGNOSIS — N28.9 RENAL INSUFFICIENCY: ICD-10-CM

## 2021-10-29 DIAGNOSIS — M19.90 ARTHRITIS: ICD-10-CM

## 2021-10-29 DIAGNOSIS — Z79.899 POLYPHARMACY: ICD-10-CM

## 2021-10-29 PROCEDURE — G0439 PPPS, SUBSEQ VISIT: HCPCS | Performed by: FAMILY MEDICINE

## 2021-10-29 PROCEDURE — 90662 IIV NO PRSV INCREASED AG IM: CPT | Performed by: FAMILY MEDICINE

## 2021-10-29 PROCEDURE — 1159F MED LIST DOCD IN RCRD: CPT | Performed by: FAMILY MEDICINE

## 2021-10-29 PROCEDURE — 1170F FXNL STATUS ASSESSED: CPT | Performed by: FAMILY MEDICINE

## 2021-10-29 PROCEDURE — G0008 ADMIN INFLUENZA VIRUS VAC: HCPCS | Performed by: FAMILY MEDICINE

## 2021-10-29 NOTE — PROGRESS NOTES
The ABCs of the Annual Wellness Visit  Subsequent Medicare Wellness Visit    Chief Complaint   Patient presents with   • Medicare Wellness-subsequent      Subjective    History of Present Illness:  Wendie Houser is a 86 y.o. female who presents for a Subsequent Medicare Wellness Visit.    The following portions of the patient's history were reviewed and   updated as appropriate: allergies and past family history.    Compared to one year ago, the patient feels her physical   health is better.    Compared to one year ago, the patient feels her mental   health is better.    Recent Hospitalizations:  She was not admitted to the hospital during the last year.       Current Medical Providers:  Patient Care Team:  Luis Mcqueen MD as PCP - General (Family Medicine)  Diego Veronica MD as Consulting Physician (Urology)    Outpatient Medications Prior to Visit   Medication Sig Dispense Refill   • acetaminophen (TYLENOL) 325 MG tablet Take 650 mg by mouth.     • ampicillin (PRINCIPEN) 250 MG capsule Take 500 mg by mouth Daily.     • cetirizine (zyrTEC) 10 MG tablet Take 0.5 tablets by mouth Daily. 30 tablet 2   • cyclobenzaprine (FLEXERIL) 5 MG tablet Take 1 tablet by mouth 3 (Three) Times a Day As Needed for Muscle Spasms. for muscle spams 30 tablet 5   • donepezil (ARICEPT) 10 MG tablet TAKE 1 TABLET BY MOUTH EVERY NIGHT AT BEDTIME 90 tablet 3   • HYDROcodone-acetaminophen (NORCO) 5-325 MG per tablet Take 1 tablet by mouth Every 4 (Four) Hours As Needed for Moderate Pain . 40 tablet 0   • magnesium oxide (MAG-OX) 400 MG tablet Take 400 mg by mouth Daily.     • Multiple Vitamin (MULTI VITAMIN DAILY PO) Take  by mouth.     • omeprazole (priLOSEC) 20 MG capsule Take 1 capsule by mouth Daily. 90 capsule 3   • potassium chloride 10 MEQ CR tablet TAKE 1 TABLET TWICE A DAY WITH MEALS 180 tablet 3   • pravastatin (PRAVACHOL) 40 MG tablet Take 1 tablet by mouth Every Night. 90 tablet 3   • Sennosides (SENNA) 8.6 MG capsule  "Take  by mouth.     • sertraline (ZOLOFT) 100 MG tablet TAKE 1 TABLET DAILY 90 tablet 3   • glucose blood test strip 1 each by Other route Daily. 50 each 12   • glucose monitor monitoring kit 1 each Daily. 1 each 11   • Lancets misc 1 each Daily. 100 each 5   • losartan (COZAAR) 50 MG tablet TAKE ONE-HALF (1/2) TABLET DAILY 45 tablet 3     No facility-administered medications prior to visit.       Opioid medication/s are on active medication list.  and I have evaluated her active treatment plan and pain score trends (see table).  Vitals:    10/29/21 1259   PainSc:   8   PainLoc: Hip     I have reviewed the chart for potential of high risk medication and harmful drug interactions in the elderly.            Aspirin is not on active medication list.  Aspirin use is not indicated based on review of current medical condition/s. Risk of harm outweighs potential benefits.  .    Patient Active Problem List   Diagnosis   • Arthritis   • Early onset Alzheimer's dementia (HCC)   • Fatigue   • Gastroesophageal reflux disease   • Hypomagnesemia   • Sleep apnea   • Renal insufficiency   • Hypothyroidism   • Lumbar canal stenosis   • Shortness of breath   • Disorder of electrolytes   • Hiatal hernia   • Hyperkalemia   • Hypoglycemia   • Irritable bowel syndrome   • Allergic rhinitis   • High risk medication use   • Mixed anxiety depressive disorder   • Polypharmacy   • Obesity (BMI 30-39.9)   • Panic attack     Advance Care Planning  Advance Directive is not on file.  ACP discussion was held with the patient during this visit. Patient does not have an advance directive, information provided.          Objective    Vitals:    10/29/21 1259   BP: 120/78   BP Location: Left arm   Pulse: 96   Resp: 18   Temp: 96.5 °F (35.8 °C)   TempSrc: Temporal   SpO2: 96%   Weight: 78.2 kg (172 lb 6.4 oz)   Height: 152.4 cm (60\")   PainSc:   8   PainLoc: Hip     BMI Readings from Last 1 Encounters:   10/29/21 33.67 kg/m²   BMI is above normal " parameters. Recommendations include: nutrition counseling    Does the patient have evidence of cognitive impairment? Yes    Physical Exam            HEALTH RISK ASSESSMENT    Smoking Status:  Social History     Tobacco Use   Smoking Status Never Smoker   Smokeless Tobacco Never Used     Alcohol Consumption:  Social History     Substance and Sexual Activity   Alcohol Use No     Fall Risk Screen:    LEEANNADI Fall Risk Assessment was completed, and patient is at MODERATE risk for falls. Assessment completed on:10/29/2021    Depression Screening:  PHQ-2/PHQ-9 Depression Screening 10/29/2021   Little interest or pleasure in doing things 0   Feeling down, depressed, or hopeless 0   Trouble falling or staying asleep, or sleeping too much 1   Feeling tired or having little energy 1   Poor appetite or overeating 0   Feeling bad about yourself - or that you are a failure or have let yourself or your family down 0   Trouble concentrating on things, such as reading the newspaper or watching television 0   Moving or speaking so slowly that other people could have noticed. Or the opposite - being so fidgety or restless that you have been moving around a lot more than usual 0   Thoughts that you would be better off dead, or of hurting yourself in some way 0   Total Score 2   If you checked off any problems, how difficult have these problems made it for you to do your work, take care of things at home, or get along with other people? Not difficult at all       Health Habits and Functional and Cognitive Screening:  Functional & Cognitive Status 10/29/2021   Do you have difficulty preparing food and eating? No   Do you have difficulty bathing yourself, getting dressed or grooming yourself? Yes   Do you have difficulty using the toilet? No   Do you have difficulty moving around from place to place? Yes   Do you have trouble with steps or getting out of a bed or a chair? No   Current Diet Well Balanced Diet   Dental Exam Up to date   Eye  Exam Up to date   Exercise (times per week) 2 times per week   Current Exercises Include No Regular Exercise   Current Exercise Activities Include -   Do you need help using the phone?  No   Are you deaf or do you have serious difficulty hearing?  Yes   Do you need help with transportation? Yes   Do you need help shopping? Yes   Do you need help preparing meals?  No   Do you need help with housework?  Yes   Do you need help with laundry? No   Do you need help taking your medications? No   Do you need help managing money? No   Do you ever drive or ride in a car without wearing a seat belt? No   Have you felt unusual stress, anger or loneliness in the last month? No   Who do you live with? Alone   If you need help, do you have trouble finding someone available to you? No   Have you been bothered in the last four weeks by sexual problems? No   Do you have difficulty concentrating, remembering or making decisions? No       Age-appropriate Screening Schedule:  Refer to the list below for future screening recommendations based on patient's age, sex and/or medical conditions. Orders for these recommended tests are listed in the plan section. The patient has been provided with a written plan.    Health Maintenance   Topic Date Due   • TDAP/TD VACCINES (1 - Tdap) Never done   • ZOSTER VACCINE (1 of 2) Never done   • DIABETIC EYE EXAM  Never done   • DXA SCAN  06/04/2016   • URINE MICROALBUMIN  10/12/2018   • MAMMOGRAM  12/11/2020   • INFLUENZA VACCINE  08/01/2021   • HEMOGLOBIN A1C  10/27/2021   • LIPID PANEL  04/27/2022              Assessment/Plan   Risk Factors Identified During Encounter  Cardiovascular Disease  Dementia/Memory   Obesity/Overweight   The above risks/problems have been discussed with the patient.  Follow up actions/plans if indicated are seen below in the Assessment/Plan Section.  Pertinent information has been shared with the patient in the After Visit Summary.        Follow Up:   Patient here for  routine follow-up of multiple medical issues as well as annual Medicare wellness evaluation.  Overall seems to be doing very well on current regimen that does include pain medication as needed.  There is no evidence of abuse nor misuse of pain medication.  Patient plans on getting third Covid vaccine in a couple of weeks which is strongly encouraged.  Check with pharmacy regarding coverage of Pneumovax 23 and shingles vaccines.  Otherwise continue current therapy and recheck in 3 months.    An After Visit Summary and PPPS were made available to the patient.

## 2021-10-30 LAB
ALBUMIN SERPL-MCNC: 3.7 G/DL (ref 3.6–4.6)
ALBUMIN/CREAT UR: 7 MG/G CREAT (ref 0–29)
ALBUMIN/GLOB SERPL: 1.1 {RATIO} (ref 1.2–2.2)
ALP SERPL-CCNC: 110 IU/L (ref 44–121)
ALT SERPL-CCNC: 13 IU/L (ref 0–32)
AST SERPL-CCNC: 19 IU/L (ref 0–40)
BASOPHILS # BLD AUTO: 0.1 X10E3/UL (ref 0–0.2)
BASOPHILS NFR BLD AUTO: 1 %
BILIRUB SERPL-MCNC: 0.3 MG/DL (ref 0–1.2)
BUN SERPL-MCNC: 12 MG/DL (ref 8–27)
BUN/CREAT SERPL: 13 (ref 12–28)
CALCIUM SERPL-MCNC: 9.2 MG/DL (ref 8.7–10.3)
CHLORIDE SERPL-SCNC: 104 MMOL/L (ref 96–106)
CHOLEST SERPL-MCNC: 149 MG/DL (ref 100–199)
CO2 SERPL-SCNC: 20 MMOL/L (ref 20–29)
CREAT SERPL-MCNC: 0.95 MG/DL (ref 0.57–1)
CREAT UR-MCNC: 113.8 MG/DL
EOSINOPHIL # BLD AUTO: 0.2 X10E3/UL (ref 0–0.4)
EOSINOPHIL NFR BLD AUTO: 2 %
ERYTHROCYTE [DISTWIDTH] IN BLOOD BY AUTOMATED COUNT: 15.9 % (ref 11.7–15.4)
GLOBULIN SER CALC-MCNC: 3.5 G/DL (ref 1.5–4.5)
GLUCOSE SERPL-MCNC: 120 MG/DL (ref 65–99)
HBA1C MFR BLD: 6.2 % (ref 4.8–5.6)
HCT VFR BLD AUTO: 39.8 % (ref 34–46.6)
HDLC SERPL-MCNC: 54 MG/DL
HGB BLD-MCNC: 12.1 G/DL (ref 11.1–15.9)
IMM GRANULOCYTES # BLD AUTO: 0 X10E3/UL (ref 0–0.1)
IMM GRANULOCYTES NFR BLD AUTO: 1 %
LDLC SERPL CALC-MCNC: 70 MG/DL (ref 0–99)
LYMPHOCYTES # BLD AUTO: 3.7 X10E3/UL (ref 0.7–3.1)
LYMPHOCYTES NFR BLD AUTO: 43 %
MCH RBC QN AUTO: 25.1 PG (ref 26.6–33)
MCHC RBC AUTO-ENTMCNC: 30.4 G/DL (ref 31.5–35.7)
MCV RBC AUTO: 83 FL (ref 79–97)
MICROALBUMIN UR-MCNC: 8 UG/ML
MONOCYTES # BLD AUTO: 0.7 X10E3/UL (ref 0.1–0.9)
MONOCYTES NFR BLD AUTO: 8 %
NEUTROPHILS # BLD AUTO: 3.9 X10E3/UL (ref 1.4–7)
NEUTROPHILS NFR BLD AUTO: 45 %
PLATELET # BLD AUTO: 226 X10E3/UL (ref 150–450)
POTASSIUM SERPL-SCNC: 4.2 MMOL/L (ref 3.5–5.2)
PROT SERPL-MCNC: 7.2 G/DL (ref 6–8.5)
RBC # BLD AUTO: 4.82 X10E6/UL (ref 3.77–5.28)
SODIUM SERPL-SCNC: 140 MMOL/L (ref 134–144)
T4 FREE SERPL-MCNC: 1.12 NG/DL (ref 0.82–1.77)
TRIGL SERPL-MCNC: 143 MG/DL (ref 0–149)
TSH SERPL DL<=0.005 MIU/L-ACNC: <0.005 UIU/ML (ref 0.45–4.5)
VLDLC SERPL CALC-MCNC: 25 MG/DL (ref 5–40)
WBC # BLD AUTO: 8.5 X10E3/UL (ref 3.4–10.8)

## 2021-11-03 RX ORDER — CYCLOBENZAPRINE HCL 5 MG
TABLET ORAL
Qty: 30 TABLET | Refills: 5 | Status: SHIPPED | OUTPATIENT
Start: 2021-11-03 | End: 2022-03-30 | Stop reason: SDUPTHER

## 2021-11-03 NOTE — TELEPHONE ENCOUNTER
Rx Refill Note  Requested Prescriptions     Pending Prescriptions Disp Refills   • cyclobenzaprine (FLEXERIL) 5 MG tablet [Pharmacy Med Name: CYCLOBENZAPRINE 5MG TABLETS] 30 tablet 5     Sig: TAKE 1 TABLET BY MOUTH THREE TIMES DAILY AS NEEDED FOR MUSCLE SPASMS      Last office visit with prescribing clinician: 10/29/2021      Next office visit with prescribing clinician: 1/28/2022            Jeniffer Howard MA  11/03/21, 13:45 EDT

## 2021-11-15 DIAGNOSIS — I10 HYPERTENSION, UNSPECIFIED TYPE: ICD-10-CM

## 2021-11-15 RX ORDER — POTASSIUM CHLORIDE 750 MG/1
TABLET, FILM COATED, EXTENDED RELEASE ORAL
Qty: 180 TABLET | Refills: 3 | Status: SHIPPED | OUTPATIENT
Start: 2021-11-15 | End: 2022-05-10

## 2021-11-15 NOTE — TELEPHONE ENCOUNTER
Rx Refill Note  Requested Prescriptions     Pending Prescriptions Disp Refills   • potassium chloride 10 MEQ CR tablet [Pharmacy Med Name: POTASSIUM CHLORIDE ER (WAX) TABS 10MEQ] 180 tablet 3     Sig: TAKE 1 TABLET TWICE A DAY WITH MEALS      Last office visit with prescribing clinician: 10/29/2021      Next office visit with prescribing clinician: 1/28/2022            Jeniffer Howard MA  11/15/21, 07:40 EST

## 2021-11-17 DIAGNOSIS — M19.90 ARTHRITIS: ICD-10-CM

## 2021-11-17 RX ORDER — HYDROCODONE BITARTRATE AND ACETAMINOPHEN 5; 325 MG/1; MG/1
1 TABLET ORAL EVERY 4 HOURS PRN
Qty: 40 TABLET | Refills: 0 | Status: SHIPPED | OUTPATIENT
Start: 2021-11-17 | End: 2021-12-30 | Stop reason: SDUPTHER

## 2021-11-17 NOTE — TELEPHONE ENCOUNTER
Rx Refill Note  Requested Prescriptions     Pending Prescriptions Disp Refills   • HYDROcodone-acetaminophen (NORCO) 5-325 MG per tablet 40 tablet 0     Sig: Take 1 tablet by mouth Every 4 (Four) Hours As Needed for Moderate Pain .      Last office visit with prescribing clinician: 10/29/2021      Next office visit with prescribing clinician: 1/28/2022            Jeniffer Howard MA  11/17/21, 15:48 EST

## 2021-11-17 NOTE — TELEPHONE ENCOUNTER
Caller: Corrina Wendie DARLENE    Relationship: Self    Best call back number: [phone]     Requested Prescriptions:   Requested Prescriptions     Pending Prescriptions Disp Refills   • HYDROcodone-acetaminophen (NORCO) 5-325 MG per tablet 40 tablet 0     Sig: Take 1 tablet by mouth Every 4 (Four) Hours As Needed for Moderate Pain .        Pharmacy where request should be sent: [facility] #[id number] [address] [facility] & [facility] [phone] [facility] [phone] FX       Does the patient have less than a 3 day supply:  [x] Yes  [] No    Marcin Jeong Rep   11/17/21 15:37 EST

## 2021-12-30 DIAGNOSIS — M19.90 ARTHRITIS: ICD-10-CM

## 2021-12-30 RX ORDER — HYDROCODONE BITARTRATE AND ACETAMINOPHEN 5; 325 MG/1; MG/1
1 TABLET ORAL EVERY 4 HOURS PRN
Qty: 40 TABLET | Refills: 0 | Status: SHIPPED | OUTPATIENT
Start: 2021-12-30 | End: 2022-01-31 | Stop reason: SDUPTHER

## 2021-12-30 NOTE — TELEPHONE ENCOUNTER
Caller: Corrina Wendie DARLENE    Relationship: Self    Best call back number: [phone] (H)    Requested Prescriptions:   Requested Prescriptions     Pending Prescriptions Disp Refills   • HYDROcodone-acetaminophen (NORCO) 5-325 MG per tablet 40 tablet 0     Sig: Take 1 tablet by mouth Every 4 (Four) Hours As Needed for Moderate Pain .        Pharmacy where request should be sent: [facility] #[id number] [place], [place] - [address] AT SEC OF [facility] & [facility] [phone] [facility] [phone] FX     Additional details provided by patient: PATIENT HAS 2 TABLETS LEFT     Does the patient have less than a 3 day supply:  [x] Yes  [] No    Mackenzie Mancia, CLAUDIA   12/30/21 13:11 EST

## 2021-12-30 NOTE — TELEPHONE ENCOUNTER
Rx Refill Note  Requested Prescriptions     Pending Prescriptions Disp Refills   • HYDROcodone-acetaminophen (NORCO) 5-325 MG per tablet 40 tablet 0     Sig: Take 1 tablet by mouth Every 4 (Four) Hours As Needed for Moderate Pain .      Last office visit with prescribing clinician: 10/29/2021      Next office visit with prescribing clinician: 1/28/2022            Bright Streeter MA  12/30/21, 13:57 EST

## 2022-01-31 ENCOUNTER — TELEPHONE (OUTPATIENT)
Dept: FAMILY MEDICINE CLINIC | Facility: CLINIC | Age: 87
End: 2022-01-31

## 2022-01-31 DIAGNOSIS — M19.90 ARTHRITIS: ICD-10-CM

## 2022-01-31 RX ORDER — HYDROCODONE BITARTRATE AND ACETAMINOPHEN 5; 325 MG/1; MG/1
1 TABLET ORAL EVERY 4 HOURS PRN
Qty: 40 TABLET | Refills: 0 | Status: SHIPPED | OUTPATIENT
Start: 2022-01-31 | End: 2022-03-30 | Stop reason: SDUPTHER

## 2022-01-31 NOTE — TELEPHONE ENCOUNTER
Rx Refill Note  Requested Prescriptions      No prescriptions requested or ordered in this encounter      Last office visit with prescribing clinician: 10/29/2021      Next office visit with prescribing clinician: 2/4/2022            Jeniffer Howard MA  01/31/22, 15:27 EST

## 2022-02-04 ENCOUNTER — OFFICE VISIT (OUTPATIENT)
Dept: FAMILY MEDICINE CLINIC | Facility: CLINIC | Age: 87
End: 2022-02-04

## 2022-02-04 VITALS — SYSTOLIC BLOOD PRESSURE: 130 MMHG | DIASTOLIC BLOOD PRESSURE: 80 MMHG

## 2022-02-04 DIAGNOSIS — M19.90 ARTHRITIS: ICD-10-CM

## 2022-02-04 DIAGNOSIS — E11.65 CONTROLLED TYPE 2 DIABETES MELLITUS WITH HYPERGLYCEMIA, WITHOUT LONG-TERM CURRENT USE OF INSULIN: ICD-10-CM

## 2022-02-04 DIAGNOSIS — F02.80 LATE ONSET ALZHEIMER'S DEMENTIA WITHOUT BEHAVIORAL DISTURBANCE: ICD-10-CM

## 2022-02-04 DIAGNOSIS — Z79.899 HIGH RISK MEDICATION USE: Primary | ICD-10-CM

## 2022-02-04 DIAGNOSIS — G30.1 LATE ONSET ALZHEIMER'S DEMENTIA WITHOUT BEHAVIORAL DISTURBANCE: ICD-10-CM

## 2022-02-04 DIAGNOSIS — F41.8 MIXED ANXIETY DEPRESSIVE DISORDER: ICD-10-CM

## 2022-02-04 PROCEDURE — 99442 PR PHYS/QHP TELEPHONE EVALUATION 11-20 MIN: CPT | Performed by: FAMILY MEDICINE

## 2022-02-04 NOTE — PROGRESS NOTES
HPI  Wendie Houser is a 86 y.o. female who is here for follow up of diabetes arthritis congestive heart failure.  Also reported history of dementia.  Unable to come in because of weather emergency and therefore this is a telephone visit.  Patient denies complaints and takes 1 pain pill a day for arthritis which seems to be under adequate control.  Lab work 3 months ago showed diabetes under good control.  Patient does check blood sugars intermittently which have been in the low 100s.      Review of Systems   Musculoskeletal: Positive for arthralgias.   All other systems reviewed and are negative.        Past Medical History:   Diagnosis Date   • Anemia    • Arthritis    • CHF (congestive heart failure) (HCC)    • Congestive heart failure (HCC)    • Depression    • Diabetes mellitus (HCC)     type 2   • Diverticulitis of colon 08/25/2016    Acute uncomplicated diverticulitis at the distal transverse colon   • Early onset Alzheimer's dementia (HCC)    • GERD (gastroesophageal reflux disease)    • Heart disease    • Hyperlipidemia    • Hypertension    • Hypothyroidism    • Kidney disease    • Sleep apnea        Past Surgical History:   Procedure Laterality Date   • BREAST CYST EXCISION Right 1989    Benign   • COLONOSCOPY  2011    Dr. Cain   • ENDOSCOPY  04/15/2011   • EYE SURGERY  2009   • GALLBLADDER SURGERY  1960    Open   • TOTAL KNEE ARTHROPLASTY Left 2012    Dr. Soler   • VAGINAL HYSTERECTOMY  1999       Family History   Problem Relation Age of Onset   • Other Mother         cardiac disorder   • Hypertension Mother    • Heart disease Mother    • Hypertension Sister    • Heart attack Sister    • Cancer Brother    • Hypertension Brother    • Heart attack Brother    • Heart disease Father        Social History     Socioeconomic History   • Marital status:    Tobacco Use   • Smoking status: Never Smoker   • Smokeless tobacco: Never Used   Substance and Sexual Activity   • Alcohol use: No   • Drug use:  No   • Sexual activity: Never       Vitals:    02/04/22 1014   BP: 130/80        There is no height or weight on file to calculate BMI.      Physical Exam  Neurological:      Mental Status: She is alert.   Psychiatric:         Mood and Affect: Mood normal.         Speech: Speech normal.         Behavior: Behavior normal.         Thought Content: Thought content normal.         Cognition and Memory: Cognition normal.         Judgment: Judgment normal.           Assessment/Plan    Diagnoses and all orders for this visit:    1. High risk medication use (Primary)    2. Mixed anxiety depressive disorder    3. Late onset Alzheimer's dementia without behavioral disturbance (HCC)    4. Arthritis    5. Controlled type 2 diabetes mellitus with hyperglycemia, without long-term current use of insulin (HCC)      Telephone visit for above-noted visit.  Patient denies complaint and mentation seems to be fully intact.  Only complaint is arthritis which is adequately controlled with 1 pain pill a day.  Doing well on current regimen which will be continued including follow-up in 3 months at which time routine lab work will be repeated for diabetes and other issues.  This visit has been rescheduled as a phone visit to comply with patient safety concerns in accordance with CDC recommendations. Total time of discussion was 12 minutes.

## 2022-02-24 DIAGNOSIS — E11.9 CONTROLLED TYPE 2 DIABETES MELLITUS WITHOUT COMPLICATION, WITHOUT LONG-TERM CURRENT USE OF INSULIN: ICD-10-CM

## 2022-02-24 DIAGNOSIS — I10 HYPERTENSION, UNSPECIFIED TYPE: ICD-10-CM

## 2022-02-24 RX ORDER — LOSARTAN POTASSIUM 50 MG/1
TABLET ORAL
Qty: 45 TABLET | Refills: 3 | Status: SHIPPED | OUTPATIENT
Start: 2022-02-24 | End: 2022-05-10 | Stop reason: SDUPTHER

## 2022-03-30 DIAGNOSIS — M19.90 ARTHRITIS: ICD-10-CM

## 2022-03-30 RX ORDER — HYDROCODONE BITARTRATE AND ACETAMINOPHEN 5; 325 MG/1; MG/1
1 TABLET ORAL EVERY 4 HOURS PRN
Qty: 40 TABLET | Refills: 0 | Status: SHIPPED | OUTPATIENT
Start: 2022-03-30 | End: 2022-05-16 | Stop reason: SDUPTHER

## 2022-03-30 RX ORDER — CYCLOBENZAPRINE HCL 5 MG
5 TABLET ORAL 3 TIMES DAILY PRN
Qty: 30 TABLET | Refills: 5 | Status: SHIPPED | OUTPATIENT
Start: 2022-03-30 | End: 2022-08-22 | Stop reason: SDUPTHER

## 2022-03-30 NOTE — TELEPHONE ENCOUNTER
Rx Refill Note  Requested Prescriptions     Pending Prescriptions Disp Refills   • HYDROcodone-acetaminophen (NORCO) 5-325 MG per tablet 40 tablet 0     Sig: Take 1 tablet by mouth Every 4 (Four) Hours As Needed for Moderate Pain .   • cyclobenzaprine (FLEXERIL) 5 MG tablet 30 tablet 5     Sig: Take 1 tablet by mouth 3 (Three) Times a Day As Needed. for muscle spams      Last office visit with prescribing clinician: 2/4/2022      Next office visit with prescribing clinician: Visit date not found            Jeniffer Howard MA  03/30/22, 14:31 EDT

## 2022-03-30 NOTE — TELEPHONE ENCOUNTER
Caller: CorrinaWendie    Relationship: Self    Best call back number: [phone]   Requested Prescriptions:   Requested Prescriptions     Pending Prescriptions Disp Refills   • HYDROcodone-acetaminophen (NORCO) 5-325 MG per tablet 40 tablet 0     Sig: Take 1 tablet by mouth Every 4 (Four) Hours As Needed for Moderate Pain .   • cyclobenzaprine (FLEXERIL) 5 MG tablet 30 tablet 5     Sig: Take 1 tablet by mouth 3 (Three) Times a Day As Needed. for [facility]        Pharmacy where request should be sent: [id number] - [place] [id number] AT SEC OF [facility] - [phone] [facility] [phone] FX     Additional details provided by patient:     Does the patient have less than a 3 day supply:  [x] Yes  [] No    Marcin York Rep   03/30/22 14:28 EDT

## 2022-04-04 DIAGNOSIS — E78.5 HYPERLIPIDEMIA, UNSPECIFIED HYPERLIPIDEMIA TYPE: ICD-10-CM

## 2022-04-04 RX ORDER — PRAVASTATIN SODIUM 40 MG
TABLET ORAL
Qty: 90 TABLET | Refills: 3 | Status: SHIPPED | OUTPATIENT
Start: 2022-04-04 | End: 2022-08-22 | Stop reason: SDUPTHER

## 2022-05-03 ENCOUNTER — TELEPHONE (OUTPATIENT)
Dept: FAMILY MEDICINE CLINIC | Facility: CLINIC | Age: 87
End: 2022-05-03

## 2022-05-03 ENCOUNTER — READMISSION MANAGEMENT (OUTPATIENT)
Dept: CALL CENTER | Facility: HOSPITAL | Age: 87
End: 2022-05-03

## 2022-05-03 ENCOUNTER — TRANSITIONAL CARE MANAGEMENT TELEPHONE ENCOUNTER (OUTPATIENT)
Dept: CALL CENTER | Facility: HOSPITAL | Age: 87
End: 2022-05-03

## 2022-05-03 NOTE — OUTREACH NOTE
Call Center TCM Note    Flowsheet Row Responses   [facility] facility patient discharged from? Non-BH   Does the patient have one of the following disease processes/diagnoses(primary or secondary)? Stroke (TIA)   TCM attempt successful? No   Unsuccessful attempts Attempt 2          Sheyla Marquez RN    5/3/2022, 11:56 EDT

## 2022-05-03 NOTE — OUTREACH NOTE
Call Center TCM Note    Flowsheet Row Responses   [facility] facility patient discharged from? Non-  [[facility]]   Does the patient have one of the following disease processes/diagnoses(primary or secondary)? Stroke (TIA)   TCM attempt successful? No  [verbal release on file for all numbers listed]   Unsuccessful attempts Attempt 1  [attempted patient and dtr (out of order)]          Sheyla Marquez RN    5/3/2022, 09:22 EDT

## 2022-05-03 NOTE — OUTREACH NOTE
Prep Survey    Flowsheet Row Responses   [facility] facility patient discharged from? Non-BH   Is LACE score < 7 ? Non-BH Discharge   Emergency Room discharge w/ pulse ox? No   Eligibility [facility]    Date of Discharge 05/02/22   Discharge diagnosis Unavailable    Does the patient have one of the following disease processes/diagnoses(primary or secondary)? Other   Prep survey completed? Yes          EVERARDO BOOTH - Registered Nurse

## 2022-05-04 ENCOUNTER — TRANSITIONAL CARE MANAGEMENT TELEPHONE ENCOUNTER (OUTPATIENT)
Dept: CALL CENTER | Facility: HOSPITAL | Age: 87
End: 2022-05-04

## 2022-05-04 NOTE — OUTREACH NOTE
Call Center TCM Note    Flowsheet Row Responses   [facility] patient discharged from? Non-   Does the patient have one of the following disease processes/diagnoses(primary or secondary)? Stroke (TIA)   TCM attempt successful? Yes   Meds reviewed with patient/caregiver? Yes   Is the patient having any side effects they believe may be caused by any medication additions or changes? No   Does the patient have all medications ordered at discharge? Yes   Is the patient taking all medications as directed (includes completed medication regime)? Yes   Does the patient have a primary care provider?  Yes   Does the patient have an appointment with their PCP within 7 days of discharge? Yes   Comments regarding PCP TCM APPT with PCP Dr Mcqueen is 05/10/2022.   Has the patient kept scheduled appointments due by today? N/A   Has home health visited the patient within 72 hours of discharge? N/A   Psychosocial issues? No   Did the patient receive a copy of their discharge instructions? Yes   Nursing interventions Reviewed instructions with patient   What is the patient's perception of their health status since discharge? Improving   Is the patient/caregiver able to teach back signs and symptoms related to disease process for when to call PCP? Yes   Is the patient/caregiver able to teach back signs and symptoms related to disease process for when to call 911? Yes   Is the patient/caregiver able to teach back the hierarchy of who to call/visit for symptoms/problems? PCP, Specialist, Home health nurse, Urgent Care, ED, 911 Yes   If the patient is a current smoker, are they able to teach back resources for cessation? Not a smoker   TCM call completed? Yes   Wrap up additional comments Pt doing well s/p d/c from SNF. Happy to be home, lots of family help. All medications in place. TCM APPT with PCP Dr Mcqueen is 05/10/2022.          Migdalia Lacy MA    5/4/2022, 16:06 EDT

## 2022-05-10 ENCOUNTER — OFFICE VISIT (OUTPATIENT)
Dept: FAMILY MEDICINE CLINIC | Facility: CLINIC | Age: 87
End: 2022-05-10

## 2022-05-10 VITALS
SYSTOLIC BLOOD PRESSURE: 112 MMHG | DIASTOLIC BLOOD PRESSURE: 70 MMHG | HEART RATE: 81 BPM | HEIGHT: 60 IN | WEIGHT: 178.8 LBS | BODY MASS INDEX: 35.1 KG/M2 | OXYGEN SATURATION: 98 %

## 2022-05-10 DIAGNOSIS — E11.22 TYPE 2 DIABETES MELLITUS WITH STAGE 1 CHRONIC KIDNEY DISEASE, WITHOUT LONG-TERM CURRENT USE OF INSULIN: ICD-10-CM

## 2022-05-10 DIAGNOSIS — E11.9 CONTROLLED TYPE 2 DIABETES MELLITUS WITHOUT COMPLICATION, WITHOUT LONG-TERM CURRENT USE OF INSULIN: ICD-10-CM

## 2022-05-10 DIAGNOSIS — Z79.899 POLYPHARMACY: ICD-10-CM

## 2022-05-10 DIAGNOSIS — M19.90 ARTHRITIS: ICD-10-CM

## 2022-05-10 DIAGNOSIS — M48.061 SPINAL STENOSIS OF LUMBAR REGION WITHOUT NEUROGENIC CLAUDICATION: Primary | ICD-10-CM

## 2022-05-10 DIAGNOSIS — N18.1 TYPE 2 DIABETES MELLITUS WITH STAGE 1 CHRONIC KIDNEY DISEASE, WITHOUT LONG-TERM CURRENT USE OF INSULIN: ICD-10-CM

## 2022-05-10 DIAGNOSIS — I10 HYPERTENSION, UNSPECIFIED TYPE: ICD-10-CM

## 2022-05-10 PROBLEM — F41.8 MIXED ANXIETY DEPRESSIVE DISORDER: Status: RESOLVED | Noted: 2018-10-23 | Resolved: 2022-05-10

## 2022-05-10 PROBLEM — F41.0 PANIC ATTACK: Status: RESOLVED | Noted: 2021-07-27 | Resolved: 2022-05-10

## 2022-05-10 PROCEDURE — 99214 OFFICE O/P EST MOD 30 MIN: CPT | Performed by: FAMILY MEDICINE

## 2022-05-10 RX ORDER — LANCETS 30 GAUGE
1 EACH MISCELLANEOUS DAILY
Qty: 100 EACH | Refills: 5 | Status: SHIPPED | OUTPATIENT
Start: 2022-05-10

## 2022-05-10 RX ORDER — LOSARTAN POTASSIUM 50 MG/1
25 TABLET ORAL 2 TIMES DAILY
Qty: 90 TABLET | Refills: 3
Start: 2022-05-10 | End: 2022-08-22 | Stop reason: SDUPTHER

## 2022-05-10 NOTE — PROGRESS NOTES
HPI  Wendie Houser is a 87 y.o. female who is here for follow up after recent hospitalization apparently for shortness of air.  Reading discharge summary do not find any specific reason.  Apparently cardiac evaluation was negative as well as long CT scan.  Patient was sent to rehab facility from the hospital but has since returned home and says continues to improve.  Apparently several medications were discontinued.  Patient is requesting a CT scan of the head apparently recommended by ophthalmologist.  Unfortunately unable to find information indicating why ophthalmologist felt CT scan was indicated.  Will contact her office and hopefully can get records and order CT scan if needed.  Apparently had some visual difficulties but these seem to have resolved since hospitalization.  Patient currently is taking blood pressure medicine twice a day with good control.      Review of Systems   Musculoskeletal:        Ambulates with walker   All other systems reviewed and are negative.        Past Medical History:   Diagnosis Date   • Anemia    • Arthritis    • CHF (congestive heart failure) (HCC)    • Congestive heart failure (HCC)    • Depression    • Diabetes mellitus (HCC)     type 2   • Diverticulitis of colon 08/25/2016    Acute uncomplicated diverticulitis at the distal transverse colon   • Early onset Alzheimer's dementia (HCC)    • GERD (gastroesophageal reflux disease)    • Heart disease    • Hyperlipidemia    • Hypertension    • Hypothyroidism    • Kidney disease    • Sleep apnea        Past Surgical History:   Procedure Laterality Date   • BREAST CYST EXCISION Right 1989    Benign   • COLONOSCOPY  2011    Dr. Cain   • ENDOSCOPY  04/15/2011   • EYE SURGERY  2009   • GALLBLADDER SURGERY  1960    Open   • TOTAL KNEE ARTHROPLASTY Left 2012    Dr. Soler   • VAGINAL HYSTERECTOMY  1999       Family History   Problem Relation Age of Onset   • Other Mother         cardiac disorder   • Hypertension Mother    • Heart  disease Mother    • Hypertension Sister    • Heart attack Sister    • Cancer Brother    • Hypertension Brother    • Heart attack Brother    • Heart disease Father        Social History     Socioeconomic History   • Marital status:    Tobacco Use   • Smoking status: Never Smoker   • Smokeless tobacco: Never Used   Substance and Sexual Activity   • Alcohol use: No   • Drug use: No   • Sexual activity: Never       Vitals:    05/10/22 1415   BP: 112/70   Pulse: 81   SpO2: 98%        Body mass index is 34.92 kg/m².      Physical Exam  Vitals and nursing note reviewed.   Constitutional:       General: She is not in acute distress.     Appearance: She is well-developed.   HENT:      Head: Normocephalic and atraumatic.      Nose:      Comments: Patient and son with masks.  Provider with mask and shield  Eyes:      Extraocular Movements: Extraocular movements intact.      Conjunctiva/sclera: Conjunctivae normal.      Pupils: Pupils are equal, round, and reactive to light.   Neck:      Thyroid: No thyromegaly.   Cardiovascular:      Rate and Rhythm: Normal rate and regular rhythm.      Heart sounds: Normal heart sounds.   Pulmonary:      Effort: Pulmonary effort is normal. No respiratory distress.      Breath sounds: Normal breath sounds.   Abdominal:      General: There is no distension.      Palpations: Abdomen is soft. There is no mass.      Tenderness: There is no abdominal tenderness.      Hernia: No hernia is present.   Musculoskeletal:         General: No tenderness or deformity.      Cervical back: Normal range of motion.      Comments: Ambulates with walker leaning forward   Lymphadenopathy:      Cervical: No cervical adenopathy.   Skin:     General: Skin is warm and dry.      Coloration: Skin is not pale.      Findings: No rash.   Neurological:      General: No focal deficit present.      Mental Status: She is alert and oriented to person, place, and time.      Motor: No abnormal muscle tone.       Coordination: Coordination normal.   Psychiatric:         Mood and Affect: Mood normal.         Behavior: Behavior normal.         Thought Content: Thought content normal.         Judgment: Judgment normal.           Assessment/Plan    Diagnoses and all orders for this visit:    1. Type 2 diabetes mellitus with stage 1 chronic kidney disease, without long-term current use of insulin ([facility])  -     Lancets misc; 1 each Daily.  Dispense: 100 each; Refill: 5    2. Hypertension, unspecified type  -     losartan (COZAAR) 50 MG tablet; Take 0.5 tablets by mouth 2 (Two) Times a Day.  Dispense: 90 tablet; Refill: 3    3. Controlled type 2 diabetes mellitus without complication, without long-term current use of insulin ([facility])  -     losartan (COZAAR) 50 MG tablet; Take 0.5 tablets by mouth 2 (Two) Times a Day.  Dispense: 90 tablet; Refill: 3        Patient here for follow-up after recent hospitalization for unexplained shortness of air.  Apparently multiple medications were discontinued but have been resumed now that has returned home.  Apparently doing well on current regimen which will be continued with recheck in 1 month.  Was told by ophthalmologist should have CT scan of the head but unable to locate records.  We will try to obtain these records and order CT scan if needed.  Apparently patient does have some dementia and questionable history of diabetes.

## 2022-05-16 DIAGNOSIS — M19.90 ARTHRITIS: ICD-10-CM

## 2022-05-16 RX ORDER — HYDROCODONE BITARTRATE AND ACETAMINOPHEN 5; 325 MG/1; MG/1
1 TABLET ORAL EVERY 4 HOURS PRN
Qty: 40 TABLET | Refills: 0 | Status: SHIPPED | OUTPATIENT
Start: 2022-05-16 | End: 2022-06-17 | Stop reason: SDUPTHER

## 2022-05-16 NOTE — TELEPHONE ENCOUNTER
Rx Refill Note  Requested Prescriptions     Pending Prescriptions Disp Refills   • HYDROcodone-acetaminophen (NORCO) 5-325 MG per tablet 40 tablet 0     Sig: Take 1 tablet by mouth Every 4 (Four) Hours As Needed for Moderate Pain .      Last office visit with prescribing clinician: 5/10/2022      Next office visit with prescribing clinician: 5/31/2022            Bright Streeter MA  05/16/22, 15:58 EDT

## 2022-05-16 NOTE — TELEPHONE ENCOUNTER
Caller: CorrinaWendie    Relationship: Self    Best call back number: [phone]    Requested Prescriptions:   Requested Prescriptions     Pending Prescriptions Disp Refills   • HYDROcodone-acetaminophen (NORCO) 5-325 MG per tablet 40 tablet 0     Sig: Take 1 tablet by mouth Every 4 (Four) Hours As Needed for Moderate Pain .        Pharmacy where request should be sent: [facility] #[id number] [address] AT SEC OF [facility] & [facility] [phone] [facility] [phone] FX     Additional details provided by patient: PATIENT STATES SHE HAS 3 TABLETS REMAINING    Does the patient have less than a 3 day supply:  [x] Yes  [] No    Marcin Nicholson Rep   05/16/22 15:43 EDT

## 2022-06-02 ENCOUNTER — TELEPHONE (OUTPATIENT)
Dept: FAMILY MEDICINE CLINIC | Facility: CLINIC | Age: 87
End: 2022-06-02

## 2022-06-03 RX ORDER — ASPIRIN 81 MG/1
81 TABLET ORAL DAILY
Qty: 90 TABLET | Refills: 1 | Status: SHIPPED | OUTPATIENT
Start: 2022-06-03 | End: 2022-08-22 | Stop reason: SDUPTHER

## 2022-06-05 ENCOUNTER — HOSPITAL ENCOUNTER (EMERGENCY)
Facility: HOSPITAL | Age: 87
Discharge: HOME | End: 2022-06-05
Attending: EMERGENCY MEDICINE | Admitting: EMERGENCY MEDICINE

## 2022-06-05 ENCOUNTER — APPOINTMENT (OUTPATIENT)
Dept: GENERAL RADIOLOGY | Facility: HOSPITAL | Age: 87
End: 2022-06-05

## 2022-06-05 VITALS
WEIGHT: 178 LBS | TEMPERATURE: 98.4 F | OXYGEN SATURATION: 95 % | BODY MASS INDEX: 34.95 KG/M2 | HEIGHT: 60 IN | HEART RATE: 81 BPM | SYSTOLIC BLOOD PRESSURE: 178 MMHG | DIASTOLIC BLOOD PRESSURE: 69 MMHG | RESPIRATION RATE: 18 BRPM

## 2022-06-05 DIAGNOSIS — M48.061 SPINAL STENOSIS OF LUMBAR REGION WITHOUT NEUROGENIC CLAUDICATION: ICD-10-CM

## 2022-06-05 DIAGNOSIS — R06.09 DYSPNEA ON EXERTION: Primary | ICD-10-CM

## 2022-06-05 LAB
ALBUMIN SERPL-MCNC: 3.7 G/DL (ref 3.5–5.2)
ALBUMIN/GLOB SERPL: 1 G/DL
ALP SERPL-CCNC: 87 U/L (ref 39–117)
ALT SERPL W P-5'-P-CCNC: 11 U/L (ref 1–33)
ANION GAP SERPL CALCULATED.3IONS-SCNC: 9.6 MMOL/L (ref 5–15)
AST SERPL-CCNC: 15 U/L (ref 1–32)
BILIRUB SERPL-MCNC: 0.3 MG/DL (ref 0–1.2)
BILIRUB UR QL STRIP: NEGATIVE
BUN SERPL-MCNC: 10 MG/DL (ref 8–23)
BUN/CREAT SERPL: 11.8 (ref 7–25)
CALCIUM SPEC-SCNC: 9 MG/DL (ref 8.6–10.5)
CHLORIDE SERPL-SCNC: 106 MMOL/L (ref 98–107)
CLARITY UR: CLEAR
CO2 SERPL-SCNC: 25.4 MMOL/L (ref 22–29)
COLOR UR: YELLOW
CREAT SERPL-MCNC: 0.85 MG/DL (ref 0.57–1)
D-LACTATE SERPL-SCNC: 1.2 MMOL/L (ref 0.5–2)
DEPRECATED RDW RBC AUTO: 48.8 FL (ref 37–54)
EGFRCR SERPLBLD CKD-EPI 2021: 67.2 ML/MIN/1.73
EOSINOPHIL # BLD MANUAL: 0.1 10*3/MM3 (ref 0–0.4)
EOSINOPHIL NFR BLD MANUAL: 1.1 % (ref 0.3–6.2)
ERYTHROCYTE [DISTWIDTH] IN BLOOD BY AUTOMATED COUNT: 15.1 % (ref 12.3–15.4)
GLOBULIN UR ELPH-MCNC: 3.7 GM/DL
GLUCOSE SERPL-MCNC: 109 MG/DL (ref 65–99)
GLUCOSE UR STRIP-MCNC: NEGATIVE MG/DL
HCT VFR BLD AUTO: 40.7 % (ref 34–46.6)
HGB BLD-MCNC: 12.6 G/DL (ref 12–15.9)
HGB UR QL STRIP.AUTO: NEGATIVE
INR PPP: 1.08 (ref 0.9–1.1)
KETONES UR QL STRIP: NEGATIVE
LEUKOCYTE ESTERASE UR QL STRIP.AUTO: NEGATIVE
LYMPHOCYTES # BLD MANUAL: 4.52 10*3/MM3 (ref 0.7–3.1)
LYMPHOCYTES NFR BLD MANUAL: 7.5 % (ref 5–12)
MCH RBC QN AUTO: 27.1 PG (ref 26.6–33)
MCHC RBC AUTO-ENTMCNC: 31 G/DL (ref 31.5–35.7)
MCV RBC AUTO: 87.5 FL (ref 79–97)
MONOCYTES # BLD: 0.68 10*3/MM3 (ref 0.1–0.9)
NEUTROPHILS # BLD AUTO: 3.83 10*3/MM3 (ref 1.7–7)
NEUTROPHILS NFR BLD MANUAL: 41.9 % (ref 42.7–76)
NITRITE UR QL STRIP: NEGATIVE
NT-PROBNP SERPL-MCNC: 225 PG/ML (ref 0–1800)
PH UR STRIP.AUTO: 5.5 [PH] (ref 5–8)
PLAT MORPH BLD: NORMAL
PLATELET # BLD AUTO: 233 10*3/MM3 (ref 140–450)
PMV BLD AUTO: 9.9 FL (ref 6–12)
POTASSIUM SERPL-SCNC: 3.8 MMOL/L (ref 3.5–5.2)
PROCALCITONIN SERPL-MCNC: 0.04 NG/ML (ref 0–0.25)
PROT SERPL-MCNC: 7.4 G/DL (ref 6–8.5)
PROT UR QL STRIP: NEGATIVE
PROTHROMBIN TIME: 13.9 SECONDS (ref 11.7–14.2)
RBC # BLD AUTO: 4.65 10*6/MM3 (ref 3.77–5.28)
RBC MORPH BLD: NORMAL
SODIUM SERPL-SCNC: 141 MMOL/L (ref 136–145)
SP GR UR STRIP: 1.01 (ref 1–1.03)
TROPONIN T SERPL-MCNC: <0.01 NG/ML (ref 0–0.03)
UROBILINOGEN UR QL STRIP: NORMAL
VARIANT LYMPHS NFR BLD MANUAL: 1.1 % (ref 0–5)
VARIANT LYMPHS NFR BLD MANUAL: 48.4 % (ref 19.6–45.3)
WBC MORPH BLD: NORMAL
WBC NRBC COR # BLD: 9.13 10*3/MM3 (ref 3.4–10.8)

## 2022-06-05 PROCEDURE — 81003 URINALYSIS AUTO W/O SCOPE: CPT | Performed by: EMERGENCY MEDICINE

## 2022-06-05 PROCEDURE — 36415 COLL VENOUS BLD VENIPUNCTURE: CPT

## 2022-06-05 PROCEDURE — 83605 ASSAY OF LACTIC ACID: CPT | Performed by: EMERGENCY MEDICINE

## 2022-06-05 PROCEDURE — C9803 HOPD COVID-19 SPEC COLLECT: HCPCS | Performed by: EMERGENCY MEDICINE

## 2022-06-05 PROCEDURE — U0004 COV-19 TEST NON-CDC HGH THRU: HCPCS | Performed by: EMERGENCY MEDICINE

## 2022-06-05 PROCEDURE — 83880 ASSAY OF NATRIURETIC PEPTIDE: CPT | Performed by: EMERGENCY MEDICINE

## 2022-06-05 PROCEDURE — 93010 ELECTROCARDIOGRAM REPORT: CPT | Performed by: INTERNAL MEDICINE

## 2022-06-05 PROCEDURE — U0005 INFEC AGEN DETEC AMPLI PROBE: HCPCS | Performed by: EMERGENCY MEDICINE

## 2022-06-05 PROCEDURE — 87040 BLOOD CULTURE FOR BACTERIA: CPT | Performed by: EMERGENCY MEDICINE

## 2022-06-05 PROCEDURE — P9612 CATHETERIZE FOR URINE SPEC: HCPCS

## 2022-06-05 PROCEDURE — 93005 ELECTROCARDIOGRAM TRACING: CPT | Performed by: EMERGENCY MEDICINE

## 2022-06-05 PROCEDURE — 85610 PROTHROMBIN TIME: CPT | Performed by: EMERGENCY MEDICINE

## 2022-06-05 PROCEDURE — 85007 BL SMEAR W/DIFF WBC COUNT: CPT | Performed by: EMERGENCY MEDICINE

## 2022-06-05 PROCEDURE — 99284 EMERGENCY DEPT VISIT MOD MDM: CPT

## 2022-06-05 PROCEDURE — 84484 ASSAY OF TROPONIN QUANT: CPT | Performed by: EMERGENCY MEDICINE

## 2022-06-05 PROCEDURE — 71045 X-RAY EXAM CHEST 1 VIEW: CPT

## 2022-06-05 PROCEDURE — 80053 COMPREHEN METABOLIC PANEL: CPT | Performed by: EMERGENCY MEDICINE

## 2022-06-05 PROCEDURE — 85025 COMPLETE CBC W/AUTO DIFF WBC: CPT | Performed by: EMERGENCY MEDICINE

## 2022-06-05 PROCEDURE — 93005 ELECTROCARDIOGRAM TRACING: CPT

## 2022-06-05 PROCEDURE — 84145 PROCALCITONIN (PCT): CPT | Performed by: EMERGENCY MEDICINE

## 2022-06-05 RX ORDER — SODIUM CHLORIDE 0.9 % (FLUSH) 0.9 %
10 SYRINGE (ML) INJECTION AS NEEDED
Status: DISCONTINUED | OUTPATIENT
Start: 2022-06-05 | End: 2022-06-06 | Stop reason: HOSPADM

## 2022-06-05 NOTE — ED TRIAGE NOTES
Patient to ER via EMS from home for SOA x 2 hours. Patient reports that it felt better after sitting up. Patient denies any pain. Patient reports that she has not wanted to eat for the past week.  Patient uses walker at home and is a&o x 4    Patient wearing mask this RN in PPE

## 2022-06-06 LAB — SARS-COV-2 ORF1AB RESP QL NAA+PROBE: NOT DETECTED

## 2022-06-06 NOTE — ED PROVIDER NOTES
EMERGENCY DEPARTMENT ENCOUNTER  I wore full protective equipment throughout this patient encounter including a N95 mask, eye shield, gown and gloves. Hand hygiene was performed before donning protective equipment and after removal when leaving the room.    Room Number:  23/23  Date of encounter:  6/6/2022  PCP: Luis Mcqueen MD    HPI:  Context: Wendie Houser is a 87 y.o. female who presents to the ED c/o chief complaint of shortness of air.  According to patient and family, patient has had increasing shortness of air with dyspnea on exertion and orthopnea for approximately 2 weeks.  They have noted that patient is gotten worse since Wednesday.  Patient denies cough, chest pain, fever or chills.  She has noted intermittent leg swelling for multiple weeks.  He denies a belly pain but she also states that she has had frequent UTIs and it still occasionally hurts to urinate.  Family brought her in [facility] to make sure that she is not in heart failure.    MEDICAL HISTORY REVIEW  Reviewed in [facility].  Patient was discharged from the hospital in the [facility] on April 15, 2022 for dyspnea on exertion.  It revealed the following:    Patient was admitted with impression of dyspnea on exertion concerning for cardiac etiology. She underwent cardiac stress test which returned as low risk study. Echo was obtained and was without significant abnormalities. D-dimer was noted to be elevated, however lower extremity venous duplex is negative for DVTs. CTA of the chest was ordered, however patient refused to complete the exam. VQ scan was obtained and results were nonsuggestive of pulmonary embolus. She will be referred to pulmonary clinic for outpatient PFTs. It is likely that her chronic VALDEZ is related to obesity and deconditioning. She was evaluated by physical therapy who recommended subacute rehab. Patient and family are agreeable to [facility]. She is currently hemodynamically stable no longer requires inpatient level of  care.        PAST MEDICAL HISTORY  Active Ambulatory Problems     Diagnosis Date Noted   • Arthritis 01/25/2016   • Late onset Alzheimer's dementia without behavioral disturbance (HCC) 01/25/2016   • Fatigue 01/25/2016   • Gastroesophageal reflux disease 01/25/2016   • Hypomagnesemia 01/25/2016   • Sleep apnea 01/25/2016   • Renal insufficiency 10/11/2016   • Hypothyroidism 10/11/2016   • Lumbar canal stenosis 10/11/2016   • Shortness of breath 03/20/2016   • Disorder of electrolytes 07/29/2014   • Hiatal hernia 05/02/2017   • Hyperkalemia 02/02/2017   • Hypoglycemia 02/02/2017   • Irritable bowel syndrome 05/02/2017   • Allergic rhinitis 06/05/2017   • High risk medication use 10/12/2017   • Polypharmacy 10/23/2018   • Obesity (BMI 30-39.9) 10/20/2020     Resolved Ambulatory Problems     Diagnosis Date Noted   • Abdominal pain 01/25/2016   • Anemia 01/25/2016   • Anxiety 01/25/2016   • Diverticulitis of intestine 01/25/2016   • Gastritis 01/25/2016   • Hyperlipidemia 01/25/2016   • Hypertension 01/25/2016   • Cramps of lower extremity 01/25/2016   • Left lower quadrant pain 01/25/2016   • Nausea 01/25/2016   • Renal insufficiency 01/25/2016   • Type 2 diabetes mellitus (HCC) 01/25/2016   • Flu-like symptoms 02/23/2016   • Alopecia 02/23/2016   • 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   • Chest pain in adult 03/22/2016   • Chronic systolic congestive heart failure (HCC) 03/28/2016   • Diverticulosis large intestine w/o perforation or abscess w/o bleeding 03/28/2016   • Weight gain 03/28/2016   • Vomiting without nausea 08/25/2016   • Fever and chills 08/25/2016   • Diverticulitis of gastrointestinal tract 08/25/2016   • Pain of upper abdomen 08/25/2016   • Muscle cramps 08/25/2016   • Congestive heart failure (HCC) 10/11/2016   • Mixed anxiety depressive disorder 10/23/2018   • Panic attack 07/27/2021     Past Medical History:   Diagnosis Date   • CHF (congestive heart failure) ([facility])    • Depression    • Diabetes mellitus ([facility])    •  Diverticulitis of colon 08/25/2016   • Early onset Alzheimer's dementia (HCC)    • GERD (gastroesophageal reflux disease)    • Heart disease    • Kidney disease        PAST SURGICAL HISTORY  Past Surgical History:   Procedure Laterality Date   • BREAST CYST EXCISION Right 1989    Benign   • COLONOSCOPY  2011    Dr. Cain   • ENDOSCOPY  04/15/2011   • EYE SURGERY  2009   • GALLBLADDER SURGERY  1960    Open   • TOTAL KNEE ARTHROPLASTY Left 2012    Dr. Soler   • VAGINAL HYSTERECTOMY  1999       FAMILY HISTORY  Family History   Problem Relation Age of Onset   • Other Mother         cardiac disorder   • Hypertension Mother    • Heart disease Mother    • Hypertension Sister    • Heart attack Sister    • Cancer Brother    • Hypertension Brother    • Heart attack Brother    • Heart disease Father        SOCIAL HISTORY  Social History     Socioeconomic History   • Marital status:    Tobacco Use   • Smoking status: Never Smoker   • Smokeless tobacco: Never Used   Substance and Sexual Activity   • Alcohol use: No   • Drug use: No   • Sexual activity: Never       ALLERGIES  Patient has no known allergies.    The patient's allergies have been reviewed    REVIEW OF SYSTEMS  All systems reviewed and negative except for those discussed in HPI.     PHYSICAL EXAM  I have reviewed the triage vital signs and nursing notes.  ED Triage Vitals [06/05/22 1806]   Temp Heart Rate Resp BP SpO2   98.4 °F (36.9 °C) 86 18 178/69 99 %      Temp src Heart Rate Source Patient Position BP Location FiO2 (%)   -- -- -- -- --       General: Mild distress.  HENT: NCAT, PERRL, Nares patent.  Eyes: no scleral icterus.  Neck: trachea midline, no ROM limitations.  CV: regular rhythm, regular rate.  Respiratory: normal effort, creased breath sounds at the bases.  Abdomen: soft, nondistended, NTTP, no rebound tenderness, no guarding or rigidity.  Musculoskeletal: no deformity.  No edema or calf tenderness noted.  Neuro: alert, moves all extremities,  follows commands.  Skin: warm, dry.    LAB RESULTS  Recent Results (from the past 24 hour(s))   Comprehensive Metabolic Panel    Collection Time: 06/05/22  8:57 PM    Specimen: Blood   Result Value Ref Range    Glucose 109 (H) 65 - 99 mg/dL    BUN 10 8 - 23 mg/dL    Creatinine 0.85 0.57 - 1.00 mg/dL    Sodium 141 136 - 145 mmol/L    Potassium 3.8 3.5 - 5.2 mmol/L    Chloride 106 98 - 107 mmol/L    CO2 25.4 22.0 - 29.0 mmol/L    Calcium 9.0 8.6 - 10.5 mg/dL    Total Protein 7.4 6.0 - 8.5 g/dL    Albumin 3.70 3.50 - 5.20 g/dL    ALT (SGPT) 11 1 - 33 U/L    AST (SGOT) 15 1 - 32 U/L    Alkaline Phosphatase 87 39 - 117 U/L    Total Bilirubin 0.3 0.0 - 1.2 mg/dL    Globulin 3.7 gm/dL    A/G Ratio 1.0 g/dL    BUN/Creatinine Ratio 11.8 7.0 - 25.0    Anion Gap 9.6 5.0 - 15.0 mmol/L    eGFR 67.2 >60.0 mL/min/1.73   Protime-INR    Collection Time: 06/05/22  8:57 PM    Specimen: Blood   Result Value Ref Range    Protime 13.9 11.7 - 14.2 Seconds    INR 1.08 0.90 - 1.10   Troponin    Collection Time: 06/05/22  8:57 PM    Specimen: Blood   Result Value Ref Range    Troponin T <0.010 0.000 - 0.030 ng/mL   BNP    Collection Time: 06/05/22  8:57 PM    Specimen: Blood   Result Value Ref Range    proBNP 225.0 0.0 - 1,800.0 pg/mL   Lactic Acid, Plasma    Collection Time: 06/05/22  8:57 PM    Specimen: Blood   Result Value Ref Range    Lactate 1.2 0.5 - 2.0 mmol/L   Procalcitonin    Collection Time: 06/05/22  8:57 PM    Specimen: Blood   Result Value Ref Range    Procalcitonin 0.04 0.00 - 0.25 ng/mL   CBC Auto Differential    Collection Time: 06/05/22  8:57 PM    Specimen: Blood   Result Value Ref Range    WBC 9.13 3.40 - 10.80 10*3/mm3    RBC 4.65 3.77 - 5.28 10*6/mm3    Hemoglobin 12.6 12.0 - 15.9 g/dL    Hematocrit 40.7 34.0 - 46.6 %    MCV 87.5 79.0 - 97.0 fL    MCH 27.1 26.6 - 33.0 pg    MCHC 31.0 (L) 31.5 - 35.7 g/dL    RDW 15.1 12.3 - 15.4 %    RDW-SD 48.8 37.0 - 54.0 fl    MPV 9.9 6.0 - 12.0 fL    Platelets 233 140 - 450 10*3/mm3    Manual Differential    Collection Time: 06/05/22  8:57 PM    Specimen: Blood   Result Value Ref Range    Neutrophil % 41.9 (L) 42.7 - 76.0 %    Lymphocyte % 48.4 (H) 19.6 - 45.3 %    Monocyte % 7.5 5.0 - 12.0 %    Eosinophil % 1.1 0.3 - 6.2 %    Atypical Lymphocyte % 1.1 0.0 - 5.0 %    Neutrophils Absolute 3.83 1.70 - 7.00 10*3/mm3    Lymphocytes Absolute 4.52 (H) 0.70 - 3.10 10*3/mm3    Monocytes Absolute 0.68 0.10 - 0.90 10*3/mm3    Eosinophils Absolute 0.10 0.00 - 0.40 10*3/mm3    RBC Morphology Normal Normal    WBC Morphology Normal Normal    Platelet Morphology Normal Normal   COVID-19,APTIMA PANTHER([place]),[facility], NP/OP SWAB IN UTM/VTM/SALINE TRANSPORT MEDIA,24 HR TAT - Swab, Nasopharynx    Collection Time: 06/05/22  9:01 PM    Specimen: Nasopharynx; Swab   Result Value Ref Range    COVID19 Not Detected Not Detected - Ref. Range   Urinalysis With Culture If Indicated - Urine, Catheter In/Out    Collection Time: 06/05/22 10:16 PM    Specimen: Urine, Catheter In/Out   Result Value Ref Range    Color, UA Yellow Yellow, Straw    Appearance, UA Clear Clear    pH, UA 5.5 5.0 - 8.0    Specific Gravity, UA 1.015 1.005 - 1.030    Glucose, UA Negative Negative    Ketones, UA Negative Negative    Bilirubin, UA Negative Negative    Blood, UA Negative Negative    Protein, UA Negative Negative    Leuk Esterase, UA Negative Negative    Nitrite, UA Negative Negative    Urobilinogen, UA 0.2 E.U./dL 0.2 - 1.0 E.U./dL       I ordered the above labs and reviewed the results.    RADIOLOGY  XR Chest 1 View    Result Date: 6/5/2022  PORTABLE CHEST 06/05/2022 AT 8:57 PM  CLINICAL HISTORY: Dyspnea  Compared to previous chest dated 03/04/2016.  There is moderate elevation of the right hemidiaphragm that is unchanged and is quite likely due to eventration. Lungs are fairly well-expanded and free of infiltrates. There are no pleural effusions. The cardiomediastinal silhouette is unremarkable.  IMPRESSIONS: No evidence of acute  disease within the chest.  This report was finalized on 6/5/2022 9:23 PM by Dr. Billy Thorne M.D.        I ordered the above noted radiological studies. I reviewed the images and results. I agree with the radiologist interpretation.    PROCEDURES  Procedures  EKG          EKG time: 1815  Rhythm/Rate: Normal sinus rhythm, rate 84  P waves and LA: normal  QRS, axis: normal   ST and T waves: normal     Interpreted Contemporaneously by me, independently viewed  No previous EKG    MEDICATIONS GIVEN IN ER  Medications - No data to display    PROGRESS, DATA ANALYSIS, CONSULTS, AND MEDICAL DECISION MAKING  A complete history and physical exam have been performed.  All available laboratory and imaging results have been reviewed by myself prior to disposition.    MDM  After the initial H&P, I discussed pertinent information from history and physical exam with patient/family.  Discussed differential diagnosis.  Discussed plan for ED evaluation/workup/treatment.  All questions answered.  Patient/family is agreeable with plan.  ED Course as of 06/06/22 0143   Sun Jun 05, 2022   2101 Patient presents with increasing shortness of air with dyspnea on exertion and orthopnea.  This has been going on for at least 2 weeks.  We will rule out sepsis, pneumonia and CHF.  We will swab her nose for COVID if she gets admitted.  Currently, patient is in no distress and her oxygen saturation is 99% on room air. [DE]   2141 WBC: 9.13 [DE]   2142 Hemoglobin: 12.6 [DE]   2142 Lactate: 1.2 [DE]   2142 proBNP: 225.0 [DE]   2142 Troponin T: <0.010 [DE]   2142 INR: 1.08 [DE]   2209 I have updated patient's family that her work-up so far is normal.  Patient's ambulatory saturation was 96% on room air.  She was not short of air with ambulation.  Examination of her lungs reveals lungs are clear to auscultation bilaterally. [DE]   2255 Patient's urinalysis is negative.  We will discharge patient home. 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    Reviewed implications of results, diagnosis, meds, responsibility to follow up, warning signs and symptoms of possible worsening, potential complications and reasons to return to ER.    Patient/Family voiced understanding of above instructions.    Discussed plan for discharge, as there is no emergent indication for admission. Patient referred to primary care provider for BP management due to today's BP. Pt/family is agreeable and understands need for follow up and repeat testing.  Pt is aware that discharge does not mean that nothing is wrong but it indicates no emergency is present that requires admission and they must continue care with follow-up as given below or physician of their choice.     FOLLOW-UP  Luis Mcqueen MD  [address] [facility] [id number] [phone]    Schedule an appointment as soon as possible for a visit            Medication List      No changes were made to your prescriptions during this visit.          Noé Sanchez MD  06/06/22 0143

## 2022-06-06 NOTE — DISCHARGE INSTRUCTIONS
Continue your home medications and follow-up with your family doctor.  Please return to the emergency department symptoms worsen with fever or worsening shortness of breath.

## 2022-06-08 LAB — QT INTERVAL: 395 MS

## 2022-06-10 LAB — BACTERIA SPEC AEROBE CULT: NORMAL

## 2022-06-17 DIAGNOSIS — M19.90 ARTHRITIS: ICD-10-CM

## 2022-06-17 RX ORDER — HYDROCODONE BITARTRATE AND ACETAMINOPHEN 5; 325 MG/1; MG/1
1 TABLET ORAL EVERY 4 HOURS PRN
Qty: 40 TABLET | Refills: 0 | Status: SHIPPED | OUTPATIENT
Start: 2022-06-17 | End: 2022-06-17 | Stop reason: SDUPTHER

## 2022-06-17 RX ORDER — HYDROCODONE BITARTRATE AND ACETAMINOPHEN 5; 325 MG/1; MG/1
1 TABLET ORAL EVERY 4 HOURS PRN
Qty: 40 TABLET | Refills: 0 | Status: SHIPPED | OUTPATIENT
Start: 2022-06-17 | End: 2022-07-29 | Stop reason: SDUPTHER

## 2022-06-17 NOTE — TELEPHONE ENCOUNTER
Caller: CorrinaWendie    Relationship: Self    Best call back number: [phone]     Requested Prescriptions:   Requested Prescriptions     Pending Prescriptions Disp Refills   • HYDROcodone-acetaminophen (NORCO) 5-325 MG per tablet 40 tablet 0     Sig: Take 1 tablet by mouth Every 4 (Four) Hours As Needed for Moderate Pain .        Pharmacy where request should be sent: [facility] #[id number] [facility] [address] AT SEC OF [facility] & [facility] [phone] [facility] [phone] FX     Additional details provided by patient:     Does the patient have less than a 3 day supply:  [x] Yes  [] No    Marcin York Rep   06/17/22 13:34 EDT

## 2022-06-27 DIAGNOSIS — F02.80 EARLY ONSET ALZHEIMER'S DEMENTIA WITHOUT BEHAVIORAL DISTURBANCE: ICD-10-CM

## 2022-06-27 DIAGNOSIS — G30.0 EARLY ONSET ALZHEIMER'S DEMENTIA WITHOUT BEHAVIORAL DISTURBANCE: ICD-10-CM

## 2022-06-27 RX ORDER — DONEPEZIL HYDROCHLORIDE 10 MG/1
TABLET, FILM COATED ORAL
Qty: 90 TABLET | Refills: 1 | Status: SHIPPED | OUTPATIENT
Start: 2022-06-27 | End: 2022-08-22 | Stop reason: SDUPTHER

## 2022-07-04 DIAGNOSIS — F41.8 MIXED ANXIETY DEPRESSIVE DISORDER: ICD-10-CM

## 2022-07-05 RX ORDER — SERTRALINE HYDROCHLORIDE 100 MG/1
TABLET, FILM COATED ORAL
Qty: 90 TABLET | Refills: 3 | OUTPATIENT
Start: 2022-07-05

## 2022-07-29 ENCOUNTER — OFFICE VISIT (OUTPATIENT)
Dept: FAMILY MEDICINE CLINIC | Facility: CLINIC | Age: 87
End: 2022-07-29

## 2022-07-29 VITALS
BODY MASS INDEX: 34.24 KG/M2 | WEIGHT: 174.4 LBS | SYSTOLIC BLOOD PRESSURE: 140 MMHG | OXYGEN SATURATION: 98 % | TEMPERATURE: 97.7 F | DIASTOLIC BLOOD PRESSURE: 82 MMHG | HEART RATE: 88 BPM | HEIGHT: 60 IN

## 2022-07-29 DIAGNOSIS — M19.90 ARTHRITIS: ICD-10-CM

## 2022-07-29 DIAGNOSIS — Z76.89 ENCOUNTER TO ESTABLISH CARE WITH NEW DOCTOR: Primary | ICD-10-CM

## 2022-07-29 DIAGNOSIS — Z51.81 ENCOUNTER FOR MEDICATION MONITORING: ICD-10-CM

## 2022-07-29 DIAGNOSIS — R35.0 URINARY FREQUENCY: ICD-10-CM

## 2022-07-29 DIAGNOSIS — M25.50 GENERALIZED JOINT PAIN: ICD-10-CM

## 2022-07-29 LAB
BACTERIA UR QL AUTO: ABNORMAL /HPF
BILIRUB UR QL STRIP: NEGATIVE
CLARITY UR: CLEAR
COLOR UR: YELLOW
GLUCOSE UR STRIP-MCNC: NEGATIVE MG/DL
HGB UR QL STRIP.AUTO: ABNORMAL
KETONES UR QL STRIP: NEGATIVE
LEUKOCYTE ESTERASE UR QL STRIP.AUTO: NEGATIVE
NITRITE UR QL STRIP: NEGATIVE
PH UR STRIP.AUTO: 5.5 [PH] (ref 4.6–8)
PROT UR QL STRIP: NEGATIVE
RBC # UR STRIP: ABNORMAL /HPF
REF LAB TEST METHOD: ABNORMAL
SP GR UR STRIP: 1.02 (ref 1–1.03)
SQUAMOUS #/AREA URNS HPF: ABNORMAL /HPF
TRANS CELLS #/AREA URNS HPF: ABNORMAL /HPF
UROBILINOGEN UR QL STRIP: ABNORMAL
WBC # UR STRIP: ABNORMAL /HPF

## 2022-07-29 PROCEDURE — 99213 OFFICE O/P EST LOW 20 MIN: CPT | Performed by: NURSE PRACTITIONER

## 2022-07-29 PROCEDURE — 81001 URINALYSIS AUTO W/SCOPE: CPT | Performed by: NURSE PRACTITIONER

## 2022-07-29 RX ORDER — HYDROCODONE BITARTRATE AND ACETAMINOPHEN 5; 325 MG/1; MG/1
1 TABLET ORAL 2 TIMES DAILY PRN
Qty: 40 TABLET | Refills: 0 | Status: SHIPPED | OUTPATIENT
Start: 2022-07-29 | End: 2022-08-22 | Stop reason: SDUPTHER

## 2022-07-29 NOTE — PROGRESS NOTES
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 She has chronic generalized joint pain related to Arthritis, takes Hydrocodone 5/325 mg BID PRN, needs refill. Compliance drug screen and controlled substance contract obtained.     Objective   Vital Signs:  /82 (BP Location: Left arm, Patient Position: Sitting, Cuff Size: Large Adult)   Pulse 88   Temp 97.7 °F (36.5 °C) (Infrared)   Ht 152.4 cm (60\")   Wt 79.1 kg (174 lb 6.4 oz)   SpO2 98%   BMI 34.06 kg/m²   Estimated body mass index is 34.06 kg/m² as calculated from the following:    Height as of this encounter: 152.4 cm (60\").    Weight as of this encounter: 79.1 kg (174 lb 6.4 oz).          Physical Exam  Cardiovascular:      Rate and Rhythm: Normal rate.      Pulses: Normal pulses.      Heart sounds: Normal heart sounds.   Pulmonary:      Effort: Pulmonary effort is normal.      Breath sounds: Normal breath sounds.   Musculoskeletal:      Comments: Generalized joint pain    Neurological:      General: No focal deficit present.      Mental Status: She is alert and oriented to person, place, and time.   Psychiatric:         Mood and Affect: Mood normal.         Behavior: Behavior normal.        Result Review :                Assessment and Plan   Diagnoses and all orders for this visit:    1. Encounter to establish care with new doctor (Primary)    2. Urinary frequency  -     Urinalysis With Microscopic - Urine, Clean Catch    3. Generalized joint pain  -     HYDROcodone-acetaminophen (NORCO) 5-325 MG per tablet; Take 1 tablet by mouth 2 (Two) Times a Day As Needed for Moderate Pain .  Dispense: 40 tablet; Refill: 0    4. Arthritis  -     HYDROcodone-acetaminophen (NORCO) 5-325 MG per tablet; Take 1 " tablet by mouth 2 (Two) Times a Day As Needed for Moderate Pain .  Dispense: 40 tablet; Refill: 0    5. Encounter for medication monitoring  -     Compliance Drug Analysis, Ur - Urine, Clean Catch             Follow Up   Return in about 3 months (around 10/29/2022).  Patient was given instructions and counseling regarding her condition or for health maintenance advice. Please see specific information pulled into the AVS if appropriate.     Mask and goggles worn

## 2022-08-03 LAB — DRUGS UR: NORMAL

## 2022-08-10 ENCOUNTER — APPOINTMENT (OUTPATIENT)
Dept: MRI IMAGING | Facility: HOSPITAL | Age: 87
End: 2022-08-10

## 2022-08-10 ENCOUNTER — HOSPITAL ENCOUNTER (OUTPATIENT)
Facility: HOSPITAL | Age: 87
Setting detail: OBSERVATION
Discharge: NURSING, REHAB OR LONG-TERM CARE | End: 2022-08-11
Attending: EMERGENCY MEDICINE | Admitting: EMERGENCY MEDICINE

## 2022-08-10 ENCOUNTER — APPOINTMENT (OUTPATIENT)
Dept: GENERAL RADIOLOGY | Facility: HOSPITAL | Age: 87
End: 2022-08-10

## 2022-08-10 ENCOUNTER — APPOINTMENT (OUTPATIENT)
Dept: CT IMAGING | Facility: HOSPITAL | Age: 87
End: 2022-08-10

## 2022-08-10 ENCOUNTER — TELEPHONE (OUTPATIENT)
Dept: FAMILY MEDICINE CLINIC | Facility: CLINIC | Age: 87
End: 2022-08-10

## 2022-08-10 DIAGNOSIS — I10 ELEVATED BLOOD PRESSURE READING WITH DIAGNOSIS OF HYPERTENSION: ICD-10-CM

## 2022-08-10 DIAGNOSIS — M79.605 LEFT LEG PAIN: ICD-10-CM

## 2022-08-10 DIAGNOSIS — E87.6 HYPOKALEMIA: ICD-10-CM

## 2022-08-10 DIAGNOSIS — R29.90 STROKE-LIKE SYMPTOMS: Primary | ICD-10-CM

## 2022-08-10 DIAGNOSIS — R20.2 LEFT LEG PARESTHESIAS: ICD-10-CM

## 2022-08-10 PROBLEM — R29.898 WEAKNESS OF LEFT LOWER EXTREMITY: Status: ACTIVE | Noted: 2022-08-10

## 2022-08-10 LAB
ALBUMIN SERPL-MCNC: 3.8 G/DL (ref 3.5–5.2)
ALBUMIN/GLOB SERPL: 1.2 G/DL
ALP SERPL-CCNC: 91 U/L (ref 39–117)
ALT SERPL W P-5'-P-CCNC: 7 U/L (ref 1–33)
ANION GAP SERPL CALCULATED.3IONS-SCNC: 14.4 MMOL/L (ref 5–15)
APTT PPP: 32.2 SECONDS (ref 22.7–35.4)
AST SERPL-CCNC: 17 U/L (ref 1–32)
BASOPHILS # BLD AUTO: 0.04 10*3/MM3 (ref 0–0.2)
BASOPHILS NFR BLD AUTO: 0.5 % (ref 0–1.5)
BILIRUB SERPL-MCNC: 0.3 MG/DL (ref 0–1.2)
BUN SERPL-MCNC: 8 MG/DL (ref 8–23)
BUN/CREAT SERPL: 9.8 (ref 7–25)
CALCIUM SPEC-SCNC: 8.7 MG/DL (ref 8.6–10.5)
CHLORIDE SERPL-SCNC: 101 MMOL/L (ref 98–107)
CO2 SERPL-SCNC: 25.6 MMOL/L (ref 22–29)
CREAT SERPL-MCNC: 0.82 MG/DL (ref 0.57–1)
DEPRECATED RDW RBC AUTO: 42 FL (ref 37–54)
EGFRCR SERPLBLD CKD-EPI 2021: 70.2 ML/MIN/1.73
EOSINOPHIL # BLD AUTO: 0.1 10*3/MM3 (ref 0–0.4)
EOSINOPHIL NFR BLD AUTO: 1.3 % (ref 0.3–6.2)
ERYTHROCYTE [DISTWIDTH] IN BLOOD BY AUTOMATED COUNT: 13.9 % (ref 12.3–15.4)
GLOBULIN UR ELPH-MCNC: 3.1 GM/DL
GLUCOSE SERPL-MCNC: 104 MG/DL (ref 65–99)
HCT VFR BLD AUTO: 38.1 % (ref 34–46.6)
HGB BLD-MCNC: 12.2 G/DL (ref 12–15.9)
IMM GRANULOCYTES # BLD AUTO: 0.02 10*3/MM3 (ref 0–0.05)
IMM GRANULOCYTES NFR BLD AUTO: 0.3 % (ref 0–0.5)
INR PPP: 1.12 (ref 0.9–1.1)
LYMPHOCYTES # BLD AUTO: 3.64 10*3/MM3 (ref 0.7–3.1)
LYMPHOCYTES NFR BLD AUTO: 45.7 % (ref 19.6–45.3)
MAGNESIUM SERPL-MCNC: 1.8 MG/DL (ref 1.6–2.4)
MCH RBC QN AUTO: 26.9 PG (ref 26.6–33)
MCHC RBC AUTO-ENTMCNC: 32 G/DL (ref 31.5–35.7)
MCV RBC AUTO: 84.1 FL (ref 79–97)
MONOCYTES # BLD AUTO: 0.69 10*3/MM3 (ref 0.1–0.9)
MONOCYTES NFR BLD AUTO: 8.7 % (ref 5–12)
NEUTROPHILS NFR BLD AUTO: 3.48 10*3/MM3 (ref 1.7–7)
NEUTROPHILS NFR BLD AUTO: 43.5 % (ref 42.7–76)
NRBC BLD AUTO-RTO: 0 /100 WBC (ref 0–0.2)
PLATELET # BLD AUTO: 230 10*3/MM3 (ref 140–450)
PMV BLD AUTO: 10.1 FL (ref 6–12)
POTASSIUM SERPL-SCNC: 3 MMOL/L (ref 3.5–5.2)
PROT SERPL-MCNC: 6.9 G/DL (ref 6–8.5)
PROTHROMBIN TIME: 14.3 SECONDS (ref 11.7–14.2)
QT INTERVAL: 400 MS
RBC # BLD AUTO: 4.53 10*6/MM3 (ref 3.77–5.28)
SARS-COV-2 RNA RESP QL NAA+PROBE: NOT DETECTED
SODIUM SERPL-SCNC: 141 MMOL/L (ref 136–145)
TROPONIN T SERPL-MCNC: <0.01 NG/ML (ref 0–0.03)
WBC NRBC COR # BLD: 7.97 10*3/MM3 (ref 3.4–10.8)

## 2022-08-10 PROCEDURE — 99285 EMERGENCY DEPT VISIT HI MDM: CPT

## 2022-08-10 PROCEDURE — 85610 PROTHROMBIN TIME: CPT | Performed by: PHYSICIAN ASSISTANT

## 2022-08-10 PROCEDURE — G0378 HOSPITAL OBSERVATION PER HR: HCPCS

## 2022-08-10 PROCEDURE — 83735 ASSAY OF MAGNESIUM: CPT | Performed by: NURSE PRACTITIONER

## 2022-08-10 PROCEDURE — 93010 ELECTROCARDIOGRAM REPORT: CPT | Performed by: INTERNAL MEDICINE

## 2022-08-10 PROCEDURE — 80053 COMPREHEN METABOLIC PANEL: CPT | Performed by: PHYSICIAN ASSISTANT

## 2022-08-10 PROCEDURE — 70496 CT ANGIOGRAPHY HEAD: CPT

## 2022-08-10 PROCEDURE — 85025 COMPLETE CBC W/AUTO DIFF WBC: CPT | Performed by: PHYSICIAN ASSISTANT

## 2022-08-10 PROCEDURE — 93005 ELECTROCARDIOGRAM TRACING: CPT | Performed by: PHYSICIAN ASSISTANT

## 2022-08-10 PROCEDURE — 70498 CT ANGIOGRAPHY NECK: CPT

## 2022-08-10 PROCEDURE — 70551 MRI BRAIN STEM W/O DYE: CPT

## 2022-08-10 PROCEDURE — 71045 X-RAY EXAM CHEST 1 VIEW: CPT

## 2022-08-10 PROCEDURE — U0003 INFECTIOUS AGENT DETECTION BY NUCLEIC ACID (DNA OR RNA); SEVERE ACUTE RESPIRATORY SYNDROME CORONAVIRUS 2 (SARS-COV-2) (CORONAVIRUS DISEASE [COVID-19]), AMPLIFIED PROBE TECHNIQUE, MAKING USE OF HIGH THROUGHPUT TECHNOLOGIES AS DESCRIBED BY CMS-2020-01-R: HCPCS | Performed by: PHYSICIAN ASSISTANT

## 2022-08-10 PROCEDURE — 84484 ASSAY OF TROPONIN QUANT: CPT | Performed by: PHYSICIAN ASSISTANT

## 2022-08-10 PROCEDURE — C9803 HOPD COVID-19 SPEC COLLECT: HCPCS

## 2022-08-10 PROCEDURE — 85730 THROMBOPLASTIN TIME PARTIAL: CPT | Performed by: PHYSICIAN ASSISTANT

## 2022-08-10 PROCEDURE — 0 IOPAMIDOL PER 1 ML: Performed by: EMERGENCY MEDICINE

## 2022-08-10 PROCEDURE — 63710000001 ONDANSETRON ODT 4 MG TABLET DISPERSIBLE: Performed by: NURSE PRACTITIONER

## 2022-08-10 PROCEDURE — U0005 INFEC AGEN DETEC AMPLI PROBE: HCPCS | Performed by: PHYSICIAN ASSISTANT

## 2022-08-10 RX ORDER — ONDANSETRON 4 MG/1
4 TABLET, FILM COATED ORAL EVERY 6 HOURS PRN
Status: DISCONTINUED | OUTPATIENT
Start: 2022-08-10 | End: 2022-08-11 | Stop reason: HOSPADM

## 2022-08-10 RX ORDER — INSULIN LISPRO 100 [IU]/ML
0-7 INJECTION, SOLUTION INTRAVENOUS; SUBCUTANEOUS
Status: DISCONTINUED | OUTPATIENT
Start: 2022-08-11 | End: 2022-08-11 | Stop reason: HOSPADM

## 2022-08-10 RX ORDER — NITROGLYCERIN 0.4 MG/1
0.4 TABLET SUBLINGUAL
Status: DISCONTINUED | OUTPATIENT
Start: 2022-08-10 | End: 2022-08-11 | Stop reason: HOSPADM

## 2022-08-10 RX ORDER — PANTOPRAZOLE SODIUM 40 MG/1
40 TABLET, DELAYED RELEASE ORAL EVERY MORNING
Refills: 3 | Status: DISCONTINUED | OUTPATIENT
Start: 2022-08-11 | End: 2022-08-11 | Stop reason: HOSPADM

## 2022-08-10 RX ORDER — SODIUM CHLORIDE 0.9 % (FLUSH) 0.9 %
10 SYRINGE (ML) INJECTION EVERY 12 HOURS SCHEDULED
Status: DISCONTINUED | OUTPATIENT
Start: 2022-08-10 | End: 2022-08-11 | Stop reason: HOSPADM

## 2022-08-10 RX ORDER — ONDANSETRON 4 MG/1
4 TABLET, ORALLY DISINTEGRATING ORAL ONCE
Status: COMPLETED | OUTPATIENT
Start: 2022-08-10 | End: 2022-08-10

## 2022-08-10 RX ORDER — LOSARTAN POTASSIUM 25 MG/1
25 TABLET ORAL 2 TIMES DAILY
Status: DISCONTINUED | OUTPATIENT
Start: 2022-08-10 | End: 2022-08-11 | Stop reason: HOSPADM

## 2022-08-10 RX ORDER — SODIUM CHLORIDE 0.9 % (FLUSH) 0.9 %
10 SYRINGE (ML) INJECTION AS NEEDED
Status: DISCONTINUED | OUTPATIENT
Start: 2022-08-10 | End: 2022-08-11 | Stop reason: HOSPADM

## 2022-08-10 RX ORDER — NICOTINE POLACRILEX 4 MG
15 LOZENGE BUCCAL
Status: DISCONTINUED | OUTPATIENT
Start: 2022-08-10 | End: 2022-08-11 | Stop reason: HOSPADM

## 2022-08-10 RX ORDER — POTASSIUM CHLORIDE 750 MG/1
40 TABLET, FILM COATED, EXTENDED RELEASE ORAL EVERY 4 HOURS
Status: COMPLETED | OUTPATIENT
Start: 2022-08-10 | End: 2022-08-11

## 2022-08-10 RX ORDER — PRAVASTATIN SODIUM 40 MG
40 TABLET ORAL NIGHTLY
Status: DISCONTINUED | OUTPATIENT
Start: 2022-08-10 | End: 2022-08-11 | Stop reason: HOSPADM

## 2022-08-10 RX ORDER — ACETAMINOPHEN 325 MG/1
650 TABLET ORAL EVERY 4 HOURS PRN
Status: DISCONTINUED | OUTPATIENT
Start: 2022-08-10 | End: 2022-08-11 | Stop reason: HOSPADM

## 2022-08-10 RX ORDER — ASPIRIN 81 MG/1
81 TABLET ORAL DAILY
Status: DISCONTINUED | OUTPATIENT
Start: 2022-08-11 | End: 2022-08-11 | Stop reason: HOSPADM

## 2022-08-10 RX ORDER — DEXTROSE MONOHYDRATE 25 G/50ML
25 INJECTION, SOLUTION INTRAVENOUS
Status: DISCONTINUED | OUTPATIENT
Start: 2022-08-10 | End: 2022-08-11 | Stop reason: HOSPADM

## 2022-08-10 RX ORDER — CYCLOBENZAPRINE HCL 10 MG
5 TABLET ORAL 3 TIMES DAILY PRN
Status: DISCONTINUED | OUTPATIENT
Start: 2022-08-10 | End: 2022-08-11 | Stop reason: HOSPADM

## 2022-08-10 RX ORDER — POTASSIUM CHLORIDE 1.5 G/1.77G
40 POWDER, FOR SOLUTION ORAL AS NEEDED
Status: DISCONTINUED | OUTPATIENT
Start: 2022-08-10 | End: 2022-08-11 | Stop reason: HOSPADM

## 2022-08-10 RX ORDER — HYDROCODONE BITARTRATE AND ACETAMINOPHEN 5; 325 MG/1; MG/1
1 TABLET ORAL ONCE
Status: COMPLETED | OUTPATIENT
Start: 2022-08-10 | End: 2022-08-10

## 2022-08-10 RX ORDER — POTASSIUM CHLORIDE 750 MG/1
40 TABLET, FILM COATED, EXTENDED RELEASE ORAL ONCE
Status: COMPLETED | OUTPATIENT
Start: 2022-08-10 | End: 2022-08-10

## 2022-08-10 RX ORDER — ONDANSETRON 2 MG/ML
4 INJECTION INTRAMUSCULAR; INTRAVENOUS EVERY 6 HOURS PRN
Status: DISCONTINUED | OUTPATIENT
Start: 2022-08-10 | End: 2022-08-11 | Stop reason: HOSPADM

## 2022-08-10 RX ORDER — DONEPEZIL HYDROCHLORIDE 10 MG/1
10 TABLET, FILM COATED ORAL NIGHTLY
Status: DISCONTINUED | OUTPATIENT
Start: 2022-08-10 | End: 2022-08-11 | Stop reason: HOSPADM

## 2022-08-10 RX ORDER — HYDROCODONE BITARTRATE AND ACETAMINOPHEN 5; 325 MG/1; MG/1
1 TABLET ORAL 2 TIMES DAILY PRN
Status: DISCONTINUED | OUTPATIENT
Start: 2022-08-10 | End: 2022-08-11 | Stop reason: HOSPADM

## 2022-08-10 RX ORDER — CHOLECALCIFEROL (VITAMIN D3) 125 MCG
5 CAPSULE ORAL NIGHTLY PRN
Status: DISCONTINUED | OUTPATIENT
Start: 2022-08-10 | End: 2022-08-11 | Stop reason: HOSPADM

## 2022-08-10 RX ADMIN — POTASSIUM CHLORIDE 40 MEQ: 750 TABLET, EXTENDED RELEASE ORAL at 22:17

## 2022-08-10 RX ADMIN — DONEPEZIL HYDROCHLORIDE 10 MG: 10 TABLET, FILM COATED ORAL at 22:17

## 2022-08-10 RX ADMIN — LOSARTAN POTASSIUM 25 MG: 25 TABLET, FILM COATED ORAL at 22:16

## 2022-08-10 RX ADMIN — ONDANSETRON 4 MG: 4 TABLET, ORALLY DISINTEGRATING ORAL at 16:27

## 2022-08-10 RX ADMIN — IOPAMIDOL 100 ML: 755 INJECTION, SOLUTION INTRAVENOUS at 16:49

## 2022-08-10 RX ADMIN — CYCLOBENZAPRINE 5 MG: 10 TABLET, FILM COATED ORAL at 22:17

## 2022-08-10 RX ADMIN — HYDROCODONE BITARTRATE AND ACETAMINOPHEN 1 TABLET: 5; 325 TABLET ORAL at 16:27

## 2022-08-10 RX ADMIN — PRAVASTATIN SODIUM 40 MG: 40 TABLET ORAL at 22:16

## 2022-08-10 RX ADMIN — POTASSIUM CHLORIDE 40 MEQ: 10 TABLET, EXTENDED RELEASE ORAL at 17:10

## 2022-08-10 NOTE — ED NOTES
Nursing report ED to floor  Wendie Houser  87 y.o.  female    HPI (triage note):   Chief Complaint   Patient presents with   • Numbness       Admitting doctor:   Maximo Calloway MD    Admitting diagnosis:   The primary encounter diagnosis was Stroke-like symptoms. Diagnoses of Left leg pain, Left leg paresthesias, Elevated blood pressure reading with diagnosis of hypertension, and Hypokalemia were also pertinent to this visit.    Code status:   Current Code Status     Date Active Code Status Order ID Comments User Context       Not on file    Advance Care Planning Activity          Allergies:   Patient has no known allergies.    Weight:   There were no vitals filed for this visit.    Most recent vitals:   Vitals:    08/10/22 1631 08/10/22 1632 08/10/22 1701 08/10/22 1801   BP: 170/91  160/83 160/96   BP Location:       Patient Position:       Pulse:  92 87 84   Resp:       Temp:       TempSrc:       SpO2: 99% 98% 98% 97%       Active LDAs/IV Access:   Lines, Drains & Airways     Active LDAs     Name Placement date Placement time Site Days    Peripheral IV 08/10/22 1341 Left Wrist 08/10/22  1341  Wrist  less than 1    Peripheral IV 08/10/22 1437 Right Antecubital 08/10/22  1437  Antecubital  less than 1    External Urinary Catheter 08/10/22  1713  --  less than 1                Labs (abnormal labs have a star):   Labs Reviewed   COMPREHENSIVE METABOLIC PANEL - Abnormal; Notable for the following components:       Result Value    Glucose 104 (*)     Potassium 3.0 (*)     All other components within normal limits    Narrative:     GFR Normal >60  Chronic Kidney Disease <60  Kidney Failure <15     PROTIME-INR - Abnormal; Notable for the following components:    Protime 14.3 (*)     INR 1.12 (*)     All other components within normal limits   CBC WITH AUTO DIFFERENTIAL - Abnormal; Notable for the following components:    Lymphocyte % 45.7 (*)     Lymphocytes, Absolute 3.64 (*)     All other components within  normal limits   COVID-19,[facility] IN-HOUSE CEPHEID/PRIMO, NP SWAB IN TRANSPORT MEDIA 8-12 HR TAT - Normal    Narrative:     Fact sheet for providers: https://www.fda.gov/media/852490/download     Fact sheet for patients: https://www.fda.gov/media/634347/download   TROPONIN (IN-HOUSE) - Normal    Narrative:     Troponin T Reference Range:  <= 0.03 ng/mL-   Negative for AMI  >0.03 ng/mL-     Abnormal for myocardial necrosis.  Clinicians would have to utilize clinical acumen, EKG, Troponin and serial changes to determine if it is an Acute Myocardial Infarction or myocardial injury due to an underlying chronic condition.       Results may be falsely decreased if patient taking Biotin.     APTT - Normal   COVID PRE-OP / PRE-PROCEDURE SCREENING ORDER (NO ISOLATION)    Narrative:     The following orders were created for panel order COVID PRE-OP / PRE-PROCEDURE SCREENING ORDER (NO ISOLATION) - Swab, Nasopharynx.  Procedure                               Abnormality         Status                     ---------                               -----------         ------                     COVID-19,[facility] IN-HOU[facility]...[[id number]]  Normal              Final result                 Please view results for these tests on the individual orders.   MAGNESIUM   CBC AND DIFFERENTIAL    Narrative:     The following orders were created for panel order CBC & Differential.  Procedure                               Abnormality         Status                     ---------                               -----------         ------                     CBC Auto Differential[[id number]]        Abnormal            Final result                 Please view results for these tests on the individual orders.       EKG:   ECG 12 Lead   Preliminary Result   HEART RATE= 85  bpm   RR Interval= 704  ms   UT Interval= 201  ms   P Horizontal Axis= 8  deg   P Front Axis= 53  deg   QRSD Interval= 77  ms   QT Interval= 400  ms   QRS Axis= -13  deg   T Wave Axis= 21  deg    - BORDERLINE ECG -   Sinus rhythm   Low voltage, precordial leads   Borderline T abnormalities, anterior leads   Electronically Signed By:    Date and Time of Study: 2022-08-10 13:41:47          Meds given in ED:   Medications   HYDROcodone-acetaminophen (NORCO) 5-325 MG per tablet 1 tablet (1 tablet Oral Given 8/10/22 1627)   ondansetron ODT (ZOFRAN-ODT) disintegrating tablet 4 mg (4 mg Oral Given 8/10/22 1627)   potassium chloride (K-DUR,KLOR-CON) ER tablet 40 mEq (40 mEq Oral Given 8/10/22 1710)   iopamidol (ISOVUE-370) 76 % injection 100 mL (100 mL Intravenous Given 8/10/22 1649)       Imaging results:  CT Angiogram Neck    Result Date: 8/10/2022  1. No hemodynamically significant focal stenosis, aneurysm, or dissection in the cervical carotid or vertebral arteries or in the arteries at the base of the brain.  2. No acute intracranial hemorrhage or hydrocephalus. No enhancing lesion in brain. If there is further clinical concern, MRI could be considered for further evaluation.  3. Thyroid nodularity.  This report was finalized on 8/10/2022 5:24 PM by Dr. Kun Foley M.D.      XR Chest 1 View    Result Date: 8/10/2022  No focal pulmonary consolidation. Tortuous aorta. Follow-up as clinically indicated.  This report was finalized on 8/10/2022 2:47 PM by Dr. Kun Foley M.D.      CT Angiogram Head    Result Date: 8/10/2022  1. No hemodynamically significant focal stenosis, aneurysm, or dissection in the cervical carotid or vertebral arteries or in the arteries at the base of the brain.  2. No acute intracranial hemorrhage or hydrocephalus. No enhancing lesion in brain. If there is further clinical concern, MRI could be considered for further evaluation.  3. Thyroid nodularity.  This report was finalized on 8/10/2022 5:24 PM by Dr. Kun Foley M.D.        Ambulatory status:   - assist x2    Social issues:   Social History     Socioeconomic History   • Marital status:    Tobacco Use   •  Smoking status: Never Smoker   • Smokeless tobacco: Never Used   Substance and Sexual Activity   • Alcohol use: No   • Drug use: No   • Sexual activity: Never

## 2022-08-10 NOTE — ED NOTES
Pt presents to ED via EMS from home. Pt called with reports of L leg numbness at 0700 when she woke up to go to the bathroom, and then went to lay back down. When pt woke up at 1000 the numbness/weakness had resolved, but grant shave HTN and wanted to be seen. Pt currently denies any symptoms. Pt is A&OX4, able to ambulate with walker, and in a mask at this time.     Patient was placed in face mask during first look triage.  Patient was wearing a face mask throughout encounter.  I wore personal protective equipment throughout the encounter.  Hand hygiene was performed before and after patient encounter.

## 2022-08-10 NOTE — ED PROVIDER NOTES
"MD ATTESTATION NOTE    The JAVIER and I have discussed this patient's history, physical exam, and treatment plan.  I have reviewed the documentation and personally had a face to face interaction with the patient. I affirm the documentation and agree with the treatment and plan.  The attached note describes my personal findings.      I provided a substantive portion of the care of the patient.  I personally performed the physical exam in its entirety, and below are my findings.  For this patient encounter, the patient wore surgical mask, I wore full protective PPE including N95 and eye protection.      Brief HPI: Patient presents for evaluation of new onset left leg weakness and numbness that began this morning.  She woke up and went to the bathroom.  She was sitting on the commode for a few minutes but when she stood back up, she had new numbness and weakness in the left leg to the extent that it felt \"like dead meat.\"  She says that she could not move her leg appropriately.  She tried to use her walker to ambulate but had difficulty doing so.  Somehow, eventually she was able to get to her bed and rest for little while.  When she woke up later, she says that the leg feels improved but still somewhat weak in comparison to her normal strength and abilities.    PHYSICAL EXAM  ED Triage Vitals [08/10/22 1255]   Temp Heart Rate Resp BP SpO2   97.4 °F (36.3 °C) 84 16 162/88 97 %      Temp src Heart Rate Source Patient Position BP Location FiO2 (%)   Tympanic Monitor Sitting Right arm --         GENERAL: Pleasant elderly lady, obese habitus, lying comfortably on her side in the bed, no acute distress  HENT: nares patent, normocephalic and atraumatic  EYES: no scleral icterus EOMI, PERRL  CV: regular rhythm, normal rate, normal distal pulses noted at the feet  RESPIRATORY: normal effort, no stridor  ABDOMEN: soft, obese, nontender throughout  MUSCULOSKELETAL: no deformity evident.  No significant edema or asymmetry " evident.  NEURO: alert, moves all extremities, follows commands.  Patient is able to flex and extend the left lower extremity at the hip and knee joints appropriately but with perhaps 4 out of 5 strength.  She reports some subjective decrease sensation to the lower leg but interestingly tells me that she has normal sensation to the bottom of her foot when I palpate that area.  PSYCH:  calm, cooperative  SKIN: warm, dry    Vital signs and nursing notes reviewed.        Plan: Proceed with cardiac and metabolic work-up in addition to head CT and CTA studies for neurologic investigation.  Anticipate admission to hospitalist for further management.  Note that patient is hypertensive here which we will watch closely and may need to add some antihypertensives if appropriate.     Mario Aragon MD  08/10/22 6639

## 2022-08-10 NOTE — ED PROVIDER NOTES
" EMERGENCY DEPARTMENT ENCOUNTER    Room Number:  05/05  Date seen:  8/10/2022  Time seen: 13:33 EDT  PCP: Rossi Rivera APRN  Historian: patient      HPI:  Chief Complaint: left leg numbness and weakness    A complete HPI/ROS/PMH/PSH/SH/FH are unobtainable due to: none    Context: Wendie Houser is a 87 y.o. female with a history of dementia, hypertension, diabetes, obesity and renal insufficiency who presents to the ED for evaluation of left leg numbness and weakness that started around 7 AM today.  She walks with a Rollator, got out of bed and went to the restroom.  While sitting on the commode for 5 or so minutes she developed numbness tingling and weakness in the left leg.  She states it felt \"limp.\"  She was eventually able to get up and get back to bed.  She went back to sleep and awoke around 10 AM with improved function and states weakness is resolved.  Her sensation has improved as well.  She denies having any other numbness tingling weakness vision changes chest pain shortness of breath        PAST MEDICAL HISTORY  Active Ambulatory Problems     Diagnosis Date Noted   • Arthritis 01/25/2016   • Late onset Alzheimer's dementia without behavioral disturbance (HCC) 01/25/2016   • Fatigue 01/25/2016   • Gastroesophageal reflux disease 01/25/2016   • Essential hypertension 01/25/2016   • Hypomagnesemia 01/25/2016   • Sleep apnea 01/25/2016   • Renal insufficiency 10/11/2016   • Hypothyroidism 10/11/2016   • Lumbar canal stenosis 10/11/2016   • Shortness of breath 03/20/2016   • Disorder of electrolytes 07/29/2014   • Hiatal hernia 05/02/2017   • Hyperkalemia 02/02/2017   • Hypoglycemia 02/02/2017   • Irritable bowel syndrome 05/02/2017   • Allergic rhinitis 06/05/2017   • High risk medication use 10/12/2017   • Polypharmacy 10/23/2018   • Obesity (BMI 30-39.9) 10/20/2020   • Panic attack 07/27/2021     Resolved Ambulatory Problems     Diagnosis Date Noted   • Abdominal pain 01/25/2016   • Anemia " 01/25/2016   • Anxiety 01/25/2016   • Diverticulitis of intestine 01/25/2016   • Gastritis 01/25/2016   • Hyperlipidemia 01/25/2016   • Cramps of lower extremity 01/25/2016   • Left lower quadrant pain 01/25/2016   • Nausea 01/25/2016   • Renal insufficiency 01/25/2016   • Type 2 diabetes mellitus (HCC) 01/25/2016   • Flu-like symptoms 02/23/2016   • Alopecia 02/23/2016   • 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   • Chest pain in adult 03/22/2016   • Chronic systolic congestive heart failure (HCC) 03/28/2016   • Diverticulosis large intestine w/o perforation or abscess w/o bleeding 03/28/2016   • Weight gain 03/28/2016   • Vomiting without nausea 08/25/2016   • Fever and chills 08/25/2016   • Diverticulitis of gastrointestinal tract 08/25/2016   • Pain of upper abdomen 08/25/2016   • Muscle cramps 08/25/2016   • Congestive heart failure ([facility]) 10/11/2016   • Mixed anxiety depressive disorder 10/23/2018     Past Medical History:   Diagnosis Date   • CHF (congestive heart failure) ([facility])    • Depression    • Diabetes mellitus ([facility])    • Diverticulitis of colon 08/25/2016   • Early onset Alzheimer's dementia ([facility])    • GERD (gastroesophageal reflux disease)    • Heart disease    • Hypertension    • Kidney disease          PAST SURGICAL HISTORY  Past Surgical History:   Procedure Laterality Date   • BREAST CYST EXCISION Right 1989    Benign   • COLONOSCOPY  2011    Dr. Cain   • ENDOSCOPY  04/15/2011   • EYE SURGERY  2009   • GALLBLADDER SURGERY  1960    Open   • TOTAL KNEE ARTHROPLASTY Left 2012    Dr. Soler   • VAGINAL HYSTERECTOMY  1999         FAMILY HISTORY  Family History   Problem Relation Age of Onset   • Other Mother         cardiac disorder   • Hypertension Mother    • Heart disease Mother    • Hypertension Sister    • Heart attack Sister    • Cancer Brother    • Hypertension Brother    • Heart attack Brother    • Heart disease Father          SOCIAL HISTORY  Social History     Socioeconomic History   • Marital status:     Tobacco Use   • Smoking status: Never Smoker   • Smokeless tobacco: Never Used   Substance and Sexual Activity   • Alcohol use: No   • Drug use: No   • Sexual activity: Never         ALLERGIES  Patient has no known allergies.        REVIEW OF SYSTEMS  Review of Systems     All systems reviewed and negative except for those discussed in HPI.       PHYSICAL EXAM  ED Triage Vitals [08/10/22 1255]   Temp Heart Rate Resp BP SpO2   97.4 °F (36.3 °C) 84 16 162/88 97 %      Temp src Heart Rate Source Patient Position BP Location FiO2 (%)   Tympanic Monitor Sitting Right arm --         GENERAL: not distressed  HENT: atraumatic  EYES: no scleral icterus  CV: regular rhythm, regular rate  RESPIRATORY: normal effort CTA B  ABDOMEN: soft, nontender nondistended  MUSCULOSKELETAL: no deformity  NEURO: Neuro: Alert and oriented x3, extraocular motion intact, pupils are equal and round reactive to light, cranial nerves II through XII are grossly intact, normal speech, moves all extremities well, 5 out of 5 strength all 4 extremities, sensation intact light touch all 4 extremities, no ataxia.  NIHSS:  0-->Alert: keenly responsive  0-->Answers both questions correctly  0-->Performs both tasks correctly  0=normal  0=No visual loss  0=Normal symmetric movement  0-->No drift: limb holds 90 (or 45) degrees for full 10 secs  0-->No drift: limb holds 90 (or 45) degrees for full 10 secs  0-->No drift: limb holds 90 (or 45) degrees for full 10 secs  0-->No drift: limb holds 90 (or 45) degrees for full 10 secs  0=Absent  1=Mild to moderate sensory loss; patient feels pinprick is less sharp or is dull on the affected side; there is a loss of superficial pain with pinprick but patient is aware She is being touched  0=No aphasia, normal  0=Normal  0=No abnormality  Total score: 1    SKIN: warm, dry    Vital signs and nursing notes reviewed.          LAB RESULTS  Recent Results (from the past 24 hour(s))   ECG 12 Lead    Collection Time:  08/10/22  1:41 PM   Result Value Ref Range    QT Interval 400 ms   Protime-INR    Collection Time: 08/10/22  2:42 PM    Specimen: Blood   Result Value Ref Range    Protime 14.3 (H) 11.7 - 14.2 Seconds    INR 1.12 (H) 0.90 - 1.10   aPTT    Collection Time: 08/10/22  2:42 PM    Specimen: Blood   Result Value Ref Range    PTT 32.2 22.7 - 35.4 seconds   CBC Auto Differential    Collection Time: 08/10/22  2:42 PM    Specimen: Blood   Result Value Ref Range    WBC 7.97 3.40 - 10.80 10*3/mm3    RBC 4.53 3.77 - 5.28 10*6/mm3    Hemoglobin 12.2 12.0 - 15.9 g/dL    Hematocrit 38.1 34.0 - 46.6 %    MCV 84.1 79.0 - 97.0 fL    MCH 26.9 26.6 - 33.0 pg    MCHC 32.0 31.5 - 35.7 g/dL    RDW 13.9 12.3 - 15.4 %    RDW-SD 42.0 37.0 - 54.0 fl    MPV 10.1 6.0 - 12.0 fL    Platelets 230 140 - 450 10*3/mm3    Neutrophil % 43.5 42.7 - 76.0 %    Lymphocyte % 45.7 (H) 19.6 - 45.3 %    Monocyte % 8.7 5.0 - 12.0 %    Eosinophil % 1.3 0.3 - 6.2 %    Basophil % 0.5 0.0 - 1.5 %    Immature Grans % 0.3 0.0 - 0.5 %    Neutrophils, Absolute 3.48 1.70 - 7.00 10*3/mm3    Lymphocytes, Absolute 3.64 (H) 0.70 - 3.10 10*3/mm3    Monocytes, Absolute 0.69 0.10 - 0.90 10*3/mm3    Eosinophils, Absolute 0.10 0.00 - 0.40 10*3/mm3    Basophils, Absolute 0.04 0.00 - 0.20 10*3/mm3    Immature Grans, Absolute 0.02 0.00 - 0.05 10*3/mm3    nRBC 0.0 0.0 - 0.2 /100 WBC   COVID-19,[facility] CEPHEID/PRIMO NP SWAB IN TRANSPORT MEDIA 8-12 HR TAT - Swab, Nasopharynx    Collection Time: 08/10/22  2:42 PM    Specimen: Nasopharynx; Swab   Result Value Ref Range    COVID19 Not Detected Not Detected - Ref. Range       Ordered the above labs and independently reviewed the results.        RADIOLOGY  XR Chest 1 View   Final Result   No focal pulmonary consolidation. Tortuous aorta. Follow-up   as clinically indicated.       This report was finalized on 8/10/2022 2:47 PM by Dr. Kun Foley M.D.          CT Angiogram Head    (Results Pending)   CT Angiogram Neck     (Results Pending)       I ordered the above noted radiological studies. Reviewed by me and discussed with radiologist.  See dictation for official radiology interpretation.    PROCEDURES  Procedures        MEDICATIONS GIVEN IN ER  Medications - No data to display          PROGRESS AND CONSULTS    DDX includes but not limited to CVA, TIA, Radiculopathy    ED Course as of 08/11/22 1553   Wed Aug 10, 2022   1328 I discussed the patient with Dr. Hagan, stroke neurology.  We discussed the patient's history presentation, NIH of 1.  Not a tPA candidate due to low NIH, not debilitating symptoms and symptoms present for greater than 3 hours.  He does recommend a CTA of head neck here in the emergency department and admission for further evaluation and treatment. [KA]   1402 My interpretation of the EKG performed at 1341 is sinus rhythm rate 85 normal axis, prolonged OH interval otherwise unremarkable intervals, no ST elevation, no significant change from prior on 6/5/2022. [KA]   1503 WBC: 7.97 [KA]   1503 Hemoglobin: 12.2 [KA]   1541 COVID19: Not Detected [KA]   1616 I reassessed the patient, she is resting comfortably.  CMP and CTAs pending. She is agreeable with the plan for admissio for futher evaluation of her symptoms. Course of care turned over to Marge Jimenez at this time pending CMP, CTAs and admission to either the obs unit or hospitalist [KA]   1625 Pt is very uncomfortable.  States she is having back spasms.  I have assisted tech to raise her up and reposition her in bed and have ordered her something for pain.  [EW]   1644 Potassium(!): 3.0 [EW]   1752 Phone call with Kyra Jones PA-C in OBS unit.   Discussed the patient, relevant history, exam, diagnostics, ED findings/progress, and concerns. She agrees to admit the patient. I have updated patient on negative CTA's.    [EW]      ED Course User Index  [EW] Marge Jimenez APRN  [KA] Ines De Leon PA             Patient was placed in face mask in first  look. Patient was wearing facemask each time I entered the room and throughout our encounter. I wore protective equipment throughout this patient encounter including a face mask, eye shield and gloves. Hand hygiene was performed before donning protective equipment and after removal when leaving the room.        DIAGNOSIS  Final diagnoses:   None         Follow Up:  No follow-up provider specified.    RX:     Medication List      No changes were made to your prescriptions during this visit.           Latest Documented Vital Signs:  As of 16:16 EDT  BP- 151/75 HR- 90 Temp- 97.4 °F (36.3 °C) (Tympanic) O2 sat- 99%       Ines De Leon PA  08/11/22 1554

## 2022-08-10 NOTE — ED PROVIDER NOTES
EMERGENCY DEPARTMENT ENCOUNTER    Room number:  05/05  Date Seen:  8/10/2022  Time of transfer:1620  PCP:  Rossi Rivera APRN    Laboratory Results:  Recent Results (from the past 24 hour(s))   ECG 12 Lead    Collection Time: 08/10/22  1:41 PM   Result Value Ref Range    QT Interval 400 ms   Comprehensive Metabolic Panel    Collection Time: 08/10/22  2:42 PM    Specimen: Blood   Result Value Ref Range    Glucose 104 (H) 65 - 99 mg/dL    BUN 8 8 - 23 mg/dL    Creatinine 0.82 0.57 - 1.00 mg/dL    Sodium 141 136 - 145 mmol/L    Potassium 3.0 (L) 3.5 - 5.2 mmol/L    Chloride 101 98 - 107 mmol/L    CO2 25.6 22.0 - 29.0 mmol/L    Calcium 8.7 8.6 - 10.5 mg/dL    Total Protein 6.9 6.0 - 8.5 g/dL    Albumin 3.80 3.50 - 5.20 g/dL    ALT (SGPT) 7 1 - 33 U/L    AST (SGOT) 17 1 - 32 U/L    Alkaline Phosphatase 91 39 - 117 U/L    Total Bilirubin 0.3 0.0 - 1.2 mg/dL    Globulin 3.1 gm/dL    A/G Ratio 1.2 g/dL    BUN/Creatinine Ratio 9.8 7.0 - 25.0    Anion Gap 14.4 5.0 - 15.0 mmol/L    eGFR 70.2 >60.0 mL/min/1.73   Troponin    Collection Time: 08/10/22  2:42 PM    Specimen: Blood   Result Value Ref Range    Troponin T <0.010 0.000 - 0.030 ng/mL   Protime-INR    Collection Time: 08/10/22  2:42 PM    Specimen: Blood   Result Value Ref Range    Protime 14.3 (H) 11.7 - 14.2 Seconds    INR 1.12 (H) 0.90 - 1.10   aPTT    Collection Time: 08/10/22  2:42 PM    Specimen: Blood   Result Value Ref Range    PTT 32.2 22.7 - 35.4 seconds   CBC Auto Differential    Collection Time: 08/10/22  2:42 PM    Specimen: Blood   Result Value Ref Range    WBC 7.97 3.40 - 10.80 10*3/mm3    RBC 4.53 3.77 - 5.28 10*6/mm3    Hemoglobin 12.2 12.0 - 15.9 g/dL    Hematocrit 38.1 34.0 - 46.6 %    MCV 84.1 79.0 - 97.0 fL    MCH 26.9 26.6 - 33.0 pg    MCHC 32.0 31.5 - 35.7 g/dL    RDW 13.9 12.3 - 15.4 %    RDW-SD 42.0 37.0 - 54.0 fl    MPV 10.1 6.0 - 12.0 fL    Platelets 230 140 - 450 10*3/mm3    Neutrophil % 43.5 42.7 - 76.0 %    Lymphocyte % 45.7 (H) 19.6 -  45.3 %    Monocyte % 8.7 5.0 - 12.0 %    Eosinophil % 1.3 0.3 - 6.2 %    Basophil % 0.5 0.0 - 1.5 %    Immature Grans % 0.3 0.0 - 0.5 %    Neutrophils, Absolute 3.48 1.70 - 7.00 10*3/mm3    Lymphocytes, Absolute 3.64 (H) 0.70 - 3.10 10*3/mm3    Monocytes, Absolute 0.69 0.10 - 0.90 10*3/mm3    Eosinophils, Absolute 0.10 0.00 - 0.40 10*3/mm3    Basophils, Absolute 0.04 0.00 - 0.20 10*3/mm3    Immature Grans, Absolute 0.02 0.00 - 0.05 10*3/mm3    nRBC 0.0 0.0 - 0.2 /100 WBC   COVID-19,[facility]IMO NP SWAB IN TRANSPORT MEDIA 8-12 HR TAT - Swab, Nasopharynx    Collection Time: 08/10/22  2:42 PM    Specimen: Nasopharynx; Swab   Result Value Ref Range    COVID19 Not Detected Not Detected - Ref. Range     I reviewed the above results.    Radiology:  CT Angiogram Head   Final Result   1. No hemodynamically significant focal stenosis, aneurysm, or   dissection in the cervical carotid or vertebral arteries or in the   arteries at the base of the brain.       2. No acute intracranial hemorrhage or hydrocephalus. No enhancing   lesion in brain. If there is further clinical concern, MRI could be   considered for further evaluation.       3. Thyroid nodularity.       This report was finalized on 8/10/2022 5:24 PM by Dr. Kun Foley M.D.          CT Angiogram Neck   Final Result   1. No hemodynamically significant focal stenosis, aneurysm, or   dissection in the cervical carotid or vertebral arteries or in the   arteries at the base of the brain.       2. No acute intracranial hemorrhage or hydrocephalus. No enhancing   lesion in brain. If there is further clinical concern, MRI could be   considered for further evaluation.       3. Thyroid nodularity.       This report was finalized on 8/10/2022 5:24 PM by Dr. Kun Foley M.D.          XR Chest 1 View   Final Result   No focal pulmonary consolidation. Tortuous aorta. Follow-up   as clinically indicated.       This report was finalized on 8/10/2022  2:47 PM by Dr. Kun Foley M.D.            I reviewed the above results    Medications ordered in ED:  Medications   HYDROcodone-acetaminophen (NORCO) 5-325 MG per tablet 1 tablet (1 tablet Oral Given 8/10/22 1627)   ondansetron ODT (ZOFRAN-ODT) disintegrating tablet 4 mg (4 mg Oral Given 8/10/22 1627)   potassium chloride (K-DUR,KLOR-CON) ER tablet 40 mEq (40 mEq Oral Given 8/10/22 1710)   iopamidol (ISOVUE-370) 76 % injection 100 mL (100 mL Intravenous Given 8/10/22 1649)       Progress and Consult Notes:  Patient care transferred from Mandy D eLeon PA-C  pending CTA's and admission.    ED Course as of 08/10/22 1826   Wed Aug 10, 2022   1328 I discussed the patient with Dr. Hagan, stroke neurology.  We discussed the patient's history presentation, NIH of 1.  Not a tPA candidate due to low NIH, not debilitating symptoms and symptoms present for greater than 3 hours.  He does recommend a CTA of head neck here in the emergency department and admission for further evaluation and treatment. [KA]   1402 My interpretation of the EKG performed at 1341 is sinus rhythm rate 85 normal axis, prolonged VT interval otherwise unremarkable intervals, no ST elevation, no significant change from prior on 6/5/2022. [KA]   1503 WBC: 7.97 [KA]   1503 Hemoglobin: 12.2 [KA]   1541 COVID19: Not Detected [KA]   1616 I reassessed the patient, she is resting comfortably.  CMP and CTAs pending. She is agreeable with the plan for admissio for futher evaluation of her symptoms. Course of care turned over to Marge Jimenez at this time pending CMP, CTAs and admission to either the obs unit or hospitalist [KA]   1625 Pt is very uncomfortable.  States she is having back spasms.  I have assisted tech to raise her up and reposition her in bed and have ordered her something for pain.  [EW]   1644 Potassium(!): 3.0 [EW]   2674 Phone call with Kyra Jones PA-C in OBS unit.   Discussed the patient, relevant history, exam, diagnostics, ED  findings/progress, and concerns. She agrees to admit the patient. I have updated patient on negative CTA's.    [EW]      ED Course User Index  [EW] Marge Jimenez APRN  [KA] Ines De Leon PA         Diagnosis:  Final diagnoses:   Stroke-like symptoms   Left leg pain   Left leg paresthesias   Elevated blood pressure reading with diagnosis of hypertension   Hypokalemia       Admission to OBS unit  RX:     Medication List      No changes were made to your prescriptions during this visit.         Provider attestation:  I personally reviewed the past medical history, past surgical history, social history, family history, current medications, and allergies as they appear in the chart.    The patient was seen and examined by myself and Dr. Aragon, who agree with plan.          Marge Jimenez APRN  08/10/22 1823

## 2022-08-10 NOTE — TELEPHONE ENCOUNTER
Caller: Wendie Houser    Relationship: Self    Best call back number:     What is the best time to reach you:     Who are you requesting to speak with (clinical staff, provider,  specific staff member):     Do you know the name of the person who called:     What was the call regarding: PATIENT IS CALLING IN STATING THAT WHILE SITTING ON THE COMMODE THIS MORNING HER LEFT  LEG WENT NUMB AND SHE WANTS TO KNOW IF SHE NEEDS TO BE SEEN IN THE OFFICE FOR THIS.  SHE JUST MENTIONED THAT HER BLOOD PRESSURE HAS BEEN RUNNING HIGH, SHE CHECKED IT THIS MORNING AND IT /100    Do you require a callback: YES

## 2022-08-10 NOTE — CASE MANAGEMENT/SOCIAL WORK
Discharge Planning Assessment  [facility]     Patient Name: Wendie Houser  MRN: [id number]  Today's Date: 8/10/2022    Admit Date: 8/10/2022     Discharge Needs Assessment     Row Name 08/10/22 1915       Living Environment    People in Home alone    Current Living Arrangements apartment    Primary Care Provided by self    Provides Primary Care For no one    Family Caregiver if Needed child(grace), adult    Family Caregiver Names Eleuterio Bar [phone]    Quality of Family Relationships helpful    Able to Return to Prior Arrangements yes       Resource/Environmental Concerns    Resource/Environmental Concerns none    Transportation Concerns none       Transition Planning    Patient/Family Anticipates Transition to home    Patient/Family Anticipated Services at Transition none    Transportation Anticipated family or friend will provide       Discharge Needs Assessment    Readmission Within the Last 30 Days no previous admission in last 30 days    Equipment Currently Used at Home rollator    Concerns to be Addressed no discharge needs identified    Anticipated Changes Related to Illness none    Equipment Needed After Discharge commode    Provided Post Acute Provider List? N/A    Provided Post Acute Provider Quality & Resource List? N/A               Discharge Plan     Row Name 08/10/22 1918       Plan    Plan Pt intends to return home upon discharge    Patient/Family in Agreement with Plan yes    Provided Post Acute Provider List? N/A    Provided Post Acute Provider Quality & Resource List? N/A    Plan Comments Facesheet verified, role of CCP explained. Pt is independent in ADL's, however uses a rollator for ambulation. Requesting a BSC when discharged. Pt denies any difficulty paying for medications. Advised pt if any further needs arise, to contact Case Management              Continued Care and Services - Admitted Since 8/10/2022    Coordination has not been started for this encounter.           Demographic Summary    No documentation.                Functional Status    No documentation.                Psychosocial    No documentation.                Abuse/Neglect    No documentation.                Legal    No documentation.                Substance Abuse    No documentation.                Patient Forms    No documentation.                   Haydee Zapata RN

## 2022-08-11 VITALS
SYSTOLIC BLOOD PRESSURE: 159 MMHG | TEMPERATURE: 98.1 F | OXYGEN SATURATION: 97 % | BODY MASS INDEX: 31.41 KG/M2 | HEART RATE: 64 BPM | DIASTOLIC BLOOD PRESSURE: 83 MMHG | WEIGHT: 160 LBS | RESPIRATION RATE: 16 BRPM | HEIGHT: 60 IN

## 2022-08-11 LAB
ANION GAP SERPL CALCULATED.3IONS-SCNC: 8.7 MMOL/L (ref 5–15)
BUN SERPL-MCNC: 8 MG/DL (ref 8–23)
BUN/CREAT SERPL: 10.3 (ref 7–25)
CALCIUM SPEC-SCNC: 8.7 MG/DL (ref 8.6–10.5)
CHLORIDE SERPL-SCNC: 104 MMOL/L (ref 98–107)
CHOLEST SERPL-MCNC: 115 MG/DL (ref 0–200)
CO2 SERPL-SCNC: 28.3 MMOL/L (ref 22–29)
CREAT SERPL-MCNC: 0.78 MG/DL (ref 0.57–1)
DEPRECATED RDW RBC AUTO: 40.6 FL (ref 37–54)
EGFRCR SERPLBLD CKD-EPI 2021: 74.5 ML/MIN/1.73
ERYTHROCYTE [DISTWIDTH] IN BLOOD BY AUTOMATED COUNT: 13.6 % (ref 12.3–15.4)
GLUCOSE BLDC GLUCOMTR-MCNC: 124 MG/DL (ref 70–130)
GLUCOSE BLDC GLUCOMTR-MCNC: 92 MG/DL (ref 70–130)
GLUCOSE SERPL-MCNC: 100 MG/DL (ref 65–99)
HBA1C MFR BLD: 6.1 % (ref 4.8–5.6)
HCT VFR BLD AUTO: 36.1 % (ref 34–46.6)
HDLC SERPL-MCNC: 48 MG/DL (ref 40–60)
HGB BLD-MCNC: 11.6 G/DL (ref 12–15.9)
LDLC SERPL CALC-MCNC: 50 MG/DL (ref 0–100)
LDLC/HDLC SERPL: 1.03 {RATIO}
MCH RBC QN AUTO: 26.6 PG (ref 26.6–33)
MCHC RBC AUTO-ENTMCNC: 32.1 G/DL (ref 31.5–35.7)
MCV RBC AUTO: 82.8 FL (ref 79–97)
PLATELET # BLD AUTO: 220 10*3/MM3 (ref 140–450)
PMV BLD AUTO: 10 FL (ref 6–12)
POTASSIUM SERPL-SCNC: 4.3 MMOL/L (ref 3.5–5.2)
RBC # BLD AUTO: 4.36 10*6/MM3 (ref 3.77–5.28)
SODIUM SERPL-SCNC: 141 MMOL/L (ref 136–145)
TRIGL SERPL-MCNC: 88 MG/DL (ref 0–150)
VLDLC SERPL-MCNC: 17 MG/DL (ref 5–40)
WBC NRBC COR # BLD: 9.13 10*3/MM3 (ref 3.4–10.8)

## 2022-08-11 PROCEDURE — 83036 HEMOGLOBIN GLYCOSYLATED A1C: CPT | Performed by: STUDENT IN AN ORGANIZED HEALTH CARE EDUCATION/TRAINING PROGRAM

## 2022-08-11 PROCEDURE — G0378 HOSPITAL OBSERVATION PER HR: HCPCS

## 2022-08-11 PROCEDURE — 97530 THERAPEUTIC ACTIVITIES: CPT

## 2022-08-11 PROCEDURE — 99204 OFFICE O/P NEW MOD 45 MIN: CPT | Performed by: PSYCHIATRY & NEUROLOGY

## 2022-08-11 PROCEDURE — 0054A HC ADM SARSCV2 30MCG TRS-SUCR BOOSTER: CPT

## 2022-08-11 PROCEDURE — 97162 PT EVAL MOD COMPLEX 30 MIN: CPT

## 2022-08-11 PROCEDURE — 82962 GLUCOSE BLOOD TEST: CPT

## 2022-08-11 PROCEDURE — 80048 BASIC METABOLIC PNL TOTAL CA: CPT | Performed by: STUDENT IN AN ORGANIZED HEALTH CARE EDUCATION/TRAINING PROGRAM

## 2022-08-11 PROCEDURE — 97116 GAIT TRAINING THERAPY: CPT

## 2022-08-11 PROCEDURE — 91305 COVID-19 MRNA VAC-TRIS(PFIZER) 30 MCG/0.3ML SUSPENSION: CPT | Performed by: NURSE PRACTITIONER

## 2022-08-11 PROCEDURE — 80061 LIPID PANEL: CPT | Performed by: STUDENT IN AN ORGANIZED HEALTH CARE EDUCATION/TRAINING PROGRAM

## 2022-08-11 PROCEDURE — 85027 COMPLETE CBC AUTOMATED: CPT | Performed by: STUDENT IN AN ORGANIZED HEALTH CARE EDUCATION/TRAINING PROGRAM

## 2022-08-11 PROCEDURE — 25010000002 COVID-19 MRNA VAC-TRIS(PFIZER) 30 MCG/0.3ML SUSPENSION: Performed by: NURSE PRACTITIONER

## 2022-08-11 RX ORDER — DIPHENHYDRAMINE HCL 25 MG
50 CAPSULE ORAL ONCE AS NEEDED
Status: DISCONTINUED | OUTPATIENT
Start: 2022-08-11 | End: 2022-08-11 | Stop reason: HOSPADM

## 2022-08-11 RX ADMIN — PANTOPRAZOLE SODIUM 40 MG: 40 TABLET, DELAYED RELEASE ORAL at 09:26

## 2022-08-11 RX ADMIN — BNT162B2 0.3 ML: 0.23 INJECTION, SUSPENSION INTRAMUSCULAR at 15:45

## 2022-08-11 RX ADMIN — ACETAMINOPHEN 650 MG: 325 TABLET, FILM COATED ORAL at 09:34

## 2022-08-11 RX ADMIN — Medication 10 ML: at 09:35

## 2022-08-11 RX ADMIN — HYDROCODONE BITARTRATE AND ACETAMINOPHEN 1 TABLET: 5; 325 TABLET ORAL at 01:28

## 2022-08-11 RX ADMIN — ASPIRIN 81 MG: 81 TABLET, COATED ORAL at 09:27

## 2022-08-11 RX ADMIN — LOSARTAN POTASSIUM 25 MG: 25 TABLET, FILM COATED ORAL at 09:27

## 2022-08-11 RX ADMIN — POTASSIUM CHLORIDE 40 MEQ: 750 TABLET, EXTENDED RELEASE ORAL at 00:39

## 2022-08-11 RX ADMIN — HYDROCODONE BITARTRATE AND ACETAMINOPHEN 1 TABLET: 5; 325 TABLET ORAL at 14:03

## 2022-08-11 NOTE — PLAN OF CARE
Goal Outcome Evaluation:  Plan of Care Reviewed With: patient, daughter           Outcome Evaluation: Pt is an 88 yo F who presnted with LLE numbness. MRI and CT were both negative for acute abnormalities with pt reporting symptoms resolved at time of eval. Pt lives alone and reports independence with a rollator. Pt has help from family for showering and other household activities, but is normally able to ambulate independently. Pt presents to PT with impaired strength, endurance, and pain limiting overall mobility. Pt c/o 5/10 low back pain - educated on log rolling to protect back with transfer to EOB - required mod A x1. Pt stood with min A x1 and ambulated 20ft with rollater. Pt with very slow pace, severe forward flexed posture with inability to correct, and overall unsteadiness. Pt fatigues quickly and pt/family noting this is not her BL. Pt returned to bed and nsg aid assisted with bed and brief change. Pt required assist with repositioning in bed, but was able to attempt some bridges - unable to fully clear bottom from bed. Pt left with all needs met. Pt will continue to benefit from skilled PT to address functional deficits and improve overall mobility. Recommending D/C to SNF.

## 2022-08-11 NOTE — PROGRESS NOTES
[facility] Clinical Pharmacy Services: Pharmacy Consult for COVID-19 Vaccine    Wendie Houser is a 87 y.o. female for whom Pharmacy has been consulted to consent for inpatient COVID-19 vaccine (Pfizer).    Consulting provider: Echo FLORES  Date consult ordered: 8/11    Previous COVID-19 Vaccination History (includes dates and ):  Pfizer (purple cap): 2/24/24 and 3/17/21    Planned dose in series (1st, 2nd, 3rd, 1st booster, 2nd booster): 1st booster     Does the patient have a history of anaphylaxis to any medications (Y/N): n  Note: If the patient has a history of anaphylaxis, they require closer monitoring (e.g. 30 minutes instead of 15 minutes)    Pharmacy has reviewed the Patient Fact Sheet/VIS and consent form with the patient or caregiver (POA), and the patient meets the following criteria:  Patient is clinically stable and not acutely ill (afebrile > 24 hours).  Patient does not have a history of allergic reaction to the Pfizer COVID-19 vaccine or any of its components (e.g. polyethylene glycol [PEG]).  Patient does not have a history of a bleeding disorder that would contraindicate them for an intramuscular injection.  Patient has not been treated with COVID-19 monoclonal antibodies (e.g. bamlanivimab with or without etesevimab, casirivimab/imdevimab, or sotrovimab) or convalescent plasma in the past 90 days.  Immunization history and [facility] records (if applicable) have been reviewed. If patient has previously received a COVID-19 vaccine, patient is appropriate for vaccination with Pfizer COVID-19 vaccine (meets one of the below criteria):  If patient received Pfizer vaccine for 1st dose and is presenting for 2nd dose, it has been at least 21 days following first dose.  If patient received Pfizer for 1st and 2nd dose and is presenting for 3rd dose (immunocompromised), it has been at least 28 days following 2nd dose  If patient is presenting for first booster dose (regardless of  primary series ), it has been at least 5 months (or 3 months,  if immunocompromised) following 2nd or 3rd dose of mRNA vaccine (e.g. Pfizer or Moderna) or 2 months following initial dose of Cirilo & Cirilo vaccine.  If patient is presenting for second booster dose, they are either 50 years of age or older OR are immunocompromised AND it has been at least 4 months following 1st booster dose  Patient is not presenting for a 2nd or 3rd dose (for immunocompromised patient) in primary series if their primary series consisted of Moderna COVID-19 vaccine. These patients do no qualify for a Pfizer COVID-19 vaccine.  Patient has not been diagnosed with myocarditis following a COVID-19 vaccine and is not in an acute episode of myocarditis or pericarditis.    The consent form has been signed and physical copy placed in the patient's chart. The COVID-19 vaccine and emergency medications panel orders have been placed.    Thank you for this consult. Please reach out to pharmacy with any questions.    Lyric Zhou [facility]  Clinical Pharmacist

## 2022-08-11 NOTE — THERAPY EVALUATION
Patient Name: Wendie Houser  : 1935    MRN: [id number]                              Today's Date: 2022       Admit Date: 8/10/2022    Visit Dx:     ICD-10-CM ICD-9-CM   1. Stroke-like symptoms  R29.90 781.99   2. Left leg pain  M79.605 729.5   3. Left leg paresthesias  R20.2 782.0   4. Elevated blood pressure reading with diagnosis of hypertension  I10 401.9   5. Hypokalemia  E87.6 276.8     Patient Active Problem List   Diagnosis   • Arthritis   • Late onset Alzheimer's dementia without behavioral disturbance (HCC)   • Fatigue   • Gastroesophageal reflux disease   • Essential hypertension   • Hypomagnesemia   • Sleep apnea   • Renal insufficiency   • Hypothyroidism   • Lumbar canal stenosis   • Shortness of breath   • Disorder of electrolytes   • Hiatal hernia   • Hyperkalemia   • Hypoglycemia   • Irritable bowel syndrome   • Allergic rhinitis   • High risk medication use   • Polypharmacy   • Obesity (BMI 30-39.9)   • Panic attack   • Weakness of left lower extremity     Past Medical History:   Diagnosis Date   • Anemia    • Arthritis    • CHF (congestive heart failure) (HCC)    • Congestive heart failure (HCC)    • Depression    • Diabetes mellitus (HCC)     type 2   • Diverticulitis of colon 2016    Acute uncomplicated diverticulitis at the distal transverse colon   • Early onset Alzheimer's dementia (HCC)    • GERD (gastroesophageal reflux disease)    • Heart disease    • Hyperlipidemia    • Hypertension    • Hypothyroidism    • Kidney disease    • Mixed anxiety depressive disorder 10/23/2018   • Panic attack 2021   • Sleep apnea      Past Surgical History:   Procedure Laterality Date   • BREAST CYST EXCISION Right     Benign   • COLONOSCOPY      Dr. Cain   • ENDOSCOPY  04/15/2011   • EYE SURGERY     • GALLBLADDER SURGERY  1960    Open   • TOTAL KNEE ARTHROPLASTY Left     Dr. Soler   • VAGINAL HYSTERECTOMY        General Information     Row Name 22 1007           Physical Therapy Time and Intention    Document Type evaluation  -     Mode of Treatment individual therapy;physical therapy  -     Row Name 08/11/22 1007          General Information    Patient Profile Reviewed yes  -     Prior Level of Function min assist:;gait;transfer;bed mobility  -     Existing Precautions/Restrictions fall  -     Barriers to Rehab medically complex;previous functional deficit;hearing deficit  -     Row Name 08/11/22 1007          Living Environment    People in Home alone  -[facility] Name 08/11/22 1007          Cognition    Orientation Status (Cognition) oriented x 4  -[facility] Name 08/11/22 1007          Safety Issues, Functional Mobility    Impairments Affecting Function (Mobility) balance;endurance/activity tolerance;strength;pain;postural/trunk control  -           User Key  (r) = Recorded By, (t) = Taken By, (c) = Cosigned By    Initials Name Provider Type     Callie Kuhn, PT Physical Therapist               Mobility     [facility] Name 08/11/22 1007          Bed Mobility    Bed Mobility supine-sit;sit-supine;scooting/bridging  -     Scooting/Bridging [place] (Bed Mobility) moderate assist (50% patient effort);2 person assist;verbal cues;nonverbal cues (demo/gesture)  -     Supine-Sit [place] (Bed Mobility) moderate assist (50% patient effort);verbal cues;nonverbal cues (demo/gesture);1 person assist  -     Sit-Supine [place] (Bed Mobility) verbal cues;nonverbal cues (demo/gesture);minimum assist (75% patient effort)  -     Assistive Device (Bed Mobility) bed rails;head of bed elevated;draw sheet  -     Comment, (Bed Mobility) Pt able to bridge to fix brief, but unable to fully clear bed and assist with draw sheet to scoot up in bed  -[facility] Name 08/11/22 1007          Sit-Stand Transfer    Sit-Stand [place] (Transfers) minimum assist (75% patient effort);1 person assist;nonverbal cues (demo/gesture);verbal cues  -     Assistive  Device (Sit-Stand Transfers) walker, 4-wheeled  -[facility] Name 08/11/22 1007          Gait/Stairs (Locomotion)    [place] Level (Gait) verbal cues;nonverbal cues (demo/gesture);minimum assist (75% patient effort)  -     Assistive Device (Gait) walker, 4-wheeled  -     Distance in Feet (Gait) 20ft  -     Deviations/Abnormal Patterns (Gait) antalgic;base of support, narrow;dustin decreased;festinating/shuffling;gait speed decreased;stride length decreased;weight shifting decreased  -     Bilateral Gait Deviations forward flexed posture;heel strike decreased  -     Comment, (Gait/Stairs) Severe forward flexed posture and unable to correct, very unsteady with slow, shuffled steps and fatigues quickly  -           User Key  (r) = Recorded By, (t) = Taken By, (c) = Cosigned By    Initials Name Provider Type     Callie Kuhn, PT Physical Therapist               Obj/Interventions     [facility] Name 08/11/22 1010          Range of Motion Comprehensive    General Range of Motion bilateral lower extremity ROM WFL  -BH     Row Name 08/11/22 1010          Strength Comprehensive (MMT)    General Manual Muscle Testing (MMT) Assessment lower extremity strength deficits identified  -     Comment, General Manual Muscle Testing (MMT) Assessment Generalized weakness, LLE grossly 4-/5, RLE grossly 3+/5  -[facility] Name 08/11/22 1010          Balance    Balance Assessment sitting static balance;sitting dynamic balance;standing static balance;standing dynamic balance  -     Static Sitting Balance standby assist  -     Dynamic Sitting Balance standby assist  -     Position, Sitting Balance unsupported;sitting edge of bed  -     Static Standing Balance contact guard;verbal cues  -     Dynamic Standing Balance minimal assist;verbal cues  -     Position/Device Used, Standing Balance walker, 4-wheeled  -[facility] Name 08/11/22 1010          Sensory Assessment (Somatosensory)    Sensory Assessment  (Somatosensory) left-sided sensation intact  Pt reports impaired sensation on entire RLE with dermatomal testing  -           User Key  (r) = Recorded By, (t) = Taken By, (c) = Cosigned By    Initials Name Provider Type     Callie Kuhn PT Physical Therapist               Goals/Plan     Row Name 08/11/22 1017          Bed Mobility Goal 1 (PT)    Activity/Assistive Device (Bed Mobility Goal 1, PT) bed mobility activities, all  -     [place] Level/Cues Needed (Bed Mobility Goal 1, PT) contact guard required  -     Time Frame (Bed Mobility Goal 1, PT) 1 week  -[facility] Name 08/11/22 1017          Transfer Goal 1 (PT)    Activity/Assistive Device (Transfer Goal 1, PT) transfers, all  -     [place] Level/Cues Needed (Transfer Goal 1, PT) standby assist  -     Time Frame (Transfer Goal 1, PT) 1 week  -[facility] Name 08/11/22 1017          Gait Training Goal 1 (PT)    Activity/Assistive Device (Gait Training Goal 1, PT) gait (walking locomotion)  -     [place] Level (Gait Training Goal 1, PT) standby assist  -     Distance (Gait Training Goal 1, PT) 100ft  -     Time Frame (Gait Training Goal 1, PT) 1 week  -[facility] Name 08/11/22 1017          Therapy Assessment/Plan (PT)    Planned Therapy Interventions (PT) balance training;bed mobility training;gait training;home exercise program;patient/family education;strengthening;transfer training  -           User Key  (r) = Recorded By, (t) = Taken By, (c) = Cosigned By    Initials Name Provider Type     Callie Kuhn PT Physical Therapist               Clinical Impression     Row Name 08/11/22 1011          Pain    Pretreatment Pain Rating 5/10  -     Pain Location lower  -     Pain Location - back  -     Pain Intervention(s) Repositioned;Ambulation/increased activity  -     Row Name 08/11/22 1011          Plan of Care Review    Plan of Care Reviewed With patient;daughter  -     Outcome Evaluation Pt is an 88 yo F who  presnted with LLE numbness. MRI and CT were both negative for acute abnormalities with pt reporting symptoms resolved at time of eval. Pt lives alone and reports independence with a rollator. Pt has help from family for showering and other household activities, but is normally able to ambulate independently. Pt presents to PT with impaired strength, endurance, and pain limiting overall mobility. Pt c/o 5/10 low back pain - educated on log rolling to protect back with transfer to EOB - required mod A x1. Pt stood with min A x1 and ambulated 20ft with rollater. Pt with very slow pace, severe forward flexed posture with inability to correct, and overall unsteadiness. Pt fatigues quickly and pt/family noting this is not her BL. Pt returned to bed and nsg aid assisted with bed and brief change. Pt required assist with repositioning in bed, but was able to attempt some bridges - unable to fully clear bottom from bed. Pt left with all needs met. Pt will continue to benefit from skilled PT to address functional deficits and improve overall mobility. Recommending D/C to SNF.  -     Row Name 08/11/22 1011          Therapy Assessment/Plan (PT)    Patient/Family Therapy Goals Statement (PT) Return to [facility]  -     Rehab Potential (PT) good, to achieve stated therapy goals  -     Criteria for Skilled Interventions Met (PT) yes  -     Therapy Frequency (PT) 6 times/wk  -     Row Name 08/11/22 1011          Vital Signs    O2 Delivery Pre Treatment room air  -     O2 Delivery Intra Treatment room air  -     O2 Delivery Post Treatment room air  -     Row Name 08/11/22 1011          Positioning and Restraints    Pre-Treatment Position in bed  -     Post Treatment Position bed  -     In Bed supine;call light within reach;encouraged to call for assist;with family/caregiver;exit alarm on;notified [facility]           User Key  (r) = Recorded By, (t) = Taken By, (c) = Cosigned By    Initials Name Provider Type    GIOVANY Kuhn  Callie KHAN, PT Physical Therapist               Outcome Measures     Row Name 08/11/22 1017          How much help from another person do you currently need...    Turning from your back to your side while in flat bed without using bedrails? 2  -BH     Moving from lying on back to sitting on the side of a flat bed without bedrails? 2  -BH     Moving to and from a bed to a chair (including a wheelchair)? 3  -BH     Standing up from a chair using your arms (e.g., wheelchair, bedside chair)? 3  -BH     Climbing 3-5 steps with a railing? 2  -BH     To walk in hospital room? 3  -     AM-PAC 6 Clicks Score (PT) 15  -     Highest level of mobility 4 --> Transferred to chair/commode  -     Row Name 08/11/22 1017          Functional Assessment    Outcome Measure Options AM-PAC 6 Clicks Basic Mobility (PT)  -           User Key  (r) = Recorded By, (t) = Taken By, (c) = Cosigned By    Initials Name Provider Type     Callie Kuhn PT Physical Therapist                             Physical Therapy Education                 Title: PT OT SLP Therapies (In Progress)     Topic: Physical Therapy (In Progress)     Point: Mobility training (Done)     Learning Progress Summary           Patient Acceptance, E,TB,D, VU,NR by  at 8/11/2022 1018                   Point: Home exercise program (Not Started)     Learner Progress:  Not documented in this visit.          Point: Body mechanics (Done)     Learning Progress Summary           Patient Acceptance, E,TB,D, VU,NR by  at 8/11/2022 1018                   Point: Precautions (Done)     Learning Progress Summary           Patient Acceptance, E,TB,D, VU,NR by  at 8/11/2022 1018                               User Key     Initials Effective Dates Name Provider Type [facility] hospital 04/08/22 -  Callie Kuhn PT Physical Therapist PT              PT Recommendation and Plan  Planned Therapy Interventions (PT): balance training, bed mobility training, gait training, home  exercise program, patient/family education, strengthening, transfer training  Plan of Care Reviewed With: patient, daughter  Outcome Evaluation: Pt is an 88 yo F who presnted with LLE numbness. MRI and CT were both negative for acute abnormalities with pt reporting symptoms resolved at time of eval. Pt lives alone and reports independence with a rollator. Pt has help from family for showering and other household activities, but is normally able to ambulate independently. Pt presents to PT with impaired strength, endurance, and pain limiting overall mobility. Pt c/o 5/10 low back pain - educated on log rolling to protect back with transfer to EOB - required mod A x1. Pt stood with min A x1 and ambulated 20ft with rollater. Pt with very slow pace, severe forward flexed posture with inability to correct, and overall unsteadiness. Pt fatigues quickly and pt/family noting this is not her BL. Pt returned to bed and nsg aid assisted with bed and brief change. Pt required assist with repositioning in bed, but was able to attempt some bridges - unable to fully clear bottom from bed. Pt left with all needs met. Pt will continue to benefit from skilled PT to address functional deficits and improve overall mobility. Recommending D/C to SNF.     Time Calculation:    PT Charges     Row Name 08/11/22 1019             Time Calculation    Start Time 0833  -      Stop Time 0859  -      Time Calculation (min) 26 min  -      PT Received On 08/11/22  -      PT - Next Appointment 08/12/22  [facility]      PT Goal Re-Cert Due Date 08/18/22  -              Time Calculation- PT    Total Timed Code Minutes- PT 23 minute(s)  -              Timed Charges    27788 - Gait Training Minutes  10  -      36954 - PT Therapeutic Activity Minutes 13  -              Untimed Charges    PT Eval/Re-eval Minutes 5  -              Total Minutes    Timed Charges Total Minutes 23  -      Untimed Charges Total Minutes 5  -       Total Minutes 28   -            User Key  (r) = Recorded By, (t) = Taken By, (c) = Cosigned By    Initials Name Provider Type     Callie Kuhn, PT Physical Therapist              Therapy Charges for Today     Code Description Service Date Service Provider Modifiers Qty    42932703988  GAIT TRAINING EA 15 MIN 8/11/2022 Callie Kuhn, PT GP 1    [id number] HC PT THERAPEUTIC ACT EA 15 MIN 8/11/2022 Callie Kuhn, PT GP 1    [id number]  PT EVAL MOD COMPLEXITY 3 8/11/2022 Callie Kuhn, PT GP 1          PT G-Codes  Outcome Measure Options: AM-PAC 6 Clicks Basic Mobility (PT)  AM-PAC 6 Clicks Score (PT): 15    Callie Kuhn PT  8/11/2022

## 2022-08-11 NOTE — CASE MANAGEMENT/SOCIAL WORK
Per Taya betancourt/ Martha, accepted at [facility] and came arrive today. Patient, sons, provider and RN updated-patient will need COVID booster. Provider and pharmacist updated

## 2022-08-11 NOTE — PROGRESS NOTES
MD ATTESTATION NOTE    The JAVIER and I have discussed this patient's history, physical exam, and treatment plan.  I have reviewed the documentation and personally had a face to face interaction with the patient. I affirm the documentation and agree with the treatment and plan.  The attached note describes my personal findings.      I provided a substantive portion of the care of the patient.  I personally performed the physical exam in its entirety, and below are my findings.  For this patient encounter, the patient wore surgical mask, I wore full protective PPE including N95 and eye protection.      Brief HPI: This patient is an 87-year-old female with history of hypertension, diabetes, as well as dementia who was admitted to the observation unit tonight after reported left leg weakness as well as numbness.  Currently, she reports that these neurologic symptoms have fully resolved and she feels at her baseline.  She denies any current numbness or tingling, weakness, chest pain, or headache.    PHYSICAL EXAM  ED Triage Vitals [08/10/22 1255]   Temp Heart Rate Resp BP SpO2   97.4 °F (36.3 °C) 84 16 162/88 97 %      Temp src Heart Rate Source Patient Position BP Location FiO2 (%)   Tympanic Monitor Sitting Right arm --         GENERAL: Resting comfortably and in no acute distress, nontoxic in appearance  HENT: nares patent  EYES: no scleral icterus  CV: regular rhythm, normal rate, no M/R/G  RESPIRATORY: normal effort, lungs clear bilaterally  ABDOMEN: soft, nontender, no rebound or guarding  MUSCULOSKELETAL: no deformity, no edema  NEURO: alert, moves all extremities, follows commands  PSYCH:  calm, cooperative  SKIN: warm, dry    Vital signs and nursing notes reviewed.        Plan: The patient had essentially unremarkable CTAs of the head and neck in the ED.  In the observation unit, we will obtain an MRI of her brain and asked neurology to see in consultation.  We will monitor and reassess following.

## 2022-08-11 NOTE — CASE MANAGEMENT/SOCIAL WORK
Per info from RN, spoke w/ patient and sons Demetris and Camilo Bar and they are all agreeable to short-term rehab. Gave/explained RTR and will follow up. Requested referrals to [facility] and [facility]. Referrals sent and updated each liasion

## 2022-08-11 NOTE — PLAN OF CARE
Goal Outcome Evaluation:              Outcome Evaluation: patient left facility at this time via private vehicle going to [facility]  for continuation of care and family providing transportation, report called to facility and spoke with Laura ALAS, patient stable at the time of discharge, vss, no questions at the time of discharge.

## 2022-08-11 NOTE — CONSULTS
"Neurology Consult Note    Consult Date: 8/11/2022    Referring MD: Maximo Calloway MD    Reason for Consult I have been asked to see the patient in neurological consultation to render advice and opinion regarding left leg weakness and numbness    Wendie Houser is a 87 y.o. female with known lumbosacral radiculopathy, CHF, diabetes, hypertension, hyperlipidemia.  She was brought into the hospital yesterday for left leg weakness.  She was sitting on the toilet and then had difficulty getting up because her left leg was \"dead and numb\".  She ultimately was able to struggle onto her Rollator walker and drag her leg down the hallway.  She then laid down on the couch and woke up several hours later with resolution of symptoms.  Left leg strength has been normal since that time.  She endorses chronic low back pain without any significant change recently.    Past Medical History:   Diagnosis Date   • Anemia    • Arthritis    • CHF (congestive heart failure) (HCC)    • Congestive heart failure (HCC)    • Depression    • Diabetes mellitus (HCC)     type 2   • Diverticulitis of colon 08/25/2016    Acute uncomplicated diverticulitis at the distal transverse colon   • Early onset Alzheimer's dementia (HCC)    • GERD (gastroesophageal reflux disease)    • Heart disease    • Hyperlipidemia    • Hypertension    • Hypothyroidism    • Kidney disease    • Mixed anxiety depressive disorder 10/23/2018   • Panic attack 7/27/2021   • Sleep apnea        ROS:  No fevers, chills  + Weakness, numbness    Exam  /53 (BP Location: Left arm, Patient Position: Lying) Comment (Patient Position): on right side  Pulse 69   Temp 98.2 °F (36.8 °C) (Oral)   Resp 16   Ht 152.4 cm (60\")   Wt 72.6 kg (160 lb)   SpO2 97%   BMI 31.25 kg/m²   Gen: NAD, vitals reviewed  MS: oriented x3, recent/remote memory intact, normal attention/concentration, language intact, no neglect.  CN: visual acuity grossly normal, PERRL, EOMI, no facial droop, " no dysarthria  Motor: 5/5 throughout upper and lower extremities, normal tone  Sensory: Diminished cold temperature sensation left leg in L4, L5 and S1 distributions  Reflexes: Absent bilateral lower extremities    DATA:    Lab Results   Component Value Date    GLUCOSE 100 (H) 08/11/2022    CALCIUM 8.7 08/11/2022     08/11/2022    K 4.3 08/11/2022    CO2 28.3 08/11/2022     08/11/2022    BUN 8 08/11/2022    CREATININE 0.78 08/11/2022    EGFRIFAFRI 64 10/29/2021    EGFRIFNONA 55 (L) 10/29/2021    BCR 10.3 08/11/2022    ANIONGAP 8.7 08/11/2022     Lab Results   Component Value Date    WBC 9.13 08/11/2022    HGB 11.6 (L) 08/11/2022    HCT 36.1 08/11/2022    MCV 82.8 08/11/2022     08/11/2022       Lab review: CBC, BMP unremarkable    Imaging review: I personally reviewed her CTA of the head and neck and an MRI of the brain performed overnight which showed no acute findings.  Radiology report reviewed.    Diagnoses:  Lumbosacral radiculopathy at L4-S1, left    Comment: Exacerbation of underlying lumbosacral radiculopathy due to degenerative disc disease.  With her good motor exam she would not really be a surgical candidate.  I would recommend referral for physical therapy    PLAN:  PT for lumbosacral radiculopathy    No further neurologic work-up needed

## 2022-08-11 NOTE — PROGRESS NOTES
[facility]     Progress Note    Name: Wendie Houser ADMIT: 8/10/2022   : 1935  PCP: Rossi Rivera APRN    MRN: [id number] LOS: 0 days   AGE/SEX: 87 y.o. female  ROOM: [facility]/1     Subjective   Subjective     Subjective   Patient reports lower extremity weakness upon waking yesterday morning. She was seen by neurology and cleared for discharge with PT recommendations for rehab placement.     Review of Systems   Neurological: Positive for weakness.   All other systems reviewed and are negative.         Objective   Objective     Objective   Vital Signs  Temp:  [97.4 °F (36.3 °C)-98.6 °F (37 °C)] 98.3 °F (36.8 °C)  Heart Rate:  [64-92] 64  Resp:  [16-18] 16  BP: (107-185)/(51-96) 125/55  SpO2:  [97 %-100 %] 97 %  on   ;   Device (Oxygen Therapy): room air  Body mass index is 31.25 kg/m².  Physical Exam  Vitals and nursing note reviewed.   Constitutional:       General: She is not in acute distress.     Appearance: Normal appearance. She is well-developed. She is obese. She is not diaphoretic.   HENT:      Head: Normocephalic and atraumatic.      Nose: Nose normal.      Mouth/Throat:      Mouth: Mucous membranes are moist.   Eyes:      Pupils: Pupils are equal, round, and reactive to light.   Cardiovascular:      Rate and Rhythm: Normal rate and regular rhythm.      Pulses: Normal pulses.      Heart sounds: Normal heart sounds.   Pulmonary:      Effort: Pulmonary effort is normal. No respiratory distress.      Breath sounds: Normal breath sounds.   Abdominal:      General: Abdomen is flat. Bowel sounds are normal. There is no distension.      Palpations: Abdomen is soft.   Musculoskeletal:         General: No swelling. Normal range of motion.      Cervical back: Normal range of motion and neck supple.   Skin:     General: Skin is warm and dry.      Findings: No erythema.   Neurological:      General: No focal deficit present.      Mental Status: She is alert and oriented to person, place, and time.  Mental status is at baseline.   Psychiatric:         Mood and Affect: Mood normal.         Behavior: Behavior normal.         Thought Content: Thought content normal.         Judgment: Judgment normal.         Results Review     I reviewed the patient's new clinical results.  Results from last 7 days   Lab Units 08/11/22  0538 08/10/22  1442   WBC 10*3/mm3 9.13 7.97   HEMOGLOBIN g/dL 11.6* 12.2   PLATELETS 10*3/mm3 220 230     Results from last 7 days   Lab Units 08/11/22  0538 08/10/22  1442   SODIUM mmol/L 141 141   POTASSIUM mmol/L 4.3 3.0*   CHLORIDE mmol/L 104 101   CO2 mmol/L 28.3 25.6   BUN mg/dL 8 8   CREATININE mg/dL 0.78 0.82   GLUCOSE mg/dL 100* 104*   Estimated Creatinine Clearance: 46.9 mL/min (by C-G formula based on SCr of 0.78 mg/dL).  Results from last 7 days   Lab Units 08/10/22  1442   ALBUMIN g/dL 3.80   BILIRUBIN mg/dL 0.3   ALK PHOS U/L 91   AST (SGOT) U/L 17   ALT (SGPT) U/L 7     Results from last 7 days   Lab Units 08/11/22  0538 08/10/22  1442   CALCIUM mg/dL 8.7 8.7   ALBUMIN g/dL  --  3.80   MAGNESIUM mg/dL  --  1.8       COVID19   Date Value Ref Range Status   08/10/2022 Not Detected Not Detected - Ref. Range Final   06/05/2022 Not Detected Not Detected - Ref. Range Final     Hemoglobin A1C   Date/Time Value Ref Range Status   08/11/2022 0538 6.10 (H) 4.80 - 5.60 % Final     Glucose   Date/Time Value Ref Range Status   08/11/2022 1130 124 70 - 130 mg/dL Final     Comment:     Meter: [id number] : [id number] Yaneli Jenifferroberto CABAN   08/11/2022 0604 92 70 - 130 mg/dL Final     Comment:     Meter: [id number] : [id number] [facility]       MRI Brain Without Contrast  Narrative: BRAIN MRI WITHOUT CONTRAST     HISTORY: Transient ischemic attack (TIA); R29.90-Unspecified symptoms  and signs involving the nervous system; M79.605-Pain in left leg;  R20.2-Paresthesia of skin; I10-Essential (primary) hypertension;  E87.6-Hypokalemia     COMPARISON: 08/10/2022.     FINDINGS:  Multiplanar  images of the head were obtained without  gadolinium. Images are degraded by motion artifact. No areas of  restricted diffusion are seen to suggest acute infarct. There is  atrophy. There is periventricular and deep white matter microangiopathic  change. There is no midline shift or mass effect. No abnormality is seen  on gradient echo imaging. The patient does have encephalomalacia within  the left cerebellar hemisphere. Mild mucosal thickening is seen within  the ethmoid sinuses.     Impression: 1. No acute intracranial abnormality.     This report was finalized on 8/10/2022 10:03 PM by Dr. Sonya Osorio M.D.     CT Angiogram Head, CT Angiogram Neck  Narrative: CT ANGIOGRAM HEAD-, CT ANGIOGRAM NECK-     INDICATIONS: Left leg numbness and weakness, possible stroke     TECHNIQUE: Radiation dose reduction techniques were utilized, including  automated exposure control and exposure modulation based on body  size.Noncontrast head CT was performed, followed by enhanced CT  angiography of the head and neck. Three-dimensional reconstructions were  created, reviewed, and assessed according to NASCET criteria.     COMPARISON: None available     FINDINGS:     No acute intracranial hemorrhage, midline shift or mass effect. No acute  territorial infarct is identified.     Mild periventricular hypodensity suggests chronic small vessel ischemic  change in a patient this age.     No enhancing lesions are noted following contrast material  administration.     Ventricles, cisterns, cerebral sulci are unremarkable for patient age.     The visualized paranasal sinuses, orbits, mastoid air cells are  unremarkable.      The CT angiography images show no hemodynamically significant focal  stenosis, aneurysm, or dissection in the cervical carotid or vertebral  arteries, or in the arteries at the base of the brain.     Bilateral thyroid nodularity is present, suboptimally evaluated with  this technique, thyroid ultrasound correlation  could be obtained as  indicated.     Facet and uncovertebral joint hypertrophy contribute to multilevel  bilateral neuroforaminal narrowing. Disc osteophyte complex appears to  result in moderate central stenosis at C3/4, C4/5, C5/6, C6/7.           Impression: 1. No hemodynamically significant focal stenosis, aneurysm, or  dissection in the cervical carotid or vertebral arteries or in the  arteries at the base of the brain.     2. No acute intracranial hemorrhage or hydrocephalus. No enhancing  lesion in brain. If there is further clinical concern, MRI could be  considered for further evaluation.     3. Thyroid nodularity.     This report was finalized on 8/10/2022 5:24 PM by Dr. Kun Foley M.D.     XR Chest 1 View  Narrative: XR CHEST 1 VW-     HISTORY: Female who is 87 years-old,  possible stroke     TECHNIQUE: Frontal view of the chest     COMPARISON: 06/05/2022     FINDINGS: The heart size is normal. Aorta is tortuous. Pulmonary  vasculature is unremarkable. No focal pulmonary consolidation, pleural  effusion, or pneumothorax. Elevation of the right hemidiaphragm remains.  No acute osseous process.     Impression: No focal pulmonary consolidation. Tortuous aorta. Follow-up  as clinically indicated.     This report was finalized on 8/10/2022 2:47 PM by Dr. Kun Foley M.D.       Scheduled Medications  aspirin, 81 mg, Oral, Daily  donepezil, 10 mg, Oral, Nightly  insulin lispro, 0-7 Units, Subcutaneous, TID AC  losartan, 25 mg, Oral, BID  pantoprazole, 40 mg, Oral, QAM  pravastatin, 40 mg, Oral, Nightly  sodium chloride, 10 mL, Intravenous, Q12H    Infusions   Diet  NPO Diet NPO Type: Strict NPO         Assessment/Plan   Assessment / Plan       Active Hospital Problems    Diagnosis  POA   • Weakness of left lower extremity [R29.898]  Yes      Resolved Hospital Problems   No resolved problems to display.       87 y.o. female admitted with weakness of left lower extremity to observation unit for  further workup.     Left lower extremity weakness and paresthesia  -CT work-up  -CTA head neck negative acute  -MRI brain negative  -Neurology consulted, see their note  -Aspirin/statin  -Lipid/hemoglobin A1c  -Monitor     Diabetes  -non- insulin dependent   -Accu-Checks 3 times daily with meals  -Moderate dose correctional factor with hypoglycemic protocol  -Check hemoglobin A1c      Hypertension/lipidemia/GERD  -Chronic conditions, no new issues  -Continue medication as prescribed     Dementia  -Continue Aricept     Chronic pain  -Continue medications as prescribed      · SCDs for DVT prophylaxis.  · Full code.  · Discussed with patient.  · Anticipate discharge today.    This progress note will additionally serve as discharge summary.     MARK Gates  [facility]  08/11/22  11:54 EDT

## 2022-08-11 NOTE — DISCHARGE PLACEMENT REQUEST
"Artur Houser (87 y.o. Female)             Date of Birth   1935    Social Security Number       Address   [address] [place] [address]    Home Phone   [phone]    MRN   [id number]       Christianity   Non-Adventism    Marital Status                               Admission Date   8/10/22    Admission Type   Emergency    Admitting Provider   Maximo Calloway MD    Attending Provider   Maximo Calloway MD    Department, Room/Bed   [facility] OBSERVATION, 108/1       Discharge Date       Discharge Disposition       Discharge Destination                               Attending Provider: Maximo Calloway MD    Allergies: No Known Allergies    Isolation: None   Infection: None   Code Status: CPR   Advance Care Planning Activity    Ht: 152.4 cm (60\")   Wt: 72.6 kg (160 lb)    Admission Cmt: None   Principal Problem: None                Active Insurance as of 8/10/2022     Primary Coverage     Payor Plan Insurance Group Employer/Plan Group    MEDICARE MEDICARE A & B      Payor Plan Address Payor Plan Phone Number Payor Plan Fax Number Effective Dates    [address] [phone]  3/1/1993 - None Entered    [facility] [id number]       Subscriber Name Subscriber Birth Date Member ID       ARTUR HOUSER 1935 [id number]           Secondary Coverage     Payor Plan Insurance Group Employer/Plan Group    ANTHEM BLUE CROSS ANTHEM BLUE CROSS BLUE SHIELD PPO [id number]     Payor Plan Address Payor Plan Phone Number Payor Plan Fax Number Effective Dates    [address] [phone]  1/1/2011 - None Entered    [facility] [id number]       Subscriber Name Subscriber Birth Date Member ID       ARTUR HOUSER 1935 [id number]                 Emergency Contacts      (Rel.) Home Phone Work Phone Mobile Phone    Trish Bar (Daughter) [phone] -- [phone]    HEIDYJAMIE STOUT (Son) [phone] -- --          "

## 2022-08-11 NOTE — H&P
[facility]   HISTORY AND PHYSICAL    Patient Name: Wendie Houser  : 1935  MRN: [id number]  Primary Care Physician:  Rossi Rivera APRN  Date of admission: 8/10/2022    Subjective   Subjective     Chief Complaint:   Chief Complaint   Patient presents with   • Numbness         HPI:    Wendie Houser is a 87 y.o. female with chronic pain, dementia, hypertension, hyperlipidemia, diabetes, and GERD, who presented to the emergency department today complaining of left leg weakness.  Patient states she woke this morning around 7 AM and used her Rollator to go to the restroom.  While sitting on the commode for 5 or so minutes she developed numbness, tingling, and weakness in her left leg.  She states it felt limp.  She was able to get herself up and back to bed again using her Rollator.  She fell back asleep and woke up again around 10 AM with improved function of left leg with weakness resolved.  She states sensation improved as well but did still have some residual numbness and tingling so she presented to the emergency department.  Patient denies any other focal neurological deficit, denies facial droop or slurred speech.  Denies chest pain, shortness of breath, palpitations, nausea, vomiting, diarrhea, fever, chills, recent sick contacts.    ED evaluation reviewed, patient does have hypokalemia with potassium of 3.0, INR 1.12, laboratory evaluation otherwise within normal limits.  CTA head and neck are negative acute.    Review of Systems   All systems were reviewed and negative except for: What is discussed in the HPI    Personal History     Past Medical History:   Diagnosis Date   • Anemia    • Arthritis    • CHF (congestive heart failure) (HCC)    • Congestive heart failure (HCC)    • Depression    • Diabetes mellitus (HCC)     type 2   • Diverticulitis of colon 2016    Acute uncomplicated diverticulitis at the distal transverse colon   • Early onset Alzheimer's dementia (HCC)    • GERD  (gastroesophageal reflux disease)    • Heart disease    • Hyperlipidemia    • Hypertension    • Hypothyroidism    • Kidney disease    • Mixed anxiety depressive disorder 10/23/2018   • Panic attack 7/27/2021   • Sleep apnea        Past Surgical History:   Procedure Laterality Date   • BREAST CYST EXCISION Right 1989    Benign   • COLONOSCOPY  2011    Dr. Cain   • ENDOSCOPY  04/15/2011   • EYE SURGERY  2009   • GALLBLADDER SURGERY  1960    Open   • TOTAL KNEE ARTHROPLASTY Left 2012    Dr. Soler   • VAGINAL HYSTERECTOMY  1999       Family History: family history includes Cancer in her brother; Heart attack in her brother and sister; Heart disease in her father and mother; Hypertension in her brother, mother, and sister; Other in her mother. Otherwise pertinent FHx was reviewed and not pertinent to current issue.    Social History:  reports that she has never smoked. She has never used smokeless tobacco. She reports that she does not drink alcohol and does not use drugs.    Home Medications:  HYDROcodone-acetaminophen, Lancets, Senna, acetaminophen, aspirin, cetirizine, cyclobenzaprine, donepezil, glucose blood, glucose monitor, losartan, multivitamin, omeprazole, and pravastatin    Allergies:  No Known Allergies    Objective   Objective     Vitals:   Temp:  [97.4 °F (36.3 °C)-98.6 °F (37 °C)] 98.6 °F (37 °C)  Heart Rate:  [84-92] 90  Resp:  [16-18] 18  BP: (151-185)/(63-96) 151/63  Physical Exam     GENERAL: 87 y.o. YO female a/o x 4, NAD  SKIN: Warm pink and dry   HEENT:  PERRL, EOM intact, conjunctiva normal, sclera clear  NECK: supple, no JVD  LUNGS: Clear to auscultation bilaterally without wheezes, rales or rhonchi.  No accessory muscle use and no nasal flaring.  CARDIAC:  Regular rate and rhythm, S1-S2.  No murmurs, rubs or gallops.  No peripheral edema.  Equal pulses bilaterally.  ABDOMEN: Soft, nontender, nondistended.  No guarding or rebound tenderness.  Normal bowel sounds.  MUSCULOSKELETAL: Moves all  extremities well.  No deformity.  NEURO: Cranial nerves II through XII grossly intact.  No gross focal deficits.  Alert.  Normal speech and motor.   strength out of 5 bilaterally, dorsiflexion plantarflexion 5 out of 5 bilaterally  PSYCH: Normal mood and affect    NIHSS:  0-->Alert: keenly responsive  0-->Answers both questions correctly  0-->Performs both tasks correctly  0=normal  0=No visual loss  0=Normal symmetric movement  0-->No drift: limb holds 90 (or 45) degrees for full 10 secs  0-->No drift: limb holds 90 (or 45) degrees for full 10 secs  0-->No drift: limb holds 90 (or 45) degrees for full 10 secs  0-->No drift: limb holds 90 (or 45) degrees for full 10 secs  0=Absent  1=Mild to moderate sensory loss; patient feels pinprick is less sharp or is dull on the affected side; there is a loss of superficial pain with pinprick but patient is aware She is being touched  0=No aphasia, normal  0=Normal  0=No abnormality  Total score: 1      Result Review    Result Review:  I have personally reviewed the results from the time of this admission to 8/10/2022 20:33 EDT and agree with these findings:  [x]  Laboratory list / accordion  []  Microbiology  [x]  Radiology  []  EKG/Telemetry   []  Cardiology/Vascular   []  Pathology  []  Old records  []  Other:  Most notable findings include:     CT Angiogram Neck    Result Date: 8/10/2022  1. No hemodynamically significant focal stenosis, aneurysm, or dissection in the cervical carotid or vertebral arteries or in the arteries at the base of the brain.  2. No acute intracranial hemorrhage or hydrocephalus. No enhancing lesion in brain. If there is further clinical concern, MRI could be considered for further evaluation.  3. Thyroid nodularity.  This report was finalized on 8/10/2022 5:24 PM by Dr. Kun Foley M.D.      XR Chest 1 View    Result Date: 8/10/2022  No focal pulmonary consolidation. Tortuous aorta. Follow-up as clinically indicated.  This report was  finalized on 8/10/2022 2:47 PM by Dr. Kun Foley M.D.      CT Angiogram Head    Result Date: 8/10/2022  1. No hemodynamically significant focal stenosis, aneurysm, or dissection in the cervical carotid or vertebral arteries or in the arteries at the base of the brain.  2. No acute intracranial hemorrhage or hydrocephalus. No enhancing lesion in brain. If there is further clinical concern, MRI could be considered for further evaluation.  3. Thyroid nodularity.  This report was finalized on 8/10/2022 5:24 PM by Dr. Kun Foley M.D.            Assessment & Plan   Assessment / Plan     Brief Patient Summary:  Wendie Houser is a 87 y.o. female who is being admitted to observation unit for further evaluation of left lower extremity weakness.  Active Hospital Problems:  Active Hospital Problems    Diagnosis    • Weakness of left lower extremity      Plan:     Left lower extremity weakness and paresthesia  -CT work-up  -CTA head neck negative acute  -MRI brain pending  -Consult neurology  -Aspirin/statin  -Lipid/hemoglobin A1c  -Monitor    Diabetes  -non- insulin dependent   -Accu-Checks 3 times daily with meals  -Moderate dose correctional factor with hypoglycemic protocol  -Check hemoglobin A1c     Hypertension/lipidemia/GERD  -Chronic conditions, no new issues  -Continue medication as prescribed    Dementia  -Continue Aricept    Chronic pain  -Continue medications as prescribed      DVT prophylaxis:  Mechanical DVT prophylaxis orders are present.    CODE STATUS:    Code Status (Patient has no pulse and is not breathing): CPR (Attempt to Resuscitate)  Medical Interventions (Patient has pulse or is breathing): Full Support    Admission Status:  I believe this patient meets observation status.    I wore an N95 mask, face shield, and gloves during this patient encounter. Patient also wearing a surgical mask throughout encounter. Hand hygeine performed before and after seeing the patient.    Electronically  signed by TASHI Jett, 08/10/22, 8:33 PM EDT.

## 2022-08-11 NOTE — PROGRESS NOTES
I supervised care provided by the midlevel provider.    We have discussed this patient's history, physical exam, and treatment plan.   I have reviewed the note and personally saw and examined the patient and agree with the plan of care.      Patient admitted to the observation unit for left-sided weakness and numbness.  Symptoms are completely resolved.  Patient has had MRI that was negative.  Patient states she feels significantly better.  Has had no chest pain or shortness of breath.  Has been seen by neurology here.      On exam patient is alert cooperative in no distress.  Patient's neck is nontender.  Patient's heart is regular rate and rhythm lungs are clear bilaterally.  Patient's abdomen soft and nontender.  Patient has normal speech.  Patient has good strength and sensation in both lower extremities.            Plan is awaiting neurology recommendation.

## 2022-08-11 NOTE — PLAN OF CARE
Goal Outcome Evaluation:               Pt new to OBS overnight from ED with co left leg numbness/tingling after sitting on toilet for too long. Scans/labs/MRI wdl. Neuro on board and monitoring, symptoms all resolved now. ED states pt agitated and uncooperative at times. Pt extremely pleasant in obs however. Vitals stable, hypokalemic but PO K+ given throughout night.

## 2022-08-11 NOTE — DISCHARGE PLACEMENT REQUEST
"Artur Houser (87 y.o. Female)             Date of Birth   1935    Social Security Number       Address   [address] [place] [address]    Home Phone   [phone]    MRN   [id number]       Anglican   Non-Judaism    Marital Status                               Admission Date   8/10/22    Admission Type   Emergency    Admitting Provider   Maximo Calloway MD    Attending Provider   Maximo Calloway MD    Department, Room/Bed   [facility] OBSERVATION, 108/1       Discharge Date       Discharge Disposition       Discharge Destination                               Attending Provider: Maximo Calloway MD    Allergies: No Known Allergies    Isolation: None   Infection: None   Code Status: CPR   Advance Care Planning Activity    Ht: 152.4 cm (60\")   Wt: 72.6 kg (160 lb)    Admission Cmt: None   Principal Problem: None                Active Insurance as of 8/10/2022     Primary Coverage     Payor Plan Insurance Group Employer/Plan Group    MEDICARE MEDICARE A & B      Payor Plan Address Payor Plan Phone Number Payor Plan Fax Number Effective Dates    [address] [phone]  3/1/1993 - None Entered    [facility] [address]       Subscriber Name Subscriber Birth Date Member ID       ARTUR HOUSER 1935 [id number]           Secondary Coverage     Payor Plan Insurance Group Employer/Plan Group    ANTHEM BLUE CROSS ANTHEM BLUE CROSS BLUE SHIELD PPO [id number]     Payor Plan Address Payor Plan Phone Number Payor Plan Fax Number Effective Dates    [address] [phone]  1/1/2011 - None Entered    [facility] [id number]       Subscriber Name Subscriber Birth Date Member ID       ARTUR HOUSER 1935 [id number]                 Emergency Contacts      (Rel.) Home Phone Work Phone Mobile Phone    Trish Bar (Daughter) [phone] -- [phone]    HEIDYJAMIE STOUT (Son) [phone] -- --            "

## 2022-08-22 DIAGNOSIS — E11.9 CONTROLLED TYPE 2 DIABETES MELLITUS WITHOUT COMPLICATION, WITHOUT LONG-TERM CURRENT USE OF INSULIN: ICD-10-CM

## 2022-08-22 DIAGNOSIS — E78.5 HYPERLIPIDEMIA, UNSPECIFIED HYPERLIPIDEMIA TYPE: ICD-10-CM

## 2022-08-22 DIAGNOSIS — M19.90 ARTHRITIS: ICD-10-CM

## 2022-08-22 DIAGNOSIS — M25.50 GENERALIZED JOINT PAIN: ICD-10-CM

## 2022-08-22 DIAGNOSIS — F02.80 EARLY ONSET ALZHEIMER'S DEMENTIA WITHOUT BEHAVIORAL DISTURBANCE: ICD-10-CM

## 2022-08-22 DIAGNOSIS — I10 HYPERTENSION, UNSPECIFIED TYPE: ICD-10-CM

## 2022-08-22 DIAGNOSIS — K21.9 GASTROESOPHAGEAL REFLUX DISEASE WITHOUT ESOPHAGITIS: ICD-10-CM

## 2022-08-22 DIAGNOSIS — J06.9 ACUTE URI: ICD-10-CM

## 2022-08-22 DIAGNOSIS — G30.0 EARLY ONSET ALZHEIMER'S DEMENTIA WITHOUT BEHAVIORAL DISTURBANCE: ICD-10-CM

## 2022-08-22 NOTE — TELEPHONE ENCOUNTER
Caller: HEIDYJADE     Relationship: SON     Best call back number: [phone]    Requested Prescriptions:   Requested Prescriptions     Pending Prescriptions Disp Refills   • acetaminophen (TYLENOL) 500 MG tablet       Sig: Take 1.5 tablets by mouth.   • aspirin (aspirin) 81 MG EC tablet 90 tablet 1     Sig: Take 1 tablet by mouth Daily.   • cetirizine (zyrTEC) 10 MG tablet 30 tablet 2     Sig: Take 0.5 tablets by mouth Daily.   • cyclobenzaprine (FLEXERIL) 5 MG tablet 30 tablet 5     Sig: Take 1 tablet by mouth 3 (Three) Times a Day As Needed for Muscle Spasms. for muscle spams   • donepezil (ARICEPT) 10 MG tablet 90 tablet 1     Sig: Take 1 tablet by mouth every night at bedtime.   • HYDROcodone-acetaminophen (NORCO) 5-325 MG per tablet 40 tablet 0     Sig: Take 1 tablet by mouth 2 (Two) Times a Day As Needed for Moderate Pain .   • multivitamin (MULTI VITAMIN DAILY PO) 30 tablet      Sig: Take  by mouth.   • omeprazole (priLOSEC) 20 MG capsule 90 capsule 3     Sig: Take 1 capsule by mouth Daily.   • pravastatin (PRAVACHOL) 40 MG tablet 90 tablet 3     Sig: Take 1 tablet by mouth Every Night.   • Sennosides (Senna) 8.6 MG capsule       Sig: Take  by mouth.   • losartan (COZAAR) 50 MG tablet 90 tablet 3     Sig: Take 0.5 tablets by mouth 2 (Two) Times a Day.        Pharmacy where request should be sent: [id number] [address] AT [facility] [address] [facility] [facility] [phone] [facility] [phone] FX       Does the patient have less than a 3 day supply:  [x] Yes  [] No    Marcin Mcdonald Rep   08/22/22 11:07 EDT

## 2022-08-23 RX ORDER — ASPIRIN 81 MG/1
81 TABLET ORAL DAILY
Qty: 90 TABLET | Refills: 1 | Status: SHIPPED | OUTPATIENT
Start: 2022-08-23

## 2022-08-23 RX ORDER — HYDROCODONE BITARTRATE AND ACETAMINOPHEN 5; 325 MG/1; MG/1
1 TABLET ORAL 2 TIMES DAILY PRN
Qty: 40 TABLET | Refills: 0 | Status: SHIPPED | OUTPATIENT
Start: 2022-08-23

## 2022-08-23 RX ORDER — CETIRIZINE HYDROCHLORIDE 10 MG/1
5 TABLET ORAL DAILY
Qty: 30 TABLET | Refills: 2 | Status: SHIPPED | OUTPATIENT
Start: 2022-08-23

## 2022-08-23 RX ORDER — CYCLOBENZAPRINE HCL 5 MG
5 TABLET ORAL 3 TIMES DAILY PRN
Qty: 30 TABLET | Refills: 3 | Status: SHIPPED | OUTPATIENT
Start: 2022-08-23

## 2022-08-23 RX ORDER — DONEPEZIL HYDROCHLORIDE 10 MG/1
10 TABLET, FILM COATED ORAL
Qty: 90 TABLET | Refills: 2 | Status: SHIPPED | OUTPATIENT
Start: 2022-08-23

## 2022-08-23 RX ORDER — SENNOSIDES 8.6 MG
1 CAPSULE ORAL DAILY
Qty: 90 EACH | Refills: 1 | Status: SHIPPED | OUTPATIENT
Start: 2022-08-23

## 2022-08-23 RX ORDER — OMEPRAZOLE 20 MG/1
20 CAPSULE, DELAYED RELEASE ORAL DAILY
Qty: 90 CAPSULE | Refills: 3 | Status: SHIPPED | OUTPATIENT
Start: 2022-08-23

## 2022-08-23 RX ORDER — ACETAMINOPHEN 500 MG
500 TABLET ORAL 2 TIMES DAILY PRN
Qty: 120 TABLET | Refills: 1 | Status: SHIPPED | OUTPATIENT
Start: 2022-08-23

## 2022-08-23 RX ORDER — DIPHENOXYLATE HYDROCHLORIDE AND ATROPINE SULFATE 2.5; .025 MG/1; MG/1
1 TABLET ORAL DAILY
Qty: 90 TABLET | Refills: 3 | Status: SHIPPED | OUTPATIENT
Start: 2022-08-23

## 2022-08-23 RX ORDER — PRAVASTATIN SODIUM 40 MG
40 TABLET ORAL NIGHTLY
Qty: 90 TABLET | Refills: 3 | Status: SHIPPED | OUTPATIENT
Start: 2022-08-23

## 2022-08-23 RX ORDER — LOSARTAN POTASSIUM 50 MG/1
25 TABLET ORAL 2 TIMES DAILY
Qty: 90 TABLET | Refills: 3
Start: 2022-08-23

## 2022-09-06 ENCOUNTER — TELEPHONE (OUTPATIENT)
Dept: FAMILY MEDICINE CLINIC | Facility: CLINIC | Age: 87
End: 2022-09-06

## 2022-09-06 NOTE — TELEPHONE ENCOUNTER
Caller: LUI    Relationship:[facility]     Best call back number: [phone]    What orders are you requesting (i.e. lab or imaging): HOME HEALTH PHYSICAL THERAPY, OCCUPATIONAL THERAPY AND SKILLED NURSING    In what timeframe would the patient need to come in:     Where will you receive your lab/imaging services:     Additional notes: LUI WITH [facility] IS CALLING IN TO GET VERBAL ORDERS FOR THE PATIENT.

## 2022-09-12 ENCOUNTER — TELEPHONE (OUTPATIENT)
Dept: FAMILY MEDICINE CLINIC | Facility: CLINIC | Age: 87
End: 2022-09-12

## 2022-09-23 ENCOUNTER — OUTSIDE FACILITY SERVICE (OUTPATIENT)
Dept: FAMILY MEDICINE CLINIC | Facility: CLINIC | Age: 87
End: 2022-09-23

## 2022-09-23 PROCEDURE — G0180 MD CERTIFICATION HHA PATIENT: HCPCS | Performed by: NURSE PRACTITIONER

## 2022-09-26 ENCOUNTER — TELEPHONE (OUTPATIENT)
Dept: FAMILY MEDICINE CLINIC | Facility: CLINIC | Age: 87
End: 2022-09-26

## 2022-09-26 NOTE — TELEPHONE ENCOUNTER
Agree patient to continue 25 mg twice daily, monitor blood pressure and home health RN to update with results.

## 2022-09-26 NOTE — TELEPHONE ENCOUNTER
[place] nurse called and said patient f/p 150/100 patient stated taking losartan 50 mg 1/2 tab once daily instead of twice daily which is what was supposed to be doing. So Flory said have patient take 1/2 tab twice daily and montior b/p and pulse and f/u

## 2022-10-31 ENCOUNTER — TELEPHONE (OUTPATIENT)
Dept: FAMILY MEDICINE CLINIC | Facility: CLINIC | Age: 87
End: 2022-10-31

## 2022-10-31 NOTE — TELEPHONE ENCOUNTER
Caller: RHONDA    Relationship: HOME HEALTH    Best call back number: [phone]    Who are you requesting to speak with (clinical staff, provider,  specific staff member): CLINCIAL    What was the call regarding: PATIENT IS GETTING DISMISSED FROM HOME HEALTH, ALL NEEDS HAVE BEEN MET.     Do you require a callback: IF NEEDED

## 2022-11-22 ENCOUNTER — TRANSCRIBE ORDERS (OUTPATIENT)
Dept: ADMINISTRATIVE | Facility: HOSPITAL | Age: 87
End: 2022-11-22

## 2022-11-22 DIAGNOSIS — R01.1 HEART MURMUR: Primary | ICD-10-CM

## 2022-12-20 ENCOUNTER — APPOINTMENT (OUTPATIENT)
Dept: CARDIOLOGY | Facility: HOSPITAL | Age: 87
End: 2022-12-20

## 2023-01-17 ENCOUNTER — HOSPITAL ENCOUNTER (OUTPATIENT)
Dept: CARDIOLOGY | Facility: HOSPITAL | Age: 88
Discharge: HOME | End: 2023-01-17
Admitting: NURSE PRACTITIONER
Payer: MEDICARE

## 2023-01-17 VITALS
WEIGHT: 158.73 LBS | BODY MASS INDEX: 31.16 KG/M2 | SYSTOLIC BLOOD PRESSURE: 141 MMHG | DIASTOLIC BLOOD PRESSURE: 50 MMHG | HEIGHT: 60 IN | HEART RATE: 100 BPM

## 2023-01-17 DIAGNOSIS — R01.1 HEART MURMUR: ICD-10-CM

## 2023-01-17 LAB
AORTIC DIMENSIONLESS INDEX: 0.9 (DI)
ASCENDING AORTA: 3 CM
BH CV ECHO MEAS - ACS: 1.23 CM
BH CV ECHO MEAS - AO MAX PG: 5.2 MMHG
BH CV ECHO MEAS - AO MEAN PG: 2.9 MMHG
BH CV ECHO MEAS - AO ROOT DIAM: 2.21 CM
BH CV ECHO MEAS - AO V2 MAX: 114 CM/SEC
BH CV ECHO MEAS - AO V2 VTI: 19.2 CM
BH CV ECHO MEAS - AVA(I,D): 2.25 CM2
BH CV ECHO MEAS - EDV(CUBED): 98.9 ML
BH CV ECHO MEAS - EDV(MOD-SP2): 93 ML
BH CV ECHO MEAS - EDV(MOD-SP4): 67 ML
BH CV ECHO MEAS - EF(MOD-BP): 51.1 %
BH CV ECHO MEAS - EF(MOD-SP2): 51.6 %
BH CV ECHO MEAS - EF(MOD-SP4): 52.2 %
BH CV ECHO MEAS - ESV(CUBED): 36 ML
BH CV ECHO MEAS - ESV(MOD-SP2): 45 ML
BH CV ECHO MEAS - ESV(MOD-SP4): 32 ML
BH CV ECHO MEAS - FS: 28.6 %
BH CV ECHO MEAS - IVS/LVPW: 0.93 CM
BH CV ECHO MEAS - IVSD: 0.86 CM
BH CV ECHO MEAS - LAT PEAK E' VEL: 6.7 CM/SEC
BH CV ECHO MEAS - LV MASS(C)D: 137 GRAMS
BH CV ECHO MEAS - LV MAX PG: 3.8 MMHG
BH CV ECHO MEAS - LV MEAN PG: 1.74 MMHG
BH CV ECHO MEAS - LV V1 MAX: 98.1 CM/SEC
BH CV ECHO MEAS - LV V1 VTI: 17.1 CM
BH CV ECHO MEAS - LVIDD: 4.6 CM
BH CV ECHO MEAS - LVIDS: 3.3 CM
BH CV ECHO MEAS - LVOT AREA: 2.5 CM2
BH CV ECHO MEAS - LVOT DIAM: 1.79 CM
BH CV ECHO MEAS - LVPWD: 0.92 CM
BH CV ECHO MEAS - MED PEAK E' VEL: 4.7 CM/SEC
BH CV ECHO MEAS - MV A DUR: 0.12 SEC
BH CV ECHO MEAS - MV A MAX VEL: 120.4 CM/SEC
BH CV ECHO MEAS - MV DEC SLOPE: 327.1 CM/SEC2
BH CV ECHO MEAS - MV DEC TIME: 0.26 MSEC
BH CV ECHO MEAS - MV E MAX VEL: 66.3 CM/SEC
BH CV ECHO MEAS - MV E/A: 0.55
BH CV ECHO MEAS - MV MAX PG: 7.5 MMHG
BH CV ECHO MEAS - MV MEAN PG: 2.6 MMHG
BH CV ECHO MEAS - MV P1/2T: 69.3 MSEC
BH CV ECHO MEAS - MV V2 VTI: 23.1 CM
BH CV ECHO MEAS - MVA(P1/2T): 3.2 CM2
BH CV ECHO MEAS - MVA(VTI): 1.87 CM2
BH CV ECHO MEAS - PA ACC TIME: 0.05 SEC
BH CV ECHO MEAS - PA PR(ACCEL): 55.2 MMHG
BH CV ECHO MEAS - PA V2 MAX: 81.4 CM/SEC
BH CV ECHO MEAS - PULM A REVS DUR: 0.12 SEC
BH CV ECHO MEAS - PULM A REVS VEL: 49.7 CM/SEC
BH CV ECHO MEAS - PULM DIAS VEL: 41.7 CM/SEC
BH CV ECHO MEAS - PULM S/D: 1.12
BH CV ECHO MEAS - PULM SYS VEL: 46.8 CM/SEC
BH CV ECHO MEAS - QP/QS: 0.74
BH CV ECHO MEAS - RAP SYSTOLE: 3 MMHG
BH CV ECHO MEAS - RV MAX PG: 3.3 MMHG
BH CV ECHO MEAS - RV V1 MAX: 90.7 CM/SEC
BH CV ECHO MEAS - RV V1 VTI: 11.2 CM
BH CV ECHO MEAS - RVOT DIAM: 1.91 CM
BH CV ECHO MEAS - RVSP: 20 MMHG
BH CV ECHO MEAS - SV(LVOT): 43.2 ML
BH CV ECHO MEAS - SV(MOD-SP2): 48 ML
BH CV ECHO MEAS - SV(MOD-SP4): 35 ML
BH CV ECHO MEAS - SV(RVOT): 32.1 ML
BH CV ECHO MEAS - TAPSE (>1.6): 1.5 CM
BH CV ECHO MEAS - TR MAX PG: 16.8 MMHG
BH CV ECHO MEAS - TR MAX VEL: 205 CM/SEC
BH CV ECHO MEASUREMENTS AVERAGE E/E' RATIO: 11.63
BH CV XLRA - RV BASE: 2.9 CM
BH CV XLRA - RV LENGTH: 5.7 CM
BH CV XLRA - RV MID: 3 CM
BH CV XLRA - TDI S': 16.2 CM/SEC
LEFT ATRIUM VOLUME INDEX: 20.6 ML/M2
MAXIMAL PREDICTED HEART RATE: 135 BPM
SINUS: 2.8 CM
STJ: 2.41 CM
STRESS TARGET HR: 115 BPM

## 2023-01-17 PROCEDURE — 93306 TTE W/DOPPLER COMPLETE: CPT | Performed by: INTERNAL MEDICINE

## 2023-01-17 PROCEDURE — 93306 TTE W/DOPPLER COMPLETE: CPT

## 2023-02-24 ENCOUNTER — TRANSCRIBE ORDERS (OUTPATIENT)
Dept: ADMINISTRATIVE | Facility: HOSPITAL | Age: 88
End: 2023-02-24
Payer: MEDICARE

## 2023-02-24 DIAGNOSIS — Z13.820 SCREENING FOR OSTEOPOROSIS: Primary | ICD-10-CM

## 2023-03-23 ENCOUNTER — TRANSCRIBE ORDERS (OUTPATIENT)
Dept: ADMINISTRATIVE | Facility: HOSPITAL | Age: 88
End: 2023-03-23
Payer: MEDICARE

## 2023-03-23 ENCOUNTER — HOSPITAL ENCOUNTER (OUTPATIENT)
Dept: GENERAL RADIOLOGY | Facility: HOSPITAL | Age: 88
Discharge: HOME | End: 2023-03-23
Payer: MEDICARE

## 2023-03-23 DIAGNOSIS — M25.552 HIP PAIN, BILATERAL: ICD-10-CM

## 2023-03-23 DIAGNOSIS — M25.551 RIGHT HIP PAIN: ICD-10-CM

## 2023-03-23 DIAGNOSIS — M25.551 HIP PAIN, BILATERAL: ICD-10-CM

## 2023-03-23 DIAGNOSIS — M17.11 OSTEOARTHRITIS OF RIGHT KNEE, UNSPECIFIED OSTEOARTHRITIS TYPE: ICD-10-CM

## 2023-03-23 DIAGNOSIS — M51.36 DEGENERATION OF LUMBAR INTERVERTEBRAL DISC: ICD-10-CM

## 2023-03-23 PROCEDURE — 73562 X-RAY EXAM OF KNEE 3: CPT

## 2023-03-23 PROCEDURE — 72114 X-RAY EXAM L-S SPINE BENDING: CPT

## 2023-03-23 PROCEDURE — 73502 X-RAY EXAM HIP UNI 2-3 VIEWS: CPT

## 2023-05-31 RX ORDER — CYCLOBENZAPRINE HCL 5 MG
5 TABLET ORAL 3 TIMES DAILY PRN
Qty: 30 TABLET | Refills: 3 | OUTPATIENT
Start: 2023-05-31

## 2023-08-22 ENCOUNTER — HOSPITAL ENCOUNTER (EMERGENCY)
Facility: HOSPITAL | Age: 88
Discharge: HOME | End: 2023-08-22
Attending: EMERGENCY MEDICINE | Admitting: EMERGENCY MEDICINE
Payer: MEDICARE

## 2023-08-22 ENCOUNTER — APPOINTMENT (OUTPATIENT)
Dept: CT IMAGING | Facility: HOSPITAL | Age: 88
End: 2023-08-22
Payer: MEDICARE

## 2023-08-22 VITALS
TEMPERATURE: 99 F | HEART RATE: 85 BPM | DIASTOLIC BLOOD PRESSURE: 63 MMHG | RESPIRATION RATE: 18 BRPM | OXYGEN SATURATION: 97 % | WEIGHT: 158 LBS | HEIGHT: 59 IN | BODY MASS INDEX: 31.85 KG/M2 | SYSTOLIC BLOOD PRESSURE: 131 MMHG

## 2023-08-22 DIAGNOSIS — E83.42 HYPOMAGNESEMIA: ICD-10-CM

## 2023-08-22 DIAGNOSIS — G89.29 OTHER CHRONIC PAIN: ICD-10-CM

## 2023-08-22 DIAGNOSIS — R19.7 DIARRHEA, UNSPECIFIED TYPE: Primary | ICD-10-CM

## 2023-08-22 LAB
ADV 40+41 DNA STL QL NAA+NON-PROBE: NOT DETECTED
ALBUMIN SERPL-MCNC: 4.1 G/DL (ref 3.5–5.2)
ALBUMIN/GLOB SERPL: 1.4 G/DL
ALP SERPL-CCNC: 82 U/L (ref 39–117)
ALT SERPL W P-5'-P-CCNC: 11 U/L (ref 1–33)
ANION GAP SERPL CALCULATED.3IONS-SCNC: 17 MMOL/L (ref 5–15)
AST SERPL-CCNC: 15 U/L (ref 1–32)
ASTRO TYP 1-8 RNA STL QL NAA+NON-PROBE: NOT DETECTED
BACTERIA UR QL AUTO: ABNORMAL /HPF
BASOPHILS # BLD AUTO: 0.04 10*3/MM3 (ref 0–0.2)
BASOPHILS NFR BLD AUTO: 0.5 % (ref 0–1.5)
BILIRUB SERPL-MCNC: 0.4 MG/DL (ref 0–1.2)
BILIRUB UR QL STRIP: NEGATIVE
BUN SERPL-MCNC: 9 MG/DL (ref 8–23)
BUN/CREAT SERPL: 10.5 (ref 7–25)
C CAYETANENSIS DNA STL QL NAA+NON-PROBE: NOT DETECTED
C COLI+JEJ+UPSA DNA STL QL NAA+NON-PROBE: NOT DETECTED
CALCIUM SPEC-SCNC: 9.1 MG/DL (ref 8.6–10.5)
CHLORIDE SERPL-SCNC: 104 MMOL/L (ref 98–107)
CLARITY UR: ABNORMAL
CO2 SERPL-SCNC: 22 MMOL/L (ref 22–29)
COLOR UR: YELLOW
CREAT SERPL-MCNC: 0.86 MG/DL (ref 0.57–1)
CRYPTOSP DNA STL QL NAA+NON-PROBE: NOT DETECTED
DEPRECATED RDW RBC AUTO: 42.9 FL (ref 37–54)
E HISTOLYT DNA STL QL NAA+NON-PROBE: NOT DETECTED
EAEC PAA PLAS AGGR+AATA ST NAA+NON-PRB: NOT DETECTED
EC STX1+STX2 GENES STL QL NAA+NON-PROBE: NOT DETECTED
EGFRCR SERPLBLD CKD-EPI 2021: 65.9 ML/MIN/1.73
EOSINOPHIL # BLD AUTO: 0.14 10*3/MM3 (ref 0–0.4)
EOSINOPHIL NFR BLD AUTO: 1.8 % (ref 0.3–6.2)
EPEC EAE GENE STL QL NAA+NON-PROBE: NOT DETECTED
ERYTHROCYTE [DISTWIDTH] IN BLOOD BY AUTOMATED COUNT: 13.5 % (ref 12.3–15.4)
ETEC LTA+ST1A+ST1B TOX ST NAA+NON-PROBE: NOT DETECTED
G LAMBLIA DNA STL QL NAA+NON-PROBE: NOT DETECTED
GLOBULIN UR ELPH-MCNC: 3 GM/DL
GLUCOSE SERPL-MCNC: 99 MG/DL (ref 65–99)
GLUCOSE UR STRIP-MCNC: NEGATIVE MG/DL
HCT VFR BLD AUTO: 37.9 % (ref 34–46.6)
HGB BLD-MCNC: 12.3 G/DL (ref 12–15.9)
HGB UR QL STRIP.AUTO: NEGATIVE
HYALINE CASTS UR QL AUTO: ABNORMAL /LPF
IMM GRANULOCYTES # BLD AUTO: 0.02 10*3/MM3 (ref 0–0.05)
IMM GRANULOCYTES NFR BLD AUTO: 0.3 % (ref 0–0.5)
KETONES UR QL STRIP: NEGATIVE
LEUKOCYTE ESTERASE UR QL STRIP.AUTO: ABNORMAL
LIPASE SERPL-CCNC: 296 U/L (ref 13–60)
LYMPHOCYTES # BLD AUTO: 3.94 10*3/MM3 (ref 0.7–3.1)
LYMPHOCYTES NFR BLD AUTO: 49.7 % (ref 19.6–45.3)
MAGNESIUM SERPL-MCNC: 1.4 MG/DL (ref 1.6–2.4)
MCH RBC QN AUTO: 28.3 PG (ref 26.6–33)
MCHC RBC AUTO-ENTMCNC: 32.5 G/DL (ref 31.5–35.7)
MCV RBC AUTO: 87.1 FL (ref 79–97)
MONOCYTES # BLD AUTO: 0.6 10*3/MM3 (ref 0.1–0.9)
MONOCYTES NFR BLD AUTO: 7.6 % (ref 5–12)
NEUTROPHILS NFR BLD AUTO: 3.19 10*3/MM3 (ref 1.7–7)
NEUTROPHILS NFR BLD AUTO: 40.1 % (ref 42.7–76)
NITRITE UR QL STRIP: NEGATIVE
NOROVIRUS GI+II RNA STL QL NAA+NON-PROBE: NOT DETECTED
NRBC BLD AUTO-RTO: 0 /100 WBC (ref 0–0.2)
P SHIGELLOIDES DNA STL QL NAA+NON-PROBE: NOT DETECTED
PH UR STRIP.AUTO: 6 [PH] (ref 5–8)
PLATELET # BLD AUTO: 209 10*3/MM3 (ref 140–450)
PMV BLD AUTO: 9.7 FL (ref 6–12)
POTASSIUM SERPL-SCNC: 4.1 MMOL/L (ref 3.5–5.2)
PROT SERPL-MCNC: 7.1 G/DL (ref 6–8.5)
PROT UR QL STRIP: NEGATIVE
RBC # BLD AUTO: 4.35 10*6/MM3 (ref 3.77–5.28)
RBC # UR STRIP: ABNORMAL /HPF
REF LAB TEST METHOD: ABNORMAL
RVA RNA STL QL NAA+NON-PROBE: NOT DETECTED
S ENT+BONG DNA STL QL NAA+NON-PROBE: NOT DETECTED
SAPO I+II+IV+V RNA STL QL NAA+NON-PROBE: NOT DETECTED
SHIGELLA SP+EIEC IPAH ST NAA+NON-PROBE: NOT DETECTED
SODIUM SERPL-SCNC: 143 MMOL/L (ref 136–145)
SP GR UR STRIP: 1.01 (ref 1–1.03)
SQUAMOUS #/AREA URNS HPF: ABNORMAL /HPF
UROBILINOGEN UR QL STRIP: ABNORMAL
V CHOL+PARA+VUL DNA STL QL NAA+NON-PROBE: NOT DETECTED
V CHOLERAE DNA STL QL NAA+NON-PROBE: NOT DETECTED
WBC # UR STRIP: ABNORMAL /HPF
WBC NRBC COR # BLD: 7.93 10*3/MM3 (ref 3.4–10.8)
Y ENTEROCOL DNA STL QL NAA+NON-PROBE: NOT DETECTED

## 2023-08-22 PROCEDURE — 25010000002 ONDANSETRON PER 1 MG: Performed by: EMERGENCY MEDICINE

## 2023-08-22 PROCEDURE — 96375 TX/PRO/DX INJ NEW DRUG ADDON: CPT

## 2023-08-22 PROCEDURE — 85025 COMPLETE CBC W/AUTO DIFF WBC: CPT | Performed by: EMERGENCY MEDICINE

## 2023-08-22 PROCEDURE — 99285 EMERGENCY DEPT VISIT HI MDM: CPT

## 2023-08-22 PROCEDURE — 87507 IADNA-DNA/RNA PROBE TQ 12-25: CPT | Performed by: EMERGENCY MEDICINE

## 2023-08-22 PROCEDURE — 25510000001 IOPAMIDOL 61 % SOLUTION: Performed by: EMERGENCY MEDICINE

## 2023-08-22 PROCEDURE — 80053 COMPREHEN METABOLIC PANEL: CPT | Performed by: EMERGENCY MEDICINE

## 2023-08-22 PROCEDURE — 83690 ASSAY OF LIPASE: CPT | Performed by: EMERGENCY MEDICINE

## 2023-08-22 PROCEDURE — 81001 URINALYSIS AUTO W/SCOPE: CPT | Performed by: EMERGENCY MEDICINE

## 2023-08-22 PROCEDURE — 25010000002 MAGNESIUM SULFATE 2 GM/50ML SOLUTION: Performed by: EMERGENCY MEDICINE

## 2023-08-22 PROCEDURE — 83735 ASSAY OF MAGNESIUM: CPT | Performed by: EMERGENCY MEDICINE

## 2023-08-22 PROCEDURE — 74177 CT ABD & PELVIS W/CONTRAST: CPT

## 2023-08-22 PROCEDURE — 36415 COLL VENOUS BLD VENIPUNCTURE: CPT

## 2023-08-22 PROCEDURE — 96366 THER/PROPH/DIAG IV INF ADDON: CPT

## 2023-08-22 PROCEDURE — 96365 THER/PROPH/DIAG IV INF INIT: CPT

## 2023-08-22 RX ORDER — METRONIDAZOLE 500 MG/1
500 TABLET ORAL 2 TIMES DAILY
Qty: 14 TABLET | Refills: 0 | Status: SHIPPED | OUTPATIENT
Start: 2023-08-22

## 2023-08-22 RX ORDER — ONDANSETRON 2 MG/ML
4 INJECTION INTRAMUSCULAR; INTRAVENOUS ONCE
Status: COMPLETED | OUTPATIENT
Start: 2023-08-22 | End: 2023-08-22

## 2023-08-22 RX ORDER — MAGNESIUM OXIDE 400 MG/1
400 TABLET ORAL DAILY
Qty: 14 TABLET | Refills: 0 | Status: SHIPPED | OUTPATIENT
Start: 2023-08-22

## 2023-08-22 RX ORDER — SODIUM CHLORIDE 0.9 % (FLUSH) 0.9 %
10 SYRINGE (ML) INJECTION AS NEEDED
Status: DISCONTINUED | OUTPATIENT
Start: 2023-08-22 | End: 2023-08-22 | Stop reason: HOSPADM

## 2023-08-22 RX ORDER — MAGNESIUM SULFATE HEPTAHYDRATE 40 MG/ML
2 INJECTION, SOLUTION INTRAVENOUS ONCE
Status: COMPLETED | OUTPATIENT
Start: 2023-08-22 | End: 2023-08-22

## 2023-08-22 RX ADMIN — ONDANSETRON 4 MG: 2 INJECTION INTRAMUSCULAR; INTRAVENOUS at 16:29

## 2023-08-22 RX ADMIN — IOPAMIDOL 85 ML: 612 INJECTION, SOLUTION INTRAVENOUS at 17:22

## 2023-08-22 RX ADMIN — MAGNESIUM SULFATE HEPTAHYDRATE 2 G: 2 INJECTION, SOLUTION INTRAVENOUS at 17:37

## 2023-08-22 RX ADMIN — SODIUM CHLORIDE 500 ML: 9 INJECTION, SOLUTION INTRAVENOUS at 16:29

## 2023-08-22 NOTE — ED NOTES
Patient states that she has diarrhea and that she has arthritis. Patient has back pain and issues with a disc in her back.

## 2023-08-22 NOTE — ED PROVIDER NOTES
EMERGENCY DEPARTMENT ENCOUNTER    Room Number:  19/19  PCP: Pallares, Clara Ann, MD  Patient Care Team:  Pallares, Clara Ann, MD as PCP - General (Internal Medicine)  Diego Veronica MD as Consulting Physician (Urology)   Independent Historians: Patient    HPI:  Chief Complaint: Diarrhea    A complete HPI/ROS/PMH/PSH/SH/FH are unobtainable due to: None    Chronic or social conditions impacting patient care (Social Determinants of Health): None  (Financial Resource Strain / Food Insecurity / Transportation Needs / Physical Activity / Stress / Social Connections / Intimate Partner Violence / Housing Stability)    Context: Wendie Houser is a 88 y.o. female who presents to the ED c/o acute diarrhea for the past week.  Patient says today she is already had 4 or 5 loose watery stools.  Patient says with the diarrhea she has had no appetite but denies any vomiting or fevers.  Patient does admit to history of colitis and diverticulitis but has not had a bout of that in quite a while.  Patient denies any recent antibiotics.  Patient does have multiple pain complaints that are chronic including her lower back, both knees, right hip.  Patient says her chronic pain issues and muscle spasms make it difficult for her to get to the bathroom in time when she has the sudden urge to have a bowel movement.  She does feel like muscle spasms in her thighs and back have been worse since she had the diarrhea.  Patient denies any sick contacts or bad foods.  Patient has had a cholecystectomy and hysterectomy in the remote past but no recent abdominal surgeries.    Review of prior external notes (non-ED) -and- Review of prior external test results outside of this encounter:     Prescription drug monitoring program review:         PAST MEDICAL HISTORY  Active Ambulatory Problems     Diagnosis Date Noted    Arthritis 01/25/2016    Late onset Alzheimer's dementia without behavioral disturbance 01/25/2016    Fatigue 01/25/2016     Gastroesophageal reflux disease 01/25/2016    Essential hypertension 01/25/2016    Hypomagnesemia 01/25/2016    Sleep apnea 01/25/2016    Renal insufficiency 10/11/2016    Hypothyroidism 10/11/2016    Lumbar canal stenosis 10/11/2016    Shortness of breath 03/20/2016    Disorder of electrolytes 07/29/2014    Hiatal hernia 05/02/2017    Hyperkalemia 02/02/2017    Hypoglycemia 02/02/2017    Irritable bowel syndrome 05/02/2017    Allergic rhinitis 06/05/2017    High risk medication use 10/12/2017    Polypharmacy 10/23/2018    Obesity (BMI 30-39.9) 10/20/2020    Panic attack 07/27/2021    Weakness of left lower extremity 08/10/2022     Resolved Ambulatory Problems     Diagnosis Date Noted    Abdominal pain 01/25/2016    Anemia 01/25/2016    Anxiety 01/25/2016    Diverticulitis of intestine 01/25/2016    Gastritis 01/25/2016    Hyperlipidemia 01/25/2016    Cramps of lower extremity 01/25/2016    Left lower quadrant pain 01/25/2016    Nausea 01/25/2016    Renal insufficiency 01/25/2016    Type 2 diabetes mellitus 01/25/2016    Flu-like symptoms 02/23/2016    Alopecia 02/23/2016    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    Chest pain in adult 03/22/2016    Chronic systolic congestive heart failure 03/28/2016    Diverticulosis large intestine w/o perforation or abscess w/o bleeding 03/28/2016    Weight gain 03/28/2016    Vomiting without nausea 08/25/2016    Fever and chills 08/25/2016    Diverticulitis of gastrointestinal tract 08/25/2016    Pain of upper abdomen 08/25/2016    Muscle cramps 08/25/2016    Congestive heart failure 10/11/2016    Mixed anxiety depressive disorder 10/23/2018     Past Medical History:   Diagnosis Date    CHF (congestive heart failure)     Depression     Diabetes mellitus     Diverticulitis of colon 08/25/2016    Early onset Alzheimer's dementia     GERD (gastroesophageal reflux disease)     Heart disease     Hypertension     Kidney disease          PAST SURGICAL HISTORY  Past Surgical History:   Procedure  Laterality Date    BREAST CYST EXCISION Right 1989    Benign    COLONOSCOPY  2011    Dr. Cain    ENDOSCOPY  04/15/2011    EYE SURGERY  2009    GALLBLADDER SURGERY  1960    Open    TOTAL KNEE ARTHROPLASTY Left 2012    Dr. Soler    VAGINAL HYSTERECTOMY  1999         FAMILY HISTORY  Family History   Problem Relation Age of Onset    Other Mother         cardiac disorder    Hypertension Mother     Heart disease Mother     Hypertension Sister     Heart attack Sister     Cancer Brother     Hypertension Brother     Heart attack Brother     Heart disease Father          SOCIAL HISTORY  Social History     Socioeconomic History    Marital status:    Tobacco Use    Smoking status: Never    Smokeless tobacco: Never   Substance and Sexual Activity    Alcohol use: No    Drug use: No    Sexual activity: Never         ALLERGIES  Patient has no known allergies.        REVIEW OF SYSTEMS  Review of Systems  Included in HPI  All systems reviewed and negative except for those discussed in HPI.      PHYSICAL EXAM    I have reviewed the triage vital signs and nursing notes.    ED Triage Vitals   Temp Heart Rate Resp BP SpO2   08/22/23 1435 08/22/23 1435 08/22/23 1435 08/22/23 1453 08/22/23 1435   99 øF (37.2 øC) 99 18 154/86 95 %      Temp src Heart Rate Source Patient Position BP Location FiO2 (%)   -- -- -- -- --              Physical Exam  GENERAL: Pleasant cooperative African-American female, morbidly obese, well-appearing, alert, no acute distress  SKIN: Warm, dry  HENT: Normocephalic, atraumatic  EYES: no scleral icterus  CV: regular rhythm, regular rate  RESPIRATORY: normal effort, lungs clear, no wheezing  ABDOMEN: soft, nontender, nondistended, no rebound, no guarding, obese  MUSCULOSKELETAL: no deformity  NEURO: alert, moves all extremities, follows commands                                                               LAB RESULTS  Recent Results (from the past 24 hour(s))   Urinalysis With Microscopic If Indicated (No  Culture) - Urine, Clean Catch    Collection Time: 08/22/23  4:12 PM    Specimen: Urine, Clean Catch   Result Value Ref Range    Color, UA Yellow Yellow, Straw    Appearance, UA Cloudy (A) Clear    pH, UA 6.0 5.0 - 8.0    Specific Gravity, UA 1.013 1.005 - 1.030    Glucose, UA Negative Negative    Ketones, UA Negative Negative    Bilirubin, UA Negative Negative    Blood, UA Negative Negative    Protein, UA Negative Negative    Leuk Esterase, UA Moderate (2+) (A) Negative    Nitrite, UA Negative Negative    Urobilinogen, UA 0.2 E.U./dL 0.2 - 1.0 E.U./dL   Gastrointestinal Panel, PCR - Stool, Per Rectum    Collection Time: 08/22/23  4:12 PM    Specimen: Per Rectum; Stool   Result Value Ref Range    Campylobacter Not Detected Not Detected    Plesiomonas shigelloides Not Detected Not Detected    Salmonella Not Detected Not Detected    Vibrio Not Detected Not Detected    Vibrio cholerae Not Detected Not Detected    Yersinia enterocolitica Not Detected Not Detected    Enteroaggregative E. coli (EAEC) Not Detected Not Detected    Enteropathogenic E. coli (EPEC) Not Detected Not Detected    Enterotoxigenic E. coli (ETEC) lt/st Not Detected Not Detected    Shiga-like toxin-producing E. coli (STEC) stx1/stx2 Not Detected Not Detected    Shigella/Enteroinvasive E. coli (EIEC) Not Detected Not Detected    Cryptosporidium Not Detected Not Detected    Cyclospora cayetanensis Not Detected Not Detected    Entamoeba histolytica Not Detected Not Detected    Giardia lamblia Not Detected Not Detected    Adenovirus F40/41 Not Detected Not Detected    Astrovirus Not Detected Not Detected    Norovirus GI/GII Not Detected Not Detected    Rotavirus A Not Detected Not Detected    Sapovirus (I, II, IV or V) Not Detected Not Detected   Urinalysis, Microscopic Only - Urine, Clean Catch    Collection Time: 08/22/23  4:12 PM    Specimen: Urine, Clean Catch   Result Value Ref Range    RBC, UA 0-2 None Seen, 0-2 /HPF    WBC, UA 31-50 (A) None Seen,  0-2 /HPF    Bacteria, UA None Seen None Seen /HPF    Squamous Epithelial Cells, UA 7-12 (A) None Seen, 0-2 /HPF    Hyaline Casts, UA 0-2 None Seen /LPF    Methodology Automated Microscopy    Comprehensive Metabolic Panel    Collection Time: 08/22/23  4:13 PM    Specimen: Blood   Result Value Ref Range    Glucose 99 65 - 99 mg/dL    BUN 9 8 - 23 mg/dL    Creatinine 0.86 0.57 - 1.00 mg/dL    Sodium 143 136 - 145 mmol/L    Potassium 4.1 3.5 - 5.2 mmol/L    Chloride 104 98 - 107 mmol/L    CO2 22.0 22.0 - 29.0 mmol/L    Calcium 9.1 8.6 - 10.5 mg/dL    Total Protein 7.1 6.0 - 8.5 g/dL    Albumin 4.1 3.5 - 5.2 g/dL    ALT (SGPT) 11 1 - 33 U/L    AST (SGOT) 15 1 - 32 U/L    Alkaline Phosphatase 82 39 - 117 U/L    Total Bilirubin 0.4 0.0 - 1.2 mg/dL    Globulin 3.0 gm/dL    A/G Ratio 1.4 g/dL    BUN/Creatinine Ratio 10.5 7.0 - 25.0    Anion Gap 17.0 (H) 5.0 - 15.0 mmol/L    eGFR 65.9 >60.0 mL/min/1.73   Lipase    Collection Time: 08/22/23  4:13 PM    Specimen: Blood   Result Value Ref Range    Lipase 296 (H) 13 - 60 U/L   Magnesium    Collection Time: 08/22/23  4:13 PM    Specimen: Blood   Result Value Ref Range    Magnesium 1.4 (L) 1.6 - 2.4 mg/dL   CBC Auto Differential    Collection Time: 08/22/23  4:13 PM    Specimen: Blood   Result Value Ref Range    WBC 7.93 3.40 - 10.80 10*3/mm3    RBC 4.35 3.77 - 5.28 10*6/mm3    Hemoglobin 12.3 12.0 - 15.9 g/dL    Hematocrit 37.9 34.0 - 46.6 %    MCV 87.1 79.0 - 97.0 fL    MCH 28.3 26.6 - 33.0 pg    MCHC 32.5 31.5 - 35.7 g/dL    RDW 13.5 12.3 - 15.4 %    RDW-SD 42.9 37.0 - 54.0 fl    MPV 9.7 6.0 - 12.0 fL    Platelets 209 140 - 450 10*3/mm3    Neutrophil % 40.1 (L) 42.7 - 76.0 %    Lymphocyte % 49.7 (H) 19.6 - 45.3 %    Monocyte % 7.6 5.0 - 12.0 %    Eosinophil % 1.8 0.3 - 6.2 %    Basophil % 0.5 0.0 - 1.5 %    Immature Grans % 0.3 0.0 - 0.5 %    Neutrophils, Absolute 3.19 1.70 - 7.00 10*3/mm3    Lymphocytes, Absolute 3.94 (H) 0.70 - 3.10 10*3/mm3    Monocytes, Absolute 0.60 0.10 -  0.90 10*3/mm3    Eosinophils, Absolute 0.14 0.00 - 0.40 10*3/mm3    Basophils, Absolute 0.04 0.00 - 0.20 10*3/mm3    Immature Grans, Absolute 0.02 0.00 - 0.05 10*3/mm3    nRBC 0.0 0.0 - 0.2 /100 WBC       I ordered the above labs and independently reviewed the results.        RADIOLOGY  CT Abdomen Pelvis With Contrast    Result Date: 8/22/2023  CT ABDOMEN PELVIS W CONTRAST-  INDICATIONS: Diarrhea  TECHNIQUE: Radiation dose reduction techniques were utilized, including automated exposure control and exposure modulation based on body size. Enhanced ABDOMEN AND PELVIS CT  COMPARISON: 11/19/2018  FINDINGS:  The gallbladder is absent, and mild intrahepatic and extrahepatic biliary ductal dilatation is apparent.  Fatty infiltration is seen in the pancreatic head.  A left adnexal cyst measures 2.2 cm; previously 1.8 cm left adnexal cyst was present.  Otherwise unremarkable appearance of the liver, spleen, adrenal glands, pancreas, kidneys, bladder.  No bowel obstruction. Assessment for wall thickening of the colon is limited by lack of distention. Colonic diverticula are seen, but do not appear focally inflamed. Mild hiatal hernia is present.  No free intraperitoneal gas or free fluid. Umbilical hernia of fat is present.  Scattered small mesenteric and para-aortic lymph nodes are seen that are not significant by size criteria.  Abdominal aorta is not aneurysmal. Aortic and other arterial calcifications are present.  The lung bases are clear.  Degenerative changes are seen in the spine and left hip, with advanced degenerative change at the right hip. No acute fracture is identified.          1. Colonic diverticulosis. No acute inflammatory process of the bowel is identified. Follow-up as indications persist.  2. No obstructive uropathy.  This report was finalized on 8/22/2023 6:13 PM by Dr. Kun Foley M.D.       I ordered the above noted radiological studies. Reviewed by me. See dictation for official radiology  interpretation.      PROCEDURES    Procedures      MEDICATIONS GIVEN IN ER    Medications   sodium chloride 0.9 % bolus 500 mL (0 mL Intravenous Stopped 8/22/23 1738)   ondansetron (ZOFRAN) injection 4 mg (4 mg Intravenous Given 8/22/23 1629)   magnesium sulfate 2g/50 mL (PREMIX) infusion (0 g Intravenous Stopped 8/22/23 1922)   iopamidol (ISOVUE-300) 61 % injection 100 mL (85 mL Intravenous Given by Other 8/22/23 1722)         ORDERS PLACED DURING THIS VISIT:  Orders Placed This Encounter   Procedures    Gastrointestinal Panel, PCR - Stool, Per Rectum    CT Abdomen Pelvis With Contrast    Comprehensive Metabolic Panel    Urinalysis With Microscopic If Indicated (No Culture) - Urine, Clean Catch    Lipase    Magnesium    CBC Auto Differential    Urinalysis, Microscopic Only - Urine, Clean Catch    Insert Peripheral IV    CBC & Differential         PROGRESS, DATA ANALYSIS, CONSULTS, AND MEDICAL DECISION MAKING    All labs have been independently interpreted by me.  All radiology studies have been reviewed by me.   EKG's independently viewed and interpreted by me.  Discussion below represents my analysis of pertinent findings related to patient's condition, differential diagnosis, treatment plan and final disposition.    MDM the differential diagnosis for this patient includes: appendicitis, pancreatitis, cholecystitis/biliary colic, PUD, gastritis, pneumonia, ureteral stone, sbo, hernia, colitis, diverticulitis, AAA, malignancy, gastroenteritis, food intolerances. Abdominal exam is without peritoneal signs. There is no evidence of acute abdomen at this time. The patient is well appearing. I have a low suspicion for acute hepatobiliary disease (including acute cholecystitis), acute pancreatitis, PUD (including perforation), acute infectious processes (pneumonia, hepatitis, pyelonephritis), acute appendicitis, vascular catastrophe, bowel obstruction or viscus perforation. This patient's presentation is most consistent  with gastroenteritis, colitis, diverticulitis    Plan serum labs, urinalysis, pain control, IMAGING CT abdomen pelvis, and serial reassessment..    ED Course as of 08/22/23 2136   Tue Aug 22, 2023   0188 I discussed all results with patient and answered all questions to the best of my ability. The patient was encouraged to follow up appropriately.      The patient also expressed understanding that at this time there is no evidence for a more malignant underlying process, but early in the process of an illness or injury, an emergency department workup can be falsely reassuring. Routine discharge counseling was given, and the patient was instructed to promptly call or followup with their primary physician or even return to the ER if any worsening, changing or persistent symptoms.         [AR]      ED Course User Index  [AR] Flaca Palacios MD         PPE: I wore and adhered to appropriate PPE per hospital protocols for specific patient presentation. (For respiratory patients with suspected Covid-19 or other infectious etiology suspected for patient's symptoms, the patient wore a mask and I wore an N95 mask throughout the entire patient encounter.) Proper hand hygiene both before and after patient encounter was performed as well.         AS OF 21:36 EDT VITALS:    BP - 131/63  HR - 85  TEMP - 99 øF (37.2 øC)  O2 SATS - 97%        DIAGNOSIS  Final diagnoses:   Diarrhea, unspecified type   Hypomagnesemia   Other chronic pain         DISPOSITION  ED Disposition       ED Disposition   Discharge    Condition   Stable    Comment   --                  Note Disclaimer: At [facility], we believe that sharing information builds trust and better relationships. You are receiving this note because you recently visited [facility]. It is possible you will see health information before a provider has talked with you about it. This kind of information can be easy to misunderstand. To help you fully understand what it  means for your health, we urge you to discuss this note with your provider.         Flaca Palacios MD  08/23/23 5226

## 2023-08-22 NOTE — ED TRIAGE NOTES
"Patient to ED via PV from home. Patient c/o intermittent diarrhea for about a week. Patient also reports that she has been having muscle spasms \"all over.\"   "

## 2023-08-23 ENCOUNTER — PATIENT OUTREACH (OUTPATIENT)
Dept: CASE MANAGEMENT | Facility: OTHER | Age: 88
End: 2023-08-23
Payer: MEDICARE

## 2023-08-23 NOTE — OUTREACH NOTE
AMBULATORY CASE MANAGEMENT NOTE    Name and Relationship of Patient/Support Person: Wendie Houser - Self  Self    Patient Outreach    Outgoing call to the patient.  Introduced self and explained role of ambulatory  and purpose of follow up outreach related to recent ED visit.  She declines assist with making her follow up PCP appointment.  Discussed HRCM program and services at no cost.  She prefers enrollment but prefers an outreach next week and this outreach has been scheduled.  She verbalized understanding that there is no charges for services and that she may opt out at any time.        Nica HOLCOMB  Ambulatory Case Management    8/23/2023, 12:28 EDT

## 2023-09-07 ENCOUNTER — TRANSCRIBE ORDERS (OUTPATIENT)
Dept: ADMINISTRATIVE | Facility: HOSPITAL | Age: 88
End: 2023-09-07
Payer: MEDICARE

## 2023-09-07 DIAGNOSIS — Z78.0 POSTMENOPAUSAL: Primary | ICD-10-CM

## 2023-09-14 ENCOUNTER — PATIENT OUTREACH (OUTPATIENT)
Dept: CASE MANAGEMENT | Facility: OTHER | Age: 88
End: 2023-09-14
Payer: MEDICARE

## 2023-09-14 NOTE — OUTREACH NOTE
AMBULATORY CASE MANAGEMENT NOTE    Name and Relationship of Patient/Support Person: Wendie Houser - Self  Self    Patient Outreach    Outgoing call to the patient for follow up.  She declines assist with making her PCP follow up appointment. Patient denies needs at this time.  She was not able to make her appointment for her Dexa scan and will have her PCP office reschedule this for her if needed.  Encouraged her to outreach with any other needs.  Clarified with Dr Pallares office that this provider is not affiliated with [facility].        Nica HOLCOMB  Ambulatory Case Management    9/14/2023, 16:58 EDT

## 2023-10-09 ENCOUNTER — APPOINTMENT (OUTPATIENT)
Dept: GENERAL RADIOLOGY | Facility: HOSPITAL | Age: 88
End: 2023-10-09
Payer: MEDICARE

## 2023-10-09 ENCOUNTER — HOSPITAL ENCOUNTER (EMERGENCY)
Facility: HOSPITAL | Age: 88
Discharge: NURSING, REHAB OR LONG-TERM CARE | End: 2023-10-09
Attending: EMERGENCY MEDICINE | Admitting: EMERGENCY MEDICINE
Payer: MEDICARE

## 2023-10-09 VITALS
TEMPERATURE: 97.6 F | HEART RATE: 74 BPM | DIASTOLIC BLOOD PRESSURE: 68 MMHG | OXYGEN SATURATION: 99 % | SYSTOLIC BLOOD PRESSURE: 117 MMHG | WEIGHT: 170 LBS | BODY MASS INDEX: 33.38 KG/M2 | HEIGHT: 60 IN | RESPIRATION RATE: 18 BRPM

## 2023-10-09 DIAGNOSIS — S39.012A LUMBAR STRAIN, INITIAL ENCOUNTER: ICD-10-CM

## 2023-10-09 DIAGNOSIS — S70.01XA CONTUSION OF RIGHT HIP, INITIAL ENCOUNTER: Primary | ICD-10-CM

## 2023-10-09 LAB
FLUAV RNA RESP QL NAA+PROBE: NOT DETECTED
FLUBV RNA RESP QL NAA+PROBE: NOT DETECTED
SARS-COV-2 RNA RESP QL NAA+PROBE: NOT DETECTED

## 2023-10-09 PROCEDURE — 99283 EMERGENCY DEPT VISIT LOW MDM: CPT

## 2023-10-09 PROCEDURE — 25010000002 DEXAMETHASONE SODIUM PHOSPHATE 10 MG/ML SOLUTION: Performed by: PHYSICIAN ASSISTANT

## 2023-10-09 PROCEDURE — 87636 SARSCOV2 & INF A&B AMP PRB: CPT | Performed by: PHYSICIAN ASSISTANT

## 2023-10-09 PROCEDURE — 73502 X-RAY EXAM HIP UNI 2-3 VIEWS: CPT

## 2023-10-09 PROCEDURE — 96372 THER/PROPH/DIAG INJ SC/IM: CPT

## 2023-10-09 PROCEDURE — 72110 X-RAY EXAM L-2 SPINE 4/>VWS: CPT

## 2023-10-09 RX ORDER — LIDOCAINE 50 MG/G
1 PATCH TOPICAL EVERY 24 HOURS
Qty: 6 EACH | Refills: 0 | Status: SHIPPED | OUTPATIENT
Start: 2023-10-09

## 2023-10-09 RX ORDER — HYDROCODONE BITARTRATE AND ACETAMINOPHEN 5; 325 MG/1; MG/1
1 TABLET ORAL ONCE
Status: COMPLETED | OUTPATIENT
Start: 2023-10-09 | End: 2023-10-09

## 2023-10-09 RX ORDER — LIDOCAINE 50 MG/G
1 PATCH TOPICAL ONCE
Status: DISCONTINUED | OUTPATIENT
Start: 2023-10-09 | End: 2023-10-09 | Stop reason: HOSPADM

## 2023-10-09 RX ORDER — METHOCARBAMOL 750 MG/1
750 TABLET, FILM COATED ORAL 3 TIMES DAILY PRN
Qty: 15 TABLET | Refills: 0 | Status: SHIPPED | OUTPATIENT
Start: 2023-10-09

## 2023-10-09 RX ORDER — NAPROXEN 500 MG/1
500 TABLET ORAL 2 TIMES DAILY PRN
Qty: 15 TABLET | Refills: 0 | Status: SHIPPED | OUTPATIENT
Start: 2023-10-09

## 2023-10-09 RX ORDER — METHOCARBAMOL 750 MG/1
750 TABLET, FILM COATED ORAL ONCE
Status: COMPLETED | OUTPATIENT
Start: 2023-10-09 | End: 2023-10-09

## 2023-10-09 RX ORDER — DEXAMETHASONE SODIUM PHOSPHATE 10 MG/ML
8 INJECTION, SOLUTION INTRAMUSCULAR; INTRAVENOUS ONCE
Status: COMPLETED | OUTPATIENT
Start: 2023-10-09 | End: 2023-10-09

## 2023-10-09 RX ADMIN — METHOCARBAMOL TABLETS 750 MG: 750 TABLET, COATED ORAL at 14:12

## 2023-10-09 RX ADMIN — LIDOCAINE 1 PATCH: 50 PATCH TOPICAL at 14:14

## 2023-10-09 RX ADMIN — DEXAMETHASONE SODIUM PHOSPHATE 8 MG: 10 INJECTION, SOLUTION INTRAMUSCULAR; INTRAVENOUS at 14:16

## 2023-10-09 RX ADMIN — HYDROCODONE BITARTRATE AND ACETAMINOPHEN 1 TABLET: 5; 325 TABLET ORAL at 14:12

## 2023-10-09 NOTE — ED PROVIDER NOTES
" EMERGENCY DEPARTMENT ENCOUNTER    Room Number:  01/01  Date of encounter:  10/9/2023  PCP: Pallares, Clara Ann, MD  Historian: Patient  Full history not obtainable due to: None    HPI:  Chief Complaint: Right leg pain    Context: Wendie Houser is a 88 y.o. female with a PMH significant for Alzheimer's dementia, hypertension, GERD, lumbar canal stenosis, IBS who presents to the ED c/o right leg pain for the past couple of days which has been progressively worsening.  When she woke up this morning she had decreased sensation and tingling throughout the thigh and into the right-sided buttock.  She has had similar symptoms in the past on the left side that she was told were related to her \"bad back\".  States that she is also a candidate for right-sided hip and knee replacements according to an orthopedist that she has seen in recent years.  She had a fall on Saturday from bed onto her right side which she believes may be contributing to her new symptoms.  She denies bowel or bladder incontinence, saddle paresthesias, numbness in the legs.      MEDICAL RECORD REVIEW:    Upon review of the medical record it appears the patient was evaluated in the office with pain management on 8/31/2023 for lumbar disc degeneration.  The patient had a normal glucose on 8/11/2022 and a normal magnesium on 8/10/2022.    PAST MEDICAL HISTORY    Active Ambulatory Problems     Diagnosis Date Noted    Arthritis 01/25/2016    Late onset Alzheimer's dementia without behavioral disturbance 01/25/2016    Fatigue 01/25/2016    Gastroesophageal reflux disease 01/25/2016    Essential hypertension 01/25/2016    Hypomagnesemia 01/25/2016    Sleep apnea 01/25/2016    Renal insufficiency 10/11/2016    Hypothyroidism 10/11/2016    Lumbar canal stenosis 10/11/2016    Shortness of breath 03/20/2016    Disorder of electrolytes 07/29/2014    Hiatal hernia 05/02/2017    Hyperkalemia 02/02/2017    Hypoglycemia 02/02/2017    Irritable bowel syndrome " 05/02/2017    Allergic rhinitis 06/05/2017    High risk medication use 10/12/2017    Polypharmacy 10/23/2018    Obesity (BMI 30-39.9) 10/20/2020    Panic attack 07/27/2021    Weakness of left lower extremity 08/10/2022     Resolved Ambulatory Problems     Diagnosis Date Noted    Abdominal pain 01/25/2016    Anemia 01/25/2016    Anxiety 01/25/2016    Diverticulitis of intestine 01/25/2016    Gastritis 01/25/2016    Hyperlipidemia 01/25/2016    Cramps of lower extremity 01/25/2016    Left lower quadrant pain 01/25/2016    Nausea 01/25/2016    Renal insufficiency 01/25/2016    Type 2 diabetes mellitus 01/25/2016    Flu-like symptoms 02/23/2016    Alopecia 02/23/2016    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    Chest pain in adult 03/22/2016    Chronic systolic congestive heart failure 03/28/2016    Diverticulosis large intestine w/o perforation or abscess w/o bleeding 03/28/2016    Weight gain 03/28/2016    Vomiting without nausea 08/25/2016    Fever and chills 08/25/2016    Diverticulitis of gastrointestinal tract 08/25/2016    Pain of upper abdomen 08/25/2016    Muscle cramps 08/25/2016    Congestive heart failure 10/11/2016    Mixed anxiety depressive disorder 10/23/2018     Past Medical History:   Diagnosis Date    CHF (congestive heart failure)     Depression     Diabetes mellitus     Diverticulitis of colon 08/25/2016    Early onset Alzheimer's dementia     GERD (gastroesophageal reflux disease)     Heart disease     Hypertension     Kidney disease          PAST SURGICAL HISTORY  Past Surgical History:   Procedure Laterality Date    BREAST CYST EXCISION Right 1989    Benign    COLONOSCOPY  2011    Dr. Cain    ENDOSCOPY  04/15/2011    EYE SURGERY  2009    GALLBLADDER SURGERY  1960    Open    TOTAL KNEE ARTHROPLASTY Left 2012    Dr. Soler    VAGINAL HYSTERECTOMY  1999         FAMILY HISTORY  Family History   Problem Relation Age of Onset    Other Mother         cardiac disorder    Hypertension Mother     Heart disease Mother      Hypertension Sister     Heart attack Sister     Cancer Brother     Hypertension Brother     Heart attack Brother     Heart disease Father          SOCIAL HISTORY  Social History     Socioeconomic History    Marital status:    Tobacco Use    Smoking status: Never    Smokeless tobacco: Never   Substance and Sexual Activity    Alcohol use: No    Drug use: No    Sexual activity: Never         ALLERGIES  Patient has no known allergies.        REVIEW OF SYSTEMS    All systems reviewed and marked as negative except as listed in HPI     PHYSICAL EXAM    I have reviewed the triage vital signs and nursing notes.    ED Triage Vitals [10/09/23 1204]   Temp Heart Rate Resp BP SpO2   97.6 øF (36.4 øC) 86 18 146/78 98 %      Temp src Heart Rate Source Patient Position BP Location FiO2 (%)   Tympanic Monitor -- -- --       Physical Exam  Constitutional:       General: She is not in acute distress.     Appearance: She is well-developed.   HENT:      Head: Normocephalic and atraumatic.   Eyes:      General: No scleral icterus.     Conjunctiva/sclera: Conjunctivae normal.   Neck:      Trachea: No tracheal deviation.   Cardiovascular:      Rate and Rhythm: Normal rate and regular rhythm.   Pulmonary:      Effort: Pulmonary effort is normal.      Breath sounds: Normal breath sounds.   Abdominal:      Palpations: Abdomen is soft.      Tenderness: There is no abdominal tenderness.   Musculoskeletal:         General: No deformity.      Cervical back: Normal range of motion.      Lumbar back: No bony tenderness. Normal range of motion. Positive right straight leg raise test. Negative left straight leg raise test.        Back:       Right hip: Tenderness present. No deformity. Normal range of motion.      Right knee: No swelling or deformity. Normal range of motion. Tenderness present.   Lymphadenopathy:      Cervical: No cervical adenopathy.   Skin:     General: Skin is warm and dry.   Neurological:      Mental Status: She is alert  and oriented to person, place, and time.   Psychiatric:         Behavior: Behavior normal.         Vital signs and nursing notes reviewed.            LAB RESULTS  Recent Results (from the past 24 hour(s))   COVID-19 and FLU A/B PCR - Swab, Nasopharynx    Collection Time: 10/09/23  2:23 PM    Specimen: Nasopharynx; Swab   Result Value Ref Range    COVID19 Not Detected Not Detected - Ref. Range    Influenza A PCR Not Detected Not Detected    Influenza B PCR Not Detected Not Detected       Ordered the above labs and independently reviewed the results.        RADIOLOGY  XR Hip With or Without Pelvis 2 - 3 View Right, XR Spine Lumbar Complete 4+VW    Result Date: 10/9/2023  XR HIP W OR WO PELVIS 2-3 VIEW RIGHT-, XR SPINE LUMBAR COMPLETE 4+VW-  INDICATIONS: Pain.  TECHNIQUE: 7 VIEWS OF THE LUMBAR SPINE, FRONTAL VIEW OF THE PELVIS, 2 VIEWS OF THE RIGHT HIP  COMPARISON: 3/23/2023  FINDINGS:  Lumbar spine: Mild thoracolumbar scoliosis is evident. Alignment is in range of normal. Multilevel endplate spurring, disc space narrowing, and facet arthropathy are present. No acute fracture is identified.  Pelvis and right hip: The pelvic ring appears intact. Advanced degenerative changes of the hips are noted, right more than left. Some foreshortening of the right femoral neck is noted, as well as mild varus angulation, suspicious for age indeterminate fracture of the right femoral neck, but appears stable from the prior exam, compatible with chronic change. No other evidence of fracture is identified.  If further imaging evaluation is indicated, MRI could be considered.       As described.    This report was finalized on 10/9/2023 3:01 PM by Dr. Kun Foley M.D on Workstation: [employer]       I ordered the above noted radiological studies. Independently reviewed by me and discussed with radiologist.  See dictation above for official radiology interpretation.      PROCEDURES    Procedures        MEDICATIONS GIVEN IN  ER    Medications   lidocaine (LIDODERM) 5 % 1 patch (1 patch Transdermal Medication Applied 10/9/23 1414)   methocarbamol (ROBAXIN) tablet 750 mg (750 mg Oral Given 10/9/23 1412)   dexAMETHasone sodium phosphate injection 8 mg (8 mg Intramuscular Given 10/9/23 1416)   HYDROcodone-acetaminophen (NORCO) 5-325 MG per tablet 1 tablet (1 tablet Oral Given 10/9/23 1412)         PROGRESS, DATA ANALYSIS, CONSULTS, AND MEDICAL DECISION MAKING    All labs have been independently interpreted by me.  All radiology studies have been interpreted by me.  Discussion below represents my analysis of pertinent findings related to patient's condition, differential diagnosis, treatment plan and final disposition.    Patient presentation and evaluation consistent with uncomplicated contusion to the right hip without evidence of fracture or dislocation.  She also has evidence of uncomplicated lumbar strain without worrisome neurologic deficit.  She ambulates unassisted with a steady gait and is feeling much better throughout ED course at this time.  She is appropriate for discharge home.  Patient and family agreeable and comfortable with this plan.  Close return precautions given.    - Chronic or social conditions impacting care: None      DIFFERENTIAL DIAGNOSIS INCLUDE BUT NOT LIMITED TO:     Hip contusion, lumbar strain, hip fracture, hip dislocation, lumbar fracture      Orders placed during this visit:  Orders Placed This Encounter   Procedures    COVID-19 and FLU A/B PCR - Swab, Nasopharynx    XR Hip With or Without Pelvis 2 - 3 View Right    XR Spine Lumbar Complete 4+VW              AS OF 15:20 EDT VITALS:    BP - 117/68  HR - 74  TEMP - 97.6 øF (36.4 øC) (Tympanic)  02 SATS - 99%    1521 I rechecked the patient.  I discussed the patient's radiology findings (including all incidental findings), diagnosis, and plan for discharge.  A repeat exam reveals no new worrisome changes from my initial exam findings.  The patient understands  that the fact that they are being discharged does not denote that nothing is abnormal, it indicates that no clinical emergency is present and that they must follow-up as directed in order to properly maintain their health.  Follow-up instructions (specifically listed below) and return to ER precautions were given at this time.  I specifically instructed the patient to follow-up with their PCP.  The patient understands and agrees with the plan, and is ready for discharge.  All questions answered.    Pallares, Clara Ann, MD  [address] [place] [id number] [phone]    Schedule an appointment as soon as possible for a visit in 2 days           Medication List        New Prescriptions      lidocaine 5 %  Commonly known as: LIDODERM  Place 1 patch on the skin as directed by provider Daily. Remove & Discard patch within 12 hours or as directed by MD     methocarbamol 750 MG tablet  Commonly known as: ROBAXIN  Take 1 tablet by mouth 3 (Three) Times a Day As Needed for Muscle Spasms.     naproxen 500 MG tablet  Commonly known as: NAPROSYN  Take 1 tablet by mouth 2 (Two) Times a Day As Needed for Mild Pain.               Where to Get Your Medications        These medications were sent to [id number] - [place], [place] - [address] AT SEC OF [id number] - [phone]  - [phone] FX  [address], [facility] [address]      Phone: [phone]   lidocaine 5 %  methocarbamol 750 MG tablet  naproxen 500 MG tablet         DIAGNOSIS  Final diagnoses:   Contusion of right hip, initial encounter   Lumbar strain, initial encounter         DISPOSITION  D/c    Pt masked in first look. I wore a surgical mask throughout my encounters with the pt. I performed hand hygiene on entry into the pt room and upon exit.     Dictated utilizing Dragon dictation     Note Disclaimer: At [facility], we believe that sharing information builds trust and better relationships. You  are receiving this note because you recently visited [facility]. It is possible you will see health information before a provider has talked with you about it. This kind of information can be easy to misunderstand. To help you fully understand what it means for your health, we urge you to discuss this note with your provider.      Billy Forrest PA  10/09/23 1524

## 2024-05-15 ENCOUNTER — HOSPITAL ENCOUNTER (INPATIENT)
Facility: HOSPITAL | Age: 89
LOS: 3 days | Discharge: HOME | DRG: 384 | End: 2024-05-19
Attending: EMERGENCY MEDICINE | Admitting: HOSPITALIST
Payer: MEDICARE

## 2024-05-15 ENCOUNTER — APPOINTMENT (OUTPATIENT)
Dept: CT IMAGING | Facility: HOSPITAL | Age: 89
DRG: 384 | End: 2024-05-15
Payer: MEDICARE

## 2024-05-15 DIAGNOSIS — K29.90 GASTRODUODENITIS: Primary | ICD-10-CM

## 2024-05-15 DIAGNOSIS — R10.10 UPPER ABDOMINAL PAIN: ICD-10-CM

## 2024-05-15 DIAGNOSIS — R10.84 GENERALIZED ABDOMINAL PAIN: ICD-10-CM

## 2024-05-15 DIAGNOSIS — E87.20 LACTIC ACIDOSIS: ICD-10-CM

## 2024-05-15 DIAGNOSIS — R11.13 VOMITING OF FECAL MATTER WITH NAUSEA: ICD-10-CM

## 2024-05-15 DIAGNOSIS — R93.5 ABNORMAL CT OF THE ABDOMEN: ICD-10-CM

## 2024-05-15 DIAGNOSIS — K21.9 GASTROESOPHAGEAL REFLUX DISEASE, UNSPECIFIED WHETHER ESOPHAGITIS PRESENT: ICD-10-CM

## 2024-05-15 DIAGNOSIS — R11.2 NAUSEA AND VOMITING, UNSPECIFIED VOMITING TYPE: ICD-10-CM

## 2024-05-15 LAB
ALBUMIN SERPL-MCNC: 4.2 G/DL (ref 3.5–5.2)
ALBUMIN/GLOB SERPL: 1.4 G/DL
ALP SERPL-CCNC: 96 U/L (ref 39–117)
ALT SERPL W P-5'-P-CCNC: 14 U/L (ref 1–33)
ANION GAP SERPL CALCULATED.3IONS-SCNC: 14 MMOL/L (ref 5–15)
AST SERPL-CCNC: 25 U/L (ref 1–32)
BASOPHILS # BLD AUTO: 0.06 10*3/MM3 (ref 0–0.2)
BASOPHILS NFR BLD AUTO: 0.5 % (ref 0–1.5)
BILIRUB SERPL-MCNC: 0.4 MG/DL (ref 0–1.2)
BUN SERPL-MCNC: 13 MG/DL (ref 8–23)
BUN/CREAT SERPL: 13.3 (ref 7–25)
CALCIUM SPEC-SCNC: 9 MG/DL (ref 8.6–10.5)
CHLORIDE SERPL-SCNC: 98 MMOL/L (ref 98–107)
CO2 SERPL-SCNC: 22 MMOL/L (ref 22–29)
CREAT SERPL-MCNC: 0.98 MG/DL (ref 0.57–1)
D-LACTATE SERPL-SCNC: 2.4 MMOL/L (ref 0.5–2)
DEPRECATED RDW RBC AUTO: 43.5 FL (ref 37–54)
EGFRCR SERPLBLD CKD-EPI 2021: 56 ML/MIN/1.73
EOSINOPHIL # BLD AUTO: 0.09 10*3/MM3 (ref 0–0.4)
EOSINOPHIL NFR BLD AUTO: 0.8 % (ref 0.3–6.2)
ERYTHROCYTE [DISTWIDTH] IN BLOOD BY AUTOMATED COUNT: 13.9 % (ref 12.3–15.4)
GLOBULIN UR ELPH-MCNC: 2.9 GM/DL
GLUCOSE SERPL-MCNC: 117 MG/DL (ref 65–99)
HCT VFR BLD AUTO: 41.6 % (ref 34–46.6)
HGB BLD-MCNC: 13.4 G/DL (ref 12–15.9)
HOLD SPECIMEN: NORMAL
HOLD SPECIMEN: NORMAL
IMM GRANULOCYTES # BLD AUTO: 0.05 10*3/MM3 (ref 0–0.05)
IMM GRANULOCYTES NFR BLD AUTO: 0.4 % (ref 0–0.5)
LIPASE SERPL-CCNC: 35 U/L (ref 13–60)
LYMPHOCYTES # BLD AUTO: 4.46 10*3/MM3 (ref 0.7–3.1)
LYMPHOCYTES NFR BLD AUTO: 38.1 % (ref 19.6–45.3)
MCH RBC QN AUTO: 27.6 PG (ref 26.6–33)
MCHC RBC AUTO-ENTMCNC: 32.2 G/DL (ref 31.5–35.7)
MCV RBC AUTO: 85.8 FL (ref 79–97)
MONOCYTES # BLD AUTO: 0.92 10*3/MM3 (ref 0.1–0.9)
MONOCYTES NFR BLD AUTO: 7.9 % (ref 5–12)
NEUTROPHILS NFR BLD AUTO: 52.3 % (ref 42.7–76)
NEUTROPHILS NFR BLD AUTO: 6.12 10*3/MM3 (ref 1.7–7)
NRBC BLD AUTO-RTO: 0 /100 WBC (ref 0–0.2)
PLATELET # BLD AUTO: 332 10*3/MM3 (ref 140–450)
PMV BLD AUTO: 9.8 FL (ref 6–12)
POTASSIUM SERPL-SCNC: 4.5 MMOL/L (ref 3.5–5.2)
PROT SERPL-MCNC: 7.1 G/DL (ref 6–8.5)
RBC # BLD AUTO: 4.85 10*6/MM3 (ref 3.77–5.28)
SODIUM SERPL-SCNC: 134 MMOL/L (ref 136–145)
WBC NRBC COR # BLD AUTO: 11.7 10*3/MM3 (ref 3.4–10.8)
WHOLE BLOOD HOLD COAG: NORMAL
WHOLE BLOOD HOLD SPECIMEN: NORMAL

## 2024-05-15 PROCEDURE — 80053 COMPREHEN METABOLIC PANEL: CPT | Performed by: PHYSICIAN ASSISTANT

## 2024-05-15 PROCEDURE — 25510000001 IOPAMIDOL 61 % SOLUTION: Performed by: EMERGENCY MEDICINE

## 2024-05-15 PROCEDURE — 83605 ASSAY OF LACTIC ACID: CPT | Performed by: PHYSICIAN ASSISTANT

## 2024-05-15 PROCEDURE — 85025 COMPLETE CBC W/AUTO DIFF WBC: CPT | Performed by: PHYSICIAN ASSISTANT

## 2024-05-15 PROCEDURE — 25010000002 ONDANSETRON PER 1 MG: Performed by: EMERGENCY MEDICINE

## 2024-05-15 PROCEDURE — 83690 ASSAY OF LIPASE: CPT | Performed by: PHYSICIAN ASSISTANT

## 2024-05-15 PROCEDURE — 99285 EMERGENCY DEPT VISIT HI MDM: CPT

## 2024-05-15 PROCEDURE — 25010000002 MORPHINE PER 10 MG: Performed by: EMERGENCY MEDICINE

## 2024-05-15 RX ORDER — MORPHINE SULFATE 2 MG/ML
2 INJECTION, SOLUTION INTRAMUSCULAR; INTRAVENOUS ONCE
Status: COMPLETED | OUTPATIENT
Start: 2024-05-15 | End: 2024-05-15

## 2024-05-15 RX ORDER — SODIUM CHLORIDE 0.9 % (FLUSH) 0.9 %
10 SYRINGE (ML) INJECTION AS NEEDED
Status: DISCONTINUED | OUTPATIENT
Start: 2024-05-15 | End: 2024-05-19 | Stop reason: HOSPADM

## 2024-05-15 RX ORDER — ONDANSETRON 2 MG/ML
4 INJECTION INTRAMUSCULAR; INTRAVENOUS ONCE
Status: COMPLETED | OUTPATIENT
Start: 2024-05-15 | End: 2024-05-15

## 2024-05-15 RX ADMIN — MORPHINE SULFATE 2 MG: 2 INJECTION, SOLUTION INTRAMUSCULAR; INTRAVENOUS at 22:22

## 2024-05-15 RX ADMIN — IOPAMIDOL 85 ML: 612 INJECTION, SOLUTION INTRAVENOUS at 23:57

## 2024-05-15 RX ADMIN — ONDANSETRON 4 MG: 2 INJECTION INTRAMUSCULAR; INTRAVENOUS at 22:22

## 2024-05-16 ENCOUNTER — APPOINTMENT (OUTPATIENT)
Dept: CT IMAGING | Facility: HOSPITAL | Age: 89
DRG: 384 | End: 2024-05-16
Payer: MEDICARE

## 2024-05-16 PROBLEM — R11.2 NAUSEA AND VOMITING: Status: ACTIVE | Noted: 2024-05-15

## 2024-05-16 PROBLEM — E87.20 LACTIC ACIDOSIS: Status: ACTIVE | Noted: 2024-05-16

## 2024-05-16 PROBLEM — R10.84 GENERALIZED ABDOMINAL PAIN: Status: ACTIVE | Noted: 2024-05-16

## 2024-05-16 PROBLEM — K29.90 GASTRODUODENITIS: Status: ACTIVE | Noted: 2024-05-16

## 2024-05-16 PROBLEM — R10.10 UPPER ABDOMINAL PAIN: Status: ACTIVE | Noted: 2024-05-15

## 2024-05-16 PROBLEM — R93.5 ABNORMAL CT OF THE ABDOMEN: Status: ACTIVE | Noted: 2024-05-15

## 2024-05-16 LAB
ANION GAP SERPL CALCULATED.3IONS-SCNC: 12 MMOL/L (ref 5–15)
BACTERIA UR QL AUTO: ABNORMAL /HPF
BILIRUB UR QL STRIP: NEGATIVE
BUN SERPL-MCNC: 11 MG/DL (ref 8–23)
BUN/CREAT SERPL: 11.1 (ref 7–25)
CALCIUM SPEC-SCNC: 8.5 MG/DL (ref 8.6–10.5)
CHLORIDE SERPL-SCNC: 100 MMOL/L (ref 98–107)
CLARITY UR: CLEAR
CO2 SERPL-SCNC: 22 MMOL/L (ref 22–29)
COLOR UR: YELLOW
CREAT SERPL-MCNC: 0.99 MG/DL (ref 0.57–1)
D-LACTATE SERPL-SCNC: 2 MMOL/L (ref 0.5–2)
DEPRECATED RDW RBC AUTO: 42.7 FL (ref 37–54)
EGFRCR SERPLBLD CKD-EPI 2021: 55.3 ML/MIN/1.73
ERYTHROCYTE [DISTWIDTH] IN BLOOD BY AUTOMATED COUNT: 13.8 % (ref 12.3–15.4)
GLUCOSE SERPL-MCNC: 128 MG/DL (ref 65–99)
GLUCOSE UR STRIP-MCNC: NEGATIVE MG/DL
HCT VFR BLD AUTO: 40.7 % (ref 34–46.6)
HGB BLD-MCNC: 13.3 G/DL (ref 12–15.9)
HGB UR QL STRIP.AUTO: NEGATIVE
HYALINE CASTS UR QL AUTO: ABNORMAL /LPF
KETONES UR QL STRIP: NEGATIVE
LEUKOCYTE ESTERASE UR QL STRIP.AUTO: ABNORMAL
MCH RBC QN AUTO: 28 PG (ref 26.6–33)
MCHC RBC AUTO-ENTMCNC: 32.7 G/DL (ref 31.5–35.7)
MCV RBC AUTO: 85.7 FL (ref 79–97)
NITRITE UR QL STRIP: NEGATIVE
PH UR STRIP.AUTO: 6 [PH] (ref 5–8)
PLATELET # BLD AUTO: 290 10*3/MM3 (ref 140–450)
PMV BLD AUTO: 9.4 FL (ref 6–12)
POTASSIUM SERPL-SCNC: 3.9 MMOL/L (ref 3.5–5.2)
PROT UR QL STRIP: NEGATIVE
RBC # BLD AUTO: 4.75 10*6/MM3 (ref 3.77–5.28)
RBC # UR STRIP: ABNORMAL /HPF
REF LAB TEST METHOD: ABNORMAL
SODIUM SERPL-SCNC: 134 MMOL/L (ref 136–145)
SP GR UR STRIP: >1.03 (ref 1–1.03)
SQUAMOUS #/AREA URNS HPF: ABNORMAL /HPF
UROBILINOGEN UR QL STRIP: ABNORMAL
WBC # UR STRIP: ABNORMAL /HPF
WBC NRBC COR # BLD AUTO: 11.42 10*3/MM3 (ref 3.4–10.8)

## 2024-05-16 PROCEDURE — 85027 COMPLETE CBC AUTOMATED: CPT | Performed by: NURSE PRACTITIONER

## 2024-05-16 PROCEDURE — 25010000002 PIPERACILLIN SOD-TAZOBACTAM PER 1 G: Performed by: PHYSICIAN ASSISTANT

## 2024-05-16 PROCEDURE — 25810000003 SODIUM CHLORIDE 0.9 % SOLUTION: Performed by: PHYSICIAN ASSISTANT

## 2024-05-16 PROCEDURE — 74177 CT ABD & PELVIS W/CONTRAST: CPT

## 2024-05-16 PROCEDURE — 80048 BASIC METABOLIC PNL TOTAL CA: CPT | Performed by: NURSE PRACTITIONER

## 2024-05-16 PROCEDURE — 83605 ASSAY OF LACTIC ACID: CPT | Performed by: PHYSICIAN ASSISTANT

## 2024-05-16 PROCEDURE — 99221 1ST HOSP IP/OBS SF/LOW 40: CPT | Performed by: NURSE PRACTITIONER

## 2024-05-16 PROCEDURE — 36415 COLL VENOUS BLD VENIPUNCTURE: CPT | Performed by: PHYSICIAN ASSISTANT

## 2024-05-16 PROCEDURE — 87040 BLOOD CULTURE FOR BACTERIA: CPT | Performed by: PHYSICIAN ASSISTANT

## 2024-05-16 PROCEDURE — 81001 URINALYSIS AUTO W/SCOPE: CPT | Performed by: PHYSICIAN ASSISTANT

## 2024-05-16 PROCEDURE — 25810000003 SODIUM CHLORIDE 0.9 % SOLUTION: Performed by: NURSE PRACTITIONER

## 2024-05-16 PROCEDURE — 25010000002 MORPHINE PER 10 MG: Performed by: EMERGENCY MEDICINE

## 2024-05-16 RX ORDER — MORPHINE SULFATE 2 MG/ML
2 INJECTION, SOLUTION INTRAMUSCULAR; INTRAVENOUS
Status: DISCONTINUED | OUTPATIENT
Start: 2024-05-16 | End: 2024-05-19 | Stop reason: HOSPADM

## 2024-05-16 RX ORDER — DONEPEZIL HYDROCHLORIDE 10 MG/1
10 TABLET, FILM COATED ORAL NIGHTLY
Status: DISCONTINUED | OUTPATIENT
Start: 2024-05-16 | End: 2024-05-19 | Stop reason: HOSPADM

## 2024-05-16 RX ORDER — POLYETHYLENE GLYCOL 3350 17 G/17G
17 POWDER, FOR SOLUTION ORAL DAILY PRN
Status: DISCONTINUED | OUTPATIENT
Start: 2024-05-16 | End: 2024-05-19 | Stop reason: HOSPADM

## 2024-05-16 RX ORDER — HYDROCODONE BITARTRATE AND ACETAMINOPHEN 5; 325 MG/1; MG/1
1 TABLET ORAL 2 TIMES DAILY PRN
Status: DISCONTINUED | OUTPATIENT
Start: 2024-05-16 | End: 2024-05-19 | Stop reason: HOSPADM

## 2024-05-16 RX ORDER — CETIRIZINE HYDROCHLORIDE 10 MG/1
5 TABLET ORAL DAILY
Status: DISCONTINUED | OUTPATIENT
Start: 2024-05-16 | End: 2024-05-19 | Stop reason: HOSPADM

## 2024-05-16 RX ORDER — SODIUM CHLORIDE 0.9 % (FLUSH) 0.9 %
10 SYRINGE (ML) INJECTION AS NEEDED
Status: DISCONTINUED | OUTPATIENT
Start: 2024-05-16 | End: 2024-05-19 | Stop reason: HOSPADM

## 2024-05-16 RX ORDER — BISACODYL 10 MG
10 SUPPOSITORY, RECTAL RECTAL DAILY PRN
Status: DISCONTINUED | OUTPATIENT
Start: 2024-05-16 | End: 2024-05-19 | Stop reason: HOSPADM

## 2024-05-16 RX ORDER — PANTOPRAZOLE SODIUM 40 MG/10ML
40 INJECTION, POWDER, LYOPHILIZED, FOR SOLUTION INTRAVENOUS 2 TIMES DAILY
Status: DISCONTINUED | OUTPATIENT
Start: 2024-05-16 | End: 2024-05-18

## 2024-05-16 RX ORDER — CALCIUM CARBONATE 500 MG/1
2 TABLET, CHEWABLE ORAL 2 TIMES DAILY PRN
Status: DISCONTINUED | OUTPATIENT
Start: 2024-05-16 | End: 2024-05-19 | Stop reason: HOSPADM

## 2024-05-16 RX ORDER — ONDANSETRON 2 MG/ML
4 INJECTION INTRAMUSCULAR; INTRAVENOUS EVERY 6 HOURS PRN
Status: DISCONTINUED | OUTPATIENT
Start: 2024-05-16 | End: 2024-05-19 | Stop reason: HOSPADM

## 2024-05-16 RX ORDER — BISACODYL 5 MG/1
5 TABLET, DELAYED RELEASE ORAL DAILY PRN
Status: DISCONTINUED | OUTPATIENT
Start: 2024-05-16 | End: 2024-05-19 | Stop reason: HOSPADM

## 2024-05-16 RX ORDER — SODIUM CHLORIDE 9 MG/ML
100 INJECTION, SOLUTION INTRAVENOUS CONTINUOUS
Status: DISCONTINUED | OUTPATIENT
Start: 2024-05-16 | End: 2024-05-18

## 2024-05-16 RX ORDER — ONDANSETRON 4 MG/1
4 TABLET, ORALLY DISINTEGRATING ORAL EVERY 6 HOURS PRN
Status: DISCONTINUED | OUTPATIENT
Start: 2024-05-16 | End: 2024-05-19 | Stop reason: HOSPADM

## 2024-05-16 RX ORDER — AMOXICILLIN 250 MG
2 CAPSULE ORAL 2 TIMES DAILY PRN
Status: DISCONTINUED | OUTPATIENT
Start: 2024-05-16 | End: 2024-05-19 | Stop reason: HOSPADM

## 2024-05-16 RX ORDER — SODIUM CHLORIDE 0.9 % (FLUSH) 0.9 %
10 SYRINGE (ML) INJECTION EVERY 12 HOURS SCHEDULED
Status: DISCONTINUED | OUTPATIENT
Start: 2024-05-16 | End: 2024-05-19 | Stop reason: HOSPADM

## 2024-05-16 RX ORDER — ACETAMINOPHEN 650 MG/1
650 SUPPOSITORY RECTAL EVERY 4 HOURS PRN
Status: DISCONTINUED | OUTPATIENT
Start: 2024-05-16 | End: 2024-05-19 | Stop reason: HOSPADM

## 2024-05-16 RX ORDER — CYCLOBENZAPRINE HCL 10 MG
5 TABLET ORAL 3 TIMES DAILY PRN
Status: DISCONTINUED | OUTPATIENT
Start: 2024-05-16 | End: 2024-05-19 | Stop reason: HOSPADM

## 2024-05-16 RX ORDER — ACETAMINOPHEN 160 MG/5ML
650 SOLUTION ORAL EVERY 4 HOURS PRN
Status: DISCONTINUED | OUTPATIENT
Start: 2024-05-16 | End: 2024-05-19 | Stop reason: HOSPADM

## 2024-05-16 RX ORDER — PANTOPRAZOLE SODIUM 40 MG/10ML
40 INJECTION, POWDER, LYOPHILIZED, FOR SOLUTION INTRAVENOUS
Status: DISCONTINUED | OUTPATIENT
Start: 2024-05-16 | End: 2024-05-16 | Stop reason: SDUPTHER

## 2024-05-16 RX ORDER — PANTOPRAZOLE SODIUM 40 MG/1
40 TABLET, DELAYED RELEASE ORAL
Status: DISCONTINUED | OUTPATIENT
Start: 2024-05-17 | End: 2024-05-16

## 2024-05-16 RX ORDER — METHOCARBAMOL 750 MG/1
750 TABLET, FILM COATED ORAL 3 TIMES DAILY PRN
Status: DISCONTINUED | OUTPATIENT
Start: 2024-05-16 | End: 2024-05-19 | Stop reason: HOSPADM

## 2024-05-16 RX ORDER — LOSARTAN POTASSIUM 25 MG/1
25 TABLET ORAL 2 TIMES DAILY
Status: DISCONTINUED | OUTPATIENT
Start: 2024-05-16 | End: 2024-05-19 | Stop reason: HOSPADM

## 2024-05-16 RX ORDER — SODIUM CHLORIDE 9 MG/ML
40 INJECTION, SOLUTION INTRAVENOUS AS NEEDED
Status: DISCONTINUED | OUTPATIENT
Start: 2024-05-16 | End: 2024-05-19 | Stop reason: HOSPADM

## 2024-05-16 RX ORDER — MORPHINE SULFATE 2 MG/ML
2 INJECTION, SOLUTION INTRAMUSCULAR; INTRAVENOUS ONCE
Status: COMPLETED | OUTPATIENT
Start: 2024-05-16 | End: 2024-05-16

## 2024-05-16 RX ORDER — ACETAMINOPHEN 325 MG/1
650 TABLET ORAL EVERY 4 HOURS PRN
Status: DISCONTINUED | OUTPATIENT
Start: 2024-05-16 | End: 2024-05-19 | Stop reason: HOSPADM

## 2024-05-16 RX ORDER — PRAVASTATIN SODIUM 40 MG
40 TABLET ORAL NIGHTLY
Status: DISCONTINUED | OUTPATIENT
Start: 2024-05-16 | End: 2024-05-19 | Stop reason: HOSPADM

## 2024-05-16 RX ADMIN — ACETAMINOPHEN 650 MG: 325 TABLET, FILM COATED ORAL at 13:51

## 2024-05-16 RX ADMIN — MORPHINE SULFATE 2 MG: 2 INJECTION, SOLUTION INTRAMUSCULAR; INTRAVENOUS at 03:34

## 2024-05-16 RX ADMIN — PANTOPRAZOLE SODIUM 40 MG: 40 INJECTION, POWDER, FOR SOLUTION INTRAVENOUS at 20:03

## 2024-05-16 RX ADMIN — Medication 10 ML: at 09:36

## 2024-05-16 RX ADMIN — Medication 10 ML: at 20:03

## 2024-05-16 RX ADMIN — CETIRIZINE HYDROCHLORIDE 5 MG: 10 TABLET ORAL at 17:01

## 2024-05-16 RX ADMIN — HYDROCODONE BITARTRATE AND ACETAMINOPHEN 1 TABLET: 5; 325 TABLET ORAL at 16:56

## 2024-05-16 RX ADMIN — PIPERACILLIN AND TAZOBACTAM 3.38 G: 3; .375 INJECTION, POWDER, FOR SOLUTION INTRAVENOUS at 04:52

## 2024-05-16 RX ADMIN — LOSARTAN POTASSIUM 25 MG: 25 TABLET, FILM COATED ORAL at 20:03

## 2024-05-16 RX ADMIN — PANTOPRAZOLE SODIUM 40 MG: 40 INJECTION, POWDER, FOR SOLUTION INTRAVENOUS at 09:39

## 2024-05-16 RX ADMIN — DONEPEZIL HYDROCHLORIDE 10 MG: 10 TABLET, FILM COATED ORAL at 20:03

## 2024-05-16 RX ADMIN — CYCLOBENZAPRINE 5 MG: 10 TABLET, FILM COATED ORAL at 14:24

## 2024-05-16 RX ADMIN — PRAVASTATIN SODIUM 40 MG: 40 TABLET ORAL at 20:03

## 2024-05-16 RX ADMIN — SODIUM CHLORIDE 1000 ML: 9 INJECTION, SOLUTION INTRAVENOUS at 00:12

## 2024-05-16 RX ADMIN — SODIUM CHLORIDE 100 ML/HR: 9 INJECTION, SOLUTION INTRAVENOUS at 18:58

## 2024-05-16 RX ADMIN — SODIUM CHLORIDE 100 ML/HR: 9 INJECTION, SOLUTION INTRAVENOUS at 06:46

## 2024-05-16 NOTE — PROGRESS NOTES
Discharge Planning Assessment  [facility]     Patient Name: Wendie Houser  MRN: [id number]  Today's Date: 5/16/2024    Admit Date: 5/15/2024        Discharge Needs Assessment    No documentation.                  Discharge Plan       Row Name 05/16/24 0936       Plan    Plan Comments The patient was transferred to [facility] from ER on 5/16/24. CCP will continue to follow for any needs. ROMAIN Tejeda RN, CCP.                  Continued Care and Services - Admitted Since 5/15/2024    No active coordination exists for this encounter.          Demographic Summary    No documentation.                  Functional Status    No documentation.                  Psychosocial    No documentation.                  Abuse/Neglect    No documentation.                  Legal    No documentation.                  Substance Abuse    No documentation.                  Patient Forms    No documentation.                     Estefany Tejeda, RN

## 2024-05-16 NOTE — PLAN OF CARE
Goal Outcome Evaluation:   Patient admitted to [facility] from ER.         Progress: no change

## 2024-05-16 NOTE — PLAN OF CARE
Problem: Adult Inpatient Plan of Care  Goal: Plan of Care Review  Outcome: Ongoing, Progressing  Flowsheets (Taken 5/16/2024 1993)  Progress: improving  Plan of Care Reviewed With:   patient   family  Outcome Evaluation: Patient alert and oriented x 4. [place]. Up to BR with walker and SBA. Tolerating clear diet w/o difficulty. Denies ABD discomfort, n/v/d. Does c/o BLE arthritic pain and muscle spasms. PRN Tylenol and Flexeril provided. K-pad in place for additional comfort. Son reports patient sees pain management for the above stated pain. Has had to reschedule appointment r/t transportation issue and is out of her medications. TT CCP and grandchildren at bedside regarding helping with transportations issue. Grandchildren report it will not be an issue to assist now that they know there is an issue / need. CCP will look into any transportation that may be available to her and provide information.GI in and plan to do an EGD in am. Monitoring.  Goal: Patient-Specific Goal (Individualized)  Outcome: Ongoing, Progressing  Goal: Absence of Hospital-Acquired Illness or Injury  Outcome: Ongoing, Progressing  Intervention: Identify and Manage Fall Risk  Description: Perform standard risk assessment on admission using a validated tool or comprehensive approach appropriate to the patient; reassess fall risk frequently, with change in status or transfer to another level of care.  Communicate fall injury risk to interprofessional healthcare team.  Determine need for increased observation, equipment and environmental modification, such as low bed, signage and supportive, nonskid footwear.  Adjust safety measures to individual developmental age, stage and identified risk factors.  Reinforce the importance of safety and physical activity with patient and family.  Perform regular intentional rounding to assess need for position change, pain assessment and personal needs, including assistance with toileting.  Recent Flowsheet  Documentation  Taken 5/16/2024 1340 by Meena Garcia, RN  Safety Promotion/Fall Prevention:   assistive device/personal items within reach   clutter free environment maintained   fall prevention program maintained   nonskid shoes/slippers when out of bed   room organization consistent   safety round/check completed  Taken 5/16/2024 1200 by Meena Garcia, RN  Safety Promotion/Fall Prevention:   assistive device/personal items within reach   clutter free environment maintained   fall prevention program maintained   nonskid shoes/slippers when out of bed   room organization consistent   safety round/check completed  Taken 5/16/2024 1000 by Meena Garcia, RN  Safety Promotion/Fall Prevention:   assistive device/personal items within reach   clutter free environment maintained   fall prevention program maintained   nonskid shoes/slippers when out of bed   room organization consistent   safety round/check completed  Taken 5/16/2024 0845 by Meena Garcia, RN  Safety Promotion/Fall Prevention:   assistive device/personal items within reach   clutter free environment maintained   fall prevention program maintained   nonskid shoes/slippers when out of bed   room organization consistent   safety round/check completed   lighting adjusted  Intervention: Prevent Skin Injury  Description: Perform a screening for skin injury risk, such as pressure or moisture associated skin damage on admission and at regular intervals throughout hospital stay.  Keep all areas of skin (especially folds) clean and dry.  Maintain adequate skin hydration.  Relieve and redistribute pressure and protect bony prominences; implement measures based on patient-specific risk factors.  Match turning and repositioning schedule to clinical condition.  Encourage weight shift frequently; assist with reposition if unable to complete independently.  Float heels off bed; avoid pressure on the Achilles tendon.  Keep skin free from extended contact with medical  devices.  Encourage functional activity and mobility, as early as tolerated.  Use aids (e.g., slide boards, mechanical lift) during transfer.  Recent Flowsheet Documentation  Taken 5/16/2024 1340 by Meena Garcia RN  Body Position:   neutral body alignment   neutral head position   supine, legs elevated   weight shifting  Taken 5/16/2024 1200 by Meena Garcia RN  Body Position:   neutral body alignment   neutral head position   weight shifting   sitting up in bed  Taken 5/16/2024 1000 by Meena Garcia RN  Body Position:   neutral body alignment   neutral head position   weight shifting   position changed independently  Taken 5/16/2024 0845 by Meena Garcia RN  Body Position:   neutral body alignment   neutral head position   weight shifting   position changed independently   right   side-lying  Intervention: Prevent Infection  Description: Maintain skin and mucous membrane integrity; promote hand, oral and pulmonary hygiene.  Optimize fluid balance, nutrition, sleep and glycemic control to maximize infection resistance.  Identify potential sources of infection early to prevent or mitigate progression of infection (e.g., wound, lines, devices).  Evaluate ongoing need for invasive devices; remove promptly when no longer indicated.  Recent Flowsheet Documentation  Taken 5/16/2024 1340 by Meena Garcia RN  Infection Prevention:   environmental surveillance performed   equipment surfaces disinfected   hand hygiene promoted   single patient room provided  Taken 5/16/2024 1200 by Meena Garcia RN  Infection Prevention:   environmental surveillance performed   equipment surfaces disinfected   hand hygiene promoted   single patient room provided  Taken 5/16/2024 1000 by Meena Garcia RN  Infection Prevention:   environmental surveillance performed   equipment surfaces disinfected   hand hygiene promoted   single patient room provided  Taken 5/16/2024 0845 by Meena Garcia RN  Infection Prevention:    environmental surveillance performed   equipment surfaces disinfected   hand hygiene promoted   single patient room provided  Goal: Optimal Comfort and Wellbeing  Outcome: Ongoing, Progressing  Intervention: Monitor Pain and Promote Comfort  Description: Assess pain level, treatment efficacy and patient response at regular intervals using a consistent pain scale.  Consider the presence and impact of preexisting chronic pain.  Encourage patient and caregiver involvement in pain assessment, interventions and safety measures.  Recent Flowsheet Documentation  Taken 5/16/2024 1351 by Meena Garcia, RN  Pain Management Interventions:   care clustered   diversional activity provided   heat applied   pillow support provided   position adjusted   see MAR  Taken 5/16/2024 1000 by Meena Garcia, RN  Pain Management Interventions:   care clustered   heat applied   position adjusted   pillow support provided   quiet environment facilitated  Intervention: Provide Person-Centered Care  Description: Use a family-focused approach to care.  Develop trust and rapport by proactively providing information, encouraging questions, addressing concerns and offering reassurance.  Acknowledge emotional response to hospitalization.  Recognize and utilize personal coping strategies.  Honor spiritual and cultural preferences.  Recent Flowsheet Documentation  Taken 5/16/2024 0845 by Meena Garcia, RN  Trust Relationship/Rapport:   care explained   choices provided   emotional support provided   empathic listening provided   questions answered   questions encouraged   reassurance provided   thoughts/feelings acknowledged  Goal: Readiness for Transition of Care  Outcome: Ongoing, Progressing  Goal: Plan of Care Review  Outcome: Ongoing, Progressing  Flowsheets (Taken 5/16/2024 1452)  Progress: improving  Plan of Care Reviewed With:   patient   family  Outcome Evaluation: Patient alert and oriented x 4. [place]. Up to BR with walker and SBA.  Tolerating clear diet w/o difficulty. Denies ABD discomfort, n/v/d. Does c/o BLE arthritic pain and muscle spasms. PRN Tylenol and Flexeril provided. K-pad in place for additional comfort. Son reports patient sees pain management for the above stated pain. Has had to reschedule appointment r/t transportation issue and is out of her medications. TT CCP and grandchildren at bedside regarding helping with transportations issue. Grandchildren report it will not be an issue to assist now that they know there is an issue / need. CCP will look into any transportation that may be available to her and provide information.GI in and plan to do an EGD in am. Monitoring.  Goal: Patient-Specific Goal (Individualized)  Outcome: Ongoing, Progressing  Goal: Absence of Hospital-Acquired Illness or Injury  Outcome: Ongoing, Progressing  Intervention: Identify and Manage Fall Risk  Description: Perform standard risk assessment on admission using a validated tool or comprehensive approach appropriate to the patient; reassess fall risk frequently, with change in status or transfer to another level of care.  Communicate fall injury risk to interprofessional healthcare team.  Determine need for increased observation, equipment and environmental modification, such as low bed, signage and supportive, nonskid footwear.  Adjust safety measures to individual developmental age, stage and identified risk factors.  Reinforce the importance of safety and physical activity with patient and family.  Perform regular intentional rounding to assess need for position change, pain assessment and personal needs, including assistance with toileting.  Recent Flowsheet Documentation  Taken 5/16/2024 1340 by Meena Garcia, RN  Safety Promotion/Fall Prevention:   assistive device/personal items within reach   clutter free environment maintained   fall prevention program maintained   nonskid shoes/slippers when out of bed   room organization consistent   safety  round/check completed  Taken 5/16/2024 1200 by Meena Garcia RN  Safety Promotion/Fall Prevention:   assistive device/personal items within reach   clutter free environment maintained   fall prevention program maintained   nonskid shoes/slippers when out of bed   room organization consistent   safety round/check completed  Taken 5/16/2024 1000 by Meena Garcia RN  Safety Promotion/Fall Prevention:   assistive device/personal items within reach   clutter free environment maintained   fall prevention program maintained   nonskid shoes/slippers when out of bed   room organization consistent   safety round/check completed  Taken 5/16/2024 0845 by Meena Garcia RN  Safety Promotion/Fall Prevention:   assistive device/personal items within reach   clutter free environment maintained   fall prevention program maintained   nonskid shoes/slippers when out of bed   room organization consistent   safety round/check completed   lighting adjusted  Intervention: Prevent Skin Injury  Description: Perform a screening for skin injury risk, such as pressure or moisture associated skin damage on admission and at regular intervals throughout hospital stay.  Keep all areas of skin (especially folds) clean and dry.  Maintain adequate skin hydration.  Relieve and redistribute pressure and protect bony prominences; implement measures based on patient-specific risk factors.  Match turning and repositioning schedule to clinical condition.  Encourage weight shift frequently; assist with reposition if unable to complete independently.  Float heels off bed; avoid pressure on the Achilles tendon.  Keep skin free from extended contact with medical devices.  Encourage functional activity and mobility, as early as tolerated.  Use aids (e.g., slide boards, mechanical lift) during transfer.  Recent Flowsheet Documentation  Taken 5/16/2024 1340 by Meena Garcia, RN  Body Position:   neutral body alignment   neutral head position   supine, legs  elevated   weight shifting  Taken 5/16/2024 1200 by Meena Garcia RN  Body Position:   neutral body alignment   neutral head position   weight shifting   sitting up in bed  Taken 5/16/2024 1000 by Meena Garcia RN  Body Position:   neutral body alignment   neutral head position   weight shifting   position changed independently  Taken 5/16/2024 0845 by Meena Garcia RN  Body Position:   neutral body alignment   neutral head position   weight shifting   position changed independently   right   side-lying  Intervention: Prevent Infection  Description: Maintain skin and mucous membrane integrity; promote hand, oral and pulmonary hygiene.  Optimize fluid balance, nutrition, sleep and glycemic control to maximize infection resistance.  Identify potential sources of infection early to prevent or mitigate progression of infection (e.g., wound, lines, devices).  Evaluate ongoing need for invasive devices; remove promptly when no longer indicated.  Recent Flowsheet Documentation  Taken 5/16/2024 1340 by Meena Garcia RN  Infection Prevention:   environmental surveillance performed   equipment surfaces disinfected   hand hygiene promoted   single patient room provided  Taken 5/16/2024 1200 by Meena Garcia RN  Infection Prevention:   environmental surveillance performed   equipment surfaces disinfected   hand hygiene promoted   single patient room provided  Taken 5/16/2024 1000 by Meena Garcia RN  Infection Prevention:   environmental surveillance performed   equipment surfaces disinfected   hand hygiene promoted   single patient room provided  Taken 5/16/2024 0845 by Meena Garcia RN  Infection Prevention:   environmental surveillance performed   equipment surfaces disinfected   hand hygiene promoted   single patient room provided  Goal: Optimal Comfort and Wellbeing  Outcome: Ongoing, Progressing  Intervention: Monitor Pain and Promote Comfort  Description: Assess pain level, treatment efficacy and patient  response at regular intervals using a consistent pain scale.  Consider the presence and impact of preexisting chronic pain.  Encourage patient and caregiver involvement in pain assessment, interventions and safety measures.  Recent Flowsheet Documentation  Taken 5/16/2024 1351 by Meena Garcia, RN  Pain Management Interventions:   care clustered   diversional activity provided   heat applied   pillow support provided   position adjusted   see MAR  Taken 5/16/2024 1000 by Meena Garcia RN  Pain Management Interventions:   care clustered   heat applied   position adjusted   pillow support provided   quiet environment facilitated  Intervention: Provide Person-Centered Care  Description: Use a family-focused approach to care.  Develop trust and rapport by proactively providing information, encouraging questions, addressing concerns and offering reassurance.  Acknowledge emotional response to hospitalization.  Recognize and utilize personal coping strategies.  Honor spiritual and cultural preferences.  Recent Flowsheet Documentation  Taken 5/16/2024 0845 by Meena Garcia, RN  Trust Relationship/Rapport:   care explained   choices provided   emotional support provided   empathic listening provided   questions answered   questions encouraged   reassurance provided   thoughts/feelings acknowledged  Goal: Readiness for Transition of Care  Outcome: Ongoing, Progressing     Problem: Fall Injury Risk  Goal: Absence of Fall and Fall-Related Injury  Outcome: Ongoing, Progressing  Intervention: Identify and Manage Contributors  Description: Develop a fall prevention plan with the patient and caregiver/family.  Provide reorientation, appropriate sensory stimulation and routines with changes in mental status to decrease risk of fall.  Promote use of personal vision and auditory aids.  Assess assistance level required for safe and effective self-care; provide support as needed, such as toileting, mobilization. For age 65 and  older, implement timed toileting with assistance.  Encourage physical activity, such as performance of mobility and self-care at highest level of patient ability, multicomponent exercise program and provision of appropriate assistive devices.  If fall occurs, assess the severity of injury; implement fall injury protocol. Determine the cause and revise fall injury prevention plan.  Regularly review medication contribution to fall risk; adjust medication administration times to minimize risk of falling.  Consider risk related to polypharmacy and age.  Balance adequate pain management with potential for oversedation.  Recent Flowsheet Documentation  Taken 5/16/2024 1340 by Meena Garcia, RN  Medication Review/Management: medications reviewed  Taken 5/16/2024 1200 by Meena Garcia, RN  Medication Review/Management: medications reviewed  Taken 5/16/2024 1000 by Meena Garcia, RN  Medication Review/Management: medications reviewed  Taken 5/16/2024 0845 by Meena Garcia RN  Medication Review/Management: medications reviewed  Intervention: Promote Injury-Free Environment  Description: Provide a safe, barrier-free environment that encourages independent activity.  Keep care area uncluttered and well-lighted.  Determine need for increased observation or monitoring.  Avoid use of devices that minimize mobility, such as restraints or indwelling urinary catheter.  Recent Flowsheet Documentation  Taken 5/16/2024 1340 by Meena Garcia, RN  Safety Promotion/Fall Prevention:   assistive device/personal items within reach   clutter free environment maintained   fall prevention program maintained   nonskid shoes/slippers when out of bed   room organization consistent   safety round/check completed  Taken 5/16/2024 1200 by Meena Garcia, RN  Safety Promotion/Fall Prevention:   assistive device/personal items within reach   clutter free environment maintained   fall prevention program maintained   nonskid shoes/slippers when out  of bed   room organization consistent   safety round/check completed  Taken 5/16/2024 1000 by Meena Garcia, RN  Safety Promotion/Fall Prevention:   assistive device/personal items within reach   clutter free environment maintained   fall prevention program maintained   nonskid shoes/slippers when out of bed   room organization consistent   safety round/check completed  Taken 5/16/2024 0845 by Meena Garcia, RN  Safety Promotion/Fall Prevention:   assistive device/personal items within reach   clutter free environment maintained   fall prevention program maintained   nonskid shoes/slippers when out of bed   room organization consistent   safety round/check completed   lighting adjusted     Problem: Hypertension Comorbidity  Goal: Blood Pressure in Desired Range  Outcome: Ongoing, Progressing  Intervention: Maintain Blood Pressure Management  Description: Evaluate adherence to home antihypertensive regimen (e.g., exercise and activity, diet modification, medication).  Provide scheduled antihypertensive medication; consider administration time and effects (e.g., avoid giving diuretic prior to bedtime).  Monitor response to antihypertensive medication therapy (e.g., blood pressure, electrolyte levels, medication effects).  Minimize risk of orthostatic hypotension; encourage caution with position changes, particularly if elderly.  Recent Flowsheet Documentation  Taken 5/16/2024 1340 by Meena Garcia, RN  Medication Review/Management: medications reviewed  Taken 5/16/2024 1200 by Meena Garcia RN  Medication Review/Management: medications reviewed  Taken 5/16/2024 1000 by Meena Garcia, RN  Medication Review/Management: medications reviewed  Taken 5/16/2024 0845 by Meena Garcia, RN  Medication Review/Management: medications reviewed     Problem: Skin Injury Risk Increased  Goal: Skin Health and Integrity  Outcome: Ongoing, Progressing  Intervention: Optimize Skin Protection  Description: Perform a full pressure  injury risk assessment, as indicated by screening, upon admission to care unit.  Reassess skin (injury risk, full inspection) frequently (e.g., scheduled interval, with change in condition) to provide optimal early detection and prevention.  Maintain adequate tissue perfusion (e.g., encourage fluid balance; avoid crossing legs, constrictive clothing or devices) to promote tissue oxygenation.  Maintain head of bed at lowest degree of elevation tolerated, considering medical condition and other restrictions.  Avoid positioning onto an area that remains reddened.  Minimize incontinence and moisture (e.g., toileting schedule; moisture-wicking pad, diaper or incontinence collection device; skin moisture barrier).  Cleanse skin promptly and gently when soiled utilizing a pH-balanced cleanser.  Relieve and redistribute pressure (e.g., scheduled position changes, weight shifts, use of support surface, medical device repositioning, protective dressing application, use of positioning device, microclimate control, use of pressure-injury-monitor  Encourage increased activity, such as sitting in a chair at the bedside or early mobilization, when able to tolerate.  Recent Flowsheet Documentation  Taken 5/16/2024 1340 by Meena Garcia, RN  Head of Bed (HOB) Positioning: HOB at 20 degrees  Taken 5/16/2024 0845 by Meena Garcia, RN  Head of Bed (HOB) Positioning: HOB flat   Goal Outcome Evaluation:  Plan of Care Reviewed With: patient, family        Progress: improving  Outcome Evaluation: Patient alert and oriented x 4. [place]. Up to BR with walker and SBA. Tolerating clear diet w/o difficulty. Denies ABD discomfort, n/v/d. Does c/o BLE arthritic pain and muscle spasms. PRN Tylenol and Flexeril provided. K-pad in place for additional comfort. Son reports patient sees pain management for the above stated pain. Has had to reschedule appointment r/t transportation issue and is out of her medications. TT CCP and grandchildren at bedside  regarding helping with transportations issue. Grandchildren report it will not be an issue to assist now that they know there is an issue / need. CCP will look into any transportation that may be available to her and provide information.GI in and plan to do an EGD in am. Monitoring.

## 2024-05-16 NOTE — H&P
"HISTORY AND PHYSICAL   [facility]        Patient Identification:  Name: Wendie Houser  Age: 89 y.o.  Sex: female  :  1935  MRN: [id number]                     Primary Care Physician: Pallares, Clara Ann, MD    Chief Complaint: Abdominal pain    History of Present Illness:       The patient  is a 89 y.o. female with a PMH significant for Alzheimer's dementia, GERD, hypertension, irritable bowel syndrome, anxiety who presents to the hospital complaining of acute generalized abdominal pain which is cramping and aching in nature and has developed over the past couple of days.  She has nausea and diarrhea that have developed through the day today as well.  No blood in the stool.  No fever but admits to some associated chills.  Her only abdominal surgical history includes cholecystectomy many years ago.  No recent antibiotics.  She does admit to increased muscle cramping that is occurred intermittently to \"all of the muscles in my body\".  States that this has been a recurring and intermittent problem for the past 3 years.  Currently no muscle spasms.  The patient was evaluated in the ER and admitted for further evaluation and treatment of abdominal pain with CT findings suggesting gastroduodenitis.    Past Medical History:  Past Medical History:   Diagnosis Date    Anemia     Arthritis     CHF (congestive heart failure)     Congestive heart failure     Depression     Diabetes mellitus     type 2    Diverticulitis of colon 2016    Acute uncomplicated diverticulitis at the distal transverse colon    Early onset Alzheimer's dementia     GERD (gastroesophageal reflux disease)     Heart disease     Hyperlipidemia     Hypertension     Hypothyroidism     Kidney disease     Mixed anxiety depressive disorder 10/23/2018    Panic attack 2021    Sleep apnea      Past Surgical History:  Past Surgical History:   Procedure Laterality Date    BREAST CYST EXCISION Right     Benign    COLONOSCOPY  " 2011    Dr. Cain    ENDOSCOPY  04/15/2011    EYE SURGERY  2009    GALLBLADDER SURGERY  1960    Open    TOTAL KNEE ARTHROPLASTY Left 2012    Dr. Soler    VAGINAL HYSTERECTOMY  1999      Home Meds:  Medications Prior to Admission   Medication Sig Dispense Refill Last Dose    cetirizine (zyrTEC) 10 MG tablet Take 0.5 tablets by mouth Daily. 30 tablet 2 5/15/2024 at 0800    donepezil (ARICEPT) 10 MG tablet Take 1 tablet by mouth every night at bedtime. 90 tablet 2 5/15/2024 at 0800    losartan (COZAAR) 50 MG tablet Take 0.5 tablets by mouth 2 (Two) Times a Day. 90 tablet 3 5/15/2024 at 0800    magnesium oxide (MAG-OX) 400 MG tablet Take 1 tablet by mouth Daily. 14 tablet 0 5/15/2024 at 0800    multivitamin (MULTI VITAMIN DAILY PO) Take 1 tablet by mouth Daily. 90 tablet 3 5/15/2024 at 0800    omeprazole (priLOSEC) 20 MG capsule Take 1 capsule by mouth Daily. 90 capsule 3 5/15/2024 at 0800    pravastatin (PRAVACHOL) 40 MG tablet Take 1 tablet by mouth Every Night. 90 tablet 3 5/15/2024 at 0800    acetaminophen (TYLENOL) 500 MG tablet Take 1 tablet by mouth 2 (Two) Times a Day As Needed for Mild Pain . 120 tablet 1 Unknown    aspirin (aspirin) 81 MG EC tablet Take 1 tablet by mouth Daily. (Patient not taking: Reported on 5/16/2024) 90 tablet 1 Not Taking    cyclobenzaprine (FLEXERIL) 5 MG tablet Take 1 tablet by mouth 3 (Three) Times a Day As Needed for Muscle Spasms. for muscle spams 30 tablet 3 Unknown    glucose blood test strip 1 each by Other route Daily. 50 each 12 Unknown    glucose monitor monitoring kit 1 each Daily. 1 each 11 Unknown    HYDROcodone-acetaminophen (NORCO) 5-325 MG per tablet Take 1 tablet by mouth 2 (Two) Times a Day As Needed for Moderate Pain . 40 tablet 0 Unknown    Lancets misc 1 each Daily. 100 each 5 Unknown    lidocaine (LIDODERM) 5 % Place 1 patch on the skin as directed by provider Daily. Remove & Discard patch within 12 hours or as directed by MD 6 each 0 Unknown    methocarbamol  (ROBAXIN) 750 MG tablet Take 1 tablet by mouth 3 (Three) Times a Day As Needed for Muscle Spasms. 15 tablet 0 Unknown    metroNIDAZOLE (FLAGYL) 500 MG tablet Take 1 tablet by mouth 2 (Two) Times a Day. 14 tablet 0 Unknown    naproxen (NAPROSYN) 500 MG tablet Take 1 tablet by mouth 2 (Two) Times a Day As Needed for Mild Pain. 15 tablet 0 Unknown    Sennosides (Senna) 8.6 MG capsule Take 1 tablet by mouth Daily. 90 each 1 Unknown     Current meds    Current Facility-Administered Medications:     acetaminophen (TYLENOL) tablet 650 mg, 650 mg, Oral, Q4H PRN **OR** acetaminophen (TYLENOL) 160 MG/5ML oral solution 650 mg, 650 mg, Oral, Q4H PRN **OR** acetaminophen (TYLENOL) suppository 650 mg, 650 mg, Rectal, Q4H PRN, Jessi Patel APRN    sennosides-docusate (PERICOLACE) 8.6-50 MG per tablet 2 tablet, 2 tablet, Oral, BID PRN **AND** polyethylene glycol (MIRALAX) packet 17 g, 17 g, Oral, Daily PRN **AND** bisacodyl (DULCOLAX) EC tablet 5 mg, 5 mg, Oral, Daily PRN **AND** bisacodyl (DULCOLAX) suppository 10 mg, 10 mg, Rectal, Daily PRN, Jessi Patel APRN    calcium carbonate (TUMS) chewable tablet 500 mg (200 mg elemental), 2 tablet, Oral, BID PRN, Jessi Patel APRN    morphine injection 2 mg, 2 mg, Intravenous, Q3H PRN, Jessi Patel APRN    ondansetron ODT (ZOFRAN-ODT) disintegrating tablet 4 mg, 4 mg, Oral, Q6H PRN **OR** ondansetron (ZOFRAN) injection 4 mg, 4 mg, Intravenous, Q6H PRN, Jessi Patel APRN    pantoprazole (PROTONIX) injection 40 mg, 40 mg, Intravenous, Q AM, Jessi Patel APRN, 40 mg at 05/16/24 0939    sodium chloride 0.9 % flush 10 mL, 10 mL, Intravenous, PRN, Billy Forrest, PA    sodium chloride 0.9 % flush 10 mL, 10 mL, Intravenous, Q12H, Jessi Patel APRN, 10 mL at 05/16/24 0936    sodium chloride 0.9 % flush 10 mL, 10 mL, Intravenous, PRN, Jessi Patel APRN    sodium chloride 0.9 % infusion 40 mL, 40 mL, Intravenous, PRN, Jorge,  MARK Goss    sodium chloride 0.9 % infusion, 100 mL/hr, Intravenous, Continuous, Jorge MARK Goss, Last Rate: 100 mL/hr at 05/16/24 0646, 100 mL/hr at 05/16/24 0646  Allergies:  No Known Allergies  Immunizations:  Immunization History   Administered Date(s) Administered    COVID-19 (PFIZER) Purple Cap Monovalent 02/24/2021, 03/17/2021    Covid-19 (Pfizer) Gray Cap Monovalent 08/11/2022    FLUAD TRI 65YR+ 10/09/2019    Fluad Quad 65+ 10/09/2019, 10/20/2020    Fluzone High Dose =>65 Years ([employer] ONLY) 09/19/2012, 10/28/2016, 10/12/2017, 10/23/2018, 10/09/2019    Fluzone High-Dose 65+yrs 10/09/2019, 10/20/2020, 10/29/2021    Influenza Split Preservative Free ID 10/23/2018    Influenza, Unspecified 09/08/2015    Pneumococcal Conjugate 13-Valent (PCV13) 09/15/2015    Pneumococcal, Unspecified 09/15/2015     Social History:   Social History     Social History Narrative    Not on file     Social History     Socioeconomic History    Marital status:    Tobacco Use    Smoking status: Never    Smokeless tobacco: Never   Vaping Use    Vaping status: Never Used   Substance and Sexual Activity    Alcohol use: No    Drug use: No    Sexual activity: Never       Family History:  Family History   Problem Relation Age of Onset    Other Mother         cardiac disorder    Hypertension Mother     Heart disease Mother     Hypertension Sister     Heart attack Sister     Cancer Brother     Hypertension Brother     Heart attack Brother     Heart disease Father         Review of Systems  See history of present illness and past medical history.  Patient denies headache, dizziness, syncope, falls, trauma, change in vision, change in hearing, change in taste, changes in weight, changes in appetite, focal weakness, numbness, or paresthesia.  Patient denies chest pain, palpitations, dyspnea, orthopnea, PND, cough, sinus pressure, rhinorrhea, epistaxis, hemoptysis, nausea, vomiting, hematemesis, diarrhea, constipation or  "hematochezia. Denies cold or heat intolerance, polydipsia, polyuria, polyphagia. Denies hematuria, pyuria, dysuria, hesitancy, frequency or urgency.   Denies fever, chills, sweats, night sweats.  Denies missing any routine medications. Remainder of ROS is negative.    Objective:  tMax 24 hrs: Temp (24hrs), Av.9 °F (36.6 °C), Min:97.1 °F (36.2 °C), Max:98.6 °F (37 °C)    Vitals Ranges:   Temp:  [97.1 °F (36.2 °C)-98.6 °F (37 °C)] 98.6 °F (37 °C)  Heart Rate:  [79-94] 85  Resp:  [14-16] 14  BP: (110-151)/(68-82) 115/75      Exam:  /75 (BP Location: Left arm, Patient Position: Lying)   Pulse 85   Temp 98.6 °F (37 °C) (Oral)   Resp 14   Ht 152.4 cm (60\")   Wt 81.1 kg (178 lb 12.7 oz)   SpO2 96%   BMI 34.92 kg/m²     General Appearance:    Alert, cooperative, no distress, appears stated age   Head:    Normocephalic, without obvious abnormality, atraumatic   Eyes:    PERRL, conjunctivae/corneas clear, EOM's intact, both eyes   Ears:    Normal external ear canals, both ears   Nose:   Nares normal, septum midline, mucosa normal, no drainage    or sinus tenderness   Throat:   Lips, mucosa, and tongue normal   Neck:   Supple, symmetrical, trachea midline, no adenopathy;     thyroid:  no enlargement/tenderness/nodules; no carotid    bruit or JVD   Back:     Symmetric, no curvature, ROM normal, no CVA tenderness   Lungs:     Clear to auscultation bilaterally, respirations unlabored   Chest Wall:    No tenderness or deformity    Heart:    Regular rate and rhythm, S1 and S2 normal, no murmur, rub   or gallop   Abdomen:     Soft, nontender, bowel sounds active all four quadrants,     no masses, no hepatomegaly, no splenomegaly   Extremities:   Extremities normal, atraumatic, no cyanosis or edema   Pulses:   2+ and symmetric all extremities   Skin:   Skin color, texture, turgor normal, no rashes or lesions   Lymph nodes:   Cervical, supraclavicular, and axillary nodes normal   Neurologic:   CNII-XII intact, normal " strength, sensation intact throughout      .    Data Review:  Lab Results (last 72 hours)       Procedure Component Value Units Date/Time    Basic Metabolic Panel [[id number]]  (Abnormal) Collected: 05/16/24 0654    Specimen: Blood Updated: 05/16/24 0740     Glucose 128 mg/dL      BUN 11 mg/dL      Creatinine 0.99 mg/dL      Sodium 134 mmol/L      Potassium 3.9 mmol/L      Chloride 100 mmol/L      CO2 22.0 mmol/L      Calcium 8.5 mg/dL      BUN/Creatinine Ratio 11.1     Anion Gap 12.0 mmol/L      eGFR 55.3 mL/min/1.73     Narrative:      GFR Normal >60  Chronic Kidney Disease <60  Kidney Failure <15    The GFR formula is only valid for adults with stable renal function between ages 18 and 70.    CBC (No Diff) [[id number]]  (Abnormal) Collected: 05/16/24 0654    Specimen: Blood Updated: 05/16/24 0722     WBC 11.42 10*3/mm3      RBC 4.75 10*6/mm3      Hemoglobin 13.3 g/dL      Hematocrit 40.7 %      MCV 85.7 fL      MCH 28.0 pg      MCHC 32.7 g/dL      RDW 13.8 %      RDW-SD 42.7 fl      MPV 9.4 fL      Platelets 290 10*3/mm3     Blood Culture - Blood, Hand, Right [[id number]] Collected: 05/16/24 0453    Specimen: Blood from Hand, Right Updated: 05/16/24 0457    Blood Culture - Blood, Hand, Left [[id number]] Collected: 05/16/24 0452    Specimen: Blood from Hand, Left Updated: 05/16/24 0457    STAT Lactic Acid, Reflex [[id number]]  (Normal) Collected: 05/16/24 0224    Specimen: Blood Updated: 05/16/24 0325     Lactate 2.0 mmol/L     Urinalysis With Microscopic If Indicated (No Culture) - Urine, Clean Catch [[id number]]  (Abnormal) Collected: 05/16/24 0219    Specimen: Urine, Clean Catch Updated: 05/16/24 0236     Color, UA Yellow     Appearance, UA Clear     pH, UA 6.0     Specific Gravity, UA >1.030     Glucose, UA Negative     Ketones, UA Negative     Bilirubin, UA Negative     Blood, UA Negative     Protein, UA Negative     Leuk Esterase, UA Trace     Nitrite, UA Negative     Urobilinogen, UA 0.2 E.U./dL    Urinalysis,  Microscopic Only - Urine, Clean Catch [[id number]]  (Abnormal) Collected: 05/16/24 0219    Specimen: Urine, Clean Catch Updated: 05/16/24 0236     RBC, UA 0-2 /HPF      WBC, UA 6-10 /HPF      Bacteria, UA None Seen /HPF      Squamous Epithelial Cells, UA 0-2 /HPF      Hyaline Casts, UA None Seen /LPF      Methodology Automated Microscopy    Comprehensive Metabolic Panel [[id number]]  (Abnormal) Collected: 05/15/24 2212    Specimen: Blood Updated: 05/15/24 2332     Glucose 117 mg/dL      BUN 13 mg/dL      Creatinine 0.98 mg/dL      Sodium 134 mmol/L      Potassium 4.5 mmol/L      Comment: Slight hemolysis detected by analyzer. Result may be falsely elevated.        Chloride 98 mmol/L      CO2 22.0 mmol/L      Calcium 9.0 mg/dL      Total Protein 7.1 g/dL      Albumin 4.2 g/dL      ALT (SGPT) 14 U/L      AST (SGOT) 25 U/L      Comment: Slight hemolysis detected by analyzer. Result may be falsely elevated.        Alkaline Phosphatase 96 U/L      Total Bilirubin 0.4 mg/dL      Globulin 2.9 gm/dL      A/G Ratio 1.4 g/dL      BUN/Creatinine Ratio 13.3     Anion Gap 14.0 mmol/L      eGFR 56.0 mL/min/1.73     Narrative:      CMP WAS PROCESSED USING THE SST TUBE.       GFR Normal >60  Chronic Kidney Disease <60  Kidney Failure <15    The GFR formula is only valid for adults with stable renal function between ages 18 and 70.    Lactic Acid, Plasma [[id number]]  (Abnormal) Collected: 05/15/24 2212    Specimen: Blood Updated: 05/15/24 2316     Lactate 2.4 mmol/L     Lipase [[id number]]  (Normal) Collected: 05/15/24 2212    Specimen: Blood Updated: 05/15/24 2308     Lipase 35 U/L     [facility] [[id number]] Collected: 05/15/24 2212    Specimen: Blood Updated: 05/15/24 2247    Narrative:      The following orders were created for panel order [facility].  Procedure                               Abnormality         Status                     ---------                               -----------         ------                     Green  Top (Gel)[[id number]]                                  Final result               Lavender Top[[id number]]                                     Final result               Gold Top - SST[[id number]]                                   Final result               Light Blue Top[[id number]]                                   Final result                 Please view results for these tests on the individual orders.    Gold Top - SST [[id number]] Collected: 05/15/24 2212    Specimen: Blood Updated: 05/15/24 2247     Extra Tube Hold for add-ons.     Comment: Auto resulted.       Green Top (Gel) [[id number]] Collected: 05/15/24 2212    Specimen: Blood Updated: 05/15/24 2247     Extra Tube Hold for add-ons.     Comment: Auto resulted.       Lavender Top [[id number]] Collected: 05/15/24 2212    Specimen: Blood Updated: 05/15/24 2247     Extra Tube hold for add-on     Comment: Auto resulted       Light Blue Top [[id number]] Collected: 05/15/24 2212    Specimen: Blood Updated: 05/15/24 2247     Extra Tube Hold for add-ons.     Comment: Auto resulted       CBC & Differential [[id number]]  (Abnormal) Collected: 05/15/24 2212    Specimen: Blood Updated: 05/15/24 2245    Narrative:      The following orders were created for panel order CBC & Differential.  Procedure                               Abnormality         Status                     ---------                               -----------         ------                     CBC Auto Differential[[id number]]        Abnormal            Final result                 Please view results for these tests on the individual orders.    CBC Auto Differential [[id number]]  (Abnormal) Collected: 05/15/24 2212    Specimen: Blood Updated: 05/15/24 2245     WBC 11.70 10*3/mm3      RBC 4.85 10*6/mm3      Hemoglobin 13.4 g/dL      Hematocrit 41.6 %      MCV 85.8 fL      MCH 27.6 pg      MCHC 32.2 g/dL      RDW 13.9 %      RDW-SD 43.5 fl      MPV 9.8 fL      Platelets 332 10*3/mm3      Neutrophil % 52.3 %       Lymphocyte % 38.1 %      Monocyte % 7.9 %      Eosinophil % 0.8 %      Basophil % 0.5 %      Immature Grans % 0.4 %      Neutrophils, Absolute 6.12 10*3/mm3      Lymphocytes, Absolute 4.46 10*3/mm3      Monocytes, Absolute 0.92 10*3/mm3      Eosinophils, Absolute 0.09 10*3/mm3      Basophils, Absolute 0.06 10*3/mm3      Immature Grans, Absolute 0.05 10*3/mm3      nRBC 0.0 /100 WBC                      Imaging Results (All)       Procedure Component Value Units Date/Time    CT Abdomen Pelvis With Contrast [[id number]] Collected: 05/16/24 0031     Updated: 05/16/24 0043    Narrative:      CT OF THE ABDOMEN AND PELVIS WITH CONTRAST     HISTORY: Generalized abdominal pain and diarrhea     COMPARISON: August 22, 2023     TECHNIQUE: Axial CT imaging was obtained through the abdomen and pelvis.  IV contrast was administered.     FINDINGS:  Images through the lung bases do not demonstrate any acute  abnormalities. No suspicious hepatic lesions are seen. The patient does  have intra and extrahepatic biliary dilatation. Common bile duct  measures up to 1.3 cm. Degree of dilatation is probably similar to the  prior study. The gallbladder is absent. There is a small hiatal hernia.  The patient does appear to have wall thickening and inflammatory  stranding involving the distal stomach and proximal duodenum, suspicious  for gastroduodenitis. This does appear to be separate from the pancreas,  which is normal in attenuation. There is no pancreatic ductal  dilatation.. The spleen appears normal, as are the adrenal glands. The  kidneys enhance symmetrically. No hydronephrosis is identified. No  distal ureteral or bladder stones are seen. Uterus is absent. There is  colonic diverticulosis. There is no bowel obstruction. The appendix is  normal in caliber. No pneumatosis or free air is seen. There are  aortoiliac calcifications. Patient does appear to have a left adnexal  cyst, measuring up to 1.7 cm. This is stable when compared to  the exam  from August 2023. Shotty pelvic lymph nodes are also unchanged. Advanced  degenerative changes of the hips, right greater than left are again  noted. There is lumbar scoliosis, with convexity to the right.       Impression:         1. The patient has wall thickening and inflammatory stranding  surrounding the distal stomach and proximal duodenum, suggesting  gastroduodenitis.  2. There is intra and extrahepatic biliary dilatation. This appears  similar to the exam from August 2023, and may be postcholecystectomy in  nature, although correlation with liver function tests is recommended.     Radiation dose reduction techniques were utilized, including automated  exposure control and exposure modulation based on body size.        This report was finalized on 5/16/2024 12:40 AM by Dr. Sonya Osorio M.D on Workstation: [id number]             Past Medical History:   Diagnosis Date    Anemia     Arthritis     CHF (congestive heart failure)     Congestive heart failure     Depression     Diabetes mellitus     type 2    Diverticulitis of colon 08/25/2016    Acute uncomplicated diverticulitis at the distal transverse colon    Early onset Alzheimer's dementia     GERD (gastroesophageal reflux disease)     Heart disease     Hyperlipidemia     Hypertension     Hypothyroidism     Kidney disease     Mixed anxiety depressive disorder 10/23/2018    Panic attack 7/27/2021    Sleep apnea        Assessment:  Active Hospital Problems    Diagnosis  POA    **Gastroduodenitis [K29.90]  Yes    Generalized abdominal pain [R10.84]  Unknown    Lactic acidosis [E87.20]  Unknown    Hypothyroidism [E03.9]  Yes    Essential hypertension [I10]  Yes    Gastroesophageal reflux disease [K21.9]  Yes    Sleep apnea [G47.30]  Yes      Resolved Hospital Problems   No resolved problems to display.       Plan:  The patient is admitted to the hospital and will get GI consult.  Plans are to do EGD.  Patient is on some medication for acid  suppression with Protonix.  Follow-up lab studies.    Don Hurtado MD  5/16/2024  13:45 EDT

## 2024-05-16 NOTE — ED PROVIDER NOTES
" EMERGENCY DEPARTMENT ENCOUNTER      Room Number:  P490/1  PCP: Pallares, Clara Ann, MD  Independent Historians: Patient and Family  Patient Care Team:  Pallares, Clara Ann, MD as PCP - General (Internal Medicine)  Diego Veronica MD as Consulting Physician (Urology)       HPI:  Chief Complaint: Abdominal pain    A complete HPI/ROS/PMH/PSH/SH/FH are unobtainable due to: Dementia    Chronic or social conditions impacting patient care (Social Determinants of Health): None      Context: Wendie Houser is a 89 y.o. female with a PMH significant for Alzheimer's dementia, GERD, hypertension, irritable bowel syndrome, anxiety who presents to the ED c/o acute generalized abdominal pain which is cramping and aching in nature and has developed over the past couple of days.  She has nausea and diarrhea that have developed through the day today as well.  No blood in the stool.  No fever but admits to some associated chills.  Her only abdominal surgical history includes cholecystectomy many years ago.  No recent antibiotics.  She does admit to increased muscle cramping that is occurred intermittently to \"all of the muscles in my body\".  States that this has been a recurring and intermittent problem for the past 3 years.  Currently no muscle spasms.      Upon review of prior external notes (non-ED) -and- Review of prior external test results outside of this encounter it appears the patient was evaluated in the office with pain management on 12/21/2023 for osteoarthritis.  The patient had a normal COVID-19 test on 10/9/2023 and a normal GI panel on 8/22/2023.      PAST MEDICAL HISTORY  Active Ambulatory Problems     Diagnosis Date Noted    Arthritis 01/25/2016    Late onset Alzheimer's dementia without behavioral disturbance 01/25/2016    Fatigue 01/25/2016    Gastroesophageal reflux disease 01/25/2016    Essential hypertension 01/25/2016    Hypomagnesemia 01/25/2016    Sleep apnea 01/25/2016    Renal insufficiency 10/11/2016    " Hypothyroidism 10/11/2016    Lumbar canal stenosis 10/11/2016    Shortness of breath 03/20/2016    Disorder of electrolytes 07/29/2014    Hiatal hernia 05/02/2017    Hyperkalemia 02/02/2017    Hypoglycemia 02/02/2017    Irritable bowel syndrome 05/02/2017    Allergic rhinitis 06/05/2017    High risk medication use 10/12/2017    Polypharmacy 10/23/2018    Obesity (BMI 30-39.9) 10/20/2020    Panic attack 07/27/2021    Weakness of left lower extremity 08/10/2022     Resolved Ambulatory Problems     Diagnosis Date Noted    Abdominal pain 01/25/2016    Anemia 01/25/2016    Anxiety 01/25/2016    Diverticulitis of intestine 01/25/2016    Gastritis 01/25/2016    Hyperlipidemia 01/25/2016    Cramps of lower extremity 01/25/2016    Left lower quadrant pain 01/25/2016    Nausea 01/25/2016    Renal insufficiency 01/25/2016    Type 2 diabetes mellitus 01/25/2016    Flu-like symptoms 02/23/2016    Alopecia 02/23/2016    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    Chest pain in adult 03/22/2016    Chronic systolic congestive heart failure 03/28/2016    Diverticulosis large intestine w/o perforation or abscess w/o bleeding 03/28/2016    Weight gain 03/28/2016    Vomiting without nausea 08/25/2016    Fever and chills 08/25/2016    Diverticulitis of gastrointestinal tract 08/25/2016    Pain of upper abdomen 08/25/2016    Muscle cramps 08/25/2016    Congestive heart failure 10/11/2016    Mixed anxiety depressive disorder 10/23/2018     Past Medical History:   Diagnosis Date    CHF (congestive heart failure)     Depression     Diabetes mellitus     Diverticulitis of colon 08/25/2016    Early onset Alzheimer's dementia     GERD (gastroesophageal reflux disease)     Heart disease     Hypertension     Kidney disease          PAST SURGICAL HISTORY  Past Surgical History:   Procedure Laterality Date    BREAST CYST EXCISION Right 1989    Benign    COLONOSCOPY  2011    Dr. Cain    ENDOSCOPY  04/15/2011    EYE SURGERY  2009    GALLBLADDER SURGERY  1960    Open     TOTAL KNEE ARTHROPLASTY Left 2012    Dr. Soler    VAGINAL HYSTERECTOMY  1999         FAMILY HISTORY  Family History   Problem Relation Age of Onset    Other Mother         cardiac disorder    Hypertension Mother     Heart disease Mother     Hypertension Sister     Heart attack Sister     Cancer Brother     Hypertension Brother     Heart attack Brother     Heart disease Father          SOCIAL HISTORY  Social History     Socioeconomic History    Marital status:    Tobacco Use    Smoking status: Never    Smokeless tobacco: Never   Vaping Use    Vaping status: Never Used   Substance and Sexual Activity    Alcohol use: No    Drug use: No    Sexual activity: Never         ALLERGIES  Patient has no known allergies.      REVIEW OF SYSTEMS  Included in HPI  All systems reviewed and negative except for those discussed in HPI.      PHYSICAL EXAM    I have reviewed the triage vital signs and nursing notes.    ED Triage Vitals   Temp Heart Rate Resp BP SpO2   05/15/24 2023 05/15/24 2023 05/15/24 2023 05/15/24 2026 05/15/24 2023   97.1 °F (36.2 °C) 90 16 145/82 97 %      Temp src Heart Rate Source Patient Position BP Location FiO2 (%)   05/15/24 2023 05/15/24 2023 05/15/24 2026 05/15/24 2026 --   Tympanic Monitor Sitting Right arm        Physical Exam  Constitutional:       General: She is not in acute distress.     Appearance: She is well-developed.   HENT:      Head: Normocephalic and atraumatic.   Eyes:      General: No scleral icterus.     Conjunctiva/sclera: Conjunctivae normal.   Neck:      Trachea: No tracheal deviation.   Cardiovascular:      Rate and Rhythm: Normal rate and regular rhythm.   Pulmonary:      Effort: Pulmonary effort is normal.      Breath sounds: Normal breath sounds.   Abdominal:      Palpations: Abdomen is soft.      Tenderness: There is no guarding or rebound.   Musculoskeletal:         General: No deformity.      Cervical back: Normal range of motion.   Lymphadenopathy:      Cervical: No  cervical adenopathy.   Skin:     General: Skin is warm and dry.   Neurological:      Mental Status: She is alert and oriented to person, place, and time.   Psychiatric:         Behavior: Behavior normal.         Cognition and Memory: Memory is impaired.         Vital signs and nursing notes reviewed.      PPE: I wore a surgical mask throughout my encounters with the pt. I performed hand hygiene on entry into the pt room and upon exit.      LAB RESULTS  Recent Results (from the past 24 hour(s))   Comprehensive Metabolic Panel    Collection Time: 05/17/24  5:11 AM    Specimen: Blood   Result Value Ref Range    Glucose 95 65 - 99 mg/dL    BUN 8 8 - 23 mg/dL    Creatinine 0.88 0.57 - 1.00 mg/dL    Sodium 140 136 - 145 mmol/L    Potassium 4.1 3.5 - 5.2 mmol/L    Chloride 108 (H) 98 - 107 mmol/L    CO2 22.0 22.0 - 29.0 mmol/L    Calcium 8.3 (L) 8.6 - 10.5 mg/dL    Total Protein 5.9 (L) 6.0 - 8.5 g/dL    Albumin 3.2 (L) 3.5 - 5.2 g/dL    ALT (SGPT) 11 1 - 33 U/L    AST (SGOT) 19 1 - 32 U/L    Alkaline Phosphatase 78 39 - 117 U/L    Total Bilirubin 0.2 0.0 - 1.2 mg/dL    Globulin 2.7 gm/dL    A/G Ratio 1.2 g/dL    BUN/Creatinine Ratio 9.1 7.0 - 25.0    Anion Gap 10.0 5.0 - 15.0 mmol/L    eGFR 63.7 >60.0 mL/min/1.73   CBC Auto Differential    Collection Time: 05/17/24  5:11 AM    Specimen: Blood   Result Value Ref Range    WBC 9.20 3.40 - 10.80 10*3/mm3    RBC 4.55 3.77 - 5.28 10*6/mm3    Hemoglobin 12.5 12.0 - 15.9 g/dL    Hematocrit 39.0 34.0 - 46.6 %    MCV 85.7 79.0 - 97.0 fL    MCH 27.5 26.6 - 33.0 pg    MCHC 32.1 31.5 - 35.7 g/dL    RDW 13.8 12.3 - 15.4 %    RDW-SD 42.1 37.0 - 54.0 fl    MPV 9.4 6.0 - 12.0 fL    Platelets 288 140 - 450 10*3/mm3    Neutrophil % 42.8 42.7 - 76.0 %    Lymphocyte % 44.7 19.6 - 45.3 %    Monocyte % 8.8 5.0 - 12.0 %    Eosinophil % 2.9 0.3 - 6.2 %    Basophil % 0.5 0.0 - 1.5 %    Immature Grans % 0.3 0.0 - 0.5 %    Neutrophils, Absolute 3.93 1.70 - 7.00 10*3/mm3    Lymphocytes, Absolute 4.11  (H) 0.70 - 3.10 10*3/mm3    Monocytes, Absolute 0.81 0.10 - 0.90 10*3/mm3    Eosinophils, Absolute 0.27 0.00 - 0.40 10*3/mm3    Basophils, Absolute 0.05 0.00 - 0.20 10*3/mm3    Immature Grans, Absolute 0.03 0.00 - 0.05 10*3/mm3    nRBC 0.0 0.0 - 0.2 /100 WBC         RADIOLOGY  No Radiology Exams Resulted Within Past 24 Hours      MEDICATIONS GIVEN IN ER  Medications   sodium chloride 0.9 % flush 10 mL (has no administration in time range)   sodium chloride 0.9 % flush 10 mL (10 mL Intravenous Given 5/17/24 0942)   sodium chloride 0.9 % flush 10 mL (has no administration in time range)   sodium chloride 0.9 % infusion 40 mL (has no administration in time range)   sodium chloride 0.9 % infusion (100 mL/hr Intravenous New Bag 5/17/24 0217)   acetaminophen (TYLENOL) tablet 650 mg (650 mg Oral Given 5/16/24 1351)     Or   acetaminophen (TYLENOL) 160 MG/5ML oral solution 650 mg ( Oral Not Given:  See Alt 5/16/24 1351)     Or   acetaminophen (TYLENOL) suppository 650 mg ( Rectal Not Given:  See Alt 5/16/24 1351)   sennosides-docusate (PERICOLACE) 8.6-50 MG per tablet 2 tablet (has no administration in time range)     And   polyethylene glycol (MIRALAX) packet 17 g (has no administration in time range)     And   bisacodyl (DULCOLAX) EC tablet 5 mg (has no administration in time range)     And   bisacodyl (DULCOLAX) suppository 10 mg (has no administration in time range)   ondansetron ODT (ZOFRAN-ODT) disintegrating tablet 4 mg (has no administration in time range)     Or   ondansetron (ZOFRAN) injection 4 mg (has no administration in time range)   calcium carbonate (TUMS) chewable tablet 500 mg (200 mg elemental) (has no administration in time range)   morphine injection 2 mg (has no administration in time range)   cetirizine (zyrTEC) tablet 5 mg (5 mg Oral Given 5/17/24 0941)   cyclobenzaprine (FLEXERIL) tablet 5 mg (5 mg Oral Given 5/16/24 1424)   donepezil (ARICEPT) tablet 10 mg (10 mg Oral Given 5/16/24 2003)    HYDROcodone-acetaminophen (NORCO) 5-325 MG per tablet 1 tablet (1 tablet Oral Given 5/17/24 1002)   losartan (COZAAR) tablet 25 mg (25 mg Oral Given 5/17/24 0941)   methocarbamol (ROBAXIN) tablet 750 mg (has no administration in time range)   pravastatin (PRAVACHOL) tablet 40 mg (40 mg Oral Given 5/16/24 2003)   pantoprazole (PROTONIX) injection 40 mg (40 mg Intravenous Given 5/17/24 0942)   sucralfate (CARAFATE) tablet 1 g (1 g Oral Given 5/17/24 1717)   morphine injection 2 mg (2 mg Intravenous Given 5/15/24 2222)   ondansetron (ZOFRAN) injection 4 mg (4 mg Intravenous Given 5/15/24 2222)   sodium chloride 0.9 % bolus 1,000 mL (0 mL Intravenous Stopped 5/16/24 0329)   iopamidol (ISOVUE-300) 61 % injection 85 mL (85 mL Intravenous Given 5/15/24 1117)   morphine injection 2 mg (2 mg Intravenous Given 5/16/24 0334)   piperacillin-tazobactam (ZOSYN) 3.375 g IVPB in 100 mL NS MBP (CD) (0 g Intravenous Stopped 5/16/24 0555)         ORDERS PLACED DURING THIS VISIT:  Orders Placed This Encounter   Procedures    Blood Culture - Blood,    Blood Culture - Blood,    CT Abdomen Pelvis With Contrast    [facility] Draw    Comprehensive Metabolic Panel    Lipase    Urinalysis With Microscopic If Indicated (No Culture) - Urine, Clean Catch    Lactic Acid, Plasma    CBC Auto Differential    STAT Lactic Acid, Reflex    Urinalysis, Microscopic Only - Urine, Clean Catch    Basic Metabolic Panel    CBC (No Diff)    Comprehensive Metabolic Panel    CBC Auto Differential    Comprehensive Metabolic Panel    Diet: Gastrointestinal; Fiber-Restricted, Low Irritant; Texture: Soft to Chew (NDD 3); Soft to Chew: Whole Meat; Fluid Consistency: Thin (IDDSI 0)    NPO Diet NPO Type: Strict NPO    Vital Signs    Intake & Output    Weigh Patient    Oral Care    Saline Lock & Maintain IV Access    Place Sequential Compression Device    Maintain Sequential Compression Device    Code Status and Medical Interventions:    LHJAVAN (on-call MD unless specified)  Details    Inpatient Case Management  Consult    Inpatient Gastroenterology Consult    OT Consult: Eval & Treat ADL Performance Below Baseline    PT Consult: Eval & Treat Functional Mobility Below Baseline    Insert Peripheral IV    Insert Peripheral IV    Inpatient Admission    CBC & Differential    Green Top (Gel)    Lavender Top    Gold Top - SST    Light Blue Top    CBC & Differential    CBC & Differential         OUTPATIENT MEDICATION MANAGEMENT:  Current Facility-Administered Medications Ordered in Epic   Medication Dose Route Frequency Provider Last Rate Last Admin    acetaminophen (TYLENOL) tablet 650 mg  650 mg Oral Q4H PRN Jessi Patel APRN   650 mg at 05/16/24 1351    Or    acetaminophen (TYLENOL) 160 MG/5ML oral solution 650 mg  650 mg Oral Q4H PRN Jessi Patel APRN        Or    acetaminophen (TYLENOL) suppository 650 mg  650 mg Rectal Q4H PRN Jessi Patel APRN        sennosides-docusate (PERICOLACE) 8.6-50 MG per tablet 2 tablet  2 tablet Oral BID PRN Jessi Patel APRN        And    polyethylene glycol (MIRALAX) packet 17 g  17 g Oral Daily PRN Jessi Patel APRN        And    bisacodyl (DULCOLAX) EC tablet 5 mg  5 mg Oral Daily PRN Jessi Patel APRN        And    bisacodyl (DULCOLAX) suppository 10 mg  10 mg Rectal Daily PRN Jessi Patel APRN        calcium carbonate (TUMS) chewable tablet 500 mg (200 mg elemental)  2 tablet Oral BID PRN Jessi Patel APRN        cetirizine (zyrTEC) tablet 5 mg  5 mg Oral Daily Don Hurtado MD   5 mg at 05/17/24 0941    cyclobenzaprine (FLEXERIL) tablet 5 mg  5 mg Oral TID PRN Don Hurtado MD   5 mg at 05/16/24 1424    donepezil (ARICEPT) tablet 10 mg  10 mg Oral Nightly Don Hurtado MD   10 mg at 05/16/24 2003    HYDROcodone-acetaminophen (NORCO) 5-325 MG per tablet 1 tablet  1 tablet Oral BID PRN Don Hurtado MD   1 tablet at 05/17/24 1002    losartan (COZAAR) tablet 25 mg   25 mg Oral BID Don Hurtado MD   25 mg at 05/17/24 0941    methocarbamol (ROBAXIN) tablet 750 mg  750 mg Oral TID PRN Don Hurtado MD        morphine injection 2 mg  2 mg Intravenous Q3H PRN Jessi Patel APRN        ondansetron ODT (ZOFRAN-ODT) disintegrating tablet 4 mg  4 mg Oral Q6H PRN Jessi Patel APRN        Or    ondansetron (ZOFRAN) injection 4 mg  4 mg Intravenous Q6H PRN Jessi Patel APRN        pantoprazole (PROTONIX) injection 40 mg  40 mg Intravenous BID Shawanda Bales APRN   40 mg at 05/17/24 0942    pravastatin (PRAVACHOL) tablet 40 mg  40 mg Oral Nightly Don Hurtado MD   40 mg at 05/16/24 2003    sodium chloride 0.9 % flush 10 mL  10 mL Intravenous PRN Billy Forrest PA        sodium chloride 0.9 % flush 10 mL  10 mL Intravenous Q12H Jessi Patel APRN   10 mL at 05/17/24 0942    sodium chloride 0.9 % flush 10 mL  10 mL Intravenous PRN Jessi Patel APRN        sodium chloride 0.9 % infusion 40 mL  40 mL Intravenous PRN Jessi Patel APRN        sodium chloride 0.9 % infusion  100 mL/hr Intravenous Continuous Jessi Patel APRN 100 mL/hr at 05/17/24 1547 100 mL/hr at 05/17/24 1547    sucralfate (CARAFATE) tablet 1 g  1 g Oral 4x Daily AC & at Bedtime Shawanda Bales APRN   1 g at 05/17/24 1717     No current [facility]-ordered outpatient medications on file.           PROGRESS, DATA ANALYSIS, CONSULTS, AND MEDICAL DECISION MAKING  All labs have been independently interpreted by me.  All radiology studies have been reviewed by me. All EKG's have been independently viewed and interpreted by me.  Discussion below represents my analysis of pertinent findings related to patient's condition, differential diagnosis, treatment plan and final disposition.      DIFFERENTIAL DIAGNOSIS INCLUDE BUT NOT LIMITED TO:     Differential diagnosis includes but is not limited to:  - Hepatobiliary pathology such as cholecystitis, cholangitis, and  symptomatic cholelithiasis  - Pancreatitis  - Dyspepsia  - Small bowel obstruction  - Appendicitis  - Diverticulitis  - UTI including pyelonephritis  - Ureteral stone  - Zoster  - Colitis, including infectious and ischemic  - Atypical ACS    Clinical Scores: N/A      ED Course as of 05/17/24 2046   Wed May 15, 2024   2249 WBC(!): 11.70 [DC]   2349 Lactate(!!): 2.4 [DC]   Thu May 16, 2024   0014 CT of the abdomen and independently interpreted by myself.  Normal appearance of the pancreas.  No ureteral stone. [TD]   0100 CT Abdomen Pelvis With Contrast  CT abdomen pelvis with signs of gastroduodenitis.  Extrahepatic biliary dilation also noted but stable compared to prior exam in August 2023, liver enzymes within normal limits. [SA]   0343 Lactate: 2.0  Repeat lactic acid improved. pt still complaining of abdominal pain.  Will admit to hospitalist for observation and further evaluation.  Lactic acid was elevated but improved with IV fluids.  White blood cell count slightly elevated 11.7.  Will give dose of broad-spectrum antibiotics and admit for further evaluation. Pt re-checked. Discussed need for admission, reviewed treatment plan and reason for admission with pt/family and admitting physician.  Pt/family voiced understanding of the plan for admission for further testing/treatment as needed.    [SA]   0436 Consult: I discussed the case with JAVIER Bowen with A.  Reviewed patient's history, presentation, exam findings, and ED workup.  They agree with plan of care.  Agrees to admit to med-surg bed under Dr. Boyce.   [SA]      ED Course User Index  [DC] Billy Forrest PA  [SA] Isis Carranza PA-C  [TD] James Moralez II, MD         AS OF 20:46 EDT VITALS:    BP - 134/64  HR - 99  TEMP - 97.2 °F (36.2 °C) (Oral)  O2 SATS - 91%    COMPLEXITY OF CARE  The patient requires admission.      DIAGNOSIS  Final diagnoses:   Gastroduodenitis   Lactic acidosis   Generalized abdominal pain         DISPOSITION  ED  Disposition       ED Disposition   Decision to Admit    Condition   --    Comment   Level of Care: Med/Surg [1]   Diagnosis: Gastroduodenitis [293686]   Admitting Physician: KACIE PORTER [[id number]]   Certification: I Certify That Inpatient Hospital Services Are Medically Necessary For Greater Than 2 Midnights                  Please note that portions of this document were completed with a voice recognition program.    Note Disclaimer: At [facility], we believe that sharing information builds trust and better relationships. You are receiving this note because you recently visited [facility]. It is possible you will see health information before a provider has talked with you about it. This kind of information can be easy to misunderstand. To help you fully understand what it means for your health, we urge you to discuss this note with your provider.         Billy Forrest PA  05/17/24 0167

## 2024-05-16 NOTE — ED NOTES
Nursing report ED to floor  Wendie Houser  89 y.o.  female    HPI :   Chief Complaint   Patient presents with    Abdominal Pain    Nausea    Diarrhea       Admitting doctor:   Dean Boyce MD    Admitting diagnosis:   The primary encounter diagnosis was Gastroduodenitis. Diagnoses of Lactic acidosis and Generalized abdominal pain were also pertinent to this visit.    Code status:   Current Code Status       Date Active Code Status Order ID Comments User Context       Prior            Allergies:   Patient has no known allergies.    Isolation:   No active isolations    Intake and Output  No intake or output data in the 24 hours ending 05/16/24 0554    Weight:       05/15/24  2023   Weight: 81.6 kg (180 lb)       Most recent vitals:   Vitals:    05/16/24 0146 05/16/24 0201 05/16/24 0315 05/16/24 0316   BP: 146/78 148/78  135/77   Pulse: 82 79 83 90   Resp:       Temp:       TempSrc:       SpO2: 96% 95% 97% 97%   Weight:       Height:           Active LDAs/IV Access:   Lines, Drains & Airways       Active LDAs       Name Placement date Placement time Site Days    Peripheral IV Anterior;Distal;Right Forearm --  --  Forearm  --                    Labs (abnormal labs have a star):   Labs Reviewed   COMPREHENSIVE METABOLIC PANEL - Abnormal; Notable for the following components:       Result Value    Glucose 117 (*)     Sodium 134 (*)     eGFR 56.0 (*)     All other components within normal limits    Narrative:     CMP WAS PROCESSED USING THE SST TUBE.       GFR Normal >60  Chronic Kidney Disease <60  Kidney Failure <15    The GFR formula is only valid for adults with stable renal function between ages 18 and 70.   URINALYSIS W/ MICROSCOPIC IF INDICATED (NO CULTURE) - Abnormal; Notable for the following components:    Specific Gravity, UA >1.030 (*)     Leuk Esterase, UA Trace (*)     All other components within normal limits   LACTIC ACID, PLASMA - Abnormal; Notable for the following components:    Lactate 2.4 (*)      All other components within normal limits   CBC WITH AUTO DIFFERENTIAL - Abnormal; Notable for the following components:    WBC 11.70 (*)     Lymphocytes, Absolute 4.46 (*)     Monocytes, Absolute 0.92 (*)     All other components within normal limits   URINALYSIS, MICROSCOPIC ONLY - Abnormal; Notable for the following components:    WBC, UA 6-10 (*)     All other components within normal limits   LIPASE - Normal   LACTIC ACID, REFLEX - Normal   BLOOD CULTURE   BLOOD CULTURE   RAINBOW DRAW    Narrative:     The following orders were created for panel order [facility].  Procedure                               Abnormality         Status                     ---------                               -----------         ------                     Green Top (Gel)[[id number]]                                  Final result               Lavender Top[[id number]]                                     Final result               Gold Top - SST[[id number]]                                   Final result               Light Blue Top[[id number]]                                   Final result                 Please view results for these tests on the individual orders.   CBC AND DIFFERENTIAL    Narrative:     The following orders were created for panel order CBC & Differential.  Procedure                               Abnormality         Status                     ---------                               -----------         ------                     CBC Auto Differential[[id number]]        Abnormal            Final result                 Please view results for these tests on the individual orders.   GREEN TOP   LAVENDER TOP   GOLD TOP - SST   LIGHT BLUE TOP       EKG:   No orders to display       Meds given in ED:   Medications   sodium chloride 0.9 % flush 10 mL (has no administration in time range)   morphine injection 2 mg (2 mg Intravenous Given 5/15/24 2222)   ondansetron (ZOFRAN) injection 4 mg (4 mg Intravenous Given 5/15/24 2222)    sodium chloride 0.9 % bolus 1,000 mL (0 mL Intravenous Stopped 5/16/24 8092)   iopamidol (ISOVUE-300) 61 % injection 85 mL (85 mL Intravenous Given 5/15/24 3121)   morphine injection 2 mg (2 mg Intravenous Given 5/16/24 2569)   piperacillin-tazobactam (ZOSYN) 3.375 g IVPB in 100 mL NS MBP (CD) (3.375 g Intravenous New Bag 5/16/24 6378)       Imaging results:  CT Abdomen Pelvis With Contrast    Result Date: 5/16/2024   1. The patient has wall thickening and inflammatory stranding surrounding the distal stomach and proximal duodenum, suggesting gastroduodenitis. 2. There is intra and extrahepatic biliary dilatation. This appears similar to the exam from August 2023, and may be postcholecystectomy in nature, although correlation with liver function tests is recommended.  Radiation dose reduction techniques were utilized, including automated exposure control and exposure modulation based on body size.   This report was finalized on 5/16/2024 12:40 AM by Dr. Sonya Osorio M.D on Workstation: [id number]       Ambulatory status:   - w/walker    Social issues:   Social History     Socioeconomic History    Marital status:    Tobacco Use    Smoking status: Never    Smokeless tobacco: Never   Substance and Sexual Activity    Alcohol use: No    Drug use: No    Sexual activity: Never       NIH Stroke Scale:       Kwaku Lares RN  05/16/24 05:54 EDT

## 2024-05-16 NOTE — CONSULTS
[facility]  Initial Inpatient Consult Note    Referring Provider: A     Reason for Consultation: Abdominal pain    Subjective     History of present illness:      Thank you for allowing us to participate in the care of this patient.    89 y.o. female with a history of arthritis, congestive heart failure, diabetes, diverticulitis, early onset Alzheimer's dementia, GERD, hyperlipidemia, hypertension, thyroid disease, mixed anxiety/depressive disorder along with panic attacks whom we have been asked to see for abdominal pain.    This patient came to the emergency room yesterday evening for complaints of abdominal pain, nausea and diarrhea x 1 day.  She resides in assisted living facility.    Patient reports several days of upper abdominal pain localizes to the epigastric area associate with nausea and vomiting.  She has a longstanding history of GERD and does report increased dyspeptic symptoms as of late as well.  She has been taking over-the-counter PPI therapy for a number of years.  Denies dysphagia, odynophagia, or poor appetite but does report roughly 5 pound weight loss over the last 1 month.  She is also been taking NSAIDs in the form of Motrin daily for joint pain.  She has tolerating clear liquids fine today.    She had a little bit of diarrhea yesterday otherwise her bowel movements have been normal without melena or rectal bleeding.  No family history of colon cancer or gastric cancer.    CT A/P with contrast on admission with findings of wall thickening and inflammatory stranding surrounding the distal stomach and proximal duodenum suggestive of gastroduodenitis.  Stable biliary dilation with normal postoperative changes of cholecystectomy.    Labs this morning reviewed with white blood cell count 11.42, H/H13.3/40.7, normal liver function test, blood cultures pending.  Normal lipase.  She has been started on IV Zosyn.    Past Medical History:  Past Medical History:   Diagnosis  Date    Anemia     Arthritis     CHF (congestive heart failure)     Congestive heart failure     Depression     Diabetes mellitus     type 2    Diverticulitis of colon 08/25/2016    Acute uncomplicated diverticulitis at the distal transverse colon    Early onset Alzheimer's dementia     GERD (gastroesophageal reflux disease)     Heart disease     Hyperlipidemia     Hypertension     Hypothyroidism     Kidney disease     Mixed anxiety depressive disorder 10/23/2018    Panic attack 7/27/2021    Sleep apnea      Past Surgical History:  Past Surgical History:   Procedure Laterality Date    BREAST CYST EXCISION Right 1989    Benign    COLONOSCOPY  2011    Dr. Cain    ENDOSCOPY  04/15/2011    EYE SURGERY  2009    GALLBLADDER SURGERY  1960    Open    TOTAL KNEE ARTHROPLASTY Left 2012    Dr. Soler    VAGINAL HYSTERECTOMY  1999      Social History:   Social History     Tobacco Use    Smoking status: Never    Smokeless tobacco: Never   Substance Use Topics    Alcohol use: No      Family History:  Family History   Problem Relation Age of Onset    Other Mother         cardiac disorder    Hypertension Mother     Heart disease Mother     Hypertension Sister     Heart attack Sister     Cancer Brother     Hypertension Brother     Heart attack Brother     Heart disease Father        Home Meds:  Medications Prior to Admission   Medication Sig Dispense Refill Last Dose    cetirizine (zyrTEC) 10 MG tablet Take 0.5 tablets by mouth Daily. 30 tablet 2 5/15/2024 at 0800    donepezil (ARICEPT) 10 MG tablet Take 1 tablet by mouth every night at bedtime. 90 tablet 2 5/15/2024 at 0800    losartan (COZAAR) 50 MG tablet Take 0.5 tablets by mouth 2 (Two) Times a Day. 90 tablet 3 5/15/2024 at 0800    magnesium oxide (MAG-OX) 400 MG tablet Take 1 tablet by mouth Daily. 14 tablet 0 5/15/2024 at 0800    multivitamin (MULTI VITAMIN DAILY PO) Take 1 tablet by mouth Daily. 90 tablet 3 5/15/2024 at 0800    omeprazole (priLOSEC) 20 MG capsule Take 1  capsule by mouth Daily. 90 capsule 3 5/15/2024 at 0800    pravastatin (PRAVACHOL) 40 MG tablet Take 1 tablet by mouth Every Night. 90 tablet 3 5/15/2024 at 0800    acetaminophen (TYLENOL) 500 MG tablet Take 1 tablet by mouth 2 (Two) Times a Day As Needed for Mild Pain . 120 tablet 1 Unknown    aspirin (aspirin) 81 MG EC tablet Take 1 tablet by mouth Daily. (Patient not taking: Reported on 5/16/2024) 90 tablet 1 Not Taking    cyclobenzaprine (FLEXERIL) 5 MG tablet Take 1 tablet by mouth 3 (Three) Times a Day As Needed for Muscle Spasms. for muscle spams 30 tablet 3 Unknown    glucose blood test strip 1 each by Other route Daily. 50 each 12 Unknown    glucose monitor monitoring kit 1 each Daily. 1 each 11 Unknown    HYDROcodone-acetaminophen (NORCO) 5-325 MG per tablet Take 1 tablet by mouth 2 (Two) Times a Day As Needed for Moderate Pain . 40 tablet 0 Unknown    Lancets misc 1 each Daily. 100 each 5 Unknown    lidocaine (LIDODERM) 5 % Place 1 patch on the skin as directed by provider Daily. Remove & Discard patch within 12 hours or as directed by MD 6 each 0 Unknown    methocarbamol (ROBAXIN) 750 MG tablet Take 1 tablet by mouth 3 (Three) Times a Day As Needed for Muscle Spasms. 15 tablet 0 Unknown    metroNIDAZOLE (FLAGYL) 500 MG tablet Take 1 tablet by mouth 2 (Two) Times a Day. 14 tablet 0 Unknown    naproxen (NAPROSYN) 500 MG tablet Take 1 tablet by mouth 2 (Two) Times a Day As Needed for Mild Pain. 15 tablet 0 Unknown    Sennosides (Senna) 8.6 MG capsule Take 1 tablet by mouth Daily. 90 each 1 Unknown     Current Meds:   cetirizine, 5 mg, Oral, Daily  donepezil, 10 mg, Oral, Nightly  losartan, 25 mg, Oral, BID  [START ON 5/17/2024] pantoprazole, 40 mg, Oral, Q AM  pravastatin, 40 mg, Oral, Nightly  sodium chloride, 10 mL, Intravenous, Q12H      Allergies:  No Known Allergies  Review of Systems  History obtained from chart review and the patient  Gastrointestinal ROS: positive for - abdominal pain, heartburn, and  nausea/vomiting    Objective     Vital Signs  Temp:  [97.1 °F (36.2 °C)-98.6 °F (37 °C)] 98.6 °F (37 °C)  Heart Rate:  [79-94] 85  Resp:  [14-16] 14  BP: (110-151)/(68-82) 115/75  Physical Exam:   Constitutional:    Alert, cooperative, in no acute distress    Abdomen:     Soft, nondistended, left upper quadrant tenderness noted; normal bowel sounds   Results Review:   I reviewed the patient's new clinical results.    Results from last 7 days   Lab Units 05/16/24  0654 05/15/24  2212   WBC 10*3/mm3 11.42* 11.70*   HEMOGLOBIN g/dL 13.3 13.4   HEMATOCRIT % 40.7 41.6   PLATELETS 10*3/mm3 290 332     Results from last 7 days   Lab Units 05/16/24  0654 05/15/24  2212   SODIUM mmol/L 134* 134*   POTASSIUM mmol/L 3.9 4.5   CHLORIDE mmol/L 100 98   CO2 mmol/L 22.0 22.0   BUN mg/dL 11 13   CREATININE mg/dL 0.99 0.98   CALCIUM mg/dL 8.5* 9.0   BILIRUBIN mg/dL  --  0.4   ALK PHOS U/L  --  96   ALT (SGPT) U/L  --  14   AST (SGOT) U/L  --  25   GLUCOSE mg/dL 128* 117*         Lab Results   Lab Value Date/Time    LIPASE 35 05/15/2024 2212    LIPASE 296 (H) 08/22/2023 1613       Radiology:  CT Abdomen Pelvis With Contrast   Final Result       1. The patient has wall thickening and inflammatory stranding   surrounding the distal stomach and proximal duodenum, suggesting   gastroduodenitis.   2. There is intra and extrahepatic biliary dilatation. This appears   similar to the exam from August 2023, and may be postcholecystectomy in   nature, although correlation with liver function tests is recommended.       Radiation dose reduction techniques were utilized, including automated   exposure control and exposure modulation based on body size.           This report was finalized on 5/16/2024 12:40 AM by Dr. Sonya Osorio M.D on Workstation: [id number]              Assessment & Plan   Active Hospital Problems    Diagnosis     **Gastroduodenitis     Generalized abdominal pain     Lactic acidosis     Hypothyroidism     Essential  hypertension     Gastroesophageal reflux disease     Sleep apnea        Assessment:  Upper abdominal pain  Nausea vomiting  GERD  Abnormal CT suggestive of gastroduodenitis  Leukocytosis on IV antibiotics - blood cultures pending    Plan:  Recommend EGD for further evaluation of symptoms to be performed by Dr. Cain tomorrow morning  Encourage patient to avoid NSAIDs moving forward  Continue clear liquid diet today  IV PPI therapy twice daily  CBC and CMP in the morning  Further recommendations pending endoscopic evaluation    I discussed the patients findings and my recommendations with patient, family, nursing staff, and Dr. Cain .    Dragon dictation used throughout this note.            MARK Nolasco  [facility]  [address] [place] [address]  Office: [phone]

## 2024-05-17 ENCOUNTER — TELEPHONE (OUTPATIENT)
Dept: GASTROENTEROLOGY | Facility: CLINIC | Age: 89
End: 2024-05-17

## 2024-05-17 LAB
ALBUMIN SERPL-MCNC: 3.2 G/DL (ref 3.5–5.2)
ALBUMIN/GLOB SERPL: 1.2 G/DL
ALP SERPL-CCNC: 78 U/L (ref 39–117)
ALT SERPL W P-5'-P-CCNC: 11 U/L (ref 1–33)
ANION GAP SERPL CALCULATED.3IONS-SCNC: 10 MMOL/L (ref 5–15)
AST SERPL-CCNC: 19 U/L (ref 1–32)
BASOPHILS # BLD AUTO: 0.05 10*3/MM3 (ref 0–0.2)
BASOPHILS NFR BLD AUTO: 0.5 % (ref 0–1.5)
BILIRUB SERPL-MCNC: 0.2 MG/DL (ref 0–1.2)
BUN SERPL-MCNC: 8 MG/DL (ref 8–23)
BUN/CREAT SERPL: 9.1 (ref 7–25)
CALCIUM SPEC-SCNC: 8.3 MG/DL (ref 8.6–10.5)
CHLORIDE SERPL-SCNC: 108 MMOL/L (ref 98–107)
CO2 SERPL-SCNC: 22 MMOL/L (ref 22–29)
CREAT SERPL-MCNC: 0.88 MG/DL (ref 0.57–1)
DEPRECATED RDW RBC AUTO: 42.1 FL (ref 37–54)
EGFRCR SERPLBLD CKD-EPI 2021: 63.7 ML/MIN/1.73
EOSINOPHIL # BLD AUTO: 0.27 10*3/MM3 (ref 0–0.4)
EOSINOPHIL NFR BLD AUTO: 2.9 % (ref 0.3–6.2)
ERYTHROCYTE [DISTWIDTH] IN BLOOD BY AUTOMATED COUNT: 13.8 % (ref 12.3–15.4)
GLOBULIN UR ELPH-MCNC: 2.7 GM/DL
GLUCOSE SERPL-MCNC: 95 MG/DL (ref 65–99)
HCT VFR BLD AUTO: 39 % (ref 34–46.6)
HGB BLD-MCNC: 12.5 G/DL (ref 12–15.9)
IMM GRANULOCYTES # BLD AUTO: 0.03 10*3/MM3 (ref 0–0.05)
IMM GRANULOCYTES NFR BLD AUTO: 0.3 % (ref 0–0.5)
LYMPHOCYTES # BLD AUTO: 4.11 10*3/MM3 (ref 0.7–3.1)
LYMPHOCYTES NFR BLD AUTO: 44.7 % (ref 19.6–45.3)
MCH RBC QN AUTO: 27.5 PG (ref 26.6–33)
MCHC RBC AUTO-ENTMCNC: 32.1 G/DL (ref 31.5–35.7)
MCV RBC AUTO: 85.7 FL (ref 79–97)
MONOCYTES # BLD AUTO: 0.81 10*3/MM3 (ref 0.1–0.9)
MONOCYTES NFR BLD AUTO: 8.8 % (ref 5–12)
NEUTROPHILS NFR BLD AUTO: 3.93 10*3/MM3 (ref 1.7–7)
NEUTROPHILS NFR BLD AUTO: 42.8 % (ref 42.7–76)
NRBC BLD AUTO-RTO: 0 /100 WBC (ref 0–0.2)
PLATELET # BLD AUTO: 288 10*3/MM3 (ref 140–450)
PMV BLD AUTO: 9.4 FL (ref 6–12)
POTASSIUM SERPL-SCNC: 4.1 MMOL/L (ref 3.5–5.2)
PROT SERPL-MCNC: 5.9 G/DL (ref 6–8.5)
RBC # BLD AUTO: 4.55 10*6/MM3 (ref 3.77–5.28)
SODIUM SERPL-SCNC: 140 MMOL/L (ref 136–145)
WBC NRBC COR # BLD AUTO: 9.2 10*3/MM3 (ref 3.4–10.8)

## 2024-05-17 PROCEDURE — 97162 PT EVAL MOD COMPLEX 30 MIN: CPT

## 2024-05-17 PROCEDURE — 25810000003 SODIUM CHLORIDE 0.9 % SOLUTION: Performed by: NURSE PRACTITIONER

## 2024-05-17 PROCEDURE — 97530 THERAPEUTIC ACTIVITIES: CPT

## 2024-05-17 PROCEDURE — 97166 OT EVAL MOD COMPLEX 45 MIN: CPT | Performed by: OCCUPATIONAL THERAPIST

## 2024-05-17 PROCEDURE — 80053 COMPREHEN METABOLIC PANEL: CPT | Performed by: NURSE PRACTITIONER

## 2024-05-17 PROCEDURE — 85025 COMPLETE CBC W/AUTO DIFF WBC: CPT | Performed by: NURSE PRACTITIONER

## 2024-05-17 PROCEDURE — 99232 SBSQ HOSP IP/OBS MODERATE 35: CPT | Performed by: NURSE PRACTITIONER

## 2024-05-17 PROCEDURE — 97535 SELF CARE MNGMENT TRAINING: CPT | Performed by: OCCUPATIONAL THERAPIST

## 2024-05-17 RX ORDER — SUCRALFATE 1 G/1
1 TABLET ORAL
Status: DISCONTINUED | OUTPATIENT
Start: 2024-05-17 | End: 2024-05-19 | Stop reason: HOSPADM

## 2024-05-17 RX ADMIN — SODIUM CHLORIDE 100 ML/HR: 9 INJECTION, SOLUTION INTRAVENOUS at 15:47

## 2024-05-17 RX ADMIN — SUCRALFATE 1 G: 1 TABLET ORAL at 17:17

## 2024-05-17 RX ADMIN — PRAVASTATIN SODIUM 40 MG: 40 TABLET ORAL at 21:58

## 2024-05-17 RX ADMIN — LOSARTAN POTASSIUM 25 MG: 25 TABLET, FILM COATED ORAL at 21:58

## 2024-05-17 RX ADMIN — PANTOPRAZOLE SODIUM 40 MG: 40 INJECTION, POWDER, FOR SOLUTION INTRAVENOUS at 09:42

## 2024-05-17 RX ADMIN — PANTOPRAZOLE SODIUM 40 MG: 40 INJECTION, POWDER, FOR SOLUTION INTRAVENOUS at 21:58

## 2024-05-17 RX ADMIN — SUCRALFATE 1 G: 1 TABLET ORAL at 21:58

## 2024-05-17 RX ADMIN — HYDROCODONE BITARTRATE AND ACETAMINOPHEN 1 TABLET: 5; 325 TABLET ORAL at 10:02

## 2024-05-17 RX ADMIN — DONEPEZIL HYDROCHLORIDE 10 MG: 10 TABLET, FILM COATED ORAL at 21:58

## 2024-05-17 RX ADMIN — Medication 10 ML: at 09:42

## 2024-05-17 RX ADMIN — CETIRIZINE HYDROCHLORIDE 5 MG: 10 TABLET ORAL at 09:41

## 2024-05-17 RX ADMIN — LOSARTAN POTASSIUM 25 MG: 25 TABLET, FILM COATED ORAL at 09:41

## 2024-05-17 RX ADMIN — SODIUM CHLORIDE 100 ML/HR: 9 INJECTION, SOLUTION INTRAVENOUS at 04:33

## 2024-05-17 NOTE — PROGRESS NOTES
"DAILY PROGRESS NOTE  [facility]    Patient Identification:  Name: Wendie Houser  Age: 89 y.o.  Sex: female  :  1935  MRN: [id number]         Primary Care Physician: Pallares, Clara Ann, MD    Subjective:  Interval History: Her abdominal pain is better today.  She had refused EGD today but says she will do it tomorrow.    Objective:    Scheduled Meds:cetirizine, 5 mg, Oral, Daily  donepezil, 10 mg, Oral, Nightly  losartan, 25 mg, Oral, BID  pantoprazole, 40 mg, Intravenous, BID  pravastatin, 40 mg, Oral, Nightly  sodium chloride, 10 mL, Intravenous, Q12H  sucralfate, 1 g, Oral, 4x Daily AC & at Bedtime      Continuous Infusions:sodium chloride, 100 mL/hr, Last Rate: 100 mL/hr (24 1547)        Vital signs in last 24 hours:  Temp:  [97.2 °F (36.2 °C)-98.7 °F (37.1 °C)] 97.2 °F (36.2 °C)  Heart Rate:  [61-81] 72  Resp:  [14-16] 16  BP: (105-123)/(55-70) 123/61    Intake/Output:    Intake/Output Summary (Last 24 hours) at 2024 1610  Last data filed at 2024 1547  Gross per 24 hour   Intake 3321 ml   Output --   Net 3321 ml       Exam:  /61 (BP Location: Left arm, Patient Position: Sitting)   Pulse 72   Temp 97.2 °F (36.2 °C) (Oral)   Resp 16   Ht 152.4 cm (60\")   Wt 81.1 kg (178 lb 12.7 oz)   SpO2 100%   BMI 34.92 kg/m²     General Appearance:    Alert, cooperative, no distress   Head:    Normocephalic, without obvious abnormality, atraumatic   Eyes:       Throat:   Lips, tongue, gums normal   Neck:   Supple, symmetrical, trachea midline, no JVD   Lungs:     Clear to auscultation bilaterally, respirations unlabored   Chest Wall:    No tenderness or deformity    Heart:    Regular rate and rhythm, S1 and S2 normal, no murmur,no  Rub or gallop   Abdomen:     Soft, nontender, bowel sounds active, no masses, no organomegaly    Extremities:   Extremities normal, atraumatic, no cyanosis or edema   Pulses:      Skin:   Skin is warm and dry,  no rashes or palpable lesions "   Neurologic:   no focal deficits noted      Lab Results (last 72 hours)       Procedure Component Value Units Date/Time    Comprehensive Metabolic Panel [[id number]]  (Abnormal) Collected: 05/17/24 0511    Specimen: Blood Updated: 05/17/24 0554     Glucose 95 mg/dL      BUN 8 mg/dL      Creatinine 0.88 mg/dL      Sodium 140 mmol/L      Potassium 4.1 mmol/L      Chloride 108 mmol/L      CO2 22.0 mmol/L      Calcium 8.3 mg/dL      Total Protein 5.9 g/dL      Albumin 3.2 g/dL      ALT (SGPT) 11 U/L      AST (SGOT) 19 U/L      Alkaline Phosphatase 78 U/L      Total Bilirubin 0.2 mg/dL      Globulin 2.7 gm/dL      A/G Ratio 1.2 g/dL      BUN/Creatinine Ratio 9.1     Anion Gap 10.0 mmol/L      eGFR 63.7 mL/min/1.73     Narrative:      GFR Normal >60  Chronic Kidney Disease <60  Kidney Failure <15    The GFR formula is only valid for adults with stable renal function between ages 18 and 70.    CBC & Differential [[id number]]  (Abnormal) Collected: 05/17/24 0511    Specimen: Blood Updated: 05/17/24 0535    Narrative:      The following orders were created for panel order CBC & Differential.  Procedure                               Abnormality         Status                     ---------                               -----------         ------                     CBC Auto Differential[[id number]]        Abnormal            Final result                 Please view results for these tests on the individual orders.    CBC Auto Differential [[id number]]  (Abnormal) Collected: 05/17/24 0511    Specimen: Blood Updated: 05/17/24 0535     WBC 9.20 10*3/mm3      RBC 4.55 10*6/mm3      Hemoglobin 12.5 g/dL      Hematocrit 39.0 %      MCV 85.7 fL      MCH 27.5 pg      MCHC 32.1 g/dL      RDW 13.8 %      RDW-SD 42.1 fl      MPV 9.4 fL      Platelets 288 10*3/mm3      Neutrophil % 42.8 %      Lymphocyte % 44.7 %      Monocyte % 8.8 %      Eosinophil % 2.9 %      Basophil % 0.5 %      Immature Grans % 0.3 %      Neutrophils, Absolute 3.93  10*3/mm3      Lymphocytes, Absolute 4.11 10*3/mm3      Monocytes, Absolute 0.81 10*3/mm3      Eosinophils, Absolute 0.27 10*3/mm3      Basophils, Absolute 0.05 10*3/mm3      Immature Grans, Absolute 0.03 10*3/mm3      nRBC 0.0 /100 WBC     Blood Culture - Blood, Hand, Left [[id number]]  (Normal) Collected: 05/16/24 0452    Specimen: Blood from Hand, Left Updated: 05/17/24 0501     Blood Culture No growth at 24 hours    Blood Culture - Blood, Hand, Right [[id number]]  (Normal) Collected: 05/16/24 0453    Specimen: Blood from Hand, Right Updated: 05/17/24 0501     Blood Culture No growth at 24 hours    Basic Metabolic Panel [[id number]]  (Abnormal) Collected: 05/16/24 0654    Specimen: Blood Updated: 05/16/24 0740     Glucose 128 mg/dL      BUN 11 mg/dL      Creatinine 0.99 mg/dL      Sodium 134 mmol/L      Potassium 3.9 mmol/L      Chloride 100 mmol/L      CO2 22.0 mmol/L      Calcium 8.5 mg/dL      BUN/Creatinine Ratio 11.1     Anion Gap 12.0 mmol/L      eGFR 55.3 mL/min/1.73     Narrative:      GFR Normal >60  Chronic Kidney Disease <60  Kidney Failure <15    The GFR formula is only valid for adults with stable renal function between ages 18 and 70.    CBC (No Diff) [[id number]]  (Abnormal) Collected: 05/16/24 0654    Specimen: Blood Updated: 05/16/24 0722     WBC 11.42 10*3/mm3      RBC 4.75 10*6/mm3      Hemoglobin 13.3 g/dL      Hematocrit 40.7 %      MCV 85.7 fL      MCH 28.0 pg      MCHC 32.7 g/dL      RDW 13.8 %      RDW-SD 42.7 fl      MPV 9.4 fL      Platelets 290 10*3/mm3     STAT Lactic Acid, Reflex [[id number]]  (Normal) Collected: 05/16/24 0224    Specimen: Blood Updated: 05/16/24 0325     Lactate 2.0 mmol/L     Urinalysis With Microscopic If Indicated (No Culture) - Urine, Clean Catch [[id number]]  (Abnormal) Collected: 05/16/24 0219    Specimen: Urine, Clean Catch Updated: 05/16/24 0236     Color, UA Yellow     Appearance, UA Clear     pH, UA 6.0     Specific Gravity, UA >1.030     Glucose, UA Negative      Ketones, UA Negative     Bilirubin, UA Negative     Blood, UA Negative     Protein, UA Negative     Leuk Esterase, UA Trace     Nitrite, UA Negative     Urobilinogen, UA 0.2 E.U./dL    Urinalysis, Microscopic Only - Urine, Clean Catch [[id number]]  (Abnormal) Collected: 05/16/24 0219    Specimen: Urine, Clean Catch Updated: 05/16/24 0236     RBC, UA 0-2 /HPF      WBC, UA 6-10 /HPF      Bacteria, UA None Seen /HPF      Squamous Epithelial Cells, UA 0-2 /HPF      Hyaline Casts, UA None Seen /LPF      Methodology Automated Microscopy    Comprehensive Metabolic Panel [[id number]]  (Abnormal) Collected: 05/15/24 2212    Specimen: Blood Updated: 05/15/24 2332     Glucose 117 mg/dL      BUN 13 mg/dL      Creatinine 0.98 mg/dL      Sodium 134 mmol/L      Potassium 4.5 mmol/L      Comment: Slight hemolysis detected by analyzer. Result may be falsely elevated.        Chloride 98 mmol/L      CO2 22.0 mmol/L      Calcium 9.0 mg/dL      Total Protein 7.1 g/dL      Albumin 4.2 g/dL      ALT (SGPT) 14 U/L      AST (SGOT) 25 U/L      Comment: Slight hemolysis detected by analyzer. Result may be falsely elevated.        Alkaline Phosphatase 96 U/L      Total Bilirubin 0.4 mg/dL      Globulin 2.9 gm/dL      A/G Ratio 1.4 g/dL      BUN/Creatinine Ratio 13.3     Anion Gap 14.0 mmol/L      eGFR 56.0 mL/min/1.73     Narrative:      CMP WAS PROCESSED USING THE SST TUBE.       GFR Normal >60  Chronic Kidney Disease <60  Kidney Failure <15    The GFR formula is only valid for adults with stable renal function between ages 18 and 70.    Lactic Acid, Plasma [[id number]]  (Abnormal) Collected: 05/15/24 2212    Specimen: Blood Updated: 05/15/24 2316     Lactate 2.4 mmol/L     Lipase [[id number]]  (Normal) Collected: 05/15/24 2212    Specimen: Blood Updated: 05/15/24 2308     Lipase 35 U/L     [place] Draw [[id number]] Collected: 05/15/24 2212    Specimen: Blood Updated: 05/15/24 2247    Narrative:      The following orders were created for panel  order [place] Draw.  Procedure                               Abnormality         Status                     ---------                               -----------         ------                     Green Top (Gel)[[id number]]                                  Final result               Lavender Top[[id number]]                                     Final result               Gold Top - SST[[id number]]                                   Final result               Light Blue Top[[id number]]                                   Final result                 Please view results for these tests on the individual orders.    Gold Top - SST [[id number]] Collected: 05/15/24 2212    Specimen: Blood Updated: 05/15/24 2247     Extra Tube Hold for add-ons.     Comment: Auto resulted.       Green Top (Gel) [[id number]] Collected: 05/15/24 2212    Specimen: Blood Updated: 05/15/24 2247     Extra Tube Hold for add-ons.     Comment: Auto resulted.       Lavender Top [[id number]] Collected: 05/15/24 2212    Specimen: Blood Updated: 05/15/24 2247     Extra Tube hold for add-on     Comment: Auto resulted       Light Blue Top [[id number]] Collected: 05/15/24 2212    Specimen: Blood Updated: 05/15/24 2247     Extra Tube Hold for add-ons.     Comment: Auto resulted       CBC & Differential [[id number]]  (Abnormal) Collected: 05/15/24 2212    Specimen: Blood Updated: 05/15/24 2245    Narrative:      The following orders were created for panel order CBC & Differential.  Procedure                               Abnormality         Status                     ---------                               -----------         ------                     CBC Auto Differential[[id number]]        Abnormal            Final result                 Please view results for these tests on the individual orders.    CBC Auto Differential [[id number]]  (Abnormal) Collected: 05/15/24 2212    Specimen: Blood Updated: 05/15/24 2245     WBC 11.70 10*3/mm3      RBC 4.85 10*6/mm3       "Hemoglobin 13.4 g/dL      Hematocrit 41.6 %      MCV 85.8 fL      MCH 27.6 pg      MCHC 32.2 g/dL      RDW 13.9 %      RDW-SD 43.5 fl      MPV 9.8 fL      Platelets 332 10*3/mm3      Neutrophil % 52.3 %      Lymphocyte % 38.1 %      Monocyte % 7.9 %      Eosinophil % 0.8 %      Basophil % 0.5 %      Immature Grans % 0.4 %      Neutrophils, Absolute 6.12 10*3/mm3      Lymphocytes, Absolute 4.46 10*3/mm3      Monocytes, Absolute 0.92 10*3/mm3      Eosinophils, Absolute 0.09 10*3/mm3      Basophils, Absolute 0.06 10*3/mm3      Immature Grans, Absolute 0.05 10*3/mm3      nRBC 0.0 /100 WBC           Data Review:  Results from last 7 days   Lab Units 05/17/24  0511 05/16/24  0654 05/15/24  2212   SODIUM mmol/L 140 134* 134*   POTASSIUM mmol/L 4.1 3.9 4.5   CHLORIDE mmol/L 108* 100 98   CO2 mmol/L 22.0 22.0 22.0   BUN mg/dL 8 11 13   CREATININE mg/dL 0.88 0.99 0.98   GLUCOSE mg/dL 95 128* 117*   CALCIUM mg/dL 8.3* 8.5* 9.0     Results from last 7 days   Lab Units 05/17/24  0511 05/16/24  0654 05/15/24  2212   WBC 10*3/mm3 9.20 11.42* 11.70*   HEMOGLOBIN g/dL 12.5 13.3 13.4   HEMATOCRIT % 39.0 40.7 41.6   PLATELETS 10*3/mm3 288 290 332             Lab Results   Lab Value Date/Time    TROPONINT <0.010 08/10/2022 1442    TROPONINT <0.010 06/05/2022 2057    TROPONINT <0.01 03/04/2016 1230         Results from last 7 days   Lab Units 05/17/24  0511 05/15/24  2212   ALK PHOS U/L 78 96   BILIRUBIN mg/dL 0.2 0.4   ALT (SGPT) U/L 11 14   AST (SGOT) U/L 19 25             No results found for: \"POCGLU\"        Past Medical History:   Diagnosis Date    Anemia     Arthritis     CHF (congestive heart failure)     Congestive heart failure     Depression     Diabetes mellitus     type 2    Diverticulitis of colon 08/25/2016    Acute uncomplicated diverticulitis at the distal transverse colon    Early onset Alzheimer's dementia     GERD (gastroesophageal reflux disease)     Heart disease     Hyperlipidemia     Hypertension     " Hypothyroidism     Kidney disease     Mixed anxiety depressive disorder 10/23/2018    Panic attack 7/27/2021    Sleep apnea        Assessment:  Active Hospital Problems    Diagnosis  POA    **Gastroduodenitis [K29.90]  Yes    Generalized abdominal pain [R10.84]  Unknown    Lactic acidosis [E87.20]  Unknown    Upper abdominal pain [R10.10]  Unknown    Nausea and vomiting [R11.2]  Unknown    Abnormal CT of the abdomen [R93.5]  Unknown    Hypothyroidism [E03.9]  Yes    Essential hypertension [I10]  Yes    Gastroesophageal reflux disease [K21.9]  Yes    Sleep apnea [G47.30]  Yes      Resolved Hospital Problems   No resolved problems to display.       Plan:  Continue with current medications and GI consult noted.  Plans for EGD again tomorrow if she will allow it.  Follow-up on labs.    Don Hurtado MD  5/17/2024  16:10 EDT

## 2024-05-17 NOTE — PLAN OF CARE
Goal Outcome Evaluation:  Plan of Care Reviewed With: patient, son           Outcome Evaluation: Pt advanced from NPO to soft diet today with no concerns. Scheduled for EGD this a.m, but refused. Son spoke with pt later in the day and she agreed to have EGD tomorrow a.m. Spoke with Dr. Cain and he added pt to his schedule at 1000 on 5-18-24. Pt to be NPO after midnight. Consent has been signed. Pt c/o intermittent right hip pain. PRN Norco administered. Pt reports relief. New order for Carafate 1gm prior to meals and to switch IV pantoprozole to po. NS at 100ml/hr to rt forearm IV. Nsg will monitor.

## 2024-05-17 NOTE — PLAN OF CARE
Goal Outcome Evaluation:  Plan of Care Reviewed With: patient        Progress: improving  Outcome Evaluation: pt evaluated for OT. pt admitted from [facility]. pt was independent PTA w walker and items for ADLs such as grab bar. pt was CGA/SBA w tsf, walking to BR, and tsf to toilet. pt completed grooming w SBA. pt completed UE ex 10x1 . pt has decr endurance, ADL, and strength and cont to benefit from OT. rec dc back to [facility] at dc as pt would like to return and is close to baseline.

## 2024-05-17 NOTE — PROGRESS NOTES
[facility]  Inpatient Progress Note    Reason for Follow Up: Abdominal pain    Subjective     Interval History:     Initially we had planned on EGD this morning for symptoms of abdominal pain and abnormal CT of the abdomen concerning for gastroduodenitis however patient ultimately decided not to follow through with procedure despite long conversation yesterday regarding risk/benefit due to concerns of complications given her current age.    She is currently resting comfortably in bed.  She is anxious to eat.  She still has epigastric pain but no nausea or vomiting.  Pain feels more mild today over yesterday.  No melena or rectal bleeding reported.  She is currently on IV PPI therapy twice daily.    Review of labs this morning with normal hemogram and liver function test.      Current Facility-Administered Medications:     acetaminophen (TYLENOL) tablet 650 mg, 650 mg, Oral, Q4H PRN, 650 mg at 05/16/24 1351 **OR** acetaminophen (TYLENOL) 160 MG/5ML oral solution 650 mg, 650 mg, Oral, Q4H PRN **OR** acetaminophen (TYLENOL) suppository 650 mg, 650 mg, Rectal, Q4H PRN, Jessi Patel APRN    sennosides-docusate (PERICOLACE) 8.6-50 MG per tablet 2 tablet, 2 tablet, Oral, BID PRN **AND** polyethylene glycol (MIRALAX) packet 17 g, 17 g, Oral, Daily PRN **AND** bisacodyl (DULCOLAX) EC tablet 5 mg, 5 mg, Oral, Daily PRN **AND** bisacodyl (DULCOLAX) suppository 10 mg, 10 mg, Rectal, Daily PRN, Jessi Patel APRN    calcium carbonate (TUMS) chewable tablet 500 mg (200 mg elemental), 2 tablet, Oral, BID PRN, Jessi Patel APRN    cetirizine (zyrTEC) tablet 5 mg, 5 mg, Oral, Daily, Don Hurtado MD, 5 mg at 05/17/24 0941    cyclobenzaprine (FLEXERIL) tablet 5 mg, 5 mg, Oral, TID PRN, Don Hurtado MD, 5 mg at 05/16/24 1424    donepezil (ARICEPT) tablet 10 mg, 10 mg, Oral, Nightly, Don Hurtado MD, 10 mg at 05/16/24 2003    HYDROcodone-acetaminophen (NORCO) 5-325 MG per tablet 1  tablet, 1 tablet, Oral, BID PRN, Don Hurtado MD, 1 tablet at 05/17/24 1002    losartan (COZAAR) tablet 25 mg, 25 mg, Oral, BID, Don Hurtado MD, 25 mg at 05/17/24 0941    methocarbamol (ROBAXIN) tablet 750 mg, 750 mg, Oral, TID PRN, Don Hurtado MD    morphine injection 2 mg, 2 mg, Intravenous, Q3H PRN, Jessi Patel APRN    ondansetron ODT (ZOFRAN-ODT) disintegrating tablet 4 mg, 4 mg, Oral, Q6H PRN **OR** ondansetron (ZOFRAN) injection 4 mg, 4 mg, Intravenous, Q6H PRN, Jessi Patel APRN    pantoprazole (PROTONIX) injection 40 mg, 40 mg, Intravenous, BID, Shawanda Bales APRN, 40 mg at 05/17/24 0942    pravastatin (PRAVACHOL) tablet 40 mg, 40 mg, Oral, Nightly, Don Hurtado MD, 40 mg at 05/16/24 2003    sodium chloride 0.9 % flush 10 mL, 10 mL, Intravenous, PRN, Billy Forrest PA    sodium chloride 0.9 % flush 10 mL, 10 mL, Intravenous, Q12H, Jessi Patel APRN, 10 mL at 05/17/24 0942    sodium chloride 0.9 % flush 10 mL, 10 mL, Intravenous, PRN, Jessi Patel APRN    sodium chloride 0.9 % infusion 40 mL, 40 mL, Intravenous, PRN, Jessi Patel APRN    sodium chloride 0.9 % infusion, 100 mL/hr, Intravenous, Continuous, Jessi Patel APRN, Last Rate: 100 mL/hr at 05/17/24 0433, 100 mL/hr at 05/17/24 0433  Review of Systems:    All systems were reviewed and negative except for:  Gastrointestinal: positive for  pain    Objective     Vital Signs  Temp:  [97.2 °F (36.2 °C)-98.9 °F (37.2 °C)] 97.2 °F (36.2 °C)  Heart Rate:  [61-81] 73  Resp:  [14-16] 16  BP: (105-120)/(55-70) 117/59  Body mass index is 34.92 kg/m².    Intake/Output Summary (Last 24 hours) at 5/17/2024 1154  Last data filed at 5/16/2024 2252  Gross per 24 hour   Intake 160 ml   Output --   Net 160 ml     No intake/output data recorded.     Physical Exam:   General: patient awake, alert and cooperative   Abdomen: soft, epigastric tenderness noted, nondistended; normal bowel sounds      Results  Review:     I reviewed the patient's new clinical results.    Results from last 7 days   Lab Units 05/17/24  0511 05/16/24  0654 05/15/24  2212   WBC 10*3/mm3 9.20 11.42* 11.70*   HEMOGLOBIN g/dL 12.5 13.3 13.4   HEMATOCRIT % 39.0 40.7 41.6   PLATELETS 10*3/mm3 288 290 332     Results from last 7 days   Lab Units 05/17/24  0511 05/16/24  0654 05/15/24  2212   SODIUM mmol/L 140 134* 134*   POTASSIUM mmol/L 4.1 3.9 4.5   CHLORIDE mmol/L 108* 100 98   CO2 mmol/L 22.0 22.0 22.0   BUN mg/dL 8 11 13   CREATININE mg/dL 0.88 0.99 0.98   CALCIUM mg/dL 8.3* 8.5* 9.0   BILIRUBIN mg/dL 0.2  --  0.4   ALK PHOS U/L 78  --  96   ALT (SGPT) U/L 11  --  14   AST (SGOT) U/L 19  --  25   GLUCOSE mg/dL 95 128* 117*         Lab Results   Lab Value Date/Time    LIPASE 35 05/15/2024 2212    LIPASE 296 (H) 08/22/2023 1613       Radiology:  CT Abdomen Pelvis With Contrast   Final Result       1. The patient has wall thickening and inflammatory stranding   surrounding the distal stomach and proximal duodenum, suggesting   gastroduodenitis.   2. There is intra and extrahepatic biliary dilatation. This appears   similar to the exam from August 2023, and may be postcholecystectomy in   nature, although correlation with liver function tests is recommended.       Radiation dose reduction techniques were utilized, including automated   exposure control and exposure modulation based on body size.           This report was finalized on 5/16/2024 12:40 AM by Dr. Sonya Osorio M.D on Workstation: [id number]              Assessment & Plan     Active Hospital Problems    Diagnosis     **Gastroduodenitis     Generalized abdominal pain     Lactic acidosis     Upper abdominal pain     Nausea and vomiting     Abnormal CT of the abdomen     Hypothyroidism     Essential hypertension     Gastroesophageal reflux disease     Sleep apnea        Assessment:  Upper abdominal pain  Nausea vomiting  GERD  Abnormal CT suggestive of  gastroduodenitis  Leukocytosis on IV antibiotics - blood cultures pending    Plan:  Patient opted not to undergo EGD due to concerns of her age and possible complications  We discussed continuation of PPI therapy with the addition of Carafate  Recommend she avoid NSAIDs as an outpatient which I discussed with her and her son  GI soft diet today  CBC and CMP in the morning    I discussed the patients findings and my recommendations with patient, family, and Dr. Cain .    Shawanda Bales APRN

## 2024-05-17 NOTE — TELEPHONE ENCOUNTER
Hub staff attempted to follow warm transfer process and was unsuccessful     Caller: VIVIANE    Relationship to patient: [facility]    Best call back number: [phone]    Patient is needing:  PT WAS NOT ABLE TO COMPLETE EGD TODAY. VIVIANE FROM [facility] CALLED BECAUSE DR HALL ADVISED TO HAVE PT ADDED TO HIS SCHEDULED AT 10A TOMORROW.     EGD/ TOMORROW ADDED TO SCHED  10 A

## 2024-05-17 NOTE — THERAPY EVALUATION
Acute Care - Occupational Therapy Initial Evaluation  [facility]     Patient Name: Wendie Houser  : 1935  MRN: [id number]  Today's Date: 2024  Onset of Illness/Injury or Date of Surgery: 05/15/24     Referring Physician: Bryant    Admit Date: 5/15/2024       ICD-10-CM ICD-9-CM   1. Gastroduodenitis  K29.90 535.50   2. Lactic acidosis  E87.20 276.2   3. Generalized abdominal pain  R10.84 789.07   4. Upper abdominal pain  R10.10 789.09   5. Nausea and vomiting, unspecified vomiting type  R11.2 787.01   6. Gastroesophageal reflux disease, unspecified whether esophagitis present  K21.9 530.81   7. Abnormal CT of the abdomen  R93.5 793.6     Patient Active Problem List   Diagnosis    Arthritis    Late onset Alzheimer's dementia without behavioral disturbance    Fatigue    Gastroesophageal reflux disease    Essential hypertension    Hypomagnesemia    Sleep apnea    Renal insufficiency    Hypothyroidism    Lumbar canal stenosis    Shortness of breath    Disorder of electrolytes    Hiatal hernia    Hyperkalemia    Hypoglycemia    Irritable bowel syndrome    Allergic rhinitis    High risk medication use    Polypharmacy    Obesity (BMI 30-39.9)    Panic attack    Weakness of left lower extremity    Gastroduodenitis    Generalized abdominal pain    Lactic acidosis    Upper abdominal pain    Nausea and vomiting    Abnormal CT of the abdomen     Past Medical History:   Diagnosis Date    Anemia     Arthritis     CHF (congestive heart failure)     Congestive heart failure     Depression     Diabetes mellitus     type 2    Diverticulitis of colon 2016    Acute uncomplicated diverticulitis at the distal transverse colon    Early onset Alzheimer's dementia     GERD (gastroesophageal reflux disease)     Heart disease     Hyperlipidemia     Hypertension     Hypothyroidism     Kidney disease     Mixed anxiety 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 Cain    ENDOSCOPY  04/15/2011    EYE SURGERY  2009    GALLBLADDER SURGERY  1960    Open    TOTAL KNEE ARTHROPLASTY Left 2012    Dr. Soler    VAGINAL HYSTERECTOMY  1999         OT ASSESSMENT FLOWSHEET (Last 12 Hours)       OT Evaluation and Treatment       Row Name 05/17/24 1406                   OT Time and Intention    Subjective Information no complaints  -        Document Type evaluation  -        Mode of Treatment individual therapy;occupational therapy  -        Patient Effort good  -        Symptoms Noted During/After Treatment none  -           General Information    Patient Profile Reviewed yes  -        Onset of Illness/Injury or Date of Surgery 05/15/24  -        Referring Physician Hurtado  -        Patient/Family/Caregiver Comments/Observations pt sitting reclined in chair  -        Prior Level of Function independent:  -        Equipment Currently Used at Home grab bar;shower chair;walker, rolling  -        Existing Precautions/Restrictions fall  -        Benefits Reviewed patient:;improve function;increase strength;increase balance;increase independence  -           Previous Level of Function/Home Environm    Bathing/Grooming, Premorbid Functional Level uses device or equipment  -        Dressing, Premorbid Functional Level uses device or equipment  -        Eating/Feeding, Premorbid Functional Level independent  -        Toileting, Premorbid Functional Level uses device or equipment  -        BADLs, Premorbid Functional Level uses device or equipment  -        IADLs, Premorbid Functional Level uses device or equipment  -        Bed Mobility, Premorbid Functional Level independent  -        Transfers, Premorbid Functional Level uses device or equipment  -           Living Environment    Current Living Arrangements assisted living facility  -           Pain  Assessment    Pretreatment Pain Rating 4/10  -KP        Posttreatment Pain Rating 4/10  -KP        Pain Location - Side/Orientation Right  -KP        Pain Location lower  -KP        Pain Location - knee  -KP        Pain Intervention(s) Repositioned  -KP           Cognition    Affect/Mental Status (Cognition) WFL  -KP        Orientation Status (Cognition) oriented x 4  -KP        Follows Commands (Cognition) follows one-step commands;over 90% accuracy  -        Cognitive Function WFL  -KP           Range of Motion Comprehensive    General Range of Motion bilateral upper extremity ROM WNL  -KP        Comment, General Range of Motion B UE 8/8  -KP           Strength (Manual Muscle Testing)    Strength (Manual Muscle Testing) bilateral upper extremities  -KP           Strength Comprehensive (MMT)    Comment, General Manual Muscle Testing (MMT) Assessment B UE 4/5  -KP           Activities of Daily Living    BADL Assessment/Intervention bathing;grooming;toileting  -           Grooming Assessment/Training    [place] Level (Grooming) grooming skills;hair care, combing/brushing;wash face, hands;standby assist;set up  -        Position (Grooming) supported sitting  -           Toileting Assessment/Training    [place] Level (Toileting) toileting skills;adjust/manage clothing;perform perineal hygiene;set up;contact guard assist  -        Assistive Devices (Toileting) grab bar/safety frame  -        Position (Toileting) supported sitting;supported standing  -        Comment, (Toileting) walker  -           Bed Mobility    Comment, (Bed Mobility) UI  -           Functional Mobility    Functional Mobility- Ind. Level set up required;standby assist;contact guard assist  -        Functional Mobility- Device walker, front-wheeled  -        Functional Mobility- Comment in room to BR, to chair. pt reports her R LE is shorter than he rL  -KP        Patient was able to Ambulate yes  -            Transfer Assessment/Treatment    Transfers sit-stand transfer;stand-sit transfer;toilet transfer;bed-chair transfer;chair-bed transfer  -           Sit-Stand Transfer    Sit-Stand [place] (Transfers) set up;contact guard  -        Assistive Device (Sit-Stand Transfers) walker, front-wheeled  -           Stand-Sit Transfer    Stand-Sit [place] (Transfers) set up;standby assist  -        Assistive Device (Stand-Sit Transfers) walker, front-wheeled  -           Toilet Transfer    Type (Toilet Transfer) sit-stand;stand-sit;stand pivot/stand step  -        [place] Level (Toilet Transfer) set up;contact guard  -        Assistive Device (Toilet Transfer) grab bars/safety frame;walker, front-wheeled  -           Motor Skills    Therapeutic Exercise shoulder;elbow/forearm;wrist  -           Shoulder (Therapeutic Exercise)    Shoulder (Therapeutic Exercise) AROM (active range of motion)  -        Shoulder AROM (Therapeutic Exercise) right;left;flexion;extension;scapular protraction;scapular retraction;sitting;10 repetitions  -           Elbow/Forearm (Therapeutic Exercise)    Elbow/Forearm (Therapeutic Exercise) AROM (active range of motion)  -        Elbow/Forearm AROM (Therapeutic Exercise) left;right;flexion;extension;supination;pronation;sitting;10 repetitions  -           Wrist (Therapeutic Exercise)    Wrist (Therapeutic Exercise) AROM (active range of motion)  -           Balance    Static Sitting Balance standby assist  -        Dynamic Sitting Balance standby assist  -        Position, Sitting Balance sitting in chair  -        Static Standing Balance standby assist  -        Dynamic Standing Balance standby assist;contact guard  -        Position/Device Used, Standing Balance walker, front-wheeled  -        Balance Interventions occupation based/functional task  -           Plan of Care Review    Plan of Care Reviewed With patient  -KP        Progress  improving  -        Outcome Evaluation pt evaluated for OT. pt admitted from [facility]. pt was independent PTA w walker and items for ADLs such as grab bar. pt was CGA/SBA w tsf, walking to BR, and tsf to toilet. pt completed grooming w SBA. pt completed UE ex 10x1 . pt has decr endurance, ADL, and strength and cont to benefit from OT. rec dc back to residential at dc as pt would like to return and is close to baseline.  -KP           Positioning and Restraints    Pre-Treatment Position sitting in chair/recliner  -KP        Post Treatment Position chair  -KP        In Chair reclined;call light within reach;encouraged to call for assist;exit alarm on  -KP           Therapy Assessment/Plan (OT)    Rehab Potential (OT) good, to achieve stated therapy goals  -        Criteria for Skilled Therapeutic Interventions Met (OT) yes;meets criteria;skilled treatment is necessary  -        Therapy Frequency (OT) 3 times/wk  -        Problem List (OT) problems related to;balance;mobility;strength  -        Activity Limitations Related to Problem List (OT) unable to ambulate safely;unable to transfer safely;BADLs not performed adequately or safely;IADLs not performed adequately or safely  -        Planned Therapy Interventions (OT) activity tolerance training;BADL retraining;functional balance retraining;occupation/activity based interventions;patient/caregiver education/training;ROM/therapeutic exercise;strengthening exercise;transfer/mobility retraining  -           Evaluation Complexity (OT)    Review Occupational Profile/Medical/Therapy History Complexity expanded/moderate complexity  -KP        Assessment, Occupational Performance/Identification of Deficit Complexity 3-5 performance deficits  -KP        Clinical Decision Making Complexity (OT) detailed assessment/moderate complexity  -KP        Overall Complexity of Evaluation (OT) moderate complexity  -KP           Progress Summary (OT)    Progress Toward Functional Goals  (OT) progress toward functional goals as expected  -KP           Therapy Plan Review/Discharge Plan (OT)    Therapy Plan Review (OT) evaluation/treatment results reviewed;care plan/treatment goals reviewed;risks/benefits reviewed;current/potential barriers reviewed;participants voiced agreement with care plan;participants included;patient  -KP           OT Goals    Toileting Goal Selection (OT) toileting, OT goal 1  -KP        Grooming Goal Selection (OT) grooming, OT goal 1  -KP        Strength Goal Selection (OT) strength, OT goal 1  -KP        Balance Goal Selection (OT) balance, OT goal 1  -KP           Toileting Goal 1 (OT)    Activity/Device (Toileting Goal 1, OT) toileting skills, all;grab bar/safety frame  -KP        [place] Level/Cues Needed (Toileting Goal 1, OT) modified independence  -KP        Time Frame (Toileting Goal 1, OT) long term goal (LTG);by discharge;2 weeks  -KP        Progress/Outcome (Toileting Goal 1, OT) goal ongoing;new goal  -KP           Grooming Goal 1 (OT)    Activity/Device (Grooming Goal 1, OT) grooming skills, all  -KP        [place] (Grooming Goal 1, OT) modified independence  -KP        Time Frame (Grooming Goal 1, OT) long term goal (LTG);by discharge;2 weeks  -KP        Progress/Outcome (Grooming Goal 1, OT) goal ongoing;new goal  -KP           Strength Goal 1 (OT)    Strength Goal 1 (OT) to incr UE to 4+/5  -KP        Time Frame (Strength Goal 1, OT) long term goal (LTG);2 weeks  -KP        Progress/Outcome (Strength Goal 1, OT) new goal;goal ongoing  -KP                  User Key  (r) = Recorded By, (t) = Taken By, (c) = Cosigned By      Initials Name Effective Dates    Eugene Martinezistin Ayaan, OTR 07/11/23 -                      Occupational Therapy Education       Title: PT OT SLP Therapies (Done)       Topic: Occupational Therapy (Done)       Point: ADL training (Done)       Description:   Instruct learner(s) on proper safety adaptation and remediation  techniques during self care or transfers.   Instruct in proper use of assistive devices.                  Learning Progress Summary             Patient Acceptance, E,TB,D, VU,DU by  at 5/17/2024 1415    Comment: ed pt on role of OT. benefit of therapy, POC w. OT. ed on safety w ADL and tsf.                         Point: Home exercise program (Done)       Description:   Instruct learner(s) on appropriate technique for monitoring, assisting and/or progressing therapeutic exercises/activities.                  Learning Progress Summary             Patient Acceptance, E,TB,D, VU,DU by  at 5/17/2024 1415    Comment: ed pt on role of OT. benefit of therapy, POC w. OT. ed on safety w ADL and tsf.                         Point: Precautions (Done)       Description:   Instruct learner(s) on prescribed precautions during self-care and functional transfers.                  Learning Progress Summary             Patient Acceptance, E,TB,D, VU,DU by  at 5/17/2024 1415    Comment: ed pt on role of OT. benefit of therapy, POC w. OT. ed on safety w ADL and tsf.                         Point: Body mechanics (Done)       Description:   Instruct learner(s) on proper positioning and spine alignment during self-care, functional mobility activities and/or exercises.                  Learning Progress Summary             Patient Acceptance, E,TB,D, VU,DU by  at 5/17/2024 1415    Comment: ed pt on role of OT. benefit of therapy, POC w. OT. ed on safety w ADL and tsf.                                         User Key       Initials Effective Dates Name Provider Type Discipline     07/11/23 -  Fallon Meyer, OTR Occupational Therapist OT                      OT Recommendation and Plan  Planned Therapy Interventions (OT): activity tolerance training, BADL retraining, functional balance retraining, occupation/activity based interventions, patient/caregiver education/training, ROM/therapeutic exercise, strengthening exercise,  transfer/mobility retraining  Therapy Frequency (OT): 3 times/wk  Progress Toward Functional Goals (OT): progress toward functional goals as expected  Plan of Care Review  Plan of Care Reviewed With: patient  Progress: improving  Outcome Evaluation: pt evaluated for OT. pt admitted from [facility]. pt was independent PTA w walker and items for ADLs such as grab bar. pt was CGA/SBA w tsf, walking to BR, and tsf to toilet. pt completed grooming w SBA. pt completed UE ex 10x1 . pt has decr endurance, ADL, and strength and cont to benefit from OT. rec dc back to [facility] at dc as pt would like to return and is close to baseline.  Plan of Care Reviewed With: patient  Outcome Evaluation: pt evaluated for OT. pt admitted from [facility]. pt was independent PTA w walker and items for ADLs such as grab bar. pt was CGA/SBA w tsf, walking to BR, and tsf to toilet. pt completed grooming w SBA. pt completed UE ex 10x1 . pt has decr endurance, ADL, and strength and cont to benefit from OT. rec dc back to [facility] at dc as pt would like to return and is close to baseline.        Time Calculation:    Time Calculation- OT       Row Name 05/17/24 1416             Time Calculation- OT    OT Start Time 1022  -      OT Stop Time 1040  -      OT Time Calculation (min) 18 min  -      Total Timed Code Minutes- OT 10 minute(s)  -      OT Received On 05/17/24  -      OT - Next Appointment 05/20/24  -      OT Goal Re-Cert Due Date 05/31/24  -         Timed Charges    18747 - OT Self Care/Mgmt Minutes 10  -KP         Untimed Charges    OT Eval/Re-eval Minutes 8  -KP         Total Minutes    Timed Charges Total Minutes 10  -KP      Untimed Charges Total Minutes 8  -KP       Total Minutes 18  -KP                User Key  (r) = Recorded By, (t) = Taken By, (c) = Cosigned By      Initials Name Provider Type    Fallon Martinez OTDAVID Occupational Therapist                  Therapy Charges for Today       Code Description Service Date Service Provider  Modifiers Qty    30585136325 HC OT SELF CARE/MGMT/TRAIN EA 15 MIN 5/17/2024 Fallon Meyer, OTR GO 1    78838268612 HC OT EVAL MOD COMPLEXITY 2 5/17/2024 Fallon Meyer, JOHNY GO 1                 Fallon Meyer, JOHNY  5/17/2024

## 2024-05-17 NOTE — PLAN OF CARE
Goal Outcome Evaluation:  Plan of Care Reviewed With: patient           Outcome Evaluation: Pt is an 88 yo F admitted from [facility] with abdominal pain - gastroduodenitis with EGD planned this PM. Pt reports independence at [facility] with a rollator, staff brings her meals but she can typically get around her apartment without difficulty. Pt presents to PT with impaired strength, endurance, and pain limiting overall mobility. Pt transferred to EOB with SBA, STS with CGA, and ambulated 5ft to chair with CGA and rwx. Pt with forward flexed posture, short shuffled steps, and fatigues quickly limiting progressed gait distance. Pt c/o 9/10 R hip pain - RN notified and gave pain meds prior to mobility. Pt left sitting UI with needs met, encouraged to continue calling out for assist up to the bathroom. PT will continue to follow, anticipate return to [facility] with HHPT at [place] pending progress.      Anticipated Discharge Disposition (PT): assisted living, home with home health

## 2024-05-17 NOTE — THERAPY EVALUATION
Patient Name: Wendie Houser  : 1935    MRN: [id number]                              Today's Date: 2024       Admit Date: 5/15/2024    Visit Dx:     ICD-10-CM ICD-9-CM   1. Gastroduodenitis  K29.90 535.50   2. Lactic acidosis  E87.20 276.2   3. Generalized abdominal pain  R10.84 789.07   4. Upper abdominal pain  R10.10 789.09   5. Nausea and vomiting, unspecified vomiting type  R11.2 787.01   6. Gastroesophageal reflux disease, unspecified whether esophagitis present  K21.9 530.81   7. Abnormal CT of the abdomen  R93.5 793.6     Patient Active Problem List   Diagnosis    Arthritis    Late onset Alzheimer's dementia without behavioral disturbance    Fatigue    Gastroesophageal reflux disease    Essential hypertension    Hypomagnesemia    Sleep apnea    Renal insufficiency    Hypothyroidism    Lumbar canal stenosis    Shortness of breath    Disorder of electrolytes    Hiatal hernia    Hyperkalemia    Hypoglycemia    Irritable bowel syndrome    Allergic rhinitis    High risk medication use    Polypharmacy    Obesity (BMI 30-39.9)    Panic attack    Weakness of left lower extremity    Gastroduodenitis    Generalized abdominal pain    Lactic acidosis    Upper abdominal pain    Nausea and vomiting    Abnormal CT of the abdomen     Past Medical History:   Diagnosis Date    Anemia     Arthritis     CHF (congestive heart failure)     Congestive heart failure     Depression     Diabetes mellitus     type 2    Diverticulitis of colon 2016    Acute uncomplicated diverticulitis at the distal transverse colon    Early onset Alzheimer's dementia     GERD (gastroesophageal reflux disease)     Heart disease     Hyperlipidemia     Hypertension     Hypothyroidism     Kidney disease     Mixed anxiety depressive disorder 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 Cain     ENDOSCOPY  04/15/2011    EYE SURGERY  2009    GALLBLADDER SURGERY  1960    Open    TOTAL KNEE ARTHROPLASTY Left 2012    Dr. Soler    VAGINAL HYSTERECTOMY  1999      General Information       [facility] Name 05/17/24 1337          Physical Therapy Time and Intention    Document Type evaluation  -     Mode of Treatment individual therapy;physical therapy  -[facility] Name 05/17/24 1337          General Information    Patient Profile Reviewed yes  -     Prior Level of Function independent:;gait;transfer;bed mobility  -     Existing Precautions/Restrictions fall  -     Barriers to Rehab previous functional deficit  -       Row Name 05/17/24 1337          Living Environment    People in Home facility resident  [facility]  -       Row Name 05/17/24 1337          Home Main Entrance    Number of Stairs, Main Entrance none  -[facility] Name 05/17/24 1337          Stairs Within Home, Primary    Number of Stairs, Within Home, Primary none  -[facility] Name 05/17/24 1337          Cognition    Orientation Status (Cognition) oriented x 4  -[facility] Name 05/17/24 1332          Safety Issues, Functional Mobility    Impairments Affecting Function (Mobility) balance;endurance/activity tolerance;strength;pain;range of motion (ROM)  -               User Key  (r) = Recorded By, (t) = Taken By, (c) = Cosigned By      Initials Name Provider Type     Callie Kuhn PT Physical Therapist                   Mobility       [facility] Name 05/17/24 1334          Bed Mobility    Bed Mobility supine-sit  -     Supine-Sit [place] (Bed Mobility) standby assist;verbal cues  -     Assistive Device (Bed Mobility) bed rails;head of bed elevated  -     Comment, (Bed Mobility) Increased time to complete  -[facility] Name 05/17/24 1330          Sit-Stand Transfer    Sit-Stand [place] (Transfers) contact guard;verbal cues  -     Assistive Device (Sit-Stand Transfers) walker, front-wheeled  -[facility] Name 05/17/24 5589           Gait/Stairs (Locomotion)    [place] Level (Gait) verbal cues;contact guard  -     Assistive Device (Gait) walker, front-wheeled  -     Distance in Feet (Gait) 5  -     Deviations/Abnormal Patterns (Gait) antalgic;dustin decreased;gait speed decreased;stride length decreased  -     Bilateral Gait Deviations forward flexed posture;heel strike decreased  -     Comment, (Gait/Stairs) Slow pace, c/o increased R hip pain limiting gait distance - RN gave pain meds  -               User Key  (r) = Recorded By, (t) = Taken By, (c) = Cosigned By      Initials Name Provider Type     Callie Kuhn, PT Physical Therapist                   Obj/Interventions       [facility] Name 05/17/24 1339          Range of Motion Comprehensive    Comment, General Range of Motion Impaired B knee extension  -[facility] Name 05/17/24 1339          Strength Comprehensive (MMT)    General Manual Muscle Testing (MMT) Assessment lower extremity strength deficits identified  -     Comment, General Manual Muscle Testing (MMT) Assessment Generalized weakness, BLE grossly 3+/5  -[facility] Name 05/17/24 1339          Balance    Balance Assessment sitting static balance;sitting dynamic balance;standing static balance;standing dynamic balance  -     Static Sitting Balance standby assist  -     Dynamic Sitting Balance standby assist  -     Position, Sitting Balance unsupported;sitting edge of bed  -     Static Standing Balance contact guard;verbal cues  -     Dynamic Standing Balance contact guard;verbal cues  -     Position/Device Used, Standing Balance supported;walker, front-wheeled  -     Balance Interventions sitting;standing;sit to stand;supported;static;dynamic  -[facility] Name 05/17/24 1333          Sensory Assessment (Somatosensory)    Sensory Assessment (Somatosensory) LE sensation intact  -               User Key  (r) = Recorded By, (t) = Taken By, (c) = Cosigned By      Initials Name Provider Type      Callie Kuhn PT Physical Therapist                   Goals/Plan       Row Name 05/17/24 1348          Bed Mobility Goal 1 (PT)    Activity/Assistive Device (Bed Mobility Goal 1, PT) bed mobility activities, all  -     [place] Level/Cues Needed (Bed Mobility Goal 1, PT) independent  -     Time Frame (Bed Mobility Goal 1, PT) 1 week  -       Row Name 05/17/24 1348          Transfer Goal 1 (PT)    Activity/Assistive Device (Transfer Goal 1, PT) transfers, all  -     [place] Level/Cues Needed (Transfer Goal 1, PT) modified independence  -     Time Frame (Transfer Goal 1, PT) 1 week  -       Row Name 05/17/24 1348          Gait Training Goal 1 (PT)    Activity/Assistive Device (Gait Training Goal 1, PT) gait (walking locomotion)  -     [place] Level (Gait Training Goal 1, PT) modified independence  -     Distance (Gait Training Goal 1, PT) 50ft  -     Time Frame (Gait Training Goal 1, PT) 1 week  -       Row Name 05/17/24 1342          Therapy Assessment/Plan (PT)    Planned Therapy Interventions (PT) balance training;bed mobility training;gait training;home exercise program;patient/family education;strengthening;transfer training  -               User Key  (r) = Recorded By, (t) = Taken By, (c) = Cosigned By      Initials Name Provider Type     Callie Kuhn PT Physical Therapist                   Clinical Impression       Row Name 05/17/24 6120          Pain    Pretreatment Pain Rating 9/10  -     Posttreatment Pain Rating 9/10  -     Pain Location - Side/Orientation Right  -     Pain Location generalized  -     Pain Location - hip  -     Pain Intervention(s) Repositioned;Ambulation/increased activity;Rest  -       Row Name 05/17/24 0585          Plan of Care Review    Plan of Care Reviewed With patient  -     Outcome Evaluation Pt is an 89 yo F admitted from [facility] with abdominal pain - gastroduodenitis with EGD planned this PM. Pt reports independence  at [facility] with a rollator, staff brings her meals but she can typically get around her apartment without difficulty. Pt presents to PT with impaired strength, endurance, and pain limiting overall mobility. Pt transferred to EOB with SBA, STS with CGA, and ambulated 5ft to chair with CGA and rwx. Pt with forward flexed posture, short shuffled steps, and fatigues quickly limiting progressed gait distance. Pt c/o 9/10 R hip pain - RN notified and gave pain meds prior to mobility. Pt left sitting UI with needs met, encouraged to continue calling out for assist up to the bathroom. PT will continue to follow, anticipate return to [facility] with HHPT at [place] pending progress.  -       Row Name 05/17/24 1339          Therapy Assessment/Plan (PT)    Patient/Family Therapy Goals Statement (PT) Return to [facility]  -     Rehab Potential (PT) good, to achieve stated therapy goals  -     Criteria for Skilled Interventions Met (PT) yes  -     Therapy Frequency (PT) 6 times/wk  -       Row Name 05/17/24 1339          Vital Signs    O2 Delivery Pre Treatment room air  -     O2 Delivery Intra Treatment room air  -     O2 Delivery Post Treatment room air  -       Row Name 05/17/24 1339          Positioning and Restraints    Pre-Treatment Position in bed  -     Post Treatment Position chair  -     In Chair notified nsg;reclined;call light within reach;encouraged to call for assist;exit alarm on  -               User Key  (r) = Recorded By, (t) = Taken By, (c) = Cosigned By      Initials Name Provider Type     Callie Kuhn, PT Physical Therapist                   Outcome Measures       Row Name 05/17/24 1349 05/17/24 1103       How much help from another person do you currently need...    Turning from your back to your side while in flat bed without using bedrails? 4  - 4  -KA    Moving from lying on back to sitting on the side of a flat bed without bedrails? 3  - 4  -KA    Moving to and from a bed to a chair  (including a wheelchair)? 3  - 3  -KA    Standing up from a chair using your arms (e.g., wheelchair, bedside chair)? 3  - 3  -KA    Climbing 3-5 steps with a railing? 2  - 2  -KA    To walk in hospital room? 3  - 3  -KA    AM-PAC 6 Clicks Score (PT) 18  - 19  -KA    Highest Level of Mobility Goal 6 --> Walk 10 steps or more  - 6 --> Walk 10 steps or more  -      Row Name 05/17/24 1349          Functional Assessment    Outcome Measure Options AM-PAC 6 Clicks Basic Mobility (PT)  -               User Key  (r) = Recorded By, (t) = Taken By, (c) = Cosigned By      Initials Name Provider Type     Callie Kuhn PT Physical Therapist    Kadie Sam, RN Registered Nurse                                 Physical Therapy Education       Title: PT OT SLP Therapies (Done)       Topic: Physical Therapy (Done)       Point: Mobility training (Done)       Learning Progress Summary             Patient Acceptance, E,TB,D, VU,NR by  at 5/17/2024 1349                         Point: Home exercise program (Done)       Learning Progress Summary             Patient Acceptance, E,TB,D, VU,NR by  at 5/17/2024 1349                         Point: Body mechanics (Done)       Learning Progress Summary             Patient Acceptance, E,TB,D, VU,NR by  at 5/17/2024 1349                         Point: Precautions (Done)       Learning Progress Summary             Patient Acceptance, E,TB,D, VU,NR by  at 5/17/2024 1349                                         User Key       Initials Effective Dates Name Provider Type [facility] 04/08/22 -  Callie Kuhn PT Physical Therapist PT                  PT Recommendation and Plan  Planned Therapy Interventions (PT): balance training, bed mobility training, gait training, home exercise program, patient/family education, strengthening, transfer training  Plan of Care Reviewed With: patient  Outcome Evaluation: Pt is an 89 yo F admitted from [facility] with abdominal  pain - gastroduodenitis with EGD planned this PM. Pt reports independence at [facility] with a rollator, staff brings her meals but she can typically get around her apartment without difficulty. Pt presents to PT with impaired strength, endurance, and pain limiting overall mobility. Pt transferred to EOB with SBA, STS with CGA, and ambulated 5ft to chair with CGA and rwx. Pt with forward flexed posture, short shuffled steps, and fatigues quickly limiting progressed gait distance. Pt c/o 9/10 R hip pain - RN notified and gave pain meds prior to mobility. Pt left sitting [facility] with needs met, encouraged to continue calling out for assist up to the bathroom. PT will continue to follow, anticipate return to [facility] with HHPT at [place] pending progress.     Time Calculation:   PT Evaluation Complexity  History, PT Evaluation Complexity: 1-2 personal factors and/or comorbidities  Examination of Body Systems (PT Eval Complexity): total of 3 or more elements  Clinical Presentation (PT Evaluation Complexity): evolving  Clinical Decision Making (PT Evaluation Complexity): moderate complexity  Overall Complexity (PT Evaluation Complexity): moderate complexity     PT Charges       Row Name 05/17/24 1349             Time Calculation    Start Time 0958  -      Stop Time 1011  -      Time Calculation (min) 13 min  -      PT Received On 05/17/24  -      PT - Next Appointment 05/18/24  -      PT Goal Re-Cert Due Date 05/24/24  -         Time Calculation- PT    Total Timed Code Minutes- PT 8 minute(s)  -         Timed Charges    95900 - PT Therapeutic Activity Minutes 8  -BH         Total Minutes    Timed Charges Total Minutes 8  -BH       Total Minutes 8  -                User Key  (r) = Recorded By, (t) = Taken By, (c) = Cosigned By      Initials Name Provider Type     Callie Kuhn, PT Physical Therapist                  Therapy Charges for Today       Code Description Service Date Service Provider Modifiers Qty    [id number]  HC PT THERAPEUTIC ACT EA 15 MIN 5/17/2024 Callie Kuhn, PT GP 1    [id number] HC PT EVAL MOD COMPLEXITY 2 5/17/2024 Callie Kuhn, PT GP 1            PT G-Codes  Outcome Measure Options: AM-PAC 6 Clicks Basic Mobility (PT)  AM-PAC 6 Clicks Score (PT): 18  PT Discharge Summary  Anticipated Discharge Disposition (PT): assisted living, home with home health    Callie Kuhn, PT  5/17/2024

## 2024-05-17 NOTE — PLAN OF CARE
Goal Outcome Evaluation:  Plan of Care Reviewed With: patient        Progress: no change  Outcome Evaluation: VSS. Scheduled po medications, IV protonix, and IV fluid as ordered. NPO since midnight. Up with assist. Safety measures. Will monitor for needs.

## 2024-05-18 ENCOUNTER — ANESTHESIA (OUTPATIENT)
Dept: GASTROENTEROLOGY | Facility: HOSPITAL | Age: 89
End: 2024-05-18
Payer: MEDICARE

## 2024-05-18 ENCOUNTER — ANESTHESIA EVENT (OUTPATIENT)
Dept: GASTROENTEROLOGY | Facility: HOSPITAL | Age: 89
End: 2024-05-18
Payer: MEDICARE

## 2024-05-18 LAB
ALBUMIN SERPL-MCNC: 3.3 G/DL (ref 3.5–5.2)
ALBUMIN/GLOB SERPL: 1.2 G/DL
ALP SERPL-CCNC: 80 U/L (ref 39–117)
ALT SERPL W P-5'-P-CCNC: 12 U/L (ref 1–33)
ANION GAP SERPL CALCULATED.3IONS-SCNC: 7.9 MMOL/L (ref 5–15)
AST SERPL-CCNC: 17 U/L (ref 1–32)
BASOPHILS # BLD AUTO: 0.05 10*3/MM3 (ref 0–0.2)
BASOPHILS NFR BLD AUTO: 0.6 % (ref 0–1.5)
BILIRUB SERPL-MCNC: 0.2 MG/DL (ref 0–1.2)
BUN SERPL-MCNC: 7 MG/DL (ref 8–23)
BUN/CREAT SERPL: 7.3 (ref 7–25)
CALCIUM SPEC-SCNC: 8.6 MG/DL (ref 8.6–10.5)
CHLORIDE SERPL-SCNC: 107 MMOL/L (ref 98–107)
CO2 SERPL-SCNC: 22.1 MMOL/L (ref 22–29)
CREAT SERPL-MCNC: 0.96 MG/DL (ref 0.57–1)
DEPRECATED RDW RBC AUTO: 43.9 FL (ref 37–54)
EGFRCR SERPLBLD CKD-EPI 2021: 57.4 ML/MIN/1.73
EOSINOPHIL # BLD AUTO: 0.21 10*3/MM3 (ref 0–0.4)
EOSINOPHIL NFR BLD AUTO: 2.5 % (ref 0.3–6.2)
ERYTHROCYTE [DISTWIDTH] IN BLOOD BY AUTOMATED COUNT: 13.9 % (ref 12.3–15.4)
GLOBULIN UR ELPH-MCNC: 2.8 GM/DL
GLUCOSE SERPL-MCNC: 108 MG/DL (ref 65–99)
HCT VFR BLD AUTO: 37.3 % (ref 34–46.6)
HGB BLD-MCNC: 12 G/DL (ref 12–15.9)
IMM GRANULOCYTES # BLD AUTO: 0.02 10*3/MM3 (ref 0–0.05)
IMM GRANULOCYTES NFR BLD AUTO: 0.2 % (ref 0–0.5)
LYMPHOCYTES # BLD AUTO: 3.8 10*3/MM3 (ref 0.7–3.1)
LYMPHOCYTES NFR BLD AUTO: 45 % (ref 19.6–45.3)
MCH RBC QN AUTO: 28.3 PG (ref 26.6–33)
MCHC RBC AUTO-ENTMCNC: 32.2 G/DL (ref 31.5–35.7)
MCV RBC AUTO: 88 FL (ref 79–97)
MONOCYTES # BLD AUTO: 0.69 10*3/MM3 (ref 0.1–0.9)
MONOCYTES NFR BLD AUTO: 8.2 % (ref 5–12)
NEUTROPHILS NFR BLD AUTO: 3.68 10*3/MM3 (ref 1.7–7)
NEUTROPHILS NFR BLD AUTO: 43.5 % (ref 42.7–76)
NRBC BLD AUTO-RTO: 0 /100 WBC (ref 0–0.2)
PLATELET # BLD AUTO: 263 10*3/MM3 (ref 140–450)
PMV BLD AUTO: 9.2 FL (ref 6–12)
POTASSIUM SERPL-SCNC: 3.9 MMOL/L (ref 3.5–5.2)
PROT SERPL-MCNC: 6.1 G/DL (ref 6–8.5)
RBC # BLD AUTO: 4.24 10*6/MM3 (ref 3.77–5.28)
SODIUM SERPL-SCNC: 137 MMOL/L (ref 136–145)
WBC NRBC COR # BLD AUTO: 8.45 10*3/MM3 (ref 3.4–10.8)

## 2024-05-18 PROCEDURE — 25810000003 SODIUM CHLORIDE 0.9 % SOLUTION: Performed by: INTERNAL MEDICINE

## 2024-05-18 PROCEDURE — 99232 SBSQ HOSP IP/OBS MODERATE 35: CPT | Performed by: INTERNAL MEDICINE

## 2024-05-18 PROCEDURE — 80053 COMPREHEN METABOLIC PANEL: CPT | Performed by: NURSE PRACTITIONER

## 2024-05-18 PROCEDURE — 85025 COMPLETE CBC W/AUTO DIFF WBC: CPT | Performed by: NURSE PRACTITIONER

## 2024-05-18 PROCEDURE — G0463 HOSPITAL OUTPT CLINIC VISIT: HCPCS | Performed by: INTERNAL MEDICINE

## 2024-05-18 RX ORDER — SODIUM CHLORIDE 9 MG/ML
50 INJECTION, SOLUTION INTRAVENOUS CONTINUOUS
Status: DISCONTINUED | OUTPATIENT
Start: 2024-05-18 | End: 2024-05-19 | Stop reason: HOSPADM

## 2024-05-18 RX ORDER — PANTOPRAZOLE SODIUM 40 MG/1
40 TABLET, DELAYED RELEASE ORAL
Status: DISCONTINUED | OUTPATIENT
Start: 2024-05-18 | End: 2024-05-19 | Stop reason: HOSPADM

## 2024-05-18 RX ADMIN — DONEPEZIL HYDROCHLORIDE 10 MG: 10 TABLET, FILM COATED ORAL at 22:11

## 2024-05-18 RX ADMIN — LOSARTAN POTASSIUM 25 MG: 25 TABLET, FILM COATED ORAL at 22:11

## 2024-05-18 RX ADMIN — ACETAMINOPHEN 650 MG: 325 TABLET, FILM COATED ORAL at 03:13

## 2024-05-18 RX ADMIN — HYDROCODONE BITARTRATE AND ACETAMINOPHEN 1 TABLET: 5; 325 TABLET ORAL at 13:26

## 2024-05-18 RX ADMIN — SUCRALFATE 1 G: 1 TABLET ORAL at 16:46

## 2024-05-18 RX ADMIN — SUCRALFATE 1 G: 1 TABLET ORAL at 13:26

## 2024-05-18 RX ADMIN — LOSARTAN POTASSIUM 25 MG: 25 TABLET, FILM COATED ORAL at 13:26

## 2024-05-18 RX ADMIN — SUCRALFATE 1 G: 1 TABLET ORAL at 22:11

## 2024-05-18 RX ADMIN — SODIUM CHLORIDE 50 ML/HR: 9 INJECTION, SOLUTION INTRAVENOUS at 09:32

## 2024-05-18 RX ADMIN — PRAVASTATIN SODIUM 40 MG: 40 TABLET ORAL at 22:11

## 2024-05-18 RX ADMIN — PANTOPRAZOLE SODIUM 40 MG: 40 TABLET, DELAYED RELEASE ORAL at 16:46

## 2024-05-18 RX ADMIN — CETIRIZINE HYDROCHLORIDE 5 MG: 10 TABLET ORAL at 13:26

## 2024-05-18 RX ADMIN — HYDROCODONE BITARTRATE AND ACETAMINOPHEN 1 TABLET: 5; 325 TABLET ORAL at 22:11

## 2024-05-18 RX ADMIN — SODIUM CHLORIDE 50 ML/HR: 9 INJECTION, SOLUTION INTRAVENOUS at 14:52

## 2024-05-18 RX ADMIN — PANTOPRAZOLE SODIUM 40 MG: 40 TABLET, DELAYED RELEASE ORAL at 13:26

## 2024-05-18 NOTE — ANESTHESIA POSTPROCEDURE EVALUATION
"Patient: Wendie Houser    Procedure Summary       Date: 05/18/24 Room / Location:  [facility]    Anesthesia Start: 1202 Anesthesia Stop: 1210    Procedure: ESOPHAGOGASTRODUODENOSCOPY (Esophagus) Diagnosis:       Upper abdominal pain      Nausea and vomiting, unspecified vomiting type      Abnormal CT of the abdomen      (Upper abdominal pain [R10.10])      (Nausea and vomiting, unspecified vomiting type [R11.2])      (Abnormal CT of the abdomen [R93.5])    Surgeons: Homar Cain MD Provider: Toby Hernandez MD    Anesthesia Type: MAC ASA Status: 3            Anesthesia Type: MAC    Vitals  No vitals data found for the desired time range.          Post Anesthesia Care and Evaluation    Patient location during evaluation: PACU  Patient participation: complete - patient participated  Level of consciousness: awake and alert  Pain management: adequate    Airway patency: patent  Anesthetic complications: No anesthetic complications  PONV Status: controlled  Cardiovascular status: acceptable and hemodynamically stable  Respiratory status: acceptable  Hydration status: acceptable    Comments: /78 (BP Location: Left arm, Patient Position: Lying)   Pulse 77   Temp 36.6 °C (97.8 °F) (Oral)   Resp 12   Ht 152.4 cm (60\")   Wt 81.1 kg (178 lb 12.7 oz)   SpO2 97%   BMI 34.92 kg/m²     "

## 2024-05-18 NOTE — PROGRESS NOTES
"DAILY PROGRESS NOTE  [facility]    Patient Identification:  Name: Wendie Houser  Age: 89 y.o.  Sex: female  :  1935  MRN: [id number]         Primary Care Physician: Pallares, Clara Ann, MD    Subjective:  Interval History: Her abdominal pain is better today.  She did not get EGD today due to problems with IV access.    Objective:    Scheduled Meds:cetirizine, 5 mg, Oral, Daily  donepezil, 10 mg, Oral, Nightly  losartan, 25 mg, Oral, BID  pantoprazole, 40 mg, Oral, BID AC  pravastatin, 40 mg, Oral, Nightly  sodium chloride, 10 mL, Intravenous, Q12H  sucralfate, 1 g, Oral, 4x Daily AC & at Bedtime      Continuous Infusions:sodium chloride, 100 mL/hr, Last Rate: 100 mL/hr (24 1547)  sodium chloride, 50 mL/hr, Last Rate: 50 mL/hr (24 1452)        Vital signs in last 24 hours:  Temp:  [96.9 °F (36.1 °C)-97.8 °F (36.6 °C)] 97.8 °F (36.6 °C)  Heart Rate:  [68-99] 77  Resp:  [12-18] 12  BP: ()/(53-78) 139/78    Intake/Output:    Intake/Output Summary (Last 24 hours) at 2024 1501  Last data filed at 2024 1802  Gross per 24 hour   Intake 3161 ml   Output --   Net 3161 ml       Exam:  /78 (BP Location: Left arm, Patient Position: Lying)   Pulse 77   Temp 97.8 °F (36.6 °C) (Oral)   Resp 12   Ht 152.4 cm (60\")   Wt 81.1 kg (178 lb 12.7 oz)   SpO2 97%   BMI 34.92 kg/m²     General Appearance:    Alert, cooperative, no distress   Head:    Normocephalic, without obvious abnormality, atraumatic   Eyes:       Throat:   Lips, tongue, gums normal   Neck:   Supple, symmetrical, trachea midline, no JVD   Lungs:     Clear to auscultation bilaterally, respirations unlabored   Chest Wall:    No tenderness or deformity    Heart:    Regular rate and rhythm, S1 and S2 normal, no murmur,no  Rub or gallop   Abdomen:     Soft, nontender, bowel sounds active, no masses, no organomegaly    Extremities:   Extremities normal, atraumatic, no cyanosis or edema   Pulses:      Skin:   " Skin is warm and dry,  no rashes or palpable lesions   Neurologic:   no focal deficits noted      Lab Results (last 72 hours)       Procedure Component Value Units Date/Time    Comprehensive Metabolic Panel [[id number]]  (Abnormal) Collected: 05/17/24 0511    Specimen: Blood Updated: 05/17/24 0554     Glucose 95 mg/dL      BUN 8 mg/dL      Creatinine 0.88 mg/dL      Sodium 140 mmol/L      Potassium 4.1 mmol/L      Chloride 108 mmol/L      CO2 22.0 mmol/L      Calcium 8.3 mg/dL      Total Protein 5.9 g/dL      Albumin 3.2 g/dL      ALT (SGPT) 11 U/L      AST (SGOT) 19 U/L      Alkaline Phosphatase 78 U/L      Total Bilirubin 0.2 mg/dL      Globulin 2.7 gm/dL      A/G Ratio 1.2 g/dL      BUN/Creatinine Ratio 9.1     Anion Gap 10.0 mmol/L      eGFR 63.7 mL/min/1.73     Narrative:      GFR Normal >60  Chronic Kidney Disease <60  Kidney Failure <15    The GFR formula is only valid for adults with stable renal function between ages 18 and 70.    CBC & Differential [[id number]]  (Abnormal) Collected: 05/17/24 0511    Specimen: Blood Updated: 05/17/24 0535    Narrative:      The following orders were created for panel order CBC & Differential.  Procedure                               Abnormality         Status                     ---------                               -----------         ------                     CBC Auto Differential[[id number]]        Abnormal            Final result                 Please view results for these tests on the individual orders.    CBC Auto Differential [[id number]]  (Abnormal) Collected: 05/17/24 0511    Specimen: Blood Updated: 05/17/24 0535     WBC 9.20 10*3/mm3      RBC 4.55 10*6/mm3      Hemoglobin 12.5 g/dL      Hematocrit 39.0 %      MCV 85.7 fL      MCH 27.5 pg      MCHC 32.1 g/dL      RDW 13.8 %      RDW-SD 42.1 fl      MPV 9.4 fL      Platelets 288 10*3/mm3      Neutrophil % 42.8 %      Lymphocyte % 44.7 %      Monocyte % 8.8 %      Eosinophil % 2.9 %      Basophil % 0.5 %       Immature Grans % 0.3 %      Neutrophils, Absolute 3.93 10*3/mm3      Lymphocytes, Absolute 4.11 10*3/mm3      Monocytes, Absolute 0.81 10*3/mm3      Eosinophils, Absolute 0.27 10*3/mm3      Basophils, Absolute 0.05 10*3/mm3      Immature Grans, Absolute 0.03 10*3/mm3      nRBC 0.0 /100 WBC     Blood Culture - Blood, Hand, Left [[id number]]  (Normal) Collected: 05/16/24 0452    Specimen: Blood from Hand, Left Updated: 05/17/24 0501     Blood Culture No growth at 24 hours    Blood Culture - Blood, Hand, Right [[id number]]  (Normal) Collected: 05/16/24 0453    Specimen: Blood from Hand, Right Updated: 05/17/24 0501     Blood Culture No growth at 24 hours    Basic Metabolic Panel [[id number]]  (Abnormal) Collected: 05/16/24 0654    Specimen: Blood Updated: 05/16/24 0740     Glucose 128 mg/dL      BUN 11 mg/dL      Creatinine 0.99 mg/dL      Sodium 134 mmol/L      Potassium 3.9 mmol/L      Chloride 100 mmol/L      CO2 22.0 mmol/L      Calcium 8.5 mg/dL      BUN/Creatinine Ratio 11.1     Anion Gap 12.0 mmol/L      eGFR 55.3 mL/min/1.73     Narrative:      GFR Normal >60  Chronic Kidney Disease <60  Kidney Failure <15    The GFR formula is only valid for adults with stable renal function between ages 18 and 70.    CBC (No Diff) [[id number]]  (Abnormal) Collected: 05/16/24 0654    Specimen: Blood Updated: 05/16/24 0722     WBC 11.42 10*3/mm3      RBC 4.75 10*6/mm3      Hemoglobin 13.3 g/dL      Hematocrit 40.7 %      MCV 85.7 fL      MCH 28.0 pg      MCHC 32.7 g/dL      RDW 13.8 %      RDW-SD 42.7 fl      MPV 9.4 fL      Platelets 290 10*3/mm3     STAT Lactic Acid, Reflex [[id number]]  (Normal) Collected: 05/16/24 0224    Specimen: Blood Updated: 05/16/24 0325     Lactate 2.0 mmol/L     Urinalysis With Microscopic If Indicated (No Culture) - Urine, Clean Catch [[id number]]  (Abnormal) Collected: 05/16/24 0219    Specimen: Urine, Clean Catch Updated: 05/16/24 0236     Color, UA Yellow     Appearance, UA Clear     pH, UA 6.0      Specific Gravity, UA >1.030     Glucose, UA Negative     Ketones, UA Negative     Bilirubin, UA Negative     Blood, UA Negative     Protein, UA Negative     Leuk Esterase, UA Trace     Nitrite, UA Negative     Urobilinogen, UA 0.2 E.U./dL    Urinalysis, Microscopic Only - Urine, Clean Catch [[id number]]  (Abnormal) Collected: 05/16/24 0219    Specimen: Urine, Clean Catch Updated: 05/16/24 0236     RBC, UA 0-2 /HPF      WBC, UA 6-10 /HPF      Bacteria, UA None Seen /HPF      Squamous Epithelial Cells, UA 0-2 /HPF      Hyaline Casts, UA None Seen /LPF      Methodology Automated Microscopy    Comprehensive Metabolic Panel [[id number]]  (Abnormal) Collected: 05/15/24 2212    Specimen: Blood Updated: 05/15/24 2332     Glucose 117 mg/dL      BUN 13 mg/dL      Creatinine 0.98 mg/dL      Sodium 134 mmol/L      Potassium 4.5 mmol/L      Comment: Slight hemolysis detected by analyzer. Result may be falsely elevated.        Chloride 98 mmol/L      CO2 22.0 mmol/L      Calcium 9.0 mg/dL      Total Protein 7.1 g/dL      Albumin 4.2 g/dL      ALT (SGPT) 14 U/L      AST (SGOT) 25 U/L      Comment: Slight hemolysis detected by analyzer. Result may be falsely elevated.        Alkaline Phosphatase 96 U/L      Total Bilirubin 0.4 mg/dL      Globulin 2.9 gm/dL      A/G Ratio 1.4 g/dL      BUN/Creatinine Ratio 13.3     Anion Gap 14.0 mmol/L      eGFR 56.0 mL/min/1.73     Narrative:      CMP WAS PROCESSED USING THE SST TUBE.       GFR Normal >60  Chronic Kidney Disease <60  Kidney Failure <15    The GFR formula is only valid for adults with stable renal function between ages 18 and 70.    Lactic Acid, Plasma [[id number]]  (Abnormal) Collected: 05/15/24 2212    Specimen: Blood Updated: 05/15/24 2316     Lactate 2.4 mmol/L     Lipase [[id number]]  (Normal) Collected: 05/15/24 2212    Specimen: Blood Updated: 05/15/24 2308     Lipase 35 U/L     [place] Draw [[id number]] Collected: 05/15/24 2212    Specimen: Blood Updated: 05/15/24 2247     Narrative:      The following orders were created for panel order [place] Draw.  Procedure                               Abnormality         Status                     ---------                               -----------         ------                     Green Top (Gel)[[id number]]                                  Final result               Lavender Top[[id number]]                                     Final result               Gold Top - SST[[id number]]                                   Final result               Light Blue Top[[id number]]                                   Final result                 Please view results for these tests on the individual orders.    Gold Top - SST [[id number]] Collected: 05/15/24 2212    Specimen: Blood Updated: 05/15/24 2247     Extra Tube Hold for add-ons.     Comment: Auto resulted.       Green Top (Gel) [[id number]] Collected: 05/15/24 2212    Specimen: Blood Updated: 05/15/24 2247     Extra Tube Hold for add-ons.     Comment: Auto resulted.       Lavender Top [[id number]] Collected: 05/15/24 2212    Specimen: Blood Updated: 05/15/24 2247     Extra Tube hold for add-on     Comment: Auto resulted       Light Blue Top [[id number]] Collected: 05/15/24 2212    Specimen: Blood Updated: 05/15/24 2247     Extra Tube Hold for add-ons.     Comment: Auto resulted       CBC & Differential [[id number]]  (Abnormal) Collected: 05/15/24 2212    Specimen: Blood Updated: 05/15/24 2245    Narrative:      The following orders were created for panel order CBC & Differential.  Procedure                               Abnormality         Status                     ---------                               -----------         ------                     CBC Auto Differential[[id number]]        Abnormal            Final result                 Please view results for these tests on the individual orders.    CBC Auto Differential [[id number]]  (Abnormal) Collected: 05/15/24 2212    Specimen: Blood Updated: 05/15/24 2245  "    WBC 11.70 10*3/mm3      RBC 4.85 10*6/mm3      Hemoglobin 13.4 g/dL      Hematocrit 41.6 %      MCV 85.8 fL      MCH 27.6 pg      MCHC 32.2 g/dL      RDW 13.9 %      RDW-SD 43.5 fl      MPV 9.8 fL      Platelets 332 10*3/mm3      Neutrophil % 52.3 %      Lymphocyte % 38.1 %      Monocyte % 7.9 %      Eosinophil % 0.8 %      Basophil % 0.5 %      Immature Grans % 0.4 %      Neutrophils, Absolute 6.12 10*3/mm3      Lymphocytes, Absolute 4.46 10*3/mm3      Monocytes, Absolute 0.92 10*3/mm3      Eosinophils, Absolute 0.09 10*3/mm3      Basophils, Absolute 0.06 10*3/mm3      Immature Grans, Absolute 0.05 10*3/mm3      nRBC 0.0 /100 WBC           Data Review:  Results from last 7 days   Lab Units 05/18/24  0733 05/17/24  0511 05/16/24  0654   SODIUM mmol/L 137 140 134*   POTASSIUM mmol/L 3.9 4.1 3.9   CHLORIDE mmol/L 107 108* 100   CO2 mmol/L 22.1 22.0 22.0   BUN mg/dL 7* 8 11   CREATININE mg/dL 0.96 0.88 0.99   GLUCOSE mg/dL 108* 95 128*   CALCIUM mg/dL 8.6 8.3* 8.5*     Results from last 7 days   Lab Units 05/18/24  0733 05/17/24  0511 05/16/24  0654   WBC 10*3/mm3 8.45 9.20 11.42*   HEMOGLOBIN g/dL 12.0 12.5 13.3   HEMATOCRIT % 37.3 39.0 40.7   PLATELETS 10*3/mm3 263 288 290             Lab Results   Lab Value Date/Time    TROPONINT <0.010 08/10/2022 1442    TROPONINT <0.010 06/05/2022 2057    TROPONINT <0.01 03/04/2016 1230         Results from last 7 days   Lab Units 05/18/24  0733 05/17/24  0511 05/15/24  2212   ALK PHOS U/L 80 78 96   BILIRUBIN mg/dL 0.2 0.2 0.4   ALT (SGPT) U/L 12 11 14   AST (SGOT) U/L 17 19 25             No results found for: \"POCGLU\"        Past Medical History:   Diagnosis Date    Anemia     Arthritis     CHF (congestive heart failure)     Congestive heart failure     Depression     Diabetes mellitus     type 2    Diverticulitis of colon 08/25/2016    Acute uncomplicated diverticulitis at the distal transverse colon    Early onset Alzheimer's dementia     GERD (gastroesophageal reflux " disease)     Heart disease     Hyperlipidemia     Hypertension     Hypothyroidism     Kidney disease     Mixed anxiety depressive disorder 10/23/2018    Panic attack 7/27/2021    Sleep apnea        Assessment:  Active Hospital Problems    Diagnosis  POA    **Gastroduodenitis [K29.90]  Yes    Generalized abdominal pain [R10.84]  Unknown    Lactic acidosis [E87.20]  Unknown    Upper abdominal pain [R10.10]  Unknown    Nausea and vomiting [R11.2]  Unknown    Abnormal CT of the abdomen [R93.5]  Unknown    Hypothyroidism [E03.9]  Yes    Essential hypertension [I10]  Yes    Gastroesophageal reflux disease [K21.9]  Yes    Sleep apnea [G47.30]  Yes      Resolved Hospital Problems   No resolved problems to display.       Plan:  Continue with current medications and GI consult noted.  Plans for EGD again tomorrow if IV access is adequate.  IV nurse got a decent IV and she thinks it will last.  Follow-up on labs.    Don Hurtado MD  5/18/2024  15:01 EDT

## 2024-05-18 NOTE — NURSING NOTE
Iv team called to evaluate pt for iv access due to egd cx today due to insufficient access. Evaluated pt with U/S in upper extremities, pt has very small vessels with multiple bifurcations that are extremely deep more than 3cm starting at antecubital. Vessels are to deep for the midline or picc to be placed for the egd tomorrow. Assessed LFA, and obtained a 20G 2.5in piv in a vessel that was 1.5cm deep. Good blood return noted even following being flushed. Spoke with Pam ALAS and Dr Hurtado outside the pt's room reported a midline nor picc can be placed due to the poor vascular integrity and depth of vessels in upper arms, new LFA iv should be sufficient for procedure and if any issues arise pt would need to be evaluated for a CVC or EJ, Iv team unable to place picc for above reasons per Dr Cain's nrsg communication.

## 2024-05-18 NOTE — SIGNIFICANT NOTE
05/18/24 1009   OTHER   Discipline physical therapist   Rehab Time/Intention   Session Not Performed patient unavailable for treatment  (Pt. MARY ANN in ENDO for EGD. Will follow up next service date as appropriate.)   Therapy Assessment/Plan (PT)   Criteria for Skilled Interventions Met (PT) yes   Recommendation   PT - Next Appointment 05/19/24

## 2024-05-18 NOTE — PLAN OF CARE
Goal Outcome Evaluation:      Patient unable to complete procedure today related to issues with IV's, spoke with IV nurse and Dr Hurtado please see discipline documentation. Patient NPO at midnight for EGD in the a.m.

## 2024-05-18 NOTE — ANESTHESIA PREPROCEDURE EVALUATION
Anesthesia Evaluation     Patient summary reviewed and Nursing notes reviewed   no history of anesthetic complications:                Airway   TM distance: >3 FB  Neck ROM: full  Dental - normal exam     Pulmonary    (+) ,sleep apnea  Cardiovascular     ECG reviewed    (+) hypertension, hyperlipidemia    ROS comment: ·  Left ventricular systolic function is normal. Calculated left ventricular EF = 51.1% Left ventricular ejection fraction appears to be 51 - 55%.  ·  Left ventricular diastolic function was normal.  ·  There is calcification of the aortic valve.  ·  Estimated right ventricular systolic pressure from tricuspid regurgitation is normal (<35 mmHg). Calculated right ventricular systolic pressure from tricuspid regurgitation is 20 mmHg.      Neuro/Psych  (+) psychiatric history Depression and Anxiety, dementia  GI/Hepatic/Renal/Endo    (+) obesity, GERD, renal disease-, diabetes mellitus, thyroid problem hypothyroidism    Musculoskeletal     Abdominal    Substance History      OB/GYN          Other   arthritis,                       Anesthesia Plan    ASA 3     MAC   total IV anesthesia  (I have reviewed the patient's history with the patient and the chart, including all pertinent laboratory results and imaging. I have explained the risks of anesthesia including but not limited to dental damage, corneal abrasion, nerve injury, MI, stroke, and death. Questions asked and answered. Anesthetic plan discussed with patient and team as indicated. Patient expressed understanding of the above.  )    Anesthetic plan, risks, benefits, and alternatives have been provided, discussed and informed consent has been obtained with: patient.        CODE STATUS:    Code Status (Patient has no pulse and is not breathing): CPR (Attempt to Resuscitate)  Medical Interventions (Patient has pulse or is breathing): Full Support

## 2024-05-19 ENCOUNTER — READMISSION MANAGEMENT (OUTPATIENT)
Dept: CALL CENTER | Facility: HOSPITAL | Age: 89
End: 2024-05-19
Payer: MEDICARE

## 2024-05-19 ENCOUNTER — ANESTHESIA EVENT (OUTPATIENT)
Dept: GASTROENTEROLOGY | Facility: HOSPITAL | Age: 89
End: 2024-05-19
Payer: MEDICARE

## 2024-05-19 ENCOUNTER — ANESTHESIA (OUTPATIENT)
Dept: GASTROENTEROLOGY | Facility: HOSPITAL | Age: 89
End: 2024-05-19
Payer: MEDICARE

## 2024-05-19 VITALS
HEART RATE: 72 BPM | OXYGEN SATURATION: 96 % | HEIGHT: 60 IN | RESPIRATION RATE: 16 BRPM | WEIGHT: 178.79 LBS | SYSTOLIC BLOOD PRESSURE: 138 MMHG | BODY MASS INDEX: 35.1 KG/M2 | TEMPERATURE: 97.1 F | DIASTOLIC BLOOD PRESSURE: 74 MMHG

## 2024-05-19 LAB
ANION GAP SERPL CALCULATED.3IONS-SCNC: 8.8 MMOL/L (ref 5–15)
BASOPHILS # BLD AUTO: 0.04 10*3/MM3 (ref 0–0.2)
BASOPHILS NFR BLD AUTO: 0.5 % (ref 0–1.5)
BUN SERPL-MCNC: 6 MG/DL (ref 8–23)
BUN/CREAT SERPL: 6.7 (ref 7–25)
CALCIUM SPEC-SCNC: 8.3 MG/DL (ref 8.6–10.5)
CHLORIDE SERPL-SCNC: 109 MMOL/L (ref 98–107)
CO2 SERPL-SCNC: 22.2 MMOL/L (ref 22–29)
CREAT SERPL-MCNC: 0.9 MG/DL (ref 0.57–1)
DEPRECATED RDW RBC AUTO: 44.1 FL (ref 37–54)
EGFRCR SERPLBLD CKD-EPI 2021: 62 ML/MIN/1.73
EOSINOPHIL # BLD AUTO: 0.18 10*3/MM3 (ref 0–0.4)
EOSINOPHIL NFR BLD AUTO: 2.4 % (ref 0.3–6.2)
ERYTHROCYTE [DISTWIDTH] IN BLOOD BY AUTOMATED COUNT: 14.1 % (ref 12.3–15.4)
GLUCOSE SERPL-MCNC: 115 MG/DL (ref 65–99)
HCT VFR BLD AUTO: 35.5 % (ref 34–46.6)
HGB BLD-MCNC: 11.2 G/DL (ref 12–15.9)
IMM GRANULOCYTES # BLD AUTO: 0.02 10*3/MM3 (ref 0–0.05)
IMM GRANULOCYTES NFR BLD AUTO: 0.3 % (ref 0–0.5)
LYMPHOCYTES # BLD AUTO: 3.58 10*3/MM3 (ref 0.7–3.1)
LYMPHOCYTES NFR BLD AUTO: 48.2 % (ref 19.6–45.3)
MCH RBC QN AUTO: 27.5 PG (ref 26.6–33)
MCHC RBC AUTO-ENTMCNC: 31.5 G/DL (ref 31.5–35.7)
MCV RBC AUTO: 87.2 FL (ref 79–97)
MONOCYTES # BLD AUTO: 0.56 10*3/MM3 (ref 0.1–0.9)
MONOCYTES NFR BLD AUTO: 7.5 % (ref 5–12)
NEUTROPHILS NFR BLD AUTO: 3.04 10*3/MM3 (ref 1.7–7)
NEUTROPHILS NFR BLD AUTO: 41.1 % (ref 42.7–76)
NRBC BLD AUTO-RTO: 0 /100 WBC (ref 0–0.2)
PLATELET # BLD AUTO: 275 10*3/MM3 (ref 140–450)
PMV BLD AUTO: 9.1 FL (ref 6–12)
POTASSIUM SERPL-SCNC: 3.8 MMOL/L (ref 3.5–5.2)
RBC # BLD AUTO: 4.07 10*6/MM3 (ref 3.77–5.28)
SODIUM SERPL-SCNC: 140 MMOL/L (ref 136–145)
WBC NRBC COR # BLD AUTO: 7.42 10*3/MM3 (ref 3.4–10.8)

## 2024-05-19 PROCEDURE — 25810000003 LACTATED RINGERS PER 1000 ML: Performed by: ANESTHESIOLOGY

## 2024-05-19 PROCEDURE — 80048 BASIC METABOLIC PNL TOTAL CA: CPT | Performed by: HOSPITALIST

## 2024-05-19 PROCEDURE — 43239 EGD BIOPSY SINGLE/MULTIPLE: CPT | Performed by: INTERNAL MEDICINE

## 2024-05-19 PROCEDURE — 85025 COMPLETE CBC W/AUTO DIFF WBC: CPT | Performed by: HOSPITALIST

## 2024-05-19 PROCEDURE — 0DB68ZX EXCISION OF STOMACH, VIA NATURAL OR ARTIFICIAL OPENING ENDOSCOPIC, DIAGNOSTIC: ICD-10-PCS | Performed by: INTERNAL MEDICINE

## 2024-05-19 PROCEDURE — 88305 TISSUE EXAM BY PATHOLOGIST: CPT | Performed by: INTERNAL MEDICINE

## 2024-05-19 PROCEDURE — 25010000002 PROPOFOL 10 MG/ML EMULSION: Performed by: ANESTHESIOLOGY

## 2024-05-19 PROCEDURE — 97530 THERAPEUTIC ACTIVITIES: CPT

## 2024-05-19 RX ORDER — SUCRALFATE 1 G/1
1 TABLET ORAL
Qty: 120 TABLET | Refills: 0 | Status: SHIPPED | OUTPATIENT
Start: 2024-05-19 | End: 2024-06-18

## 2024-05-19 RX ORDER — PROPOFOL 10 MG/ML
VIAL (ML) INTRAVENOUS CONTINUOUS PRN
Status: DISCONTINUED | OUTPATIENT
Start: 2024-05-19 | End: 2024-05-19 | Stop reason: SURG

## 2024-05-19 RX ORDER — SODIUM CHLORIDE, SODIUM LACTATE, POTASSIUM CHLORIDE, CALCIUM CHLORIDE 600; 310; 30; 20 MG/100ML; MG/100ML; MG/100ML; MG/100ML
INJECTION, SOLUTION INTRAVENOUS CONTINUOUS PRN
Status: DISCONTINUED | OUTPATIENT
Start: 2024-05-19 | End: 2024-05-19 | Stop reason: SURG

## 2024-05-19 RX ORDER — SODIUM CHLORIDE 9 MG/ML
30 INJECTION, SOLUTION INTRAVENOUS CONTINUOUS PRN
Status: DISCONTINUED | OUTPATIENT
Start: 2024-05-19 | End: 2024-05-19 | Stop reason: HOSPADM

## 2024-05-19 RX ORDER — PANTOPRAZOLE SODIUM 40 MG/1
40 TABLET, DELAYED RELEASE ORAL
Qty: 60 TABLET | Refills: 0 | Status: SHIPPED | OUTPATIENT
Start: 2024-05-19 | End: 2024-06-18

## 2024-05-19 RX ADMIN — SUCRALFATE 1 G: 1 TABLET ORAL at 11:14

## 2024-05-19 RX ADMIN — SODIUM CHLORIDE 30 ML/HR: 9 INJECTION, SOLUTION INTRAVENOUS at 09:24

## 2024-05-19 RX ADMIN — PROPOFOL 16 MCG/KG/MIN: 10 INJECTION, EMULSION INTRAVENOUS at 10:12

## 2024-05-19 RX ADMIN — LOSARTAN POTASSIUM 25 MG: 25 TABLET, FILM COATED ORAL at 11:14

## 2024-05-19 RX ADMIN — CETIRIZINE HYDROCHLORIDE 5 MG: 10 TABLET ORAL at 11:14

## 2024-05-19 RX ADMIN — Medication 10 ML: at 09:00

## 2024-05-19 RX ADMIN — SODIUM CHLORIDE, POTASSIUM CHLORIDE, SODIUM LACTATE AND CALCIUM CHLORIDE: 600; 310; 30; 20 INJECTION, SOLUTION INTRAVENOUS at 10:01

## 2024-05-19 NOTE — CASE MANAGEMENT/SOCIAL WORK
Case Management Discharge Note      Final Note: tabatha MOELLER         Selected Continued Care - Discharged on 5/19/2024 Admission date: 5/15/2024 - Discharge disposition: Home or Self Care      Destination    No services have been selected for the patient.                Durable Medical Equipment    No services have been selected for the patient.                Dialysis/Infusion    No services have been selected for the patient.                Home Medical Care    No services have been selected for the patient.                Therapy    No services have been selected for the patient.                Community Resources    No services have been selected for the patient.                Community & DME    No services have been selected for the patient.                    Transportation Services  Private: Car    Final Discharge Disposition Code: 01 - home or self-care

## 2024-05-19 NOTE — ANESTHESIA PREPROCEDURE EVALUATION
Anesthesia Evaluation                  Airway   Mallampati: III  TM distance: >3 FB  Neck ROM: limited  Possible difficult intubation  Dental    (+) upper dentures and poor dentition        Pulmonary    (-) wheezes  Cardiovascular     Rhythm: regular    (+) hypertension, CHF , hyperlipidemia  (-) murmur, carotid bruits      Neuro/Psych  GI/Hepatic/Renal/Endo    (+) hiatal hernia, GERD, renal disease-, diabetes mellitus, thyroid problem     Musculoskeletal     Abdominal    Substance History      OB/GYN          Other                    Anesthesia Plan    ASA 3     MAC     (D/W pt. MAC and possible awareness intra op.  Pt understands MAC and GA are not the same and the possibility of GA being required for failed MAC)  intravenous induction     Anesthetic plan, risks, benefits, and alternatives have been provided, discussed and informed consent has been obtained with: patient.    CODE STATUS:    Code Status (Patient has no pulse and is not breathing): CPR (Attempt to Resuscitate)  Medical Interventions (Patient has pulse or is breathing): Full Support

## 2024-05-19 NOTE — THERAPY TREATMENT NOTE
Continue with tamsulosin and finasteride. Denies any voiding difficulties.   Patient Name: Wendie Houser  : 1935    MRN: [id number]                              Today's Date: 2024       Admit Date: 5/15/2024    Visit Dx:     ICD-10-CM ICD-9-CM   1. Gastroduodenitis  K29.90 535.50   2. Lactic acidosis  E87.20 276.2   3. Generalized abdominal pain  R10.84 789.07   4. Upper abdominal pain  R10.10 789.09   5. Nausea and vomiting, unspecified vomiting type  R11.2 787.01   6. Gastroesophageal reflux disease, unspecified whether esophagitis present  K21.9 530.81   7. Abnormal CT of the abdomen  R93.5 793.6   8. Vomiting of fecal matter with nausea  R11.13 569.87     Patient Active Problem List   Diagnosis    Arthritis    Late onset Alzheimer's dementia without behavioral disturbance    Fatigue    Gastroesophageal reflux disease    Essential hypertension    Hypomagnesemia    Sleep apnea    Renal insufficiency    Hypothyroidism    Lumbar canal stenosis    Shortness of breath    Disorder of electrolytes    Hiatal hernia    Hyperkalemia    Hypoglycemia    Irritable bowel syndrome    Allergic rhinitis    High risk medication use    Polypharmacy    Obesity (BMI 30-39.9)    Panic attack    Weakness of left lower extremity    Gastroduodenitis    Generalized abdominal pain    Lactic acidosis    Upper abdominal pain    Nausea and vomiting    Abnormal CT of the abdomen     Past Medical History:   Diagnosis Date    Anemia     Arthritis     CHF (congestive heart failure)     Congestive heart failure     Depression     Diabetes mellitus     type 2    Diverticulitis of colon 2016    Acute uncomplicated diverticulitis at the distal transverse colon    Early onset Alzheimer's dementia     GERD (gastroesophageal reflux disease)     Heart disease     Hyperlipidemia     Hypertension     Hypothyroidism     Kidney disease     Mixed 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 Cain    ENDOSCOPY  04/15/2011    EYE SURGERY  2009    GALLBLADDER SURGERY  1960    Open    TOTAL KNEE ARTHROPLASTY Left 2012    Dr. Soler    VAGINAL HYSTERECTOMY  1999      General Information       Row Name 05/19/24 1335          Physical Therapy Time and Intention    Document Type therapy note (daily note)  -ER     Mode of Treatment individual therapy;physical therapy  -ER       Row Name 05/19/24 1335          General Information    Patient Profile Reviewed yes  -ER     Existing Precautions/Restrictions fall  -ER       Row Name 05/19/24 1335          Cognition    Orientation Status (Cognition) oriented x 4  -ER       Row Name 05/19/24 1335          Safety Issues, Functional Mobility    Impairments Affecting Function (Mobility) balance;endurance/activity tolerance;strength;pain;range of motion (ROM)  -ER     Comment, Safety Issues/Impairments (Mobility) Pt. with LLD, at baseline she ambulates with an altered gait pattern  -ER               User Key  (r) = Recorded By, (t) = Taken By, (c) = Cosigned By      Initials Name Provider Type    ER Paty, Radha, PT Physical Therapist                   Mobility       Row Name 05/19/24 1338          Bed Mobility    Comment, (Bed Mobility) UIC at arrival  -ER       Row Name 05/19/24 1336          Sit-Stand Transfer    Sit-Stand [place] (Transfers) contact guard  -ER     Assistive Device (Sit-Stand Transfers) walker, front-wheeled  -ER       Row Name 05/19/24 1334          Gait/Stairs (Locomotion)    [place] Level (Gait) contact guard  -ER     Assistive Device (Gait) walker, front-wheeled  -ER     Patient was able to Ambulate yes  -ER     Distance in Feet (Gait) 65  -ER     Deviations/Abnormal Patterns (Gait) antalgic;dustin decreased;gait speed decreased;stride length decreased  -ER     Bilateral Gait Deviations forward flexed posture;heel strike decreased  -ER     Comment, (Gait/Stairs) LLD altering gait  -ER                User Key  (r) = Recorded By, (t) = Taken By, (c) = Cosigned By      Initials Name Provider Type    ER Radha Newman PT Physical Therapist                   Obj/Interventions       Row Name 05/19/24 1336          Balance    Balance Assessment sitting static balance;sitting dynamic balance;sit to stand dynamic balance;standing static balance;standing dynamic balance  -ER     Static Sitting Balance standby assist  -ER     Dynamic Sitting Balance standby assist  -ER     Position, Sitting Balance sitting in chair  -ER     Sit to Stand Dynamic Balance contact guard  -ER     Static Standing Balance supervision  -ER     Dynamic Standing Balance supervision;contact guard  -ER     Position/Device Used, Standing Balance walker, front-wheeled  -ER     Balance Interventions sitting;standing;sit to stand;supported;static;dynamic  -ER               User Key  (r) = Recorded By, (t) = Taken By, (c) = Cosigned By      Initials Name Provider Type    ER Radha Newman PT Physical Therapist                   Goals/Plan    No documentation.                  Clinical Impression       Row Name 05/19/24 2702          Pain    Pretreatment Pain Rating 4/10  -ER     Posttreatment Pain Rating 4/10  -ER     Pain Location - Side/Orientation Right  -ER     Pain Location lower  -ER     Pain Location - knee  -ER     Pain Intervention(s) Rest;Repositioned;Ambulation/increased activity  -ER       Row Name 05/19/24 8543          Plan of Care Review    Plan of Care Reviewed With patient;family  -ER     Outcome Evaluation Wendie Houser is an 89 year old female seen for physical therapy treatment session this date. Per RN, pt. lives in assisted living and requires some assistance in the restroom for pericare/donning briefs. Today, pt. UIC and agreeable to PT. She performs STS with CGA, and ambulates ~65 ft with CGA and RW. Pt. reports that she has ambulated with a leg length discrepancy for several years. Gait pattern slow, crouched and wobbly however  not unsteady. No overt LOB, and pt. appears to be close to her baseline. Discussed potentially seeking info on a raised shoe to improve gait pattern at d/c.  -ER       Row Name 05/19/24 1337          Therapy Assessment/Plan (PT)    Rehab Potential (PT) good, to achieve stated therapy goals  -ER     Criteria for Skilled Interventions Met (PT) yes  -ER     Therapy Frequency (PT) 5 times/wk  -ER       Row Name 05/19/24 1337          Positioning and Restraints    Pre-Treatment Position sitting in chair/recliner  -ER     Post Treatment Position chair  -ER     In Chair notified nsg;call light within reach;encouraged to call for assist;exit alarm on  -ER               User Key  (r) = Recorded By, (t) = Taken By, (c) = Cosigned By      Initials Name Provider Type    ER Radha Newman PT Physical Therapist                   Outcome Measures       Row Name 05/19/24 1340          How much help from another person do you currently need...    Turning from your back to your side while in flat bed without using bedrails? 3  -ER     Moving from lying on back to sitting on the side of a flat bed without bedrails? 3  -ER     Moving to and from a bed to a chair (including a wheelchair)? 3  -ER     Standing up from a chair using your arms (e.g., wheelchair, bedside chair)? 3  -ER     Climbing 3-5 steps with a railing? 2  -ER     To walk in hospital room? 3  -ER     AM-PAC 6 Clicks Score (PT) 17  -ER     Highest Level of Mobility Goal 5 --> Static standing  -ER       Row Name 05/19/24 1340          Functional Assessment    Outcome Measure Options AM-PAC 6 Clicks Basic Mobility (PT)  -ER               User Key  (r) = Recorded By, (t) = Taken By, (c) = Cosigned By      Initials Name Provider Type    ER Radha Newman PT Physical Therapist                                 Physical Therapy Education       Title: PT OT SLP Therapies (Done)       Topic: Physical Therapy (Done)       Point: Mobility training (Done)       Learning Progress Summary              Patient Acceptance, E, VU by ER at 5/19/2024 1340    Acceptance, E,TB,D, VU,NR by  at 5/17/2024 1349                         Point: Home exercise program (Done)       Learning Progress Summary             Patient Acceptance, E, VU by ER at 5/19/2024 1340    Acceptance, E,TB,D, VU,NR by  at 5/17/2024 1349                         Point: Body mechanics (Done)       Learning Progress Summary             Patient Acceptance, E, VU by ER at 5/19/2024 1340    Acceptance, E,TB,D, VU,NR by  at 5/17/2024 1349                         Point: Precautions (Done)       Learning Progress Summary             Patient Acceptance, E, VU by ER at 5/19/2024 1340    Acceptance, E,TB,D, VU,NR by  at 5/17/2024 1349                                         User Key       Initials Effective Dates Name Provider Type Discipline     04/08/22 -  Callie Kuhn, PT Physical Therapist PT    ER 10/15/23 -  Radha Newman, PT Physical Therapist PT                  PT Recommendation and Plan     Plan of Care Reviewed With: patient, family  Outcome Evaluation: Wendie Houser is an 89 year old female seen for physical therapy treatment session this date. Per RN, pt. lives in assisted living and requires some assistance in the restroom for pericare/donning briefs. Today, pt. UIC and agreeable to PT. She performs STS with CGA, and ambulates ~65 ft with CGA and RW. Pt. reports that she has ambulated with a leg length discrepancy for several years. Gait pattern slow, crouched and wobbly however not unsteady. No overt LOB, and pt. appears to be close to her baseline. Discussed potentially seeking info on a raised shoe to improve gait pattern at d/c.     Time Calculation:         PT Charges       Row Name 05/19/24 1340             Time Calculation    Start Time 1247  -ER      Stop Time 1304  -ER      Time Calculation (min) 17 min  -ER      PT Received On 05/19/24  -ER      PT - Next Appointment 05/20/24  -ER         Time Calculation- PT     Total Timed Code Minutes- PT 17 minute(s)  -ER         Timed Charges    64285 - PT Therapeutic Activity Minutes 17  -ER         Total Minutes    Timed Charges Total Minutes 17  -ER       Total Minutes 17  -ER                User Key  (r) = Recorded By, (t) = Taken By, (c) = Cosigned By      Initials Name Provider Type    ER Radha Newman, PT Physical Therapist                  Therapy Charges for Today       Code Description Service Date Service Provider Modifiers Qty    10030140581 HC PT THERAPEUTIC ACT EA 15 MIN 5/19/2024 Radha Newman, PT GP 1            PT G-Codes  Outcome Measure Options: AM-PAC 6 Clicks Basic Mobility (PT)  AM-PAC 6 Clicks Score (PT): 17  PT Discharge Summary  Anticipated Discharge Disposition (PT): assisted living, skilled nursing facility    Radha Newman PT  5/19/2024

## 2024-05-19 NOTE — DISCHARGE SUMMARY
PHYSICIAN DISCHARGE SUMMARY                                                                        [facility]    Patient Identification:  Name: Wendie Houser  Age: 89 y.o.  Sex: female  :  1935  MRN: [id number]  Primary Care Physician: Pallares, Clara Ann, MD    Admit date: 5/15/2024  Discharge date and time:2024  Discharged Condition: good    Discharge Diagnoses:  Active Hospital Problems    Diagnosis  POA    **Gastroduodenitis [K29.90]  Yes    Generalized abdominal pain [R10.84]  Unknown    Lactic acidosis [E87.20]  Unknown    Upper abdominal pain [R10.10]  Unknown    Nausea and vomiting [R11.2]  Unknown    Abnormal CT of the abdomen [R93.5]  Unknown    Hypothyroidism [E03.9]  Yes    Essential hypertension [I10]  Yes    Gastroesophageal reflux disease [K21.9]  Yes    Sleep apnea [G47.30]  Yes      Resolved Hospital Problems   No resolved problems to display.          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 Cain    ENDOSCOPY  04/15/2011    EYE SURGERY  2009    GALLBLADDER SURGERY  1960    Open    TOTAL KNEE ARTHROPLASTY Left     Dr. Soler    VAGINAL HYSTERECTOMY         Hospital Course: Wendie Houser   is a 89 y.o. female with a PMH significant for Alzheimer's  "dementia, GERD, hypertension, irritable bowel syndrome, anxiety who presents to the hospital complaining of acute generalized abdominal pain which is cramping and aching in nature and has developed over the past couple of days.  She has nausea and diarrhea that have developed through the day today as well.  No blood in the stool.  No fever but admits to some associated chills.  Her only abdominal surgical history includes cholecystectomy many years ago.  No recent antibiotics.  She does admit to increased muscle cramping that is occurred intermittently to \"all of the muscles in my body\".  States that this has been a recurring and intermittent problem for the past 3 years.  Currently no muscle spasms.  The patient was evaluated in the ER and admitted for further evaluation and treatment of abdominal pain with CT findings suggesting gastroduodenitis.  Patient was admitted to the hospital and seen by GI medicine.  Patient had EGD which showed some ulcers.  Biopsies were done.  She was feeling better and looked well enough to go home and she will continue with Protonix twice daily and Carafate.  She will follow-up with her primary care physician and follow-up on the path report with GI medicine.    Consults:     Consults       Date and Time Order Name Status Description    5/16/2024  1:48 PM Inpatient Gastroenterology Consult Completed     5/16/2024  3:48 AM [facility] (on-call MD unless specified) Details            Results from last 7 days   Lab Units 05/19/24  0646   WBC 10*3/mm3 7.42   HEMOGLOBIN g/dL 11.2*   HEMATOCRIT % 35.5   PLATELETS 10*3/mm3 275     Results from last 7 days   Lab Units 05/19/24  0646   SODIUM mmol/L 140   POTASSIUM mmol/L 3.8   CHLORIDE mmol/L 109*   CO2 mmol/L 22.2   BUN mg/dL 6*   CREATININE mg/dL 0.90   GLUCOSE mg/dL 115*   CALCIUM mg/dL 8.3*     Significant Diagnostic Studies:   WBC   Date Value Ref Range Status   05/19/2024 7.42 3.40 - 10.80 10*3/mm3 Final     Hemoglobin   Date Value Ref Range " "Status   05/19/2024 11.2 (L) 12.0 - 15.9 g/dL Final     Hematocrit   Date Value Ref Range Status   05/19/2024 35.5 34.0 - 46.6 % Final     Platelets   Date Value Ref Range Status   05/19/2024 275 140 - 450 10*3/mm3 Final     Sodium   Date Value Ref Range Status   05/19/2024 140 136 - 145 mmol/L Final     Potassium   Date Value Ref Range Status   05/19/2024 3.8 3.5 - 5.2 mmol/L Final     Chloride   Date Value Ref Range Status   05/19/2024 109 (H) 98 - 107 mmol/L Final     CO2   Date Value Ref Range Status   05/19/2024 22.2 22.0 - 29.0 mmol/L Final     BUN   Date Value Ref Range Status   05/19/2024 6 (L) 8 - 23 mg/dL Final     Creatinine   Date Value Ref Range Status   05/19/2024 0.90 0.57 - 1.00 mg/dL Final     Glucose   Date Value Ref Range Status   05/19/2024 115 (H) 65 - 99 mg/dL Final     Calcium   Date Value Ref Range Status   05/19/2024 8.3 (L) 8.6 - 10.5 mg/dL Final     AST (SGOT)   Date Value Ref Range Status   05/18/2024 17 1 - 32 U/L Final     ALT (SGPT)   Date Value Ref Range Status   05/18/2024 12 1 - 33 U/L Final     Alkaline Phosphatase   Date Value Ref Range Status   05/18/2024 80 39 - 117 U/L Final     No results found for: \"APTT\", \"INR\"  No results found for: \"COLORU\", \"CLARITYU\", \"SPECGRAV\", \"PHUR\", \"PROTEINUR\", \"GLUCOSEU\", \"KETONESU\", \"BLOODU\", \"NITRITE\", \"LEUKOCYTESUR\", \"BILIRUBINUR\", \"UROBILINOGEN\", \"RBCUA\", \"WBCUA\", \"BACTERIA\", \"UACOMMENT\"  No results found for: \"TROPONINT\", \"TROPONINI\", \"BNP\"  No components found for: \"HGBA1C;2\"  No components found for: \"TSH;2\"  Imaging Results (All)       Procedure Component Value Units Date/Time    CT Abdomen Pelvis With Contrast [[id number]] Collected: 05/16/24 0031     Updated: 05/16/24 0043    Narrative:      CT OF THE ABDOMEN AND PELVIS WITH CONTRAST     HISTORY: Generalized abdominal pain and diarrhea     COMPARISON: August 22, 2023     TECHNIQUE: Axial CT imaging was obtained through the abdomen and pelvis.  IV contrast was administered.   "   FINDINGS:  Images through the lung bases do not demonstrate any acute  abnormalities. No suspicious hepatic lesions are seen. The patient does  have intra and extrahepatic biliary dilatation. Common bile duct  measures up to 1.3 cm. Degree of dilatation is probably similar to the  prior study. The gallbladder is absent. There is a small hiatal hernia.  The patient does appear to have wall thickening and inflammatory  stranding involving the distal stomach and proximal duodenum, suspicious  for gastroduodenitis. This does appear to be separate from the pancreas,  which is normal in attenuation. There is no pancreatic ductal  dilatation.. The spleen appears normal, as are the adrenal glands. The  kidneys enhance symmetrically. No hydronephrosis is identified. No  distal ureteral or bladder stones are seen. Uterus is absent. There is  colonic diverticulosis. There is no bowel obstruction. The appendix is  normal in caliber. No pneumatosis or free air is seen. There are  aortoiliac calcifications. Patient does appear to have a left adnexal  cyst, measuring up to 1.7 cm. This is stable when compared to the exam  from August 2023. Shotty pelvic lymph nodes are also unchanged. Advanced  degenerative changes of the hips, right greater than left are again  noted. There is lumbar scoliosis, with convexity to the right.       Impression:         1. The patient has wall thickening and inflammatory stranding  surrounding the distal stomach and proximal duodenum, suggesting  gastroduodenitis.  2. There is intra and extrahepatic biliary dilatation. This appears  similar to the exam from August 2023, and may be postcholecystectomy in  nature, although correlation with liver function tests is recommended.     Radiation dose reduction techniques were utilized, including automated  exposure control and exposure modulation based on body size.        This report was finalized on 5/16/2024 12:40 AM by Dr. Sonya Osorio M.D on  Workstation: [id number]             Lab Results (last 7 days)       Procedure Component Value Units Date/Time    Basic Metabolic Panel [[id number]]  (Abnormal) Collected: 05/19/24 0646    Specimen: Blood Updated: 05/19/24 0723     Glucose 115 mg/dL      BUN 6 mg/dL      Creatinine 0.90 mg/dL      Sodium 140 mmol/L      Potassium 3.8 mmol/L      Chloride 109 mmol/L      CO2 22.2 mmol/L      Calcium 8.3 mg/dL      BUN/Creatinine Ratio 6.7     Anion Gap 8.8 mmol/L      eGFR 62.0 mL/min/1.73     Narrative:      GFR Normal >60  Chronic Kidney Disease <60  Kidney Failure <15    The GFR formula is only valid for adults with stable renal function between ages 18 and 70.    CBC & Differential [[id number]]  (Abnormal) Collected: 05/19/24 0646    Specimen: Blood Updated: 05/19/24 0705    Narrative:      The following orders were created for panel order CBC & Differential.  Procedure                               Abnormality         Status                     ---------                               -----------         ------                     CBC Auto Differential[[id number]]        Abnormal            Final result                 Please view results for these tests on the individual orders.    CBC Auto Differential [[id number]]  (Abnormal) Collected: 05/19/24 0646    Specimen: Blood Updated: 05/19/24 0705     WBC 7.42 10*3/mm3      RBC 4.07 10*6/mm3      Hemoglobin 11.2 g/dL      Hematocrit 35.5 %      MCV 87.2 fL      MCH 27.5 pg      MCHC 31.5 g/dL      RDW 14.1 %      RDW-SD 44.1 fl      MPV 9.1 fL      Platelets 275 10*3/mm3      Neutrophil % 41.1 %      Lymphocyte % 48.2 %      Monocyte % 7.5 %      Eosinophil % 2.4 %      Basophil % 0.5 %      Immature Grans % 0.3 %      Neutrophils, Absolute 3.04 10*3/mm3      Lymphocytes, Absolute 3.58 10*3/mm3      Monocytes, Absolute 0.56 10*3/mm3      Eosinophils, Absolute 0.18 10*3/mm3      Basophils, Absolute 0.04 10*3/mm3      Immature Grans, Absolute 0.02 10*3/mm3      nRBC 0.0  /100 WBC     Blood Culture - Blood, Hand, Left [[id number]]  (Normal) Collected: 05/16/24 0452    Specimen: Blood from Hand, Left Updated: 05/19/24 0501     Blood Culture No growth at 3 days    Blood Culture - Blood, Hand, Right [[id number]]  (Normal) Collected: 05/16/24 0453    Specimen: Blood from Hand, Right Updated: 05/19/24 0501     Blood Culture No growth at 3 days    Comprehensive Metabolic Panel [[id number]]  (Abnormal) Collected: 05/18/24 0733    Specimen: Blood Updated: 05/18/24 0818     Glucose 108 mg/dL      BUN 7 mg/dL      Creatinine 0.96 mg/dL      Sodium 137 mmol/L      Potassium 3.9 mmol/L      Chloride 107 mmol/L      CO2 22.1 mmol/L      Calcium 8.6 mg/dL      Total Protein 6.1 g/dL      Albumin 3.3 g/dL      ALT (SGPT) 12 U/L      AST (SGOT) 17 U/L      Alkaline Phosphatase 80 U/L      Total Bilirubin 0.2 mg/dL      Globulin 2.8 gm/dL      A/G Ratio 1.2 g/dL      BUN/Creatinine Ratio 7.3     Anion Gap 7.9 mmol/L      eGFR 57.4 mL/min/1.73     Narrative:      GFR Normal >60  Chronic Kidney Disease <60  Kidney Failure <15    The GFR formula is only valid for adults with stable renal function between ages 18 and 70.    CBC & Differential [[id number]]  (Abnormal) Collected: 05/18/24 0733    Specimen: Blood Updated: 05/18/24 0808    Narrative:      The following orders were created for panel order CBC & Differential.  Procedure                               Abnormality         Status                     ---------                               -----------         ------                     CBC Auto Differential[[id number]]        Abnormal            Final result                 Please view results for these tests on the individual orders.    CBC Auto Differential [[id number]]  (Abnormal) Collected: 05/18/24 0733    Specimen: Blood Updated: 05/18/24 0808     WBC 8.45 10*3/mm3      RBC 4.24 10*6/mm3      Hemoglobin 12.0 g/dL      Hematocrit 37.3 %      MCV 88.0 fL      MCH 28.3 pg      MCHC 32.2 g/dL      RDW  13.9 %      RDW-SD 43.9 fl      MPV 9.2 fL      Platelets 263 10*3/mm3      Neutrophil % 43.5 %      Lymphocyte % 45.0 %      Monocyte % 8.2 %      Eosinophil % 2.5 %      Basophil % 0.6 %      Immature Grans % 0.2 %      Neutrophils, Absolute 3.68 10*3/mm3      Lymphocytes, Absolute 3.80 10*3/mm3      Monocytes, Absolute 0.69 10*3/mm3      Eosinophils, Absolute 0.21 10*3/mm3      Basophils, Absolute 0.05 10*3/mm3      Immature Grans, Absolute 0.02 10*3/mm3      nRBC 0.0 /100 WBC     Comprehensive Metabolic Panel [[id number]]  (Abnormal) Collected: 05/17/24 0511    Specimen: Blood Updated: 05/17/24 0554     Glucose 95 mg/dL      BUN 8 mg/dL      Creatinine 0.88 mg/dL      Sodium 140 mmol/L      Potassium 4.1 mmol/L      Chloride 108 mmol/L      CO2 22.0 mmol/L      Calcium 8.3 mg/dL      Total Protein 5.9 g/dL      Albumin 3.2 g/dL      ALT (SGPT) 11 U/L      AST (SGOT) 19 U/L      Alkaline Phosphatase 78 U/L      Total Bilirubin 0.2 mg/dL      Globulin 2.7 gm/dL      A/G Ratio 1.2 g/dL      BUN/Creatinine Ratio 9.1     Anion Gap 10.0 mmol/L      eGFR 63.7 mL/min/1.73     Narrative:      GFR Normal >60  Chronic Kidney Disease <60  Kidney Failure <15    The GFR formula is only valid for adults with stable renal function between ages 18 and 70.    CBC & Differential [[id number]]  (Abnormal) Collected: 05/17/24 0511    Specimen: Blood Updated: 05/17/24 0535    Narrative:      The following orders were created for panel order CBC & Differential.  Procedure                               Abnormality         Status                     ---------                               -----------         ------                     CBC Auto Differential[[id number]]        Abnormal            Final result                 Please view results for these tests on the individual orders.    CBC Auto Differential [[id number]]  (Abnormal) Collected: 05/17/24 0511    Specimen: Blood Updated: 05/17/24 0535     WBC 9.20 10*3/mm3      RBC 4.55  10*6/mm3      Hemoglobin 12.5 g/dL      Hematocrit 39.0 %      MCV 85.7 fL      MCH 27.5 pg      MCHC 32.1 g/dL      RDW 13.8 %      RDW-SD 42.1 fl      MPV 9.4 fL      Platelets 288 10*3/mm3      Neutrophil % 42.8 %      Lymphocyte % 44.7 %      Monocyte % 8.8 %      Eosinophil % 2.9 %      Basophil % 0.5 %      Immature Grans % 0.3 %      Neutrophils, Absolute 3.93 10*3/mm3      Lymphocytes, Absolute 4.11 10*3/mm3      Monocytes, Absolute 0.81 10*3/mm3      Eosinophils, Absolute 0.27 10*3/mm3      Basophils, Absolute 0.05 10*3/mm3      Immature Grans, Absolute 0.03 10*3/mm3      nRBC 0.0 /100 WBC     Basic Metabolic Panel [[id number]]  (Abnormal) Collected: 05/16/24 0654    Specimen: Blood Updated: 05/16/24 0740     Glucose 128 mg/dL      BUN 11 mg/dL      Creatinine 0.99 mg/dL      Sodium 134 mmol/L      Potassium 3.9 mmol/L      Chloride 100 mmol/L      CO2 22.0 mmol/L      Calcium 8.5 mg/dL      BUN/Creatinine Ratio 11.1     Anion Gap 12.0 mmol/L      eGFR 55.3 mL/min/1.73     Narrative:      GFR Normal >60  Chronic Kidney Disease <60  Kidney Failure <15    The GFR formula is only valid for adults with stable renal function between ages 18 and 70.    CBC (No Diff) [[id number]]  (Abnormal) Collected: 05/16/24 0654    Specimen: Blood Updated: 05/16/24 0722     WBC 11.42 10*3/mm3      RBC 4.75 10*6/mm3      Hemoglobin 13.3 g/dL      Hematocrit 40.7 %      MCV 85.7 fL      MCH 28.0 pg      MCHC 32.7 g/dL      RDW 13.8 %      RDW-SD 42.7 fl      MPV 9.4 fL      Platelets 290 10*3/mm3     STAT Lactic Acid, Reflex [[id number]]  (Normal) Collected: 05/16/24 0224    Specimen: Blood Updated: 05/16/24 0325     Lactate 2.0 mmol/L     Urinalysis With Microscopic If Indicated (No Culture) - Urine, Clean Catch [[id number]]  (Abnormal) Collected: 05/16/24 0219    Specimen: Urine, Clean Catch Updated: 05/16/24 0236     Color, UA Yellow     Appearance, UA Clear     pH, UA 6.0     Specific Gravity, UA >1.030     Glucose, UA Negative      Ketones, UA Negative     Bilirubin, UA Negative     Blood, UA Negative     Protein, UA Negative     Leuk Esterase, UA Trace     Nitrite, UA Negative     Urobilinogen, UA 0.2 E.U./dL    Urinalysis, Microscopic Only - Urine, Clean Catch [[id number]]  (Abnormal) Collected: 05/16/24 0219    Specimen: Urine, Clean Catch Updated: 05/16/24 0236     RBC, UA 0-2 /HPF      WBC, UA 6-10 /HPF      Bacteria, UA None Seen /HPF      Squamous Epithelial Cells, UA 0-2 /HPF      Hyaline Casts, UA None Seen /LPF      Methodology Automated Microscopy    Comprehensive Metabolic Panel [[id number]]  (Abnormal) Collected: 05/15/24 2212    Specimen: Blood Updated: 05/15/24 2332     Glucose 117 mg/dL      BUN 13 mg/dL      Creatinine 0.98 mg/dL      Sodium 134 mmol/L      Potassium 4.5 mmol/L      Comment: Slight hemolysis detected by analyzer. Result may be falsely elevated.        Chloride 98 mmol/L      CO2 22.0 mmol/L      Calcium 9.0 mg/dL      Total Protein 7.1 g/dL      Albumin 4.2 g/dL      ALT (SGPT) 14 U/L      AST (SGOT) 25 U/L      Comment: Slight hemolysis detected by analyzer. Result may be falsely elevated.        Alkaline Phosphatase 96 U/L      Total Bilirubin 0.4 mg/dL      Globulin 2.9 gm/dL      A/G Ratio 1.4 g/dL      BUN/Creatinine Ratio 13.3     Anion Gap 14.0 mmol/L      eGFR 56.0 mL/min/1.73     Narrative:      CMP WAS PROCESSED USING THE SST TUBE.       GFR Normal >60  Chronic Kidney Disease <60  Kidney Failure <15    The GFR formula is only valid for adults with stable renal function between ages 18 and 70.    Lactic Acid, Plasma [[id number]]  (Abnormal) Collected: 05/15/24 2212    Specimen: Blood Updated: 05/15/24 2316     Lactate 2.4 mmol/L     Lipase [[id number]]  (Normal) Collected: 05/15/24 2212    Specimen: Blood Updated: 05/15/24 2308     Lipase 35 U/L     [place] Draw [[id number]] Collected: 05/15/24 2212    Specimen: Blood Updated: 05/15/24 2247    Narrative:      The following orders were created for panel  order [place] Draw.  Procedure                               Abnormality         Status                     ---------                               -----------         ------                     Green Top (Gel)[[id number]]                                  Final result               Lavender Top[[id number]]                                     Final result               Gold Top - SST[[id number]]                                   Final result               Light Blue Top[[id number]]                                   Final result                 Please view results for these tests on the individual orders.    Gold Top - SST [[id number]] Collected: 05/15/24 2212    Specimen: Blood Updated: 05/15/24 2247     Extra Tube Hold for add-ons.     Comment: Auto resulted.       Green Top (Gel) [[id number]] Collected: 05/15/24 2212    Specimen: Blood Updated: 05/15/24 2247     Extra Tube Hold for add-ons.     Comment: Auto resulted.       Lavender Top [[id number]] Collected: 05/15/24 2212    Specimen: Blood Updated: 05/15/24 2247     Extra Tube hold for add-on     Comment: Auto resulted       Light Blue Top [[id number]] Collected: 05/15/24 2212    Specimen: Blood Updated: 05/15/24 2247     Extra Tube Hold for add-ons.     Comment: Auto resulted       CBC & Differential [[id number]]  (Abnormal) Collected: 05/15/24 2212    Specimen: Blood Updated: 05/15/24 2245    Narrative:      The following orders were created for panel order CBC & Differential.  Procedure                               Abnormality         Status                     ---------                               -----------         ------                     CBC Auto Differential[[id number]]        Abnormal            Final result                 Please view results for these tests on the individual orders.    CBC Auto Differential [[id number]]  (Abnormal) Collected: 05/15/24 2212    Specimen: Blood Updated: 05/15/24 2245     WBC 11.70 10*3/mm3      RBC 4.85 10*6/mm3       "Hemoglobin 13.4 g/dL      Hematocrit 41.6 %      MCV 85.8 fL      MCH 27.6 pg      MCHC 32.2 g/dL      RDW 13.9 %      RDW-SD 43.5 fl      MPV 9.8 fL      Platelets 332 10*3/mm3      Neutrophil % 52.3 %      Lymphocyte % 38.1 %      Monocyte % 7.9 %      Eosinophil % 0.8 %      Basophil % 0.5 %      Immature Grans % 0.4 %      Neutrophils, Absolute 6.12 10*3/mm3      Lymphocytes, Absolute 4.46 10*3/mm3      Monocytes, Absolute 0.92 10*3/mm3      Eosinophils, Absolute 0.09 10*3/mm3      Basophils, Absolute 0.06 10*3/mm3      Immature Grans, Absolute 0.05 10*3/mm3      nRBC 0.0 /100 WBC           /74 (BP Location: Left arm, Patient Position: Lying)   Pulse 72   Temp 97.1 °F (36.2 °C) (Oral)   Resp 16   Ht 152.4 cm (60\")   Wt 81.1 kg (178 lb 12.7 oz)   SpO2 96%   BMI 34.92 kg/m²     Discharge Exam:  General Appearance:    Alert, cooperative, no distress                          Head:    Normocephalic, without obvious abnormality, atraumatic                          Eyes:                            Throat:   Lips, tongue, gums normal                          Neck:   Supple, symmetrical, trachea midline, no JVD                        Lungs:     Clear to auscultation bilaterally, respirations unlabored                Chest Wall:    No tenderness or deformity                        Heart:    Regular rate and rhythm, S1 and S2 normal, no murmur,no  Rub  or gallop                  Abdomen:     Soft, non-tender, bowel sounds active, no masses, no                                                        organomegaly                  Extremities:   Extremities normal, atraumatic, no cyanosis or edema                             Skin:   Skin is warm and dry,  no rashes or palpable lesions                  Neurologic:   no focal deficits noted     Disposition:  Home    Activity as tolerated    Diet as tolerated  Diet Order   Procedures    Diet: Gastrointestinal; Fiber-Restricted; Texture: Soft to Chew (NDD 3); Soft to " Chew: Whole Meat; Fluid Consistency: Thin (IDDSI 0)       Patient Instructions:      Discharge Medications        New Medications        Instructions Start Date   pantoprazole 40 MG EC tablet  Commonly known as: PROTONIX   40 mg, Oral, 2 Times Daily Before Meals      sucralfate 1 g tablet  Commonly known as: CARAFATE   1 g, Oral, 4 Times Daily Before Meals & Nightly             Continue These Medications        Instructions Start Date   acetaminophen 500 MG tablet  Commonly known as: TYLENOL   500 mg, Oral, 2 Times Daily PRN      cetirizine 10 MG tablet  Commonly known as: zyrTEC   5 mg, Oral, Daily      cyclobenzaprine 5 MG tablet  Commonly known as: FLEXERIL   5 mg, Oral, 3 Times Daily PRN, for muscle spams      donepezil 10 MG tablet  Commonly known as: ARICEPT   10 mg, Oral, Every Night at Bedtime      glucose blood test strip   1 each, Other, Daily      glucose monitor monitoring kit   1 each, Does not apply, Daily      HYDROcodone-acetaminophen 5-325 MG per tablet  Commonly known as: NORCO   1 tablet, Oral, 2 Times Daily PRN      Lancets misc   1 each, Does not apply, Daily      lidocaine 5 %  Commonly known as: LIDODERM   1 patch, Transdermal, Every 24 Hours, Remove & Discard patch within 12 hours or as directed by MD      losartan 50 MG tablet  Commonly known as: COZAAR   25 mg, Oral, 2 Times Daily      magnesium oxide 400 MG tablet  Commonly known as: MAG-OX   400 mg, Oral, Daily      methocarbamol 750 MG tablet  Commonly known as: ROBAXIN   750 mg, Oral, 3 Times Daily PRN      metroNIDAZOLE 500 MG tablet  Commonly known as: FLAGYL   500 mg, Oral, 2 Times Daily      multivitamin tablet tablet   1 tablet, Oral, Daily      pravastatin 40 MG tablet  Commonly known as: PRAVACHOL   40 mg, Oral, Nightly      Senna 8.6 MG capsule   1 tablet, Oral, Daily             Stop These Medications      aspirin 81 MG EC tablet     naproxen 500 MG tablet  Commonly known as: NAPROSYN     omeprazole 20 MG capsule  Commonly  known as: priLOSEC            No future appointments.   Follow-up Information       Pallares, Clara Ann, MD Follow up in 1 week(s).    Specialty: Internal Medicine  Contact information:  [address] [facility] [address] [phone]                           Discharge Order (From admission, onward)       Start     Ordered    05/19/24 1337  Discharge patient  Once        Expected Discharge Date: 05/19/24   Discharge Disposition: Home or Self Care   Physician of Record for Attribution - Please select from Treatment Team: DON HURTADO [[id number]]   Review needed by CMO to determine Physician of Record: No      Question Answer Comment   Physician of Record for Attribution - Please select from Treatment Team DON HURTADO    Review needed by CMO to determine Physician of Record No        05/19/24 1340                    Total time spent discharging patient including evaluation,post hospitalization follow up,  medication and post hospitalization instructions and education total time exceeds 30 minutes.    Signed:  Don Hurtado MD  5/19/2024  13:40 EDT

## 2024-05-19 NOTE — OUTREACH NOTE
Prep Survey      Flowsheet Row Responses   [facility] facility patient discharged from? [place]   Is LACE score < 7 ? No   Eligibility Readm Mgmt   Discharge diagnosis Abdominal pain   Does the patient have one of the following disease processes/diagnoses(primary or secondary)? Other   Does the patient have Home health ordered? No   Is there a DME ordered? No   General alerts for this patient [facility].   Prep survey completed? Yes            PRUDENCIO MAGDALENO - Registered Nurse

## 2024-05-19 NOTE — ANESTHESIA POSTPROCEDURE EVALUATION
"Patient: Wendie Houser    Procedure Summary       Date: 05/19/24 Room / Location:  [facility]    Anesthesia Start: 1011 Anesthesia Stop: 1020    Procedure: ESOPHAGOGASTRODUODENOSCOPY (Esophagus) Diagnosis:       Vomiting of fecal matter with nausea      (Vomiting of fecal matter with nausea [R11.13])    Surgeons: Homar Cain MD Provider: Orin, Cecilio Cast MD    Anesthesia Type: MAC ASA Status: 3            Anesthesia Type: MAC    Vitals  Vitals Value Taken Time   /82 05/19/24 1030   Temp     Pulse 73 05/19/24 1034   Resp 14 05/19/24 1030   SpO2 99 % 05/19/24 1034   Vitals shown include unfiled device data.        Post Anesthesia Care and Evaluation    Patient location during evaluation: bedside  Patient participation: complete - patient participated  Level of consciousness: awake and alert  Pain management: adequate    Airway patency: patent  Anesthetic complications: No anesthetic complications    Cardiovascular status: acceptable  Respiratory status: acceptable  Hydration status: acceptable    Comments: /82 (BP Location: Right arm, Patient Position: Lying)   Pulse 75   Temp 36.9 °C (98.5 °F) (Oral)   Resp 14   Ht 152.4 cm (60\")   Wt 81.1 kg (178 lb 12.7 oz)   SpO2 98%   BMI 34.92 kg/m²     "

## 2024-05-19 NOTE — PLAN OF CARE
Goal Outcome Evaluation:  Plan of Care Reviewed With: patient, family           Outcome Evaluation: Wendie Houser is an 89 year old female seen for physical therapy treatment session this date. Per RN, pt. lives in assisted living and requires some assistance in the restroom for pericare/donning briefs. Today, pt. UIC and agreeable to PT. She performs STS with CGA, and ambulates ~65 ft with CGA and RW. Pt. reports that she has ambulated with a leg length discrepancy for several years. Gait pattern slow, crouched and wobbly however not unsteady. No overt LOB, and pt. appears to be close to her baseline. Discussed potentially seeking info on a raised shoe to improve gait pattern at d/c.      Anticipated Discharge Disposition (PT): assisted living, skilled nursing facility

## 2024-05-19 NOTE — ADDENDUM NOTE
Addendum  created 05/19/24 1048 by Cecilio Sr MD    Clinical Note Signed, Review and Sign - Ready for Procedure

## 2024-05-21 LAB
BACTERIA SPEC AEROBE CULT: NORMAL
BACTERIA SPEC AEROBE CULT: NORMAL
LAB AP CASE REPORT: NORMAL
PATH REPORT.FINAL DX SPEC: NORMAL
PATH REPORT.GROSS SPEC: NORMAL

## 2024-05-23 ENCOUNTER — TELEPHONE (OUTPATIENT)
Dept: GASTROENTEROLOGY | Facility: CLINIC | Age: 89
End: 2024-05-23
Payer: MEDICARE

## 2024-05-23 ENCOUNTER — READMISSION MANAGEMENT (OUTPATIENT)
Dept: CALL CENTER | Facility: HOSPITAL | Age: 89
End: 2024-05-23
Payer: MEDICARE

## 2024-05-23 NOTE — OUTREACH NOTE
Medical Week 1 Survey      Flowsheet Row Responses   [facility] patient discharged from? [place]   Does the patient have one of the following disease processes/diagnoses(primary or secondary)? Other   Week 1 attempt successful? Yes   Call start time 0826   Call end time 0830   Is patient permission given to speak with other caregiver? Yes   Person spoke with today (if not patient) and relationship Demetris-son.   Meds reviewed with patient/caregiver? Yes   Is the patient having any side effects they believe may be caused by any medication additions or changes? No   Does the patient have all medications ordered at discharge? Yes   Is the patient taking all medications as directed (includes completed medication regime)? Yes   Comments regarding appointments Has already seen her PCP.   Does the patient have a primary care provider?  Yes   Does the patient have an appointment with their PCP within 7 days of discharge? Yes   Has the patient kept scheduled appointments due by today? Yes   Has home health visited the patient within 72 hours of discharge? N/A   Psychosocial issues? No   Did the patient receive a copy of their discharge instructions? Yes   Nursing interventions Reviewed instructions with patient   What is the patient's perception of their health status since discharge? Improving   Is the patient/caregiver able to teach back signs and symptoms related to disease process for when to call PCP? Yes   Is the patient/caregiver able to teach back signs and symptoms related to disease process for when to call 911? Yes   Is the patient/caregiver able to teach back the hierarchy of who to call/visit for symptoms/problems? PCP, Specialist, Home health nurse, Urgent Care, ED, 911 Yes   If the patient is a current smoker, are they able to teach back resources for cessation? Not a smoker   Week 1 call completed? Yes   Would this patient benefit from a Referral to Amb Social Work? No   Is the patient interested in  additional calls from an ambulatory ? No   Wrap up additional comments Son states patient is doing well.States has already followed up with her PCP. Denies any known needs today. [facility] will have patient call if she has any questions/concerns.   Call end time 0830            Suyapa HOLCOMB - Registered Nurse

## 2024-05-23 NOTE — TELEPHONE ENCOUNTER
Patient called. Advised as per Dr. Cain's note. She verb understanding.   Follow up with Patsy on 6/26@8678.

## 2024-05-23 NOTE — TELEPHONE ENCOUNTER
Homar Cain MD  [facility] Pool  Pathology benign  Continue PPI  Office visit [facility] 6 weeks.

## 2024-06-26 ENCOUNTER — OFFICE VISIT (OUTPATIENT)
Dept: GASTROENTEROLOGY | Facility: CLINIC | Age: 89
End: 2024-06-26
Payer: MEDICARE

## 2024-06-26 VITALS
BODY MASS INDEX: 35.02 KG/M2 | HEART RATE: 94 BPM | WEIGHT: 178.4 LBS | HEIGHT: 60 IN | TEMPERATURE: 97 F | DIASTOLIC BLOOD PRESSURE: 70 MMHG | SYSTOLIC BLOOD PRESSURE: 109 MMHG

## 2024-06-26 DIAGNOSIS — K26.9 DUODENAL ULCER: ICD-10-CM

## 2024-06-26 DIAGNOSIS — R10.13 EPIGASTRIC PAIN: Primary | ICD-10-CM

## 2024-06-26 RX ORDER — CEFDINIR 300 MG/1
2 CAPSULE ORAL DAILY
COMMUNITY

## 2024-06-26 RX ORDER — PANTOPRAZOLE SODIUM 40 MG/1
40 TABLET, DELAYED RELEASE ORAL 2 TIMES DAILY
Qty: 180 TABLET | Refills: 3 | Status: SHIPPED | OUTPATIENT
Start: 2024-06-26

## 2024-06-26 RX ORDER — DIPHENOXYLATE HYDROCHLORIDE AND ATROPINE SULFATE 2.5; .025 MG/1; MG/1
1 TABLET ORAL DAILY
COMMUNITY

## 2024-06-26 RX ORDER — OMEPRAZOLE 20 MG/1
1 CAPSULE, DELAYED RELEASE ORAL DAILY
COMMUNITY
End: 2024-06-26

## 2024-06-26 RX ORDER — METHIMAZOLE 5 MG/1
TABLET ORAL
COMMUNITY
Start: 2024-05-13

## 2024-06-26 RX ORDER — POTASSIUM CHLORIDE 750 MG/1
10 TABLET, EXTENDED RELEASE ORAL 2 TIMES DAILY
COMMUNITY

## 2024-06-26 RX ORDER — PREDNISONE 10 MG/1
TABLET ORAL
COMMUNITY

## 2024-06-26 RX ORDER — CITALOPRAM HYDROBROMIDE 10 MG/1
10 TABLET ORAL DAILY
COMMUNITY

## 2024-06-26 NOTE — PROGRESS NOTES
"Chief Complaint  Ulcer ., Duodenitis, and Vomiting (Pt states it is better)    Subjective          History Of Present Illness:    Wendie Houser is a  89 y.o. female of Dr. Cain with a past medical history of arthritis, CHF, diabetes, diverticulitis, Alzheimer's/dementia, GERD, hyperlipidemia, hypertension, thyroid disease, anxiety/depression who presents as a hospital follow-up for abdominal pain.    Patient was seen by our team on 5/16/2024 to 5/19/2024.  Patient was complaining of epigastric pain with nausea and vomiting at that time.  She has a known history of GERD.  She did have a CT abdomen pelvis performed at that time with evidence of wall thickening and inflammatory stranding surrounding the distal stomach and proximal duodenum which was suggestive of gastroenteritis.  Patient does have a history of a cholecystectomy.  Patient did have an EGD at that time with a normal-appearing esophagus, stomach, nonbleeding duodenal ulcers.  Biopsies consistent with mild gastropathy, no H. pylori.    Patient reports she is doing quite well.  She denies any further epigastric pain, nausea, vomiting.  She denies any melena or hematochezia.  Appetite is good and weight is stable.  She denies any lower GI symptoms such as diarrhea, constipation.  She does remain on Protonix therapy.  Unclear if she is taking this twice a day.    Objective   Vital Signs:   /70   Pulse 94   Temp 97 °F (36.1 °C)   Ht 152.4 cm (60\")   Wt 80.9 kg (178 lb 6.4 oz)   BMI 34.84 kg/m²       Physical Exam  Vitals reviewed.   Constitutional:       General: She is not in acute distress.     Appearance: Normal appearance. She is not ill-appearing.   HENT:      Head: Normocephalic and atraumatic.      Nose: Nose normal.      Mouth/Throat:      Pharynx: Oropharynx is clear.   Eyes:      Extraocular Movements: Extraocular movements intact.      Conjunctiva/sclera: Conjunctivae normal.      Pupils: Pupils are equal, round, and reactive to " light.   Pulmonary:      Effort: Pulmonary effort is normal.   Abdominal:      General: There is no distension.      Palpations: Abdomen is soft. There is no mass.      Tenderness: There is no abdominal tenderness.   Musculoskeletal:         General: No swelling. Normal range of motion.      Cervical back: Normal range of motion.   Skin:     General: Skin is warm and dry.      Findings: No bruising or rash.   Neurological:      General: No focal deficit present.      Mental Status: She is alert and oriented to person, place, and time.      Motor: No weakness.      Gait: Gait normal.   Psychiatric:         Mood and Affect: Mood normal.          Result Review :   The following data was reviewed by: Patsy Rojas PA-C on 06/26/2024:  CMP          5/17/2024    05:11 5/18/2024    07:33 5/19/2024    06:46   CMP   Glucose 95  108  115    BUN 8  7  6    Creatinine 0.88  0.96  0.90    EGFR 63.7  57.4  62.0    Sodium 140  137  140    Potassium 4.1  3.9  3.8    Chloride 108  107  109    Calcium 8.3  8.6  8.3    Total Protein 5.9  6.1     Albumin 3.2  3.3     Globulin 2.7  2.8     Total Bilirubin 0.2  0.2     Alkaline Phosphatase 78  80     AST (SGOT) 19  17     ALT (SGPT) 11  12     Albumin/Globulin Ratio 1.2  1.2     BUN/Creatinine Ratio 9.1  7.3  6.7    Anion Gap 10.0  7.9  8.8      CBC          5/17/2024    05:11 5/18/2024    07:33 5/19/2024    06:46   CBC   WBC 9.20  8.45  7.42    RBC 4.55  4.24  4.07    Hemoglobin 12.5  12.0  11.2    Hematocrit 39.0  37.3  35.5    MCV 85.7  88.0  87.2    MCH 27.5  28.3  27.5    MCHC 32.1  32.2  31.5    RDW 13.8  13.9  14.1    Platelets 288  263  275            Assessment and Plan    Diagnoses and all orders for this visit:    1. Epigastric pain (Primary)  -     pantoprazole (PROTONIX) 40 MG EC tablet; Take 1 tablet by mouth 2 (Two) Times a Day.  Dispense: 180 tablet; Refill: 3    2. Duodenal ulcer  -     pantoprazole (PROTONIX) 40 MG EC tablet; Take 1 tablet by mouth 2 (Two) Times  a Day.  Dispense: 180 tablet; Refill: 3       Continue pantoprazole 40 mg twice daily for the next 3 months - from there drop down to once daily  Overall symptoms are currently well-controlled.  Avoid NSAIDs    Follow Up   Return in about 6 months (around 12/26/2024) for Patsy Hubbard PA-C.    Dragon dictation used throughout this note.            Patsy Hubbard PA-C   [facility]  [address] [place] [address]  Office: [phone]

## 2025-01-28 ENCOUNTER — OFFICE VISIT (OUTPATIENT)
Dept: GASTROENTEROLOGY | Facility: CLINIC | Age: 89
End: 2025-01-28
Payer: MEDICARE

## 2025-01-28 VITALS
SYSTOLIC BLOOD PRESSURE: 119 MMHG | WEIGHT: 177.9 LBS | BODY MASS INDEX: 28.59 KG/M2 | DIASTOLIC BLOOD PRESSURE: 79 MMHG | TEMPERATURE: 97 F | HEIGHT: 66 IN | HEART RATE: 109 BPM

## 2025-01-28 DIAGNOSIS — K26.9 DUODENAL ULCER: ICD-10-CM

## 2025-01-28 PROCEDURE — 99213 OFFICE O/P EST LOW 20 MIN: CPT | Performed by: PHYSICIAN ASSISTANT

## 2025-01-28 PROCEDURE — 1159F MED LIST DOCD IN RCRD: CPT | Performed by: PHYSICIAN ASSISTANT

## 2025-01-28 PROCEDURE — 1160F RVW MEDS BY RX/DR IN RCRD: CPT | Performed by: PHYSICIAN ASSISTANT

## 2025-01-28 RX ORDER — PANTOPRAZOLE SODIUM 40 MG/1
40 TABLET, DELAYED RELEASE ORAL DAILY
Qty: 90 TABLET | Refills: 3 | Status: SHIPPED | OUTPATIENT
Start: 2025-01-28

## 2025-01-28 NOTE — PROGRESS NOTES
"Chief Complaint  ulcer    Subjective          History Of Present Illness:    Wendie Houser is a  89 y.o. female patient of Dr. Cain with a past medical history of arthritis, CHF, diabetes, diverticulitis, Alzheimer's/dementia, GERD, hyperlipidemia, hypertension, thyroid disease, anxiety/depression who presents as a hospital follow-up for abdominal pain.     \"Patient was seen by our team in the hospital on 5/16/2024 to 5/19/2024.  Patient was complaining of epigastric pain with nausea and vomiting at that time.  She has a known history of GERD.  She did have a CT abdomen pelvis performed at that time with evidence of wall thickening and inflammatory stranding surrounding the distal stomach and proximal duodenum which was suggestive of gastroenteritis.  Patient does have a history of a cholecystectomy.  Patient did have an EGD at that time with a normal-appearing esophagus, stomach, nonbleeding duodenal ulcers.  Biopsies consistent with mild gastropathy, no H. pylori.\"     Patient is doing quite well.  She thinks that she still been on the pantoprazole therapy but could not confirm.  She denies any chest pain, abdominal pain, nausea, vomiting, diarrhea, constipation, black or bloody stools.  Appetite is good and weight is stable.    Objective   Vital Signs:   /79   Pulse 109   Temp 97 °F (36.1 °C) (Temporal)   Ht 167.6 cm (66\")   Wt 80.7 kg (177 lb 14.4 oz)   BMI 28.71 kg/m²       Physical Exam  Vitals reviewed.   Constitutional:       General: She is not in acute distress.     Appearance: Normal appearance. She is not ill-appearing.   HENT:      Head: Normocephalic and atraumatic.      Nose: Nose normal.      Mouth/Throat:      Pharynx: Oropharynx is clear.   Eyes:      Conjunctiva/sclera: Conjunctivae normal.   Pulmonary:      Effort: Pulmonary effort is normal.   Abdominal:      General: There is no distension.      Palpations: Abdomen is soft. There is no mass.      Tenderness: There is no " abdominal tenderness.   Musculoskeletal:         General: No swelling. Normal range of motion.      Cervical back: Normal range of motion.   Skin:     General: Skin is warm and dry.      Findings: No bruising or rash.   Neurological:      General: No focal deficit present.      Mental Status: She is alert and oriented to person, place, and time.      Motor: Weakness present.      Gait: Gait normal.   Psychiatric:         Mood and Affect: Mood normal.          Result Review :   The following data was reviewed by: Patsy Rojas PA-C on 01/28/2025:  CMP          5/17/2024    05:11 5/18/2024    07:33 5/19/2024    06:46   CMP   Glucose 95  108  115    BUN 8  7  6    Creatinine 0.88  0.96  0.90    EGFR 63.7  57.4  62.0    Sodium 140  137  140    Potassium 4.1  3.9  3.8    Chloride 108  107  109    Calcium 8.3  8.6  8.3    Total Protein 5.9  6.1     Albumin 3.2  3.3     Globulin 2.7  2.8     Total Bilirubin 0.2  0.2     Alkaline Phosphatase 78  80     AST (SGOT) 19  17     ALT (SGPT) 11  12     Albumin/Globulin Ratio 1.2  1.2     BUN/Creatinine Ratio 9.1  7.3  6.7    Anion Gap 10.0  7.9  8.8      CBC          5/17/2024    05:11 5/18/2024    07:33 5/19/2024    06:46   CBC   WBC 9.20  8.45  7.42    RBC 4.55  4.24  4.07    Hemoglobin 12.5  12.0  11.2    Hematocrit 39.0  37.3  35.5    MCV 85.7  88.0  87.2    MCH 27.5  28.3  27.5    MCHC 32.1  32.2  31.5    RDW 13.8  13.9  14.1    Platelets 288  263  275            Assessment and Plan    Diagnoses and all orders for this visit:    1. Duodenal ulcer  -     pantoprazole (PROTONIX) 40 MG EC tablet; Take 1 tablet by mouth Daily.  Dispense: 90 tablet; Refill: 3       Drop pantoprazole dosing to 40 mg once daily  Continue to avoid NSAIDs    Follow Up   Return if symptoms worsen or fail to improve, for Patsy Hubbard PA-C.    Dragon dictation used throughout this note.            Patsy Hubbard PA-C   [facility]  [address]  [place] [address]  Office: [phone]

## 2025-06-04 ENCOUNTER — APPOINTMENT (OUTPATIENT)
Dept: GENERAL RADIOLOGY | Facility: HOSPITAL | Age: 89
End: 2025-06-04
Payer: MEDICARE

## 2025-06-04 ENCOUNTER — HOSPITAL ENCOUNTER (OUTPATIENT)
Facility: HOSPITAL | Age: 89
Setting detail: OBSERVATION
Discharge: HOME | End: 2025-06-07
Attending: EMERGENCY MEDICINE | Admitting: INTERNAL MEDICINE
Payer: MEDICARE

## 2025-06-04 DIAGNOSIS — Z78.9 DECREASED ACTIVITIES OF DAILY LIVING (ADL): ICD-10-CM

## 2025-06-04 DIAGNOSIS — I95.1 ORTHOSTATIC HYPOTENSION: Primary | ICD-10-CM

## 2025-06-04 DIAGNOSIS — R19.7 DIARRHEA, UNSPECIFIED TYPE: ICD-10-CM

## 2025-06-04 DIAGNOSIS — R53.1 GENERAL WEAKNESS: ICD-10-CM

## 2025-06-04 DIAGNOSIS — E87.6 HYPOKALEMIA: ICD-10-CM

## 2025-06-04 LAB
ALBUMIN SERPL-MCNC: 3.6 G/DL (ref 3.5–5.2)
ALBUMIN/GLOB SERPL: 1.2 G/DL
ALP SERPL-CCNC: 72 U/L (ref 39–117)
ALT SERPL W P-5'-P-CCNC: 16 U/L (ref 1–33)
ANION GAP SERPL CALCULATED.3IONS-SCNC: 8.4 MMOL/L (ref 5–15)
AST SERPL-CCNC: 24 U/L (ref 1–32)
B PARAPERT DNA SPEC QL NAA+PROBE: NOT DETECTED
B PERT DNA SPEC QL NAA+PROBE: NOT DETECTED
BACTERIA UR QL AUTO: ABNORMAL /HPF
BASOPHILS # BLD MANUAL: 0.1 10*3/MM3 (ref 0–0.2)
BASOPHILS NFR BLD MANUAL: 1 % (ref 0–1.5)
BILIRUB SERPL-MCNC: 0.2 MG/DL (ref 0–1.2)
BILIRUB UR QL STRIP: NEGATIVE
BUN SERPL-MCNC: 9 MG/DL (ref 8–23)
BUN/CREAT SERPL: 10.7 (ref 7–25)
C PNEUM DNA NPH QL NAA+NON-PROBE: NOT DETECTED
CALCIUM SPEC-SCNC: 9.1 MG/DL (ref 8.6–10.5)
CHLORIDE SERPL-SCNC: 104 MMOL/L (ref 98–107)
CLARITY UR: CLEAR
CO2 SERPL-SCNC: 26.6 MMOL/L (ref 22–29)
COLOR UR: YELLOW
CREAT SERPL-MCNC: 0.84 MG/DL (ref 0.57–1)
D-LACTATE SERPL-SCNC: 1.6 MMOL/L (ref 0.5–2)
DACRYOCYTES BLD QL SMEAR: ABNORMAL
DEPRECATED RDW RBC AUTO: 45.3 FL (ref 37–54)
EGFRCR SERPLBLD CKD-EPI 2021: 66.9 ML/MIN/1.73
EOSINOPHIL # BLD MANUAL: 0.2 10*3/MM3 (ref 0–0.4)
EOSINOPHIL NFR BLD MANUAL: 2 % (ref 0.3–6.2)
ERYTHROCYTE [DISTWIDTH] IN BLOOD BY AUTOMATED COUNT: 13.7 % (ref 12.3–15.4)
FLUAV SUBTYP SPEC NAA+PROBE: NOT DETECTED
FLUBV RNA ISLT QL NAA+PROBE: NOT DETECTED
GEN 5 1HR TROPONIN T REFLEX: 12 NG/L
GLOBULIN UR ELPH-MCNC: 3.1 GM/DL
GLUCOSE SERPL-MCNC: 105 MG/DL (ref 65–99)
GLUCOSE UR STRIP-MCNC: NEGATIVE MG/DL
HADV DNA SPEC NAA+PROBE: NOT DETECTED
HCOV 229E RNA SPEC QL NAA+PROBE: NOT DETECTED
HCOV HKU1 RNA SPEC QL NAA+PROBE: NOT DETECTED
HCOV NL63 RNA SPEC QL NAA+PROBE: NOT DETECTED
HCOV OC43 RNA SPEC QL NAA+PROBE: NOT DETECTED
HCT VFR BLD AUTO: 39.5 % (ref 34–46.6)
HGB BLD-MCNC: 12.5 G/DL (ref 12–15.9)
HGB UR QL STRIP.AUTO: NEGATIVE
HMPV RNA NPH QL NAA+NON-PROBE: NOT DETECTED
HOLD SPECIMEN: NORMAL
HOLD SPECIMEN: NORMAL
HPIV1 RNA ISLT QL NAA+PROBE: NOT DETECTED
HPIV2 RNA SPEC QL NAA+PROBE: NOT DETECTED
HPIV3 RNA NPH QL NAA+PROBE: NOT DETECTED
HPIV4 P GENE NPH QL NAA+PROBE: NOT DETECTED
HYALINE CASTS UR QL AUTO: ABNORMAL /LPF
KETONES UR QL STRIP: NEGATIVE
LEUKOCYTE ESTERASE UR QL STRIP.AUTO: ABNORMAL
LIPASE SERPL-CCNC: 46 U/L (ref 13–60)
LYMPHOCYTES # BLD MANUAL: 4.76 10*3/MM3 (ref 0.7–3.1)
LYMPHOCYTES NFR BLD MANUAL: 11.2 % (ref 5–12)
M PNEUMO IGG SER IA-ACNC: NOT DETECTED
MAGNESIUM SERPL-MCNC: 1.6 MG/DL (ref 1.6–2.4)
MCH RBC QN AUTO: 28.7 PG (ref 26.6–33)
MCHC RBC AUTO-ENTMCNC: 31.6 G/DL (ref 31.5–35.7)
MCV RBC AUTO: 90.6 FL (ref 79–97)
MONOCYTES # BLD: 1.11 10*3/MM3 (ref 0.1–0.9)
NEUTROPHILS # BLD AUTO: 3.75 10*3/MM3 (ref 1.7–7)
NEUTROPHILS NFR BLD MANUAL: 37.8 % (ref 42.7–76)
NITRITE UR QL STRIP: NEGATIVE
NRBC BLD AUTO-RTO: 0 /100 WBC (ref 0–0.2)
PH UR STRIP.AUTO: 6.5 [PH] (ref 5–8)
PLAT MORPH BLD: NORMAL
PLATELET # BLD AUTO: 220 10*3/MM3 (ref 140–450)
PMV BLD AUTO: 10.4 FL (ref 6–12)
POTASSIUM SERPL-SCNC: 2.9 MMOL/L (ref 3.5–5.2)
PROCALCITONIN SERPL-MCNC: 0.06 NG/ML (ref 0–0.25)
PROT SERPL-MCNC: 6.7 G/DL (ref 6–8.5)
PROT UR QL STRIP: NEGATIVE
RBC # BLD AUTO: 4.36 10*6/MM3 (ref 3.77–5.28)
RBC # UR STRIP: ABNORMAL /HPF
REF LAB TEST METHOD: ABNORMAL
RHINOVIRUS RNA SPEC NAA+PROBE: NOT DETECTED
RSV RNA NPH QL NAA+NON-PROBE: NOT DETECTED
SARS-COV-2 RNA NPH QL NAA+NON-PROBE: NOT DETECTED
SODIUM SERPL-SCNC: 139 MMOL/L (ref 136–145)
SP GR UR STRIP: 1.01 (ref 1–1.03)
SQUAMOUS #/AREA URNS HPF: ABNORMAL /HPF
TROPONIN T NUMERIC DELTA: 1 NG/L
TROPONIN T SERPL HS-MCNC: 11 NG/L
UROBILINOGEN UR QL STRIP: ABNORMAL
VARIANT LYMPHS NFR BLD MANUAL: 3.1 % (ref 0–5)
VARIANT LYMPHS NFR BLD MANUAL: 44.9 % (ref 19.6–45.3)
WBC # UR STRIP: ABNORMAL /HPF
WBC MORPH BLD: NORMAL
WBC NRBC COR # BLD AUTO: 10.27 10*3/MM3 (ref 3.4–10.8)
WHOLE BLOOD HOLD COAG: NORMAL
WHOLE BLOOD HOLD SPECIMEN: NORMAL

## 2025-06-04 PROCEDURE — 85007 BL SMEAR W/DIFF WBC COUNT: CPT | Performed by: EMERGENCY MEDICINE

## 2025-06-04 PROCEDURE — G0378 HOSPITAL OBSERVATION PER HR: HCPCS

## 2025-06-04 PROCEDURE — 80053 COMPREHEN METABOLIC PANEL: CPT | Performed by: EMERGENCY MEDICINE

## 2025-06-04 PROCEDURE — 96360 HYDRATION IV INFUSION INIT: CPT

## 2025-06-04 PROCEDURE — 71045 X-RAY EXAM CHEST 1 VIEW: CPT

## 2025-06-04 PROCEDURE — 83735 ASSAY OF MAGNESIUM: CPT

## 2025-06-04 PROCEDURE — 93005 ELECTROCARDIOGRAM TRACING: CPT | Performed by: EMERGENCY MEDICINE

## 2025-06-04 PROCEDURE — 83690 ASSAY OF LIPASE: CPT | Performed by: EMERGENCY MEDICINE

## 2025-06-04 PROCEDURE — 84484 ASSAY OF TROPONIN QUANT: CPT | Performed by: EMERGENCY MEDICINE

## 2025-06-04 PROCEDURE — 36415 COLL VENOUS BLD VENIPUNCTURE: CPT

## 2025-06-04 PROCEDURE — 83735 ASSAY OF MAGNESIUM: CPT | Performed by: EMERGENCY MEDICINE

## 2025-06-04 PROCEDURE — 81001 URINALYSIS AUTO W/SCOPE: CPT | Performed by: EMERGENCY MEDICINE

## 2025-06-04 PROCEDURE — 84145 PROCALCITONIN (PCT): CPT | Performed by: EMERGENCY MEDICINE

## 2025-06-04 PROCEDURE — 96361 HYDRATE IV INFUSION ADD-ON: CPT

## 2025-06-04 PROCEDURE — 85025 COMPLETE CBC W/AUTO DIFF WBC: CPT | Performed by: EMERGENCY MEDICINE

## 2025-06-04 PROCEDURE — 99285 EMERGENCY DEPT VISIT HI MDM: CPT

## 2025-06-04 PROCEDURE — 93010 ELECTROCARDIOGRAM REPORT: CPT | Performed by: INTERNAL MEDICINE

## 2025-06-04 PROCEDURE — 83605 ASSAY OF LACTIC ACID: CPT | Performed by: EMERGENCY MEDICINE

## 2025-06-04 PROCEDURE — 25810000003 SODIUM CHLORIDE 0.9 % SOLUTION: Performed by: EMERGENCY MEDICINE

## 2025-06-04 PROCEDURE — 0202U NFCT DS 22 TRGT SARS-COV-2: CPT | Performed by: EMERGENCY MEDICINE

## 2025-06-04 RX ORDER — POTASSIUM CHLORIDE 1500 MG/1
40 TABLET, EXTENDED RELEASE ORAL ONCE
Status: COMPLETED | OUTPATIENT
Start: 2025-06-04 | End: 2025-06-04

## 2025-06-04 RX ORDER — SODIUM CHLORIDE 0.9 % (FLUSH) 0.9 %
10 SYRINGE (ML) INJECTION AS NEEDED
Status: DISCONTINUED | OUTPATIENT
Start: 2025-06-04 | End: 2025-06-07 | Stop reason: HOSPADM

## 2025-06-04 RX ADMIN — MAGNESIUM OXIDE TAB 400 MG (240 MG ELEMENTAL MG) 800 MG: 400 (240 MG) TAB at 22:07

## 2025-06-04 RX ADMIN — POTASSIUM CHLORIDE 40 MEQ: 1500 TABLET, EXTENDED RELEASE ORAL at 22:07

## 2025-06-04 RX ADMIN — SODIUM CHLORIDE 500 ML: 9 INJECTION, SOLUTION INTRAVENOUS at 20:24

## 2025-06-04 NOTE — ED PROVIDER NOTES
EMERGENCY DEPARTMENT ENCOUNTER  Room Number:  20/20  Date of encounter:  6/5/2025  PCP: Pallares, Clara Ann, MD  Patient Care Team:  Pallares, Clara Ann, MD as PCP - General (Internal Medicine)  Diego Veronica MD as Consulting Physician (Urology)     HPI:  Context: Wendie Houser is a 89 y.o. female who presents to the ED c/o chief complaint of diarrhea and generalized weakness.  Patient endorses generalized weakness, denies any focal weakness.  Patient reports that she has been having symptoms for the last week.  Patient reports that she has had 3 episodes of diarrhea today, no blood or mucus.  Patient denies any nausea vomiting, no abdominal pain, no dysuria, no hematuria, no increased urinary frequency or urgency.  Patient denies any fevers or chills, does endorse shakes.,  No night sweats.  Patient endorses some runny nose and congestion with a nonproductive cough.  Patient reports that she has been feeling lightheaded, denies any syncope.  Patient reports that she fell while getting out of bed this morning, denies any injury occurred, no head injury, no loss consciousness, no neck pain, reports chronic back pain that is unchanged.  Patient reports that she is in assisted living and there have been multiple sick contacts.  Patient denies any recent travel, no recent antibiotics, no history of C. difficile.    MEDICAL HISTORY REVIEW  Reviewed in EPIC    PAST MEDICAL HISTORY  Active Ambulatory Problems     Diagnosis Date Noted    Arthritis 01/25/2016    Late onset Alzheimer's dementia without behavioral disturbance 01/25/2016    Fatigue 01/25/2016    Gastroesophageal reflux disease 01/25/2016    Essential hypertension 01/25/2016    Hypomagnesemia 01/25/2016    Sleep apnea 01/25/2016    Renal insufficiency 10/11/2016    Hypothyroidism 10/11/2016    Lumbar canal stenosis 10/11/2016    Shortness of breath 03/20/2016    Disorder of electrolytes 07/29/2014    Hiatal hernia 05/02/2017    Hyperkalemia 02/02/2017     Hypoglycemia 02/02/2017    Irritable bowel syndrome 05/02/2017    Allergic rhinitis 06/05/2017    High risk medication use 10/12/2017    Polypharmacy 10/23/2018    Obesity (BMI 30-39.9) 10/20/2020    Panic attack 07/27/2021    Weakness of left lower extremity 08/10/2022    Gastroduodenitis 05/16/2024    Generalized abdominal pain 05/16/2024    Lactic acidosis 05/16/2024    Upper abdominal pain 05/15/2024    Nausea and vomiting 05/15/2024    Abnormal CT of the abdomen 05/15/2024     Resolved Ambulatory Problems     Diagnosis Date Noted    Abdominal pain 01/25/2016    Anemia 01/25/2016    Anxiety 01/25/2016    Diverticulitis of intestine 01/25/2016    Gastritis 01/25/2016    Hyperlipidemia 01/25/2016    Cramps of lower extremity 01/25/2016    Left lower quadrant pain 01/25/2016    Nausea 01/25/2016    Renal insufficiency 01/25/2016    Type 2 diabetes mellitus 01/25/2016    Flu-like symptoms 02/23/2016    Alopecia 02/23/2016    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    Chest pain in adult 03/22/2016    Chronic systolic congestive heart failure 03/28/2016    Diverticulosis large intestine w/o perforation or abscess w/o bleeding 03/28/2016    Weight gain 03/28/2016    Vomiting without nausea 08/25/2016    Fever and chills 08/25/2016    Diverticulitis of gastrointestinal tract 08/25/2016    Pain of upper abdomen 08/25/2016    Muscle cramps 08/25/2016    Congestive heart failure 10/11/2016    Mixed anxiety depressive disorder 10/23/2018     Past Medical History:   Diagnosis Date    CHF (congestive heart failure)     Depression     Diabetes mellitus     Diverticulitis of colon 08/25/2016    Early onset Alzheimer's dementia     GERD (gastroesophageal reflux disease)     Heart disease     Hypertension     Kidney disease        PAST SURGICAL HISTORY  Past Surgical History:   Procedure Laterality Date    BREAST CYST EXCISION Right 1989    Benign    COLONOSCOPY  2011    Dr. Cain    ENDOSCOPY  04/15/2011    ENDOSCOPY N/A 5/19/2024    Procedure:  ESOPHAGOGASTRODUODENOSCOPY;  Surgeon: Homar Cain MD;  Location: [facility] ENDOSCOPY;  Service: Gastroenterology;  Laterality: N/A;  PRE - abnormal imaging  POST - duodenal ulcers    EYE SURGERY  2009    GALLBLADDER SURGERY  1960    Open    TOTAL KNEE ARTHROPLASTY Left 2012    Dr. Soler    VAGINAL HYSTERECTOMY  1999       FAMILY HISTORY  Family History   Problem Relation Age of Onset    Other Mother         cardiac disorder    Hypertension Mother     Heart disease Mother     Hypertension Sister     Heart attack Sister     Cancer Brother     Hypertension Brother     Heart attack Brother     Heart disease Father        SOCIAL HISTORY  Social History     Socioeconomic History    Marital status:    Tobacco Use    Smoking status: Never    Smokeless tobacco: Never   Vaping Use    Vaping status: Never Used   Substance and Sexual Activity    Alcohol use: No    Drug use: No    Sexual activity: Never       ALLERGIES  Patient has no known allergies.    The patient's allergies have been reviewed    PHYSICAL EXAM  I have reviewed the triage vital signs and nursing notes.  ED Triage Vitals [06/04/25 1850]   Temp Heart Rate Resp BP SpO2   98.2 °F (36.8 °C) 79 18 169/79 97 %      Temp src Heart Rate Source Patient Position BP Location FiO2 (%)   -- -- -- -- --       General: No acute distress.  HENT: NCAT, PERRL, Nares patent.  Eyes: no scleral icterus.  Neck: trachea midline, no ROM limitations.  CV: regular rhythm, regular rate.  Respiratory: normal effort, CTAB.  Abdomen: soft, nondistended, NTTP, no rebound tenderness, no guarding or rigidity.  Musculoskeletal: no deformity.  Neuro: alert, moves all extremities, follows commands.  Skin: warm, dry.    LAB RESULTS  Recent Results (from the past 24 hours)   Comprehensive Metabolic Panel    Collection Time: 06/04/25  7:56 PM    Specimen: Blood   Result Value Ref Range    Glucose 105 (H) 65 - 99 mg/dL    BUN 9.0 8.0 - 23.0 mg/dL    Creatinine 0.84 0.57 - 1.00 mg/dL     Sodium 139 136 - 145 mmol/L    Potassium 2.9 (L) 3.5 - 5.2 mmol/L    Chloride 104 98 - 107 mmol/L    CO2 26.6 22.0 - 29.0 mmol/L    Calcium 9.1 8.6 - 10.5 mg/dL    Total Protein 6.7 6.0 - 8.5 g/dL    Albumin 3.6 3.5 - 5.2 g/dL    ALT (SGPT) 16 1 - 33 U/L    AST (SGOT) 24 1 - 32 U/L    Alkaline Phosphatase 72 39 - 117 U/L    Total Bilirubin 0.2 0.0 - 1.2 mg/dL    Globulin 3.1 gm/dL    A/G Ratio 1.2 g/dL    BUN/Creatinine Ratio 10.7 7.0 - 25.0    Anion Gap 8.4 5.0 - 15.0 mmol/L    eGFR 66.9 >60.0 mL/min/1.73   Lipase    Collection Time: 06/04/25  7:56 PM    Specimen: Blood   Result Value Ref Range    Lipase 46 13 - 60 U/L   Lactic Acid, Plasma    Collection Time: 06/04/25  7:56 PM    Specimen: Blood   Result Value Ref Range    Lactate 1.6 0.5 - 2.0 mmol/L   Green Top (Gel)    Collection Time: 06/04/25  7:56 PM   Result Value Ref Range    Extra Tube Hold for add-ons.    Lavender Top    Collection Time: 06/04/25  7:56 PM   Result Value Ref Range    Extra Tube hold for add-on    Gold Top - SST    Collection Time: 06/04/25  7:56 PM   Result Value Ref Range    Extra Tube Hold for add-ons.    Light Blue Top    Collection Time: 06/04/25  7:56 PM   Result Value Ref Range    Extra Tube Hold for add-ons.    CBC Auto Differential    Collection Time: 06/04/25  7:56 PM    Specimen: Blood   Result Value Ref Range    WBC 10.27 3.40 - 10.80 10*3/mm3    RBC 4.36 3.77 - 5.28 10*6/mm3    Hemoglobin 12.5 12.0 - 15.9 g/dL    Hematocrit 39.5 34.0 - 46.6 %    MCV 90.6 79.0 - 97.0 fL    MCH 28.7 26.6 - 33.0 pg    MCHC 31.6 31.5 - 35.7 g/dL    RDW 13.7 12.3 - 15.4 %    RDW-SD 45.3 37.0 - 54.0 fl    MPV 10.4 6.0 - 12.0 fL    Platelets 220 140 - 450 10*3/mm3    nRBC 0.0 0.0 - 0.2 /100 WBC   Procalcitonin    Collection Time: 06/04/25  7:56 PM    Specimen: Blood   Result Value Ref Range    Procalcitonin 0.06 0.00 - 0.25 ng/mL   Magnesium    Collection Time: 06/04/25  7:56 PM    Specimen: Blood   Result Value Ref Range    Magnesium 1.6 1.6 - 2.4  mg/dL   High Sensitivity Troponin T    Collection Time: 06/04/25  7:56 PM    Specimen: Blood   Result Value Ref Range    HS Troponin T 11 <14 ng/L   Manual Differential    Collection Time: 06/04/25  7:56 PM    Specimen: Blood   Result Value Ref Range    Neutrophil % 37.8 (L) 42.7 - 76.0 %    Lymphocyte % 44.9 19.6 - 45.3 %    Monocyte % 11.2 5.0 - 12.0 %    Eosinophil % 2.0 0.3 - 6.2 %    Basophil % 1.0 0.0 - 1.5 %    Atypical Lymphocyte % 3.1 0.0 - 5.0 %    Neutrophils Absolute 3.75 1.70 - 7.00 10*3/mm3    Lymphocytes Absolute 4.76 (H) 0.70 - 3.10 10*3/mm3    Monocytes Absolute 1.11 (H) 0.10 - 0.90 10*3/mm3    Eosinophils Absolute 0.20 0.00 - 0.40 10*3/mm3    Basophils Absolute 0.10 0.00 - 0.20 10*3/mm3    Dacrocytes Slight/1+ None Seen    WBC Morphology Normal Normal    Platelet Morphology Normal Normal   Respiratory Panel PCR w/COVID-19(SARS-CoV-2) [facility] In-House, NP Swab in UTM/VTM, 2 HR TAT - Swab, Nasopharynx    Collection Time: 06/04/25  7:57 PM    Specimen: Nasopharynx; Swab   Result Value Ref Range    ADENOVIRUS, PCR Not Detected Not Detected    Coronavirus 229E Not Detected Not Detected    Coronavirus HKU1 Not Detected Not Detected    Coronavirus NL63 Not Detected Not Detected    Coronavirus OC43 Not Detected Not Detected    COVID19 Not Detected Not Detected - Ref. Range    Human Metapneumovirus Not Detected Not Detected    Human Rhinovirus/Enterovirus Not Detected Not Detected    Influenza A PCR Not Detected Not Detected    Influenza B PCR Not Detected Not Detected    Parainfluenza Virus 1 Not Detected Not Detected    Parainfluenza Virus 2 Not Detected Not Detected    Parainfluenza Virus 3 Not Detected Not Detected    Parainfluenza Virus 4 Not Detected Not Detected    RSV, PCR Not Detected Not Detected    Bordetella pertussis pcr Not Detected Not Detected    Bordetella parapertussis PCR Not Detected Not Detected    Chlamydophila pneumoniae PCR Not Detected Not Detected    Mycoplasma  pneumo by PCR Not Detected Not Detected   ECG 12 Lead Syncope    Collection Time: 06/04/25  7:59 PM   Result Value Ref Range    QT Interval 464 ms    QTC Interval 490 ms   High Sensitivity Troponin T 1Hr    Collection Time: 06/04/25  9:40 PM    Specimen: Blood   Result Value Ref Range    HS Troponin T 12 <14 ng/L    Troponin T Numeric Delta 1 Abnormal if >/=3 ng/L   Urinalysis With Microscopic If Indicated (No Culture) - Urine, Clean Catch    Collection Time: 06/04/25  9:55 PM    Specimen: Urine, Clean Catch   Result Value Ref Range    Color, UA Yellow Yellow, Straw    Appearance, UA Clear Clear    pH, UA 6.5 5.0 - 8.0    Specific Gravity, UA 1.015 1.005 - 1.030    Glucose, UA Negative Negative    Ketones, UA Negative Negative    Bilirubin, UA Negative Negative    Blood, UA Negative Negative    Protein, UA Negative Negative    Leuk Esterase, UA Trace (A) Negative    Nitrite, UA Negative Negative    Urobilinogen, UA 1.0 E.U./dL 0.2 - 1.0 E.U./dL   Urinalysis, Microscopic Only - Urine, Clean Catch    Collection Time: 06/04/25  9:55 PM    Specimen: Urine, Clean Catch   Result Value Ref Range    RBC, UA 0-2 None Seen, 0-2 /HPF    WBC, UA 3-5 (A) None Seen, 0-2 /HPF    Bacteria, UA None Seen None Seen /HPF    Squamous Epithelial Cells, UA 3-6 (A) None Seen, 0-2 /HPF    Hyaline Casts, UA None Seen None Seen /LPF    Methodology Automated Microscopy        I ordered the above labs and reviewed the results.    RADIOLOGY  XR Chest 1 View  Result Date: 6/4/2025  CXR ONE VIEW  HISTORY: cough  COMPARISON: Chest x-ray 8/10/2022  TECHNIQUE: single portable AP       The heart size is within normal limits.  The left lung is normally aerated.  There is elevation of the right hemidiaphragm, but the right lung is otherwise normally aerated.  This report was finalized on 6/4/2025 8:19 PM by Dr. James Bernabe M.D on Workstation: [id number]        I ordered the above noted radiological studies. I reviewed the images and results. I  agree with the radiologist interpretation.    PROCEDURES  Procedures    MEDICATIONS GIVEN IN ER  Medications   sodium chloride 0.9 % flush 10 mL (has no administration in time range)   sodium chloride 0.9 % bolus 1,000 mL (has no administration in time range)   sodium chloride 0.9 % bolus 500 mL (0 mL Intravenous Stopped 6/4/25 2124)   potassium chloride (KLOR-CON M20) CR tablet 40 mEq (40 mEq Oral Given 6/4/25 2207)   magnesium oxide (MAG-OX) tablet 800 mg (800 mg Oral Given 6/4/25 2207)       PROGRESS, DATA ANALYSIS, CONSULTS, AND MEDICAL DECISION MAKING  A complete history and physical exam have been performed.  All available laboratory and imaging results have been reviewed by myself prior to disposition.    MDM    After the initial H&P, I discussed pertinent information from history and physical exam with patient/family.  Discussed differential diagnosis.  Discussed plan for ED evaluation/workup/treatment.  All questions answered.  Patient/family is agreeable with plan.  ED Course as of 06/05/25 0016   Wed Jun 04, 2025   1901 My differential diagnosis for generalized weakness includes but is not limited to:  Neuromuscular weakness   CVA  Hemorrhagic stroke  Multiple sclerosis  Amyotrophic Lateral Sclerosis (ALS) (UMN & LMN)  Spinal and bulbar muscular atrophy (Estuardo's syndrome)  Spinal cord disease: Infection (Epidural abscess)  Infarction/ischemia  Trauma (Spinal Cord Syndromes)  Inflammation (Transverse Myelitis)  Degenerative (Spinal muscular atrophy)  Tumor  Peripheral nerve disease: Guillain-Sawyer syndrome  Toxins ([place])  Tick paralysis  Diabetic peripheral neuropathy  NMJ disease: Myasthenia gravis crisis  Botulism  Organophosphate toxicity  Lambert-Eaton myasthenic syndrome  Rhabdomyolysis  Dermatomyositis  Polymyositis  Alcoholic myopathy  Non-neuromuscular weakness   ACS  Arrhythmia/Syncope  Severe infection/Sepsis  Hypoglycemia  Periodic paralysis (electrolyte disturbance, K, Mg, Ca)    Hypokalemic periodic paralysis  Thyrotoxic periodic paralysis  Respiratory failure  Symptomatic Anemia  Severe dehydration  Hypothyroidism  Polypharmacy  Malignancy           [JG]   2004 EKG independently viewed and contemporaneously interpreted by ED physician. Time: 1959.  Rate 67.  Interpretation: Normal sinus rhythm, left axis deviation, delayed R wave progression, no acute ST changes. [JG]   2227 I reviewed chest x-ray in PACS, no pulmonary infiltrates per my read. [JG]   2227 Potassium 2.9, replacing orally. [JG]   Thu Jun 05, 2025   0001 Patient with significant orthostatic hypotension with blood pressure decreasing to 90 systolic with standing.  Patient's status post 500 cc fluid bolus.  Given additional IV fluids, plan for admission for further evaluation and treatment. [JG]   0015 Phone call with [employer], JAVIER with [facility].  Discussed the patient, relevant history, exam, diagnostics, ED findings/progress, and concerns. They agree to admit the patient to observation under Dr. Mcduffie. Care assumed by the admitting physician at this time.     [JG]      ED Course User Index  [JG] Tye Kirby MD       AS OF 00:16 EDT VITALS:    BP - 141/68  HR - 68  TEMP - 98.2 °F (36.8 °C)  O2 SATS - 97%    DIAGNOSIS  Final diagnoses:   Orthostatic hypotension   Diarrhea, unspecified type   Hypokalemia   General weakness         DISPOSITION  ADMISSION    Discussed treatment plan and reason for admission with pt/family and admitting physician.  Pt/family voiced understanding of the plan for admission for further testing/treatment as needed.        Tye Kirby MD  06/05/25 0017

## 2025-06-04 NOTE — ED TRIAGE NOTES
Pt from [facility] via ems, called for weakness and diarrhea x1 week, pt reports fell out of bed around noon onto her butt, denies hitting head, no loc, no blood thinners

## 2025-06-05 ENCOUNTER — APPOINTMENT (OUTPATIENT)
Dept: CARDIOLOGY | Facility: HOSPITAL | Age: 89
End: 2025-06-05
Payer: MEDICARE

## 2025-06-05 ENCOUNTER — APPOINTMENT (OUTPATIENT)
Dept: CT IMAGING | Facility: HOSPITAL | Age: 89
End: 2025-06-05
Payer: MEDICARE

## 2025-06-05 PROBLEM — I95.1 ORTHOSTATIC HYPOTENSION: Status: ACTIVE | Noted: 2025-06-05

## 2025-06-05 PROBLEM — E05.90 HYPERTHYROIDISM: Status: ACTIVE | Noted: 2025-06-05

## 2025-06-05 PROBLEM — K27.9 PEPTIC ULCER DISEASE: Status: ACTIVE | Noted: 2025-06-05

## 2025-06-05 PROBLEM — E87.6 HYPOKALEMIA: Status: ACTIVE | Noted: 2025-06-05

## 2025-06-05 PROBLEM — K57.90 DIVERTICULAR DISEASE: Status: ACTIVE | Noted: 2025-06-05

## 2025-06-05 PROBLEM — R19.7 DIARRHEA: Status: ACTIVE | Noted: 2025-06-05

## 2025-06-05 LAB
ADV 40+41 DNA STL QL NAA+NON-PROBE: NOT DETECTED
ANION GAP SERPL CALCULATED.3IONS-SCNC: 8.1 MMOL/L (ref 5–15)
AORTIC DIMENSIONLESS INDEX: 0.82 (DI)
ASCENDING AORTA: 3.1 CM
ASTRO TYP 1-8 RNA STL QL NAA+NON-PROBE: NOT DETECTED
AV MEAN PRESS GRAD SYS DOP V1V2: 4.3 MMHG
AV VMAX SYS DOP: 141.2 CM/SEC
BH CV ECHO MEAS - ACS: 1.99 CM
BH CV ECHO MEAS - AO MAX PG: 8 MMHG
BH CV ECHO MEAS - AO ROOT DIAM: 2.9 CM
BH CV ECHO MEAS - AO V2 VTI: 29.7 CM
BH CV ECHO MEAS - AVA(I,D): 2.08 CM2
BH CV ECHO MEAS - EDV(CUBED): 60.2 ML
BH CV ECHO MEAS - EDV(MOD-SP2): 70 ML
BH CV ECHO MEAS - EDV(MOD-SP4): 71 ML
BH CV ECHO MEAS - EF(MOD-SP2): 67.1 %
BH CV ECHO MEAS - EF(MOD-SP4): 67.6 %
BH CV ECHO MEAS - ESV(CUBED): 14.4 ML
BH CV ECHO MEAS - ESV(MOD-SP2): 23 ML
BH CV ECHO MEAS - ESV(MOD-SP4): 23 ML
BH CV ECHO MEAS - FS: 37.9 %
BH CV ECHO MEAS - IVS/LVPW: 1.09 CM
BH CV ECHO MEAS - IVSD: 0.91 CM
BH CV ECHO MEAS - LAT PEAK E' VEL: 7.2 CM/SEC
BH CV ECHO MEAS - LV DIASTOLIC VOL/BSA (35-75): 37.4 CM2
BH CV ECHO MEAS - LV MASS(C)D: 101.8 GRAMS
BH CV ECHO MEAS - LV MAX PG: 5.3 MMHG
BH CV ECHO MEAS - LV MEAN PG: 2.8 MMHG
BH CV ECHO MEAS - LV SYSTOLIC VOL/BSA (12-30): 12.1 CM2
BH CV ECHO MEAS - LV V1 MAX: 114.7 CM/SEC
BH CV ECHO MEAS - LV V1 VTI: 24.3 CM
BH CV ECHO MEAS - LVIDD: 3.9 CM
BH CV ECHO MEAS - LVIDS: 2.43 CM
BH CV ECHO MEAS - LVOT AREA: 2.5 CM2
BH CV ECHO MEAS - LVOT DIAM: 1.8 CM
BH CV ECHO MEAS - LVPWD: 0.84 CM
BH CV ECHO MEAS - MED PEAK E' VEL: 6.9 CM/SEC
BH CV ECHO MEAS - MV A DUR: 0.12 SEC
BH CV ECHO MEAS - MV A MAX VEL: 136.1 CM/SEC
BH CV ECHO MEAS - MV DEC SLOPE: 406.7 CM/SEC2
BH CV ECHO MEAS - MV DEC TIME: 0.18 SEC
BH CV ECHO MEAS - MV E MAX VEL: 111.4 CM/SEC
BH CV ECHO MEAS - MV E/A: 0.82
BH CV ECHO MEAS - MV MAX PG: 7.5 MMHG
BH CV ECHO MEAS - MV MEAN PG: 3.4 MMHG
BH CV ECHO MEAS - MV P1/2T: 73.9 MSEC
BH CV ECHO MEAS - MV V2 VTI: 29.5 CM
BH CV ECHO MEAS - MVA(P1/2T): 3 CM2
BH CV ECHO MEAS - MVA(VTI): 2.09 CM2
BH CV ECHO MEAS - PA ACC TIME: 0.12 SEC
BH CV ECHO MEAS - PA V2 MAX: 85.3 CM/SEC
BH CV ECHO MEAS - PULM A REVS DUR: 0.09 SEC
BH CV ECHO MEAS - PULM A REVS VEL: 36.9 CM/SEC
BH CV ECHO MEAS - PULM DIAS VEL: 53.5 CM/SEC
BH CV ECHO MEAS - PULM S/D: 1.87
BH CV ECHO MEAS - PULM SYS VEL: 99.9 CM/SEC
BH CV ECHO MEAS - RAP SYSTOLE: 3 MMHG
BH CV ECHO MEAS - RV MAX PG: 2.44 MMHG
BH CV ECHO MEAS - RV V1 MAX: 78.1 CM/SEC
BH CV ECHO MEAS - RV V1 VTI: 18.3 CM
BH CV ECHO MEAS - RVSP: 37 MMHG
BH CV ECHO MEAS - SV(LVOT): 61.7 ML
BH CV ECHO MEAS - SV(MOD-SP2): 47 ML
BH CV ECHO MEAS - SV(MOD-SP4): 48 ML
BH CV ECHO MEAS - SVI(LVOT): 32.5 ML/M2
BH CV ECHO MEAS - SVI(MOD-SP2): 24.8 ML/M2
BH CV ECHO MEAS - SVI(MOD-SP4): 25.3 ML/M2
BH CV ECHO MEAS - TAPSE (>1.6): 1.48 CM
BH CV ECHO MEAS - TR MAX PG: 33.9 MMHG
BH CV ECHO MEAS - TR MAX VEL: 291.3 CM/SEC
BH CV ECHO MEASUREMENTS AVERAGE E/E' RATIO: 15.8
BH CV XLRA - TDI S': 13.9 CM/SEC
BUN SERPL-MCNC: 7 MG/DL (ref 8–23)
BUN/CREAT SERPL: 9.5 (ref 7–25)
C CAYETANENSIS DNA STL QL NAA+NON-PROBE: NOT DETECTED
C COLI+JEJ+UPSA DNA STL QL NAA+NON-PROBE: NOT DETECTED
C DIFF TOX GENS STL QL NAA+PROBE: NEGATIVE
CALCIUM SPEC-SCNC: 8.6 MG/DL (ref 8.6–10.5)
CHLORIDE SERPL-SCNC: 107 MMOL/L (ref 98–107)
CO2 SERPL-SCNC: 26.9 MMOL/L (ref 22–29)
CREAT SERPL-MCNC: 0.74 MG/DL (ref 0.57–1)
CRYPTOSP DNA STL QL NAA+NON-PROBE: NOT DETECTED
DEPRECATED RDW RBC AUTO: 44.4 FL (ref 37–54)
E HISTOLYT DNA STL QL NAA+NON-PROBE: NOT DETECTED
EAEC PAA PLAS AGGR+AATA ST NAA+NON-PRB: NOT DETECTED
EC STX1+STX2 GENES STL QL NAA+NON-PROBE: NOT DETECTED
EGFRCR SERPLBLD CKD-EPI 2021: 77.9 ML/MIN/1.73
EPEC EAE GENE STL QL NAA+NON-PROBE: NOT DETECTED
ERYTHROCYTE [DISTWIDTH] IN BLOOD BY AUTOMATED COUNT: 13.8 % (ref 12.3–15.4)
ETEC LTA+ST1A+ST1B TOX ST NAA+NON-PROBE: NOT DETECTED
FOLATE SERPL-MCNC: 7.66 NG/ML (ref 4.78–24.2)
G LAMBLIA DNA STL QL NAA+NON-PROBE: NOT DETECTED
GLUCOSE SERPL-MCNC: 106 MG/DL (ref 65–99)
HBA1C MFR BLD: 5.6 % (ref 4.8–5.6)
HCT VFR BLD AUTO: 36.9 % (ref 34–46.6)
HGB BLD-MCNC: 11.6 G/DL (ref 12–15.9)
LEFT ATRIUM VOLUME INDEX: 22.1 ML/M2
LV EF BIPLANE MOD: 68.1 %
MAGNESIUM SERPL-MCNC: 1.7 MG/DL (ref 1.6–2.4)
MCH RBC QN AUTO: 28.1 PG (ref 26.6–33)
MCHC RBC AUTO-ENTMCNC: 31.4 G/DL (ref 31.5–35.7)
MCV RBC AUTO: 89.3 FL (ref 79–97)
NOROVIRUS GI+II RNA STL QL NAA+NON-PROBE: NOT DETECTED
P SHIGELLOIDES DNA STL QL NAA+NON-PROBE: NOT DETECTED
PLATELET # BLD AUTO: 211 10*3/MM3 (ref 140–450)
PMV BLD AUTO: 10.1 FL (ref 6–12)
POTASSIUM SERPL-SCNC: 3.3 MMOL/L (ref 3.5–5.2)
POTASSIUM SERPL-SCNC: 3.3 MMOL/L (ref 3.5–5.2)
QT INTERVAL: 464 MS
QTC INTERVAL: 490 MS
RBC # BLD AUTO: 4.13 10*6/MM3 (ref 3.77–5.28)
RPR SER QL: NORMAL
RVA RNA STL QL NAA+NON-PROBE: NOT DETECTED
S ENT+BONG DNA STL QL NAA+NON-PROBE: NOT DETECTED
SAPO I+II+IV+V RNA STL QL NAA+NON-PROBE: NOT DETECTED
SHIGELLA SP+EIEC IPAH ST NAA+NON-PROBE: NOT DETECTED
SINUS: 3.1 CM
SODIUM SERPL-SCNC: 142 MMOL/L (ref 136–145)
STJ: 2.6 CM
TSH SERPL DL<=0.05 MIU/L-ACNC: 1.12 UIU/ML (ref 0.27–4.2)
V CHOL+PARA+VUL DNA STL QL NAA+NON-PROBE: NOT DETECTED
V CHOLERAE DNA STL QL NAA+NON-PROBE: NOT DETECTED
VIT B12 BLD-MCNC: 884 PG/ML (ref 211–946)
WBC NRBC COR # BLD AUTO: 9.18 10*3/MM3 (ref 3.4–10.8)
Y ENTEROCOL DNA STL QL NAA+NON-PROBE: NOT DETECTED

## 2025-06-05 PROCEDURE — G0378 HOSPITAL OBSERVATION PER HR: HCPCS

## 2025-06-05 PROCEDURE — 25810000003 SODIUM CHLORIDE 0.9 % SOLUTION: Performed by: EMERGENCY MEDICINE

## 2025-06-05 PROCEDURE — 84132 ASSAY OF SERUM POTASSIUM: CPT | Performed by: INTERNAL MEDICINE

## 2025-06-05 PROCEDURE — 82607 VITAMIN B-12: CPT | Performed by: INTERNAL MEDICINE

## 2025-06-05 PROCEDURE — 25510000001 PERFLUTREN 6.52 MG/ML SUSPENSION 2 ML VIAL

## 2025-06-05 PROCEDURE — 86592 SYPHILIS TEST NON-TREP QUAL: CPT | Performed by: INTERNAL MEDICINE

## 2025-06-05 PROCEDURE — 84443 ASSAY THYROID STIM HORMONE: CPT | Performed by: INTERNAL MEDICINE

## 2025-06-05 PROCEDURE — 83036 HEMOGLOBIN GLYCOSYLATED A1C: CPT | Performed by: INTERNAL MEDICINE

## 2025-06-05 PROCEDURE — 87507 IADNA-DNA/RNA PROBE TQ 12-25: CPT | Performed by: EMERGENCY MEDICINE

## 2025-06-05 PROCEDURE — 36415 COLL VENOUS BLD VENIPUNCTURE: CPT

## 2025-06-05 PROCEDURE — 82746 ASSAY OF FOLIC ACID SERUM: CPT | Performed by: INTERNAL MEDICINE

## 2025-06-05 PROCEDURE — 63710000001 ONDANSETRON ODT 4 MG TABLET DISPERSIBLE

## 2025-06-05 PROCEDURE — 25810000003 SODIUM CHLORIDE 0.9 % SOLUTION

## 2025-06-05 PROCEDURE — 80048 BASIC METABOLIC PNL TOTAL CA: CPT

## 2025-06-05 PROCEDURE — 87493 C DIFF AMPLIFIED PROBE: CPT | Performed by: EMERGENCY MEDICINE

## 2025-06-05 PROCEDURE — 93306 TTE W/DOPPLER COMPLETE: CPT

## 2025-06-05 PROCEDURE — 93306 TTE W/DOPPLER COMPLETE: CPT | Performed by: INTERNAL MEDICINE

## 2025-06-05 PROCEDURE — 85027 COMPLETE CBC AUTOMATED: CPT

## 2025-06-05 PROCEDURE — 25010000002 SODIUM CHLORIDE 0.9 % WITH KCL 20 MEQ 20-0.9 MEQ/L-% SOLUTION: Performed by: INTERNAL MEDICINE

## 2025-06-05 PROCEDURE — 99214 OFFICE O/P EST MOD 30 MIN: CPT

## 2025-06-05 RX ORDER — AMLODIPINE BESYLATE 5 MG/1
5 TABLET ORAL
Status: DISCONTINUED | OUTPATIENT
Start: 2025-06-05 | End: 2025-06-05

## 2025-06-05 RX ORDER — PRAVASTATIN SODIUM 40 MG
40 TABLET ORAL NIGHTLY
Status: CANCELLED | OUTPATIENT
Start: 2025-06-05

## 2025-06-05 RX ORDER — DIPHENOXYLATE HYDROCHLORIDE AND ATROPINE SULFATE 2.5; .025 MG/1; MG/1
1 TABLET ORAL DAILY
Status: CANCELLED | OUTPATIENT
Start: 2025-06-05

## 2025-06-05 RX ORDER — ONDANSETRON 2 MG/ML
4 INJECTION INTRAMUSCULAR; INTRAVENOUS EVERY 6 HOURS PRN
Status: DISCONTINUED | OUTPATIENT
Start: 2025-06-05 | End: 2025-06-07 | Stop reason: HOSPADM

## 2025-06-05 RX ORDER — HYDROCODONE BITARTRATE AND ACETAMINOPHEN 5; 325 MG/1; MG/1
1 TABLET ORAL EVERY 8 HOURS PRN
Refills: 0 | Status: DISCONTINUED | OUTPATIENT
Start: 2025-06-05 | End: 2025-06-07 | Stop reason: HOSPADM

## 2025-06-05 RX ORDER — ACETAMINOPHEN 325 MG/1
650 TABLET ORAL EVERY 4 HOURS PRN
Status: DISCONTINUED | OUTPATIENT
Start: 2025-06-05 | End: 2025-06-07 | Stop reason: HOSPADM

## 2025-06-05 RX ORDER — SODIUM CHLORIDE AND POTASSIUM CHLORIDE 150; 900 MG/100ML; MG/100ML
100 INJECTION, SOLUTION INTRAVENOUS CONTINUOUS
Status: DISCONTINUED | OUTPATIENT
Start: 2025-06-05 | End: 2025-06-06

## 2025-06-05 RX ORDER — MAGNESIUM OXIDE 400 MG/1
400 TABLET ORAL DAILY
Status: CANCELLED | OUTPATIENT
Start: 2025-06-05

## 2025-06-05 RX ORDER — POTASSIUM CHLORIDE 750 MG/1
10 TABLET, FILM COATED, EXTENDED RELEASE ORAL 2 TIMES DAILY
Status: CANCELLED | OUTPATIENT
Start: 2025-06-05

## 2025-06-05 RX ORDER — AMOXICILLIN 250 MG
2 CAPSULE ORAL 2 TIMES DAILY PRN
Status: DISCONTINUED | OUTPATIENT
Start: 2025-06-05 | End: 2025-06-07 | Stop reason: HOSPADM

## 2025-06-05 RX ORDER — METHOCARBAMOL 750 MG/1
750 TABLET, FILM COATED ORAL 3 TIMES DAILY PRN
Status: CANCELLED | OUTPATIENT
Start: 2025-06-05

## 2025-06-05 RX ORDER — ONDANSETRON 4 MG/1
4 TABLET, ORALLY DISINTEGRATING ORAL EVERY 6 HOURS PRN
Status: DISCONTINUED | OUTPATIENT
Start: 2025-06-05 | End: 2025-06-07 | Stop reason: HOSPADM

## 2025-06-05 RX ORDER — POTASSIUM CHLORIDE 1500 MG/1
40 TABLET, EXTENDED RELEASE ORAL EVERY 4 HOURS
Status: COMPLETED | OUTPATIENT
Start: 2025-06-05 | End: 2025-06-05

## 2025-06-05 RX ORDER — BISACODYL 5 MG/1
5 TABLET, DELAYED RELEASE ORAL DAILY PRN
Status: DISCONTINUED | OUTPATIENT
Start: 2025-06-05 | End: 2025-06-07 | Stop reason: HOSPADM

## 2025-06-05 RX ORDER — LIDOCAINE 4 G/G
1 PATCH TOPICAL EVERY 24 HOURS
Status: CANCELLED | OUTPATIENT
Start: 2025-06-05

## 2025-06-05 RX ORDER — POTASSIUM CHLORIDE 1500 MG/1
20 TABLET, EXTENDED RELEASE ORAL ONCE
Status: COMPLETED | OUTPATIENT
Start: 2025-06-05 | End: 2025-06-05

## 2025-06-05 RX ORDER — POLYETHYLENE GLYCOL 3350 17 G/17G
17 POWDER, FOR SOLUTION ORAL DAILY PRN
Status: DISCONTINUED | OUTPATIENT
Start: 2025-06-05 | End: 2025-06-07 | Stop reason: HOSPADM

## 2025-06-05 RX ORDER — BISACODYL 10 MG
10 SUPPOSITORY, RECTAL RECTAL DAILY PRN
Status: DISCONTINUED | OUTPATIENT
Start: 2025-06-05 | End: 2025-06-07 | Stop reason: HOSPADM

## 2025-06-05 RX ORDER — SODIUM CHLORIDE 9 MG/ML
50 INJECTION, SOLUTION INTRAVENOUS CONTINUOUS
Status: DISCONTINUED | OUTPATIENT
Start: 2025-06-05 | End: 2025-06-05

## 2025-06-05 RX ORDER — LOSARTAN POTASSIUM 25 MG/1
25 TABLET ORAL 2 TIMES DAILY
Status: DISCONTINUED | OUTPATIENT
Start: 2025-06-05 | End: 2025-06-06

## 2025-06-05 RX ORDER — PANTOPRAZOLE SODIUM 40 MG/1
40 TABLET, DELAYED RELEASE ORAL DAILY
Status: CANCELLED | OUTPATIENT
Start: 2025-06-05

## 2025-06-05 RX ORDER — DONEPEZIL HYDROCHLORIDE 10 MG/1
10 TABLET, FILM COATED ORAL NIGHTLY
Status: CANCELLED | OUTPATIENT
Start: 2025-06-05

## 2025-06-05 RX ORDER — METHIMAZOLE 5 MG/1
5 TABLET ORAL EVERY OTHER DAY
Status: CANCELLED | OUTPATIENT
Start: 2025-06-05

## 2025-06-05 RX ORDER — CITALOPRAM HYDROBROMIDE 20 MG/1
10 TABLET ORAL DAILY
Status: CANCELLED | OUTPATIENT
Start: 2025-06-05

## 2025-06-05 RX ADMIN — PERFLUTREN 2 ML: 6.52 INJECTION, SUSPENSION INTRAVENOUS at 14:25

## 2025-06-05 RX ADMIN — SODIUM CHLORIDE 1000 ML: 9 INJECTION, SOLUTION INTRAVENOUS at 00:32

## 2025-06-05 RX ADMIN — POTASSIUM CHLORIDE 40 MEQ: 1500 TABLET, EXTENDED RELEASE ORAL at 17:27

## 2025-06-05 RX ADMIN — ONDANSETRON 4 MG: 4 TABLET, ORALLY DISINTEGRATING ORAL at 17:25

## 2025-06-05 RX ADMIN — ACETAMINOPHEN 650 MG: 325 TABLET ORAL at 08:39

## 2025-06-05 RX ADMIN — ACETAMINOPHEN 650 MG: 325 TABLET ORAL at 22:47

## 2025-06-05 RX ADMIN — LOSARTAN POTASSIUM 25 MG: 25 TABLET, FILM COATED ORAL at 22:46

## 2025-06-05 RX ADMIN — LOSARTAN POTASSIUM 25 MG: 25 TABLET, FILM COATED ORAL at 12:20

## 2025-06-05 RX ADMIN — SODIUM CHLORIDE 50 ML/HR: 9 INJECTION, SOLUTION INTRAVENOUS at 03:35

## 2025-06-05 RX ADMIN — POTASSIUM CHLORIDE 40 MEQ: 1500 TABLET, EXTENDED RELEASE ORAL at 22:47

## 2025-06-05 RX ADMIN — POTASSIUM CHLORIDE AND SODIUM CHLORIDE 100 ML/HR: 900; 150 INJECTION, SOLUTION INTRAVENOUS at 12:20

## 2025-06-05 RX ADMIN — POTASSIUM CHLORIDE 20 MEQ: 1500 TABLET, EXTENDED RELEASE ORAL at 08:29

## 2025-06-05 NOTE — H&P
Patient Name:  Wendie Houser  YOB: 1935  MRN:  [id number]  Admit Date:  6/4/2025  Patient Care Team:  Pallares, Clara Ann, MD as PCP - General (Internal Medicine)  Diego Veronica MD as Consulting Physician (Urology)        Chief Complaint   Patient presents with    Weakness - Generalized    Diarrhea   Duration 1 week    History Present Illness     A pleasant 89 years old female with a past history of hypertension/dementia/panic disorder/left lower extremity weakness/obstructive sleep apnea/degenerative disc disease of the lumbar spine/IBS/hiatal hernia/GERD/peptic ulcer disease/diverticular disease/hypothyroidism/DM2/anemia who presents from an assisted living on account of diarrhea for the last 1 week associated with weakness.  She describes the diarrhea as watery without fresh bright blood per rectum or melena.  Decreased appetite.  No fever or chills.  No abdominal pain.  No nausea or vomiting.  The patient states that she has fallen down because of her weakness as her legs could not support her.  She did describe dizziness.  No head trauma.  No loss of consciousness.  No seizures.  She does describe right lower extremity weakness which she stated is old.  Positive for blurred vision.  No double vision or loss of the vision.  No slurring of the speech.  In the emergency department chest x-ray revealed no acute disease with right hemidiaphragm elevation.  Troponins negative x 2.  Magnesium normal.  Procalcitonin normal.  CBC normal.  Lactic acid normal.  Lipase normal.  CMP normal except for potassium of 2.9 and a blood sugar of 105.  Respiratory PCR panel is negative.  UA negative for UTI.  Stool for C. difficile is negative.  The stool for PCR is negative.  Patient was subsequently admitted.        Review of Systems   GI.  As above.  CNS.  As above.  .  No dysuria or hematuria.  Cardio vascular/respiratory.  No chest pain/no palpitations/no ankle edema/no cough/no wheeze/no hemoptysis.   Patient describes occasional dyspnea on exertion.  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 Cain    ENDOSCOPY  04/15/2011    ENDOSCOPY N/A 5/19/2024    Procedure: ESOPHAGOGASTRODUODENOSCOPY;  Surgeon: Homar Cain MD;  Location: [facility] ENDOSCOPY;  Service: Gastroenterology;  Laterality: N/A;  PRE - abnormal imaging  POST - duodenal ulcers    EYE SURGERY  2009    GALLBLADDER SURGERY  1960    Open    TOTAL KNEE ARTHROPLASTY Left 2012    Dr. Soler    VAGINAL HYSTERECTOMY  1999     Family History   Problem Relation Age of Onset    Other Mother         cardiac disorder    Hypertension Mother     Heart disease Mother     Hypertension Sister     Heart attack Sister     Cancer Brother     Hypertension Brother     Heart attack Brother     Heart disease Father      Social History     Tobacco Use    Smoking status: Never    Smokeless tobacco: Never   Vaping Use    Vaping status: Never Used   Substance Use Topics    Alcohol use: No    Drug use: No     No current facility-administered medications on file prior to encounter.     Current Outpatient Medications on File Prior to Encounter   Medication Sig Dispense Refill    cetirizine (zyrTEC) 10 MG tablet Take 0.5 tablets by mouth Daily. 30 tablet 2    citalopram (CeleXA) 10 MG tablet Take 1 tablet by mouth Daily.      donepezil  (ARICEPT) 10 MG tablet Take 1 tablet by mouth every night at bedtime. 90 tablet 2    losartan (COZAAR) 50 MG tablet Take 0.5 tablets by mouth 2 (Two) Times a Day. 90 tablet 3    methIMAzole (TAPAZOLE) 5 MG tablet Take one and one half pill daily (Patient taking differently: Take 1 tablet by mouth Every Other Day. Take 1/2 tablet by mouth every other day)      pravastatin (PRAVACHOL) 40 MG tablet Take 1 tablet by mouth Every Night. 90 tablet 3    Sennosides (Senna) 8.6 MG capsule Take 1 tablet by mouth Daily. 90 each 1    vitamin D3 125 MCG (5000 UT) capsule capsule Take 2,000 Units by mouth Daily.      acetaminophen (TYLENOL) 500 MG tablet Take 1 tablet by mouth 2 (Two) Times a Day As Needed for Mild Pain . (Patient not taking: Reported on 1/28/2025) 120 tablet 1    cyclobenzaprine (FLEXERIL) 5 MG tablet Take 1 tablet by mouth 3 (Three) Times a Day As Needed for Muscle Spasms. for muscle spams 30 tablet 3    glucose blood test strip 1 each by Other route Daily. 50 each 12    glucose monitor monitoring kit 1 each Daily. 1 each 11    HYDROcodone-acetaminophen (NORCO) 5-325 MG per tablet Take 1 tablet by mouth 2 (Two) Times a Day As Needed for Moderate Pain . 40 tablet 0    Lancets misc 1 each Daily. 100 each 5    lidocaine (LIDODERM) 5 % Place 1 patch on the skin as directed by provider Daily. Remove & Discard patch within 12 hours or as directed by MD 6 each 0    magnesium oxide (MAG-OX) 400 MG tablet Take 1 tablet by mouth Daily. 14 tablet 0    methocarbamol (ROBAXIN) 750 MG tablet Take 1 tablet by mouth 3 (Three) Times a Day As Needed for Muscle Spasms. 15 tablet 0    metroNIDAZOLE (FLAGYL) 500 MG tablet Take 1 tablet by mouth 2 (Two) Times a Day. 14 tablet 0    multivitamin (MULTI VITAMIN DAILY PO) Take 1 tablet by mouth Daily. 90 tablet 3    multivitamin (THERAGRAN) tablet tablet Take 1 tablet by mouth Daily.      pantoprazole (PROTONIX) 40 MG EC tablet Take 1 tablet by mouth Daily. 90 tablet 3    potassium  chloride (KLOR-CON M10) 10 MEQ CR tablet Take 1 tablet by mouth 2 (Two) Times a Day.      predniSONE (DELTASONE) 10 MG tablet Take by oral route for 3 days.       No Known Allergies    Objective    Objective     Vital Signs  Temp:  [97.6 °F (36.4 °C)-98.4 °F (36.9 °C)] 98.4 °F (36.9 °C)  Heart Rate:  [68-84] 84  Resp:  [16-18] 16  BP: (125-195)/(61-91) 192/91  SpO2:  [90 %-98 %] 98 %  on   ;   Device (Oxygen Therapy): room air  There is no height or weight on file to calculate BMI.    Physical Exam  General.  Elderly female.  She is alert and oriented times 3 out of 4.  In no apparent pain/distress/diaphoresis.  Normal mood and affect.  Eyes.  Status post bilateral cataract surgery.  Pupils equal round and reactive.  Intact extraocular musculature.  No pallor or jaundice  Oral cavity.  Moist mucous membrane.  Neck.  Supple.  No JVD.  No lymphadenopathy or thyromegaly  Cardiac vascular.  Regular rate and rhythm with no gallops or murmurs  Chest.  Clear to auscultation bilaterally with no added sounds.  Abdomen.  Soft lax.  No tenderness.  No organomegaly.  No guarding or rebound  Extremities.  No clubbing/cyanosis/edema  CNS.  No acute focal neurological deficits.      Results Review:  I reviewed the patient's new clinical results.  I reviewed the patient's new imaging results and agree with the interpretation.  I reviewed the patient's other test results and agree with the interpretation  I personally viewed and interpreted the patient's EKG/Telemetry data  Discussed with ED provider.    Lab Results (last 24 hours)       Procedure Component Value Units Date/Time    CBC & Differential [[id number]]  (Normal) Collected: 06/04/25 1956    Specimen: Blood Updated: 06/04/25 2008    Narrative:      The following orders were created for panel order CBC & Differential.  Procedure                               Abnormality         Status                     ---------                               -----------         ------                      CBC Auto Differential[[id number]]        Normal              Final result                 Please view results for these tests on the individual orders.    Comprehensive Metabolic Panel [[id number]]  (Abnormal) Collected: 06/04/25 1956    Specimen: Blood Updated: 06/04/25 2033     Glucose 105 mg/dL      BUN 9.0 mg/dL      Creatinine 0.84 mg/dL      Sodium 139 mmol/L      Potassium 2.9 mmol/L      Comment: Slight hemolysis detected by analyzer. Result may be falsely elevated.        Chloride 104 mmol/L      CO2 26.6 mmol/L      Calcium 9.1 mg/dL      Total Protein 6.7 g/dL      Albumin 3.6 g/dL      ALT (SGPT) 16 U/L      AST (SGOT) 24 U/L      Alkaline Phosphatase 72 U/L      Total Bilirubin 0.2 mg/dL      Globulin 3.1 gm/dL      A/G Ratio 1.2 g/dL      BUN/Creatinine Ratio 10.7     Anion Gap 8.4 mmol/L      eGFR 66.9 mL/min/1.73     Narrative:      GFR Categories in Chronic Kidney Disease (CKD)              GFR Category          GFR (mL/min/1.73)    Interpretation  G1                    90 or greater        Normal or high (1)  G2                    60-89                Mild decrease (1)  G3a                   45-59                Mild to moderate decrease  G3b                   30-44                Moderate to severe decrease  G4                    15-29                Severe decrease  G5                    14 or less           Kidney failure    (1)In the absence of evidence of kidney disease, neither GFR category G1 or G2 fulfill the criteria for CKD.    eGFR calculation 2021 CKD-EPI creatinine equation, which does not include race as a factor    Lipase [[id number]]  (Normal) Collected: 06/04/25 1956    Specimen: Blood Updated: 06/04/25 2033     Lipase 46 U/L     Lactic Acid, Plasma [[id number]]  (Normal) Collected: 06/04/25 1956    Specimen: Blood Updated: 06/04/25 2030     Lactate 1.6 mmol/L     CBC Auto Differential [[id number]]  (Normal) Collected: 06/04/25 1956    Specimen: Blood Updated: 06/04/25  "2008     WBC 10.27 10*3/mm3      RBC 4.36 10*6/mm3      Hemoglobin 12.5 g/dL      Hematocrit 39.5 %      MCV 90.6 fL      MCH 28.7 pg      MCHC 31.6 g/dL      RDW 13.7 %      RDW-SD 45.3 fl      MPV 10.4 fL      Platelets 220 10*3/mm3      nRBC 0.0 /100 WBC     Procalcitonin [[id number]]  (Normal) Collected: 06/04/25 1956    Specimen: Blood Updated: 06/04/25 2038     Procalcitonin 0.06 ng/mL     Narrative:      As a Marker for Sepsis (Non-Neonates):    1. <0.5 ng/mL represents a low risk of severe sepsis and/or septic shock.  2. >2 ng/mL represents a high risk of severe sepsis and/or septic shock.    As a Marker for Lower Respiratory Tract Infections that require antibiotic therapy:    PCT on Admission    Antibiotic Therapy       6-12 Hrs later    >0.5                Strongly Recommended  >0.25 - <0.5        Recommended   0.1 - 0.25          Discouraged              Remeasure/reassess PCT  <0.1                Strongly Discouraged     Remeasure/reassess PCT    As 28 day mortality risk marker: \"Change in Procalcitonin Result\" (>80% or <=80%) if Day 0 (or Day 1) and Day 4 values are available. Refer to http://www.[employer]-pct-calculator.com    Change in PCT <=80%  A decrease of PCT levels below or equal to 80% defines a positive change in PCT test result representing a higher risk for 28-day all-cause mortality of patients diagnosed with severe sepsis for septic shock.    Change in PCT >80%  A decrease of PCT levels of more than 80% defines a negative change in PCT result representing a lower risk for 28-day all-cause mortality of patients diagnosed with severe sepsis or septic shock.       Magnesium [[id number]]  (Normal) Collected: 06/04/25 1956    Specimen: Blood Updated: 06/04/25 2033     Magnesium 1.6 mg/dL     High Sensitivity Troponin T [[id number]]  (Normal) Collected: 06/04/25 1956    Specimen: Blood Updated: 06/04/25 2038     HS Troponin T 11 ng/L     Narrative:      High Sensitive Troponin T Reference " Range:  <14.0 ng/L- Negative Female for AMI  <22.0 ng/L- Negative Male for AMI  >=14 - Abnormal Female indicating possible myocardial injury.  >=22 - Abnormal Male indicating possible myocardial injury.   Clinicians would have to utilize clinical acumen, EKG, Troponin, and serial changes to determine if it is an Acute Myocardial Infarction or myocardial injury due to an underlying chronic condition.         Manual Differential [[id number]]  (Abnormal) Collected: 06/04/25 1956    Specimen: Blood Updated: 06/04/25 2049     Neutrophil % 37.8 %      Lymphocyte % 44.9 %      Monocyte % 11.2 %      Eosinophil % 2.0 %      Basophil % 1.0 %      Atypical Lymphocyte % 3.1 %      Neutrophils Absolute 3.75 10*3/mm3      Lymphocytes Absolute 4.76 10*3/mm3      Monocytes Absolute 1.11 10*3/mm3      Eosinophils Absolute 0.20 10*3/mm3      Basophils Absolute 0.10 10*3/mm3      Dacrocytes Slight/1+     WBC Morphology Normal     Platelet Morphology Normal    Respiratory Panel PCR w/COVID-19(SARS-CoV-2) [facility] In-House, NP Swab in [facility], 2 HR TAT - Swab, Nasopharynx [[id number]]  (Normal) Collected: 06/04/25 1957    Specimen: Swab from Nasopharynx Updated: 06/04/25 2054     ADENOVIRUS, PCR Not Detected     Coronavirus 229E Not Detected     Coronavirus HKU1 Not Detected     Coronavirus NL63 Not Detected     Coronavirus OC43 Not Detected     COVID19 Not Detected     Human Metapneumovirus Not Detected     Human Rhinovirus/Enterovirus Not Detected     Influenza A PCR Not Detected     Influenza B PCR Not Detected     Parainfluenza Virus 1 Not Detected     Parainfluenza Virus 2 Not Detected     Parainfluenza Virus 3 Not Detected     Parainfluenza Virus 4 Not Detected     RSV, PCR Not Detected     Bordetella pertussis pcr Not Detected     Bordetella parapertussis PCR Not Detected     Chlamydophila pneumoniae PCR Not Detected     Mycoplasma pneumo by PCR Not Detected    Narrative:      In the setting of a positive  respiratory panel with a viral infection PLUS a negative procalcitonin without other underlying concern for bacterial infection, consider observing off antibiotics or discontinuation of antibiotics and continue supportive care. If the respiratory panel is positive for atypical bacterial infection (Bordetella pertussis, Chlamydophila pneumoniae, or Mycoplasma pneumoniae), consider antibiotic de-escalation to target atypical bacterial infection.    High Sensitivity Troponin T 1Hr [[id number]]  (Normal) Collected: 06/04/25 2140    Specimen: Blood Updated: 06/04/25 2210     HS Troponin T 12 ng/L      Troponin T Numeric Delta 1 ng/L     Narrative:      High Sensitive Troponin T Reference Range:  <14.0 ng/L- Negative Female for AMI  <22.0 ng/L- Negative Male for AMI  >=14 - Abnormal Female indicating possible myocardial injury.  >=22 - Abnormal Male indicating possible myocardial injury.   Clinicians would have to utilize clinical acumen, EKG, Troponin, and serial changes to determine if it is an Acute Myocardial Infarction or myocardial injury due to an underlying chronic condition.         Magnesium [[id number]]  (Normal) Collected: 06/04/25 2140    Specimen: Blood Updated: 06/05/25 0155     Magnesium 1.7 mg/dL     Urinalysis With Microscopic If Indicated (No Culture) - Urine, Clean Catch [[id number]]  (Abnormal) Collected: 06/04/25 2155    Specimen: Urine, Clean Catch Updated: 06/04/25 2217     Color, UA Yellow     Appearance, UA Clear     pH, UA 6.5     Specific Gravity, UA 1.015     Glucose, UA Negative     Ketones, UA Negative     Bilirubin, UA Negative     Blood, UA Negative     Protein, UA Negative     Leuk Esterase, UA Trace     Nitrite, UA Negative     Urobilinogen, UA 1.0 E.U./dL    Urinalysis, Microscopic Only - Urine, Clean Catch [[id number]]  (Abnormal) Collected: 06/04/25 2155    Specimen: Urine, Clean Catch Updated: 06/04/25 2243     RBC, UA 0-2 /HPF      WBC, UA 3-5 /HPF      Bacteria, UA None Seen /HPF       Squamous Epithelial Cells, UA 3-6 /HPF      Hyaline Casts, UA None Seen /LPF      Methodology Automated Microscopy    Gastrointestinal Panel, PCR - Stool, Per Rectum [[id number]]  (Normal) Collected: 06/05/25 0336    Specimen: Stool from Per Rectum Updated: 06/05/25 0605     Campylobacter Not Detected     Plesiomonas shigelloides Not Detected     Salmonella Not Detected     Vibrio Not Detected     Vibrio cholerae Not Detected     Yersinia enterocolitica Not Detected     Enteroaggregative E. coli (EAEC) Not Detected     Enteropathogenic E. coli (EPEC) Not Detected     Enterotoxigenic E. coli (ETEC) lt/st Not Detected     Shiga-like toxin-producing E. coli (STEC) stx1/stx2 Not Detected     Shigella/Enteroinvasive E. coli (EIEC) Not Detected     Cryptosporidium Not Detected     Cyclospora cayetanensis Not Detected     Entamoeba histolytica Not Detected     Giardia lamblia Not Detected     Adenovirus F40/41 Not Detected     Astrovirus Not Detected     Norovirus GI/GII Not Detected     Rotavirus A Not Detected     Sapovirus (I, II, IV or V) Not Detected    Narrative:      If Aeromonas, Staphylococcus aureus or Bacillus cereus are suspected, please order [id number]: Stool Culture, Aeromonas, S aureus, B Cereus.    Clostridioides difficile Toxin - Stool, Per Rectum [[id number]]  (Normal) Collected: 06/05/25 0336    Specimen: Stool from Per Rectum Updated: 06/05/25 0534    Narrative:      The following orders were created for panel order Clostridioides difficile Toxin - Stool, Per Rectum.  Procedure                               Abnormality         Status                     ---------                               -----------         ------                     Clostridioides difficile...[[id number]]  Normal              Final result                 Please view results for these tests on the individual orders.    Clostridioides difficile Toxin, PCR - Stool, Per Rectum [[id number]]  (Normal) Collected: 06/05/25 0336    Specimen:  Stool from Per Rectum Updated: 06/05/25 0534     Toxigenic C. difficile by PCR Negative    Narrative:      The result indicates the absence of toxigenic C. difficile from stool specimen.     Basic Metabolic Panel [[id number]]  (Abnormal) Collected: 06/05/25 0712    Specimen: Blood Updated: 06/05/25 0744     Glucose 106 mg/dL      BUN 7.0 mg/dL      Creatinine 0.74 mg/dL      Sodium 142 mmol/L      Potassium 3.3 mmol/L      Chloride 107 mmol/L      CO2 26.9 mmol/L      Calcium 8.6 mg/dL      BUN/Creatinine Ratio 9.5     Anion Gap 8.1 mmol/L      eGFR 77.9 mL/min/1.73     Narrative:      GFR Categories in Chronic Kidney Disease (CKD)              GFR Category          GFR (mL/min/1.73)    Interpretation  G1                    90 or greater        Normal or high (1)  G2                    60-89                Mild decrease (1)  G3a                   45-59                Mild to moderate decrease  G3b                   30-44                Moderate to severe decrease  G4                    15-29                Severe decrease  G5                    14 or less           Kidney failure    (1)In the absence of evidence of kidney disease, neither GFR category G1 or G2 fulfill the criteria for CKD.    eGFR calculation 2021 CKD-EPI creatinine equation, which does not include race as a factor    TSH [[id number]] Collected: 06/05/25 0712    Specimen: Blood Updated: 06/05/25 1101    CBC (No Diff) [[id number]]  (Abnormal) Collected: 06/05/25 0713    Specimen: Blood Updated: 06/05/25 0724     WBC 9.18 10*3/mm3      RBC 4.13 10*6/mm3      Hemoglobin 11.6 g/dL      Hematocrit 36.9 %      MCV 89.3 fL      MCH 28.1 pg      MCHC 31.4 g/dL      RDW 13.8 %      RDW-SD 44.4 fl      MPV 10.1 fL      Platelets 211 10*3/mm3     Hemoglobin A1c [[id number]] Collected: 06/05/25 0713    Specimen: Blood Updated: 06/05/25 1103            Imaging Results (Last 24 Hours)       Procedure Component Value Units Date/Time    XR Chest 1 View [[id number]]  Collected: 06/04/25 2018     Updated: 06/04/25 2022    Narrative:      CXR ONE VIEW      HISTORY: cough     COMPARISON: Chest x-ray 8/10/2022     TECHNIQUE: single portable AP       Impression:         The heart size is within normal limits.     The left lung is normally aerated.     There is elevation of the right hemidiaphragm, but the right lung is  otherwise normally aerated.     This report was finalized on 6/4/2025 8:19 PM by Dr. James Bernabe M.D  on Workstation: [id number]               Results for orders placed during the hospital encounter of 01/17/23    Adult Transthoracic Echo Complete W/ Cont if Necessary Per Protocol    Interpretation Summary    Left ventricular systolic function is normal. Calculated left ventricular EF = 51.1% Left ventricular ejection fraction appears to be 51 - 55%.    Left ventricular diastolic function was normal.    There is calcification of the aortic valve.    Estimated right ventricular systolic pressure from tricuspid regurgitation is normal (<35 mmHg). Calculated right ventricular systolic pressure from tricuspid regurgitation is 20 mmHg.      ECG 12 Lead Syncope   Preliminary Result   HEART RATE=67  bpm   RR Ptdkaalj=471  ms   SD Interval=76  ms   P Horizontal Axis=  deg   P Front Axis=0  deg   QRSD Interval=88  ms   QT Dyzuncat=574  ms   RPxA=424  ms   QRS Axis=-33  deg   T Wave Axis=15  deg   - BORDERLINE ECG -   Sinus rhythm   Short SD interval   Left axis deviation   Low voltage, precordial leads   Borderline T abnormalities, anterior leads   Borderline prolonged QT interval   When compared with ECG of 10-Aug-2022 13:41:47,   Nonspecific significant change   Date and Time of Study:2025-06-04 19:59:59               Assessment/Plan     Active Hospital Problems    Diagnosis  POA    **Weakness [R53.1]  Yes    Orthostatic hypotension [I95.1]  Yes    Diarrhea [R19.7]  Yes    Hypokalemia [E87.6]  Yes    Peptic ulcer disease [K27.9]  Yes    Diverticular disease [K57.90]   Yes    Hyperthyroidism [E05.90]  Yes    Panic disorder [F41.0]  Yes    IBS (irritable bowel syndrome) [K58.9]  Yes    Essential hypertension [I10]  Yes    Late onset Alzheimer's dementia without behavioral disturbance [G30.1, F02.80]  Yes    DM2 (diabetes mellitus, type 2) [E11.9]  Yes    GERD (gastroesophageal reflux disease) [K21.9]  Yes    Hyperlipidemia [E78.5]  Yes      Resolved Hospital Problems   No resolved problems to display.           Weakness leading to fall..  Multifactorial secondary to orthostasis/dehydration/hypokalemia and diarrhea.  Orthostasis are borderline. magnesium and hemoglobin are normal.  No clinical evidence of infection (normal procalcitonin, normal lactic acid, negative respiratory PCR, negative UTI by UA, negative chest x-ray for infiltrate).  Will check TSH/CK.  Will check CT scan of the brain.  Replace potassium.  Consult PT and OT.  Start IV fluids and correct hypokalemia.  Diarrhea in a patient with a history of IBS/peptic ulcer disease/GERD/diverticular disease..  Stool for C. difficile and PCR are negative.  GI examination is benign.  Liver function test and lipase are normal.  Will check CT scan of the abdomen pelvis and continue Protonix.  Hypertension-orthostasis..  Patient with borderline orthostasis secondary to volume depletion.  Blood pressure is on the elevated side.  Will continue losartan and monitor.  IV fluids.  Type 2 diabetes.  Check A1c.  Not on home medications.  Dementia/panic disorder.  CNS examination without focal deficit.  Check CT scan of the head secondary to weakness.  Check TSH/B12/folate/RPR.  Continue Celexa and Aricept.  Hypothyroidism..  Continue Tapazole.  Normal TSH, free T4, total T3 on 5/22/2025.  Hyperlipidemia.  Continue Pravachol and check CK secondary to weakness  VTE prophylaxis.  Sequential compression device.        Discussed my findings and plan of treatment with the patient.  Disposition.  Anticipate discharge back to assisted living in 2  days.  Code Status full code.       Flaquito Robles MD  [facility]  06/05/25  11:03 EDT

## 2025-06-05 NOTE — CONSULTS
Date of Consultation: 25    Referral Provider: Tye Kirby MD     Reason for Consultation: Orthostatic hypotension     Encounter Provider: MARK López    Group of Service: [facility]     Patient Name: Wendie Houser    :1935    Chief complaint: Fall      History of Present Illness: Wendie Houser is an 89 year old who is followed by [facility]. She has a past medical history that is significant for dementia, GERD, hypertension, irritable bowel syndrome, anxiety, hyperlipidemia, CHF, and thyroid disease.     She presented to the ED on 25 with complaints of generalized weakness. She has had diarrhea for over a week. She denies chest pain or discomfort, palpitations, shortness of breath, fever, chills. She has had a nonproductive cough.     She fell while getting out of bed, reports she just felt weak and went down. She did not lose consciousness, she did not hit her head.     HS troponin 12 then 11. proBNP 225. Potassium 2.9. Respiratory viral panel negative. Chest xray with no acute findings. EKG showed SR with a rate of 67.     She reportedly had positive orthostatic vitals in the ED with a decrease in systolic BP to 90 with standing. She has been receiving IV fluids. Was ambulating in the room on my exam. She denies chest pain or discomfort, palpitations, or shortness of breath.       Echocardiogram 23    Left ventricular systolic function is normal. Calculated left ventricular EF = 51.1% Left ventricular ejection fraction appears to be 51 - 55%.    Left ventricular diastolic function was normal.    There is calcification of the aortic valve.    Estimated right ventricular systolic pressure from tricuspid regurgitation is normal (<35 mmHg). Calculated right ventricular systolic pressure from tricuspid regurgitation is 20 mmHg.    Stress Test 22   Summary    Equivocal pharmacological stress SPECT Myocardial Perfusion Imaging.    The study  is technically difficult due to significant GI uptake of    radioisotope and absence of prone imaging.    A small area of mild intensity ischemia in the mid inferior wall cannot be    excluded.    No evidence of prior infarction.      Summary of LV Function    Normal LV systolic function.      Risk Stratification    This is a low risk study.     Past Medical History:   Diagnosis Date    Anemia     Arthritis     CHF (congestive heart failure)     Congestive heart failure     Depression     Diabetes mellitus     type 2    Diverticulitis of colon 08/25/2016    Acute uncomplicated diverticulitis at the distal transverse colon    Early onset Alzheimer's dementia     GERD (gastroesophageal reflux disease)     Heart disease     Hyperlipidemia     Hypertension     Hypothyroidism     Kidney disease     Mixed anxiety depressive disorder 10/23/2018    Panic attack 07/27/2021    Sleep apnea          Past Surgical History:   Procedure Laterality Date    BREAST CYST EXCISION Right 1989    Benign    COLONOSCOPY  2011    Dr. Cain    ENDOSCOPY  04/15/2011    ENDOSCOPY N/A 5/19/2024    Procedure: ESOPHAGOGASTRODUODENOSCOPY;  Surgeon: Homar Cain MD;  Location: [facility] ENDOSCOPY;  Service: Gastroenterology;  Laterality: N/A;  PRE - abnormal imaging  POST - duodenal ulcers    EYE SURGERY  2009    GALLBLADDER SURGERY  1960    Open    TOTAL KNEE ARTHROPLASTY Left 2012    Dr. Soler    VAGINAL HYSTERECTOMY  1999         No Known Allergies      No current facility-administered medications on file prior to encounter.     Current Outpatient Medications on File Prior to Encounter   Medication Sig Dispense Refill    cetirizine (zyrTEC) 10 MG tablet Take 0.5 tablets by mouth Daily. 30 tablet 2    citalopram (CeleXA) 10 MG tablet Take 1 tablet by mouth Daily.      donepezil (ARICEPT) 10 MG tablet Take 1 tablet by mouth every night at bedtime. 90 tablet 2    losartan (COZAAR) 50 MG tablet Take 0.5 tablets by mouth 2 (Two) Times a Day. 90  tablet 3    methIMAzole (TAPAZOLE) 5 MG tablet Take one and one half pill daily (Patient taking differently: Take 1 tablet by mouth Every Other Day. Take 1/2 tablet by mouth every other day)      pravastatin (PRAVACHOL) 40 MG tablet Take 1 tablet by mouth Every Night. 90 tablet 3    Sennosides (Senna) 8.6 MG capsule Take 1 tablet by mouth Daily. 90 each 1    vitamin D3 125 MCG (5000 UT) capsule capsule Take 2,000 Units by mouth Daily.      acetaminophen (TYLENOL) 500 MG tablet Take 1 tablet by mouth 2 (Two) Times a Day As Needed for Mild Pain . (Patient not taking: Reported on 1/28/2025) 120 tablet 1    cyclobenzaprine (FLEXERIL) 5 MG tablet Take 1 tablet by mouth 3 (Three) Times a Day As Needed for Muscle Spasms. for muscle spams 30 tablet 3    glucose blood test strip 1 each by Other route Daily. 50 each 12    glucose monitor monitoring kit 1 each Daily. 1 each 11    HYDROcodone-acetaminophen (NORCO) 5-325 MG per tablet Take 1 tablet by mouth 2 (Two) Times a Day As Needed for Moderate Pain . 40 tablet 0    Lancets misc 1 each Daily. 100 each 5    lidocaine (LIDODERM) 5 % Place 1 patch on the skin as directed by provider Daily. Remove & Discard patch within 12 hours or as directed by MD 6 each 0    magnesium oxide (MAG-OX) 400 MG tablet Take 1 tablet by mouth Daily. 14 tablet 0    methocarbamol (ROBAXIN) 750 MG tablet Take 1 tablet by mouth 3 (Three) Times a Day As Needed for Muscle Spasms. 15 tablet 0    metroNIDAZOLE (FLAGYL) 500 MG tablet Take 1 tablet by mouth 2 (Two) Times a Day. 14 tablet 0    multivitamin (MULTI VITAMIN DAILY PO) Take 1 tablet by mouth Daily. 90 tablet 3    multivitamin (THERAGRAN) tablet tablet Take 1 tablet by mouth Daily.      pantoprazole (PROTONIX) 40 MG EC tablet Take 1 tablet by mouth Daily. 90 tablet 3    potassium chloride (KLOR-CON M10) 10 MEQ CR tablet Take 1 tablet by mouth 2 (Two) Times a Day.      predniSONE (DELTASONE) 10 MG tablet Take by oral route for 3 days.            Social History     Socioeconomic History    Marital status:    Tobacco Use    Smoking status: Never    Smokeless tobacco: Never   Vaping Use    Vaping status: Never Used   Substance and Sexual Activity    Alcohol use: No    Drug use: No    Sexual activity: Never         Family History   Problem Relation Age of Onset    Other Mother         cardiac disorder    Hypertension Mother     Heart disease Mother     Hypertension Sister     Heart attack Sister     Cancer Brother     Hypertension Brother     Heart attack Brother     Heart disease Father        REVIEW OF SYSTEMS:   12 point ROS was performed and is negative except as outlined in HPI       Objective:     Vitals:    06/05/25 0559 06/05/25 0950 06/05/25 0952 06/05/25 0955   BP: 125/71 (!) 191/90 (!) 188/89 (!) 192/91   BP Location: Right arm Right arm Right arm Right arm   Patient Position: Lying Lying Standing Standing   Pulse: 76 84     Resp: 16 16     Temp: 97.6 °F (36.4 °C) 98.4 °F (36.9 °C)     TempSrc: Oral Oral     SpO2: 98% 98%       There is no height or weight on file to calculate BMI.        Physical Exam  Vitals reviewed.   Constitutional:       General: She is not in acute distress.  HENT:      Head: Normocephalic.      Nose: Nose normal.   Eyes:      Extraocular Movements: Extraocular movements intact.      Pupils: Pupils are equal, round, and reactive to light.   Cardiovascular:      Rate and Rhythm: Normal rate and regular rhythm.      Pulses: Normal pulses.      Heart sounds: Normal heart sounds. Heart sounds not distant. No murmur heard.     No friction rub. No gallop. No S3 or S4 sounds.   Pulmonary:      Effort: Pulmonary effort is normal.      Breath sounds: Normal breath sounds.   Abdominal:      General: Abdomen is flat. Bowel sounds are normal.      Palpations: Abdomen is soft.      Tenderness: There is no abdominal tenderness.   Skin:     General: Skin is warm and dry.   Neurological:      General: No focal deficit present.       Mental Status: She is alert and oriented to person, place, and time. Mental status is at baseline.   Psychiatric:         Mood and Affect: Mood normal.         Behavior: Behavior normal.         Lab Review:                Results from last 7 days   Lab Units 06/05/25  0712   SODIUM mmol/L 142   POTASSIUM mmol/L 3.3*   CHLORIDE mmol/L 107   CO2 mmol/L 26.9   BUN mg/dL 7.0*   CREATININE mg/dL 0.74   GLUCOSE mg/dL 106*   CALCIUM mg/dL 8.6     Results from last 7 days   Lab Units 06/04/25  2140 06/04/25  1956   HSTROP T ng/L 12 11     Results from last 7 days   Lab Units 06/05/25  0713   WBC 10*3/mm3 9.18   HEMOGLOBIN g/dL 11.6*   HEMATOCRIT % 36.9   PLATELETS 10*3/mm3 211             Results from last 7 days   Lab Units 06/04/25  2140   MAGNESIUM mg/dL 1.7           EKG:            Assessment/Plan:   Orthostatic hypotension   Likely secondary to volume depletion due to diarrhea.   Awaiting repeat orthostatics today. She has been receiving IV fluids.   Echocardiogram pending.   Diarrhea   She reports multiple episodes of diarrhea daily for over a week.   Hypertension   BP elevated this morning. Will resume home dose of losartan.   Hyperlipidemia     Await echocardiogram. Suspect her orthostasis is secondary to volume depletion. Cardiology will follow, thank you for this consult.     Karo Grey, APRN   06/05/25

## 2025-06-05 NOTE — PROGRESS NOTES
Discharge Planning Assessment  [facility]     Patient Name: Wendie Houser  MRN: [id number]  Today's Date: 6/5/2025    Admit Date: 6/4/2025    Plan: From/Return to [facility]   Discharge Needs Assessment       Row Name 06/05/25 1232       Living Environment    People in Home facility resident    Current Living Arrangements assisted living facility    Primary Care Provided by self;other (see comments)  Facility resident    Provides Primary Care For no one    Family Caregiver if Needed child(grace), adult;grandchild(grace), adult;other (see comments)  Facility Resident    Quality of Family Relationships helpful;involved;supportive    Able to Return to Prior Arrangements yes       Transition Planning    Patient/Family Anticipates Transition to home  Assisted Living Facility    Transportation Anticipated family or friend will provide       Discharge Needs Assessment    Equipment Currently Used at Home grab bar;shower chair;wheelchair;rollator;other (see comments)  scooter    Concerns to be Addressed no discharge needs identified    Provided Post Acute Provider List? N/A    N/A Provider List Comment Denies needs. Plan is to return home (Assisted Living)                   Discharge Plan       Row Name 06/05/25 1234       Plan    Plan From/Return to [facility]    Patient/Family in Agreement with Plan yes    Plan Comments TYRESE noted. Introduced self and role of CCP. Facesheet verified. Patient lives at [facility] in a one bedroom apartment with living area, bathroom and kitchenette (refridge, sink and microwave). Stated her granddgt assists with bathing but she is able to dress and feed self. Stated she has meals delivered to her apartment. Stated she uses  rollator at all times. Has wheelchair and scooter that she rarely uses. Uses walk-in shower with shower chair and grab bars presnet. Has been to rehab at [facility] in the past. DC plan is to return to her home at  [place]. Stated her son will provide transportation at [place]. Denies any needs/equipment. Msg to [place] with [employer] to confirm.                       Demographic Summary       Row Name 06/05/25 1230       General Information    Admission Type observation    Arrived From home    Required Notices Provided Observation Status Notice    Reason for Consult discharge planning    Preferred Language English                   Functional Status       Row Name 06/05/25 1230       Functional Status    Usual Activity Tolerance moderate    Current Activity Tolerance moderate       Functional Status, IADL    Medications assistive person    Meal Preparation completely dependent    Housekeeping assistive person    Laundry completely dependent    Shopping completely dependent;assistive person       Mental Status    General Appearance WDL WDL       Employment/    Employment Status retired                   Psychosocial       Row Name 06/05/25 1231       Values/Beliefs    Spiritual, Cultural Beliefs, Yarsanism Practices, Values that Affect Care no       Behavior WDL    Behavior WDL WDL       Emotion Mood WDL    Emotion/Mood/Affect WDL WDL       Speech WDL    Speech WDL WDL       Perceptual State WDL    Perceptual State WDL WDL       Coping/Stress    Sources of Support adult child(grace);other family members;other (see comments)  Facility Resident    Reaction to Health Status adjusting    Understanding of Condition and Treatment adequate understanding of treatment       Developmental Stage (Eriksson's Stages of Development)    Developmental Stage Stage 8 (65 years-death/Late Adulthood) Integrity vs. Despair                   Abuse/Neglect       Row Name 06/05/25 1231       Personal Safety    Feels Unsafe at Home or Work/School no    Feels Threatened by Someone no    Does Anyone Try to Keep You From Having Contact with Others or Doing Things Outside Your Home? no    Physical Signs of Abuse Present no               Haydee Wells,  RN

## 2025-06-05 NOTE — CONSULTS
Visit with patient for spiritual care consult. Prayer and supportive conversation welcomed by patient.

## 2025-06-05 NOTE — ED NOTES
Nursing report ED to floor  Wendie Houser  89 y.o.  female    HPI :  HPI  Stated Reason for Visit: weak  History Obtained From: patient, EMS    Chief Complaint  Chief Complaint   Patient presents with    Weakness - Generalized    Diarrhea       Admitting doctor:   Farrukh Mcduffie DO    Admitting diagnosis:   The primary encounter diagnosis was Orthostatic hypotension. Diagnoses of Diarrhea, unspecified type, Hypokalemia, and General weakness were also pertinent to this visit.    Code status:   Current Code Status       Date Active Code Status Order ID Comments User Context       Prior            Allergies:   Patient has no known allergies.    Isolation:   Contact Spore    Intake and Output    Intake/Output Summary (Last 24 hours) at 6/5/2025 0034  Last data filed at 6/4/2025 2124  Gross per 24 hour   Intake 500 ml   Output --   Net 500 ml       Weight:   There were no vitals filed for this visit.    Most recent vitals:   Vitals:    06/04/25 2201 06/04/25 2231 06/04/25 2301 06/04/25 2331   BP: 145/61 138/74 140/69 141/68   Pulse: 68 70 70 68   Resp: 16      Temp:       SpO2: 94% 95% 96% 97%       Active LDAs/IV Access:   Lines, Drains & Airways       Active LDAs       Name Placement date Placement time Site Days    Peripheral IV 06/04/25 2025 22 G Anterior;Distal;Left Forearm 06/04/25 2025  Forearm  less than 1                    Labs (abnormal labs have a star):   Labs Reviewed   COMPREHENSIVE METABOLIC PANEL - Abnormal; Notable for the following components:       Result Value    Glucose 105 (*)     Potassium 2.9 (*)     All other components within normal limits    Narrative:     GFR Categories in Chronic Kidney Disease (CKD)              GFR Category          GFR (mL/min/1.73)    Interpretation  G1                    90 or greater        Normal or high (1)  G2                    60-89                Mild decrease (1)  G3a                   45-59                Mild to moderate decrease  G3b                    30-44                Moderate to severe decrease  G4                    15-29                Severe decrease  G5                    14 or less           Kidney failure    (1)In the absence of evidence of kidney disease, neither GFR category G1 or G2 fulfill the criteria for CKD.    eGFR calculation 2021 CKD-EPI creatinine equation, which does not include race as a factor   URINALYSIS W/ MICROSCOPIC IF INDICATED (NO CULTURE) - Abnormal; Notable for the following components:    Leuk Esterase, UA Trace (*)     All other components within normal limits   MANUAL DIFFERENTIAL - Abnormal; Notable for the following components:    Neutrophil % 37.8 (*)     Lymphocytes Absolute 4.76 (*)     Monocytes Absolute 1.11 (*)     All other components within normal limits   URINALYSIS, MICROSCOPIC ONLY - Abnormal; Notable for the following components:    WBC, UA 3-5 (*)     Squamous Epithelial Cells, UA 3-6 (*)     All other components within normal limits   RESPIRATORY PANEL PCR W/ COVID-19 (SARS-COV-2), NP SWAB IN UTM/VTP, 2 HR TAT - Normal    Narrative:     In the setting of a positive respiratory panel with a viral infection PLUS a negative procalcitonin without other underlying concern for bacterial infection, consider observing off antibiotics or discontinuation of antibiotics and continue supportive care. If the respiratory panel is positive for atypical bacterial infection (Bordetella pertussis, Chlamydophila pneumoniae, or Mycoplasma pneumoniae), consider antibiotic de-escalation to target atypical bacterial infection.   LIPASE - Normal   LACTIC ACID, PLASMA - Normal   CBC WITH AUTO DIFFERENTIAL - Normal   PROCALCITONIN - Normal    Narrative:     As a Marker for Sepsis (Non-Neonates):    1. <0.5 ng/mL represents a low risk of severe sepsis and/or septic shock.  2. >2 ng/mL represents a high risk of severe sepsis and/or septic shock.    As a Marker for Lower Respiratory Tract Infections that require antibiotic therapy:    PCT  "on Admission    Antibiotic Therapy       6-12 Hrs later    >0.5                Strongly Recommended  >0.25 - <0.5        Recommended   0.1 - 0.25          Discouraged              Remeasure/reassess PCT  <0.1                Strongly Discouraged     Remeasure/reassess PCT    As 28 day mortality risk marker: \"Change in Procalcitonin Result\" (>80% or <=80%) if Day 0 (or Day 1) and Day 4 values are available. Refer to http://www.[facility]-pct-calculator.com    Change in PCT <=80%  A decrease of PCT levels below or equal to 80% defines a positive change in PCT test result representing a higher risk for 28-day all-cause mortality of patients diagnosed with severe sepsis for septic shock.    Change in PCT >80%  A decrease of PCT levels of more than 80% defines a negative change in PCT result representing a lower risk for 28-day all-cause mortality of patients diagnosed with severe sepsis or septic shock.      MAGNESIUM - Normal   TROPONIN - Normal    Narrative:     High Sensitive Troponin T Reference Range:  <14.0 ng/L- Negative Female for AMI  <22.0 ng/L- Negative Male for AMI  >=14 - Abnormal Female indicating possible myocardial injury.  >=22 - Abnormal Male indicating possible myocardial injury.   Clinicians would have to utilize clinical acumen, EKG, Troponin, and serial changes to determine if it is an Acute Myocardial Infarction or myocardial injury due to an underlying chronic condition.        HIGH SENSITIVITIY TROPONIN T 1HR - Normal    Narrative:     High Sensitive Troponin T Reference Range:  <14.0 ng/L- Negative Female for AMI  <22.0 ng/L- Negative Male for AMI  >=14 - Abnormal Female indicating possible myocardial injury.  >=22 - Abnormal Male indicating possible myocardial injury.   Clinicians would have to utilize clinical acumen, EKG, Troponin, and serial changes to determine if it is an Acute Myocardial Infarction or myocardial injury due to an underlying chronic condition.        GASTROINTESTINAL PANEL, PCR " (PREFERRED) DOES NOT INCLUDE CDIFF   CLOSTRIDIOIDES DIFFICILE TOXIN    Narrative:     The following orders were created for panel order Clostridioides difficile Toxin - Stool, Per Rectum.  Procedure                               Abnormality         Status                     ---------                               -----------         ------                     Clostridioides difficile...[[id number]]                                                   Please view results for these tests on the individual orders.   CLOSTRIDIOIDES DIFFICILE TOXIN, PCR   RAINBOW DRAW    Narrative:     The following orders were created for panel order [place] Draw.  Procedure                               Abnormality         Status                     ---------                               -----------         ------                     Green Top (Gel)[[id number]]                                  Final result               Lavender Top[[id number]]                                     Final result               Gold Top - SST[[id number]]                                   Final result               Light Blue Top[[id number]]                                   Final result                 Please view results for these tests on the individual orders.   CBC AND DIFFERENTIAL    Narrative:     The following orders were created for panel order CBC & Differential.  Procedure                               Abnormality         Status                     ---------                               -----------         ------                     CBC Auto Differential[[id number]]        Normal              Final result                 Please view results for these tests on the individual orders.   GREEN TOP   LAVENDER TOP   GOLD TOP - SST   LIGHT BLUE TOP       EKG:   ECG 12 Lead Syncope   Preliminary Result   HEART RATE=67  bpm   RR Qqfledvn=056  ms   IN Interval=76  ms   P Horizontal Axis=  deg   P Front Axis=0  deg   QRSD Interval=88  ms   QT Wumszbqz=416  ms    RJqR=783  ms   QRS Axis=-33  deg   T Wave Axis=15  deg   - BORDERLINE ECG -   Sinus rhythm   Short MN interval   Left axis deviation   Low voltage, precordial leads   Borderline T abnormalities, anterior leads   Borderline prolonged QT interval   When compared with ECG of 10-Aug-2022 13:41:47,   Nonspecific significant change   Date and Time of Study:2025-06-04 19:59:59          Meds given in ED:   Medications   sodium chloride 0.9 % flush 10 mL (has no administration in time range)   sodium chloride 0.9 % bolus 1,000 mL (1,000 mL Intravenous New Bag 6/5/25 0032)   sodium chloride 0.9 % bolus 500 mL (0 mL Intravenous Stopped 6/4/25 2124)   potassium chloride (KLOR-CON M20) CR tablet 40 mEq (40 mEq Oral Given 6/4/25 2207)   magnesium oxide (MAG-OX) tablet 800 mg (800 mg Oral Given 6/4/25 2207)       Imaging results:  XR Chest 1 View  Result Date: 6/4/2025   The heart size is within normal limits.  The left lung is normally aerated.  There is elevation of the right hemidiaphragm, but the right lung is otherwise normally aerated.  This report was finalized on 6/4/2025 8:19 PM by Dr. James Bernabe M.D on Workstation: [id number]        Ambulatory status:   -     Social issues:   Social History     Socioeconomic History    Marital status:    Tobacco Use    Smoking status: Never    Smokeless tobacco: Never   Vaping Use    Vaping status: Never Used   Substance and Sexual Activity    Alcohol use: No    Drug use: No    Sexual activity: Never       Peripheral Neurovascular       Neuro Cognitive       Learning       Respiratory       Abdominal Pain       Pain Assessments  Pain (Adult)  (0-10) Pain Rating: Rest: 0    NIH Stroke Scale       Serenity Ramirez RN  06/05/25 00:34 EDT

## 2025-06-05 NOTE — PLAN OF CARE
Goal Outcome Evaluation: Patient a&o. BP elevated, home medication started. Loss of PIV access this evening, IV therapy notified. Pt up w/ assist x1. IVF. Pt having diarrhea, c.diff neg. Plan of care will continue.

## 2025-06-05 NOTE — PLAN OF CARE
Goal Outcome Evaluation:  Plan of Care Reviewed With: patient        Progress: no change  Outcome Evaluation: Patient is alert and oriented x3, stool sample collected, small amt and soft light brown. SCDs on. IS at bedside. Up with assist x1 and gaitbelt to BSC. Unsteady. From assisted living, came in from ER after fall. pulse ox on. IVFs infusing per order.  POC ongoing.

## 2025-06-06 ENCOUNTER — APPOINTMENT (OUTPATIENT)
Dept: CT IMAGING | Facility: HOSPITAL | Age: 89
End: 2025-06-06
Payer: MEDICARE

## 2025-06-06 LAB
ANION GAP SERPL CALCULATED.3IONS-SCNC: 9 MMOL/L (ref 5–15)
BASOPHILS # BLD MANUAL: 0.09 10*3/MM3 (ref 0–0.2)
BASOPHILS # BLD MANUAL: 0.09 10*3/MM3 (ref 0–0.2)
BASOPHILS NFR BLD MANUAL: 1 % (ref 0–1.5)
BASOPHILS NFR BLD MANUAL: 1 % (ref 0–1.5)
BUN SERPL-MCNC: 5 MG/DL (ref 8–23)
BUN/CREAT SERPL: 7 (ref 7–25)
CALCIUM SPEC-SCNC: 8.6 MG/DL (ref 8.6–10.5)
CHLORIDE SERPL-SCNC: 108 MMOL/L (ref 98–107)
CK SERPL-CCNC: 89 U/L (ref 20–180)
CO2 SERPL-SCNC: 24 MMOL/L (ref 22–29)
CREAT SERPL-MCNC: 0.71 MG/DL (ref 0.57–1)
DEPRECATED RDW RBC AUTO: 42.5 FL (ref 37–54)
EGFRCR SERPLBLD CKD-EPI 2021: 81.9 ML/MIN/1.73
EOSINOPHIL # BLD MANUAL: 0.09 10*3/MM3 (ref 0–0.4)
EOSINOPHIL # BLD MANUAL: 0.09 10*3/MM3 (ref 0–0.4)
EOSINOPHIL NFR BLD MANUAL: 1 % (ref 0.3–6.2)
EOSINOPHIL NFR BLD MANUAL: 1 % (ref 0.3–6.2)
ERYTHROCYTE [DISTWIDTH] IN BLOOD BY AUTOMATED COUNT: 13.3 % (ref 12.3–15.4)
GLUCOSE SERPL-MCNC: 101 MG/DL (ref 65–99)
HCT VFR BLD AUTO: 35.7 % (ref 34–46.6)
HGB BLD-MCNC: 11.3 G/DL (ref 12–15.9)
LYMPHOCYTES # BLD MANUAL: 3.11 10*3/MM3 (ref 0.7–3.1)
LYMPHOCYTES # BLD MANUAL: 3.62 10*3/MM3 (ref 0.7–3.1)
LYMPHOCYTES NFR BLD MANUAL: 6 % (ref 5–12)
LYMPHOCYTES NFR BLD MANUAL: 6.2 % (ref 5–12)
MCH RBC QN AUTO: 28 PG (ref 26.6–33)
MCHC RBC AUTO-ENTMCNC: 31.7 G/DL (ref 31.5–35.7)
MCV RBC AUTO: 88.4 FL (ref 79–97)
MONOCYTES # BLD: 0.52 10*3/MM3 (ref 0.1–0.9)
MONOCYTES # BLD: 0.53 10*3/MM3 (ref 0.1–0.9)
NEUTROPHILS # BLD AUTO: 4.31 10*3/MM3 (ref 1.7–7)
NEUTROPHILS # BLD AUTO: 4.35 10*3/MM3 (ref 1.7–7)
NEUTROPHILS NFR BLD MANUAL: 50 % (ref 42.7–76)
NEUTROPHILS NFR BLD MANUAL: 50.5 % (ref 42.7–76)
OTHER CELLS %: 5.2 % (ref 0–0)
PLAT MORPH BLD: NORMAL
PLAT MORPH BLD: NORMAL
PLATELET # BLD AUTO: 235 10*3/MM3 (ref 140–450)
PMV BLD AUTO: 10.2 FL (ref 6–12)
POTASSIUM SERPL-SCNC: 4 MMOL/L (ref 3.5–5.2)
RBC # BLD AUTO: 4.04 10*6/MM3 (ref 3.77–5.28)
RBC MORPH BLD: NORMAL
RBC MORPH BLD: NORMAL
SODIUM SERPL-SCNC: 141 MMOL/L (ref 136–145)
VARIANT LYMPHS NFR BLD MANUAL: 1 % (ref 0–5)
VARIANT LYMPHS NFR BLD MANUAL: 36.1 % (ref 19.6–45.3)
VARIANT LYMPHS NFR BLD MANUAL: 41 % (ref 19.6–45.3)
WBC MORPH BLD: NORMAL
WBC NRBC COR # BLD AUTO: 8.62 10*3/MM3 (ref 3.4–10.8)

## 2025-06-06 PROCEDURE — 97110 THERAPEUTIC EXERCISES: CPT | Performed by: PHYSICAL THERAPIST

## 2025-06-06 PROCEDURE — 25510000001 IOPAMIDOL 61 % SOLUTION: Performed by: INTERNAL MEDICINE

## 2025-06-06 PROCEDURE — G0378 HOSPITAL OBSERVATION PER HR: HCPCS

## 2025-06-06 PROCEDURE — 96365 THER/PROPH/DIAG IV INF INIT: CPT

## 2025-06-06 PROCEDURE — 97162 PT EVAL MOD COMPLEX 30 MIN: CPT | Performed by: PHYSICAL THERAPIST

## 2025-06-06 PROCEDURE — 85025 COMPLETE CBC W/AUTO DIFF WBC: CPT | Performed by: INTERNAL MEDICINE

## 2025-06-06 PROCEDURE — 99204 OFFICE O/P NEW MOD 45 MIN: CPT | Performed by: INTERNAL MEDICINE

## 2025-06-06 PROCEDURE — 85007 BL SMEAR W/DIFF WBC COUNT: CPT | Performed by: INTERNAL MEDICINE

## 2025-06-06 PROCEDURE — 70450 CT HEAD/BRAIN W/O DYE: CPT

## 2025-06-06 PROCEDURE — 82550 ASSAY OF CK (CPK): CPT | Performed by: INTERNAL MEDICINE

## 2025-06-06 PROCEDURE — 74177 CT ABD & PELVIS W/CONTRAST: CPT

## 2025-06-06 PROCEDURE — 97535 SELF CARE MNGMENT TRAINING: CPT

## 2025-06-06 PROCEDURE — 96366 THER/PROPH/DIAG IV INF ADDON: CPT

## 2025-06-06 PROCEDURE — 80048 BASIC METABOLIC PNL TOTAL CA: CPT | Performed by: INTERNAL MEDICINE

## 2025-06-06 PROCEDURE — 96361 HYDRATE IV INFUSION ADD-ON: CPT

## 2025-06-06 PROCEDURE — 25010000002 SODIUM CHLORIDE 0.9 % WITH KCL 20 MEQ 20-0.9 MEQ/L-% SOLUTION: Performed by: INTERNAL MEDICINE

## 2025-06-06 PROCEDURE — 97166 OT EVAL MOD COMPLEX 45 MIN: CPT

## 2025-06-06 RX ORDER — IOPAMIDOL 612 MG/ML
100 INJECTION, SOLUTION INTRAVASCULAR
Status: COMPLETED | OUTPATIENT
Start: 2025-06-06 | End: 2025-06-06

## 2025-06-06 RX ORDER — LOSARTAN POTASSIUM 50 MG/1
50 TABLET ORAL 2 TIMES DAILY
Status: DISCONTINUED | OUTPATIENT
Start: 2025-06-06 | End: 2025-06-07 | Stop reason: HOSPADM

## 2025-06-06 RX ORDER — CHOLESTYRAMINE 4 G/9G
1 POWDER, FOR SUSPENSION ORAL EVERY 12 HOURS SCHEDULED
Status: DISCONTINUED | OUTPATIENT
Start: 2025-06-06 | End: 2025-06-07 | Stop reason: HOSPADM

## 2025-06-06 RX ADMIN — CHOLESTYRAMINE 1 PACKET: 4 POWDER, FOR SUSPENSION ORAL at 14:53

## 2025-06-06 RX ADMIN — HYDROCODONE BITARTRATE AND ACETAMINOPHEN 1 TABLET: 5; 325 TABLET ORAL at 09:25

## 2025-06-06 RX ADMIN — LOSARTAN POTASSIUM 25 MG: 25 TABLET, FILM COATED ORAL at 09:21

## 2025-06-06 RX ADMIN — CHOLESTYRAMINE 1 PACKET: 4 POWDER, FOR SUSPENSION ORAL at 23:30

## 2025-06-06 RX ADMIN — LOSARTAN POTASSIUM 50 MG: 50 TABLET, FILM COATED ORAL at 21:30

## 2025-06-06 RX ADMIN — ACETAMINOPHEN 650 MG: 325 TABLET ORAL at 23:34

## 2025-06-06 RX ADMIN — HYDROCODONE BITARTRATE AND ACETAMINOPHEN 1 TABLET: 5; 325 TABLET ORAL at 00:46

## 2025-06-06 RX ADMIN — POTASSIUM CHLORIDE AND SODIUM CHLORIDE 100 ML/HR: 900; 150 INJECTION, SOLUTION INTRAVENOUS at 07:09

## 2025-06-06 RX ADMIN — HYDROCODONE BITARTRATE AND ACETAMINOPHEN 1 TABLET: 5; 325 TABLET ORAL at 18:21

## 2025-06-06 RX ADMIN — ACETAMINOPHEN 650 MG: 325 TABLET ORAL at 05:57

## 2025-06-06 RX ADMIN — IOPAMIDOL 85 ML: 612 INJECTION, SOLUTION INTRAVENOUS at 08:48

## 2025-06-06 NOTE — PLAN OF CARE
Goal Outcome Evaluation:  Plan of Care Reviewed With: patient, child           Outcome Evaluation: Patient was admitted from assisted living facility with increased weakness and diarrhea for the past week.  Patient reports she is independently mobile and uses a rollator at assisted living facility at baseline.  Patient was up in the chair when PT arrived and she was agreeable to therapy.  2 of her children were present.  Patient stood with contact-guard assist and rolling walker.  She ambulated a short distance with min assist and Rwx.  Gait was slow, antalgic and unsteady.  Patient ambulates with very forward flexed posture, decreased step length and no heel strike noted on right foot.  Patient reports right leg is shorter but denies use of a brace or built-up shoe.  Patient reports a recent fall last week.  She presents with generalized weakness, limited endurance, impaired balance and functional mobility below baseline.  Patient would benefit from PT to address these impairments.  Recommend SNF versus return to [facility] pending progress and level of assistance at [facility].    Anticipated Discharge Disposition (PT): skilled nursing facility, home with home health, assisted living

## 2025-06-06 NOTE — PROGRESS NOTES
"CC: Elevated blood pressure    Interval History: No new acute events overnight      Vital Signs  Temp:  [97 °F (36.1 °C)-98.2 °F (36.8 °C)] 97 °F (36.1 °C)  Heart Rate:  [66-76] 74  Resp:  [16] 16  BP: (137-186)/() 174/84    Intake/Output Summary (Last 24 hours) at 6/6/2025 1112  Last data filed at 6/6/2025 0915  Gross per 24 hour   Intake 1881.67 ml   Output 600 ml   Net 1281.67 ml     Flowsheet Rows      Flowsheet Row First Filed Value   Admission Height 167.6 cm (66\") Documented at 06/05/2025 1424   Admission Weight 80.3 kg (177 lb) Documented at 06/05/2025 1424            PHYSICAL EXAM:  General: No acute distress  Resp:NL Rate, symmetric chest expansion,unlabored, clear  CV:NL rate and rhythm, NL PMI, NL S1 and S2, no Murmur, no gallop, no rub, No JVD.   ABD:Nl sounds, no masses or tenderness, nondistended, no guarding or rebound  Neuro: alert,cooperative and oriented  Extr:Normal pedal pulses, No edema or cyanosis, moves all extremities      Results Review:    Results from last 7 days   Lab Units 06/06/25  0616   SODIUM mmol/L 141   POTASSIUM mmol/L 4.0   CHLORIDE mmol/L 108*   CO2 mmol/L 24.0   BUN mg/dL 5.0*   CREATININE mg/dL 0.71   GLUCOSE mg/dL 101*   CALCIUM mg/dL 8.6     Results from last 7 days   Lab Units 06/06/25  0616 06/04/25  2140 06/04/25  1956   CK TOTAL U/L 89  --   --    HSTROP T ng/L  --  12 11     Results from last 7 days   Lab Units 06/06/25  0616   WBC 10*3/mm3 8.62   HEMOGLOBIN g/dL 11.3*   HEMATOCRIT % 35.7   PLATELETS 10*3/mm3 235             Results from last 7 days   Lab Units 06/04/25  2140   MAGNESIUM mg/dL 1.7         I reviewed the patient's new clinical results.  I personally viewed and interpreted the patient's EKG/Telemetry data        Medication Review:   Meds reviewed    sodium chloride 0.9 % with KCl 20 mEq, 100 mL/hr, Last Rate: 100 mL/hr (06/06/25 0709)      Assessment/Plan:   Orthostatic hypotension with supine hypertension  Diarrhea  Hyperlipidemia  Essential " hypertension    Unremarkable echo on 6/5/2025  Systolic blood pressure in the 170s in semisupine position.  I have increased losartan to home dose-50 mg p.o. daily.  Check orthostatic vitals every day.  Diarrhea per primary  Continue IV fluids because of hypertension  Adjust BP medication as needed.  PT/OT  Cardiology will sign off but call with questions     Tone Hernandez MD  06/06/25  11:12 EDT

## 2025-06-06 NOTE — PLAN OF CARE
Goal Outcome Evaluation:  Plan of Care Reviewed With: patient        Progress: no change  Outcome Evaluation: Patient is alert and oriented x3, having continuous BMs. SCDs on. IS at bedside. Up with assist x1 and gaitbelt to BSC. Waiting on new PIV site. CT can to be completed. POC ongoing.

## 2025-06-06 NOTE — THERAPY EVALUATION
Patient Name: Wendie Houser  : 1935    MRN: [id number]                              Today's Date: 2025       Admit Date: 2025    Visit Dx:     ICD-10-CM ICD-9-CM   1. Orthostatic hypotension  I95.1 458.0   2. Diarrhea, unspecified type  R19.7 787.91   3. Hypokalemia  E87.6 276.8   4. General weakness  R53.1 780.79   5. Decreased activities of daily living (ADL)  Z78.9 V49.89     Patient Active Problem List   Diagnosis    Arthritis    Late onset Alzheimer's dementia without behavioral disturbance    Weakness    GERD (gastroesophageal reflux disease)    Hyperlipidemia    Essential hypertension    Hypomagnesemia    Sleep apnea    DM2 (diabetes mellitus, type 2)    Renal insufficiency    Hypothyroidism    Lumbar canal stenosis    Shortness of breath    Disorder of electrolytes    Hiatal hernia    Hyperkalemia    Hypoglycemia    IBS (irritable bowel syndrome)    Allergic rhinitis    High risk medication use    Polypharmacy    Obesity (BMI 30-39.9)    Panic disorder    Weakness of left lower extremity    Gastroduodenitis    Generalized abdominal pain    Lactic acidosis    Upper abdominal pain    Nausea and vomiting    Abnormal CT of the abdomen    Orthostatic hypotension    Diarrhea    Hypokalemia    Peptic ulcer disease    Diverticular disease    Hyperthyroidism     Past Medical History:   Diagnosis Date    Anemia     Arthritis     CHF (congestive heart failure)     Congestive heart failure     Depression     Diabetes mellitus     type 2    Diverticulitis of colon 2016    Acute uncomplicated diverticulitis at the distal transverse colon    Early onset Alzheimer's dementia     GERD (gastroesophageal reflux disease)     Heart disease     Hyperlipidemia     Hypertension     Hypothyroidism     Kidney disease     Mixed 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 Cain    ENDOSCOPY  04/15/2011    ENDOSCOPY N/A 5/19/2024    Procedure: ESOPHAGOGASTRODUODENOSCOPY;  Surgeon: Homar Cain MD;  Location: [facility] ENDOSCOPY;  Service: Gastroenterology;  Laterality: N/A;  PRE - abnormal imaging  POST - duodenal ulcers    EYE SURGERY  2009    GALLBLADDER SURGERY  1960    Open    TOTAL KNEE ARTHROPLASTY Left 2012    Dr. Soler    VAGINAL HYSTERECTOMY  1999      General Information       Row Name 06/06/25 1224          OT Time and Intention    Document Type evaluation  -ES     Mode of Treatment individual therapy;occupational therapy  -ES     Patient Effort good  -ES       Row Name 06/06/25 1224          General Information    Patient Profile Reviewed yes  -ES     Prior Level of Function independent:;ADL's;all household mobility  Patient is [facility] resident. Reports she is independent with ADLs, facility assist with IADLs. Rollator for functional mobility, reports she also has w/c. Handicap accessible bathroom set up. No home O2 use. x1 fall in last month.  -ES     Existing Precautions/Restrictions fall  -ES     Barriers to Rehab none identified  -ES       Row Name 06/06/25 1224          Living Environment    Current Living Arrangements assisted living facility  -ES       Row Name 06/06/25 1224          Cognition    Orientation Status (Cognition) oriented x 3  patient pleasant and cooperative, agreeable to therapy evaluation.  -ES       Row Name 06/06/25 1224          Safety Issues/Impairments Affecting Functional Mobility    Impairments Affecting Function (Mobility) balance;endurance/activity tolerance;strength  -ES               User Key  (r) = Recorded By, (t) = Taken By, (c) = Cosigned By      Initials Name Provider Type    ES Phyllis Bocanegra, OTR/L, CSRS Occupational Therapist                     Mobility/ADL's       Row Name 06/06/25 1227          Bed Mobility    Bed Mobility supine-sit  -ES     Supine-Sit [place] (Bed Mobility) minimum assist (75%  patient effort);1 person assist  -ES       Row Name 06/06/25 1227          Transfers    Transfers sit-stand transfer;toilet transfer  -ES       Row Name 06/06/25 1227          Sit-Stand Transfer    Sit-Stand [place] (Transfers) 1 person assist;contact guard  -ES     Assistive Device (Sit-Stand Transfers) walker, front-wheeled  -ES       Row Name 06/06/25 1227          Toilet Transfer    Type (Toilet Transfer) stand pivot/stand step  -ES     [place] Level (Toilet Transfer) contact guard;1 person assist  -ES       Row Name 06/06/25 1227          Functional Mobility    Functional Mobility- Ind. Level contact guard assist;1 person  -ES     Functional Mobility- Device walker, front-wheeled  -ES       Row Name 06/06/25 1227          Activities of Daily Living    BADL Assessment/Intervention toileting  -ES       Row Name 06/06/25 1227          Toileting Assessment/Training    [place] Level (Toileting) toileting skills;adjust/manage clothing;perform perineal hygiene;change pad/brief;maximum assist (25% patient effort)  -ES     Position (Toileting) unsupported sitting;supported standing  -ES               User Key  (r) = Recorded By, (t) = Taken By, (c) = Cosigned By      Initials Name Provider Type    ES Phyllis Bocanegra, OTR/L, CSRS Occupational Therapist                   Obj/Interventions       Row Name 06/06/25 1230          Range of Motion Comprehensive    General Range of Motion bilateral upper extremity ROM WNL  -ES       Row Name 06/06/25 1230          Strength Comprehensive (MMT)    General Manual Muscle Testing (MMT) Assessment upper extremity strength deficits identified;lower extremity strength deficits identified  -ES     Comment, General Manual Muscle Testing (MMT) Assessment generalized weakness  -ES       Row Name 06/06/25 1230          Upper Extremity (Manual Muscle Testing)    Comment, MMT: Upper Extremity BUEs 4/5  -ES       Row Name 06/06/25 1230          Motor Skills    Motor Skills  functional endurance  -ES     Functional Endurance fair  -ES       Row Name 06/06/25 1230          Balance    Balance Assessment sitting dynamic balance;standing static balance;standing dynamic balance  -ES     Dynamic Sitting Balance standby assist  -ES     Position, Sitting Balance unsupported;sitting in chair  -ES     Static Standing Balance contact guard  -ES     Dynamic Standing Balance contact guard  -ES     Position/Device Used, Standing Balance supported;walker, front-wheeled  -ES               User Key  (r) = Recorded By, (t) = Taken By, (c) = Cosigned By      Initials Name Provider Type    ES Phyllis Bocanegra, OTR/L, CSRS Occupational Therapist                   Goals/Plan       Row Name 06/06/25 1240          Bed Mobility Goal 1 (OT)    Activity/Assistive Device (Bed Mobility Goal 1, OT) bed mobility activities, all  -ES     [place] Level/Cues Needed (Bed Mobility Goal 1, OT) independent  -ES     Time Frame (Bed Mobility Goal 1, OT) short term goal (STG);2 weeks  -ES     Progress/Outcomes (Bed Mobility Goal 1, OT) new goal  -ES       Row Name 06/06/25 1240          Transfer Goal 1 (OT)    Activity/Assistive Device (Transfer Goal 1, OT) transfers, all  -ES     [place] Level/Cues Needed (Transfer Goal 1, OT) modified independence  -ES     Time Frame (Transfer Goal 1, OT) short term goal (STG);2 weeks  -ES     Progress/Outcome (Transfer Goal 1, OT) new goal  -ES       Row Name 06/06/25 1240          Dressing Goal 1 (OT)    Activity/Device (Dressing Goal 1, OT) dressing skills, all  -ES     [place]/Cues Needed (Dressing Goal 1, OT) independent  -ES     Time Frame (Dressing Goal 1, OT) short term goal (STG);2 weeks  -ES     Progress/Outcome (Dressing Goal 1, OT) new goal  -ES       Row Name 06/06/25 1240          Toileting Goal 1 (OT)    Activity/Device (Toileting Goal 1, OT) toileting skills, all  -ES     [place] Level/Cues Needed (Toileting Goal 1, OT) independent  -ES     Time Frame  (Toileting Goal 1, OT) short term goal (STG);2 weeks  -ES     Progress/Outcome (Toileting Goal 1, OT) new goal  -ES       Row Name 06/06/25 1240          Grooming Goal 1 (OT)    Activity/Device (Grooming Goal 1, OT) grooming skills, all  -ES     [place] (Grooming Goal 1, OT) independent  -ES     Time Frame (Grooming Goal 1, OT) short term goal (STG);2 weeks  -ES     Progress/Outcome (Grooming Goal 1, OT) new goal  -ES       Row Name 06/06/25 1240          Problem Specific Goal 1 (OT)    Problem Specific Goal 1 (OT) Patient will demonstrate fair plus graded dynamic standing balance in preperation for independent ADL routine completion at time of discharge  -ES     Time Frame (Problem Specific Goal 1, OT) short term goal (STG);2 weeks  -ES     Progress/Outcome (Problem Specific Goal 1, OT) new goal  -ES       Row Name 06/06/25 1240          Therapy Assessment/Plan (OT)    Planned Therapy Interventions (OT) activity tolerance training;BADL retraining;functional balance retraining;occupation/activity based interventions;patient/caregiver education/training;transfer/mobility retraining;strengthening exercise;ROM/therapeutic exercise  -ES               User Key  (r) = Recorded By, (t) = Taken By, (c) = Cosigned By      Initials Name Provider Type    Phyllis Lainez OTR/L, CSRS Occupational Therapist                   Clinical Impression       Row Name 06/06/25 1236          Plan of Care Review    Plan of Care Reviewed With patient  -ES     Outcome Evaluation Patient is 89 year old female presenting to [facility] on 6/4/2025 from [facility] with reports of increased weakness and diarrhea x1 week. At baseline, patient is independent with ADLs, is [facility] resident, and uses rollator for functional mobility. Following a comprehensive Occupational Therapy assessment conducted today, the patient demonstrates a decline from their baseline functional status, exhibiting deficits related to balance, strength, tolerance,  transfers and mobility that significantly impact independence in activities of daily living. The patient would benefit from skilled Occupational Therapy services to enhance safety and facilitate a return to prior level of independence. Return to [facility] is recommended upon discharge from the hospital setting as continued rehabilitation is necessary to address functional deficits and support optimal recovery.  -ES       Row Name 06/06/25 1239          Therapy Assessment/Plan (OT)    Rehab Potential (OT) good  -ES     Criteria for Skilled Therapeutic Interventions Met (OT) yes;meets criteria;skilled treatment is necessary  -ES     Therapy Frequency (OT) 3 times/wk  -ES       Row Name 06/06/25 1239          Therapy Plan Review/Discharge Plan (OT)    Anticipated Discharge Disposition (OT) home with home health  -ES       Row Name 06/06/25 1239          Positioning and Restraints    Pre-Treatment Position in bed  -ES     Post Treatment Position chair  -ES     In Chair reclined;call light within reach;encouraged to call for assist;exit alarm on  -ES               User Key  (r) = Recorded By, (t) = Taken By, (c) = Cosigned By      Initials Name Provider Type    ES Phyllis Bocanegra, OTR/L, CSRS Occupational Therapist                   Outcome Measures       Row Name 06/06/25 1243          How much help from another is currently needed...    Putting on and taking off regular lower body clothing? 3  -ES     Bathing (including washing, rinsing, and drying) 3  -ES     Toileting (which includes using toilet bed pan or urinal) 3  -ES     Putting on and taking off regular upper body clothing 3  -ES     Taking care of personal grooming (such as brushing teeth) 3  -ES     Eating meals 4  -ES     AM-PAC 6 Clicks Score (OT) 19  -ES       Row Name 06/06/25 0921          How much help from another person do you currently need...    Turning from your back to your side while in flat bed without using bedrails? 3  -KS     Moving from lying on  back to sitting on the side of a flat bed without bedrails? 3  -KS     Moving to and from a bed to a chair (including a wheelchair)? 2  -KS     Standing up from a chair using your arms (e.g., wheelchair, bedside chair)? 2  -KS     Climbing 3-5 steps with a railing? 2  -KS     To walk in hospital room? 2  -KS     AM-PAC 6 Clicks Score (PT) 14  -KS       Row Name 06/06/25 1243          Modified [place] Scale    Modified [place] Scale 0 - No Symptoms at all.  -ES       Row Name 06/06/25 1243          Functional Assessment    Outcome Measure Options AM-PAC 6 Clicks Daily Activity (OT);Modified [place]  -ES               User Key  (r) = Recorded By, (t) = Taken By, (c) = Cosigned By      Initials Name Provider Type    Phyllis Lainez, OTR/L, CSRS Occupational Therapist    Lesley Lee, RN Registered Nurse                      OT Recommendation and Plan  Planned Therapy Interventions (OT): activity tolerance training, BADL retraining, functional balance retraining, occupation/activity based interventions, patient/caregiver education/training, transfer/mobility retraining, strengthening exercise, ROM/therapeutic exercise  Therapy Frequency (OT): 3 times/wk  Plan of Care Review  Plan of Care Reviewed With: patient  Outcome Evaluation: Patient is 89 year old female presenting to [facility] on 6/4/2025 from [facility] with reports of increased weakness and diarrhea x1 week. At baseline, patient is independent with ADLs, is [facility] resident, and uses rollator for functional mobility. Following a comprehensive Occupational Therapy assessment conducted today, the patient demonstrates a decline from their baseline functional status, exhibiting deficits related to balance, strength, tolerance, transfers and mobility that significantly impact independence in activities of daily living. The patient would benefit from skilled Occupational Therapy services to enhance safety and facilitate a return to prior level of  independence. Return to [facility] is recommended upon discharge from the hospital setting as continued rehabilitation is necessary to address functional deficits and support optimal recovery.     Time Calculation:   Evaluation Complexity (OT)  Review Occupational Profile/Medical/Therapy History Complexity: expanded/moderate complexity  Assessment, Occupational Performance/Identification of Deficit Complexity: 3-5 performance deficits  Clinical Decision Making Complexity (OT): detailed assessment/moderate complexity  Overall Complexity of Evaluation (OT): moderate complexity     Time Calculation- OT       Row Name 06/06/25 1243             Time Calculation- OT    OT Start Time 1007  -ES      OT Stop Time 1025  -ES      OT Time Calculation (min) 18 min  -ES      Total Timed Code Minutes- OT 10 minute(s)  -ES      OT Received On 06/06/25  -ES      OT - Next Appointment 06/09/25  -ES      OT Goal Re-Cert Due Date 06/20/25  -ES         Timed Charges    39467 - OT Self Care/Mgmt Minutes 10  -ES         Untimed Charges    OT Eval/Re-eval Minutes 8  -ES         Total Minutes    Timed Charges Total Minutes 10  -ES      Untimed Charges Total Minutes 8  -ES       Total Minutes 18  -ES                User Key  (r) = Recorded By, (t) = Taken By, (c) = Cosigned By      Initials Name Provider Type    ES Phyllis Bocanegra, OTR/L, CSRS Occupational Therapist                  Therapy Charges for Today       Code Description Service Date Service Provider Modifiers Qty    09848982784 HC OT SELF CARE/MGMT/TRAIN EA 15 MIN 6/6/2025 Phyllis Bocanegra, OTR/L, CSRS GO 1    [id number] HC OT EVAL MOD COMPLEXITY 3 6/6/2025 Phyllis Bocanegra, OTR/L, CSRS GO 1                 Phyllis Bocanegra OTR/L, CSRS  6/6/2025

## 2025-06-06 NOTE — PLAN OF CARE
Goal Outcome Evaluation:  Plan of Care Reviewed With: patient           Outcome Evaluation: Patient is 89 year old female presenting to [facility] on 6/4/2025 from [facility] with reports of increased weakness and diarrhea x1 week. At baseline, patient is independent with ADLs, is [facility] resident, and uses rollator for functional mobility. Following a comprehensive Occupational Therapy assessment conducted today, the patient demonstrates a decline from their baseline functional status, exhibiting deficits related to balance, strength, tolerance, transfers and mobility that significantly impact independence in activities of daily living. The patient would benefit from skilled Occupational Therapy services to enhance safety and facilitate a return to prior level of independence. Return to [facility] is recommended upon discharge from the hospital setting as continued rehabilitation is necessary to address functional deficits and support optimal recovery.    Anticipated Discharge Disposition (OT): home with home health

## 2025-06-06 NOTE — CONSULTS
"Nutrition Services    Patient Name: Wendie Houser  YOB: 1935  MRN: [id number]  Admission date: 6/4/2025    Assessment Date:  06/06/25    NUTRITION EVALUATION      Reason for Encounter MST score 2+, Nurse Admission Screen   Diagnosis/Problem Admission Diagnosis:  Orthostatic hypotension [I95.1]  Hypokalemia [E87.6]  General weakness [R53.1]  Diarrhea, unspecified type [R19.7]    Problem List:    Weakness    Late onset Alzheimer's dementia without behavioral disturbance    GERD (gastroesophageal reflux disease)    Hyperlipidemia    Essential hypertension    DM2 (diabetes mellitus, type 2)    IBS (irritable bowel syndrome)    Panic disorder    Orthostatic hypotension    Diarrhea    Hypokalemia    Peptic ulcer disease    Diverticular disease    Hyperthyroidism     Narrative 89 yo female, admitted for weakness. Hx of hiatal hernia and gastroduodenitis.     RD visited pt at bedside. Pt was awake and conversational. Pt denies challenges will food insecurity. Pt appetite has been off and on for 6 months.     NFPE completed with pt consent.       PO Diet Diet: Liquid; Clear Liquid; Fluid Consistency: Thin (IDDSI 0)   Allergies NKFA   Supplements n/a   PO Intake % 50-75%       Chewing/Swallowing Difficulty no issues identified at this time and other:teeth missing       Medications reviewed   Labs  reviewed - chloride (108), Hemoglobin (11.3)       Physical Findings alert, oriented     Edema no edema    GI Function diarrhea, last bowel movement: 6/6   Skin Status skin intact    Lines/Drains none   I/O reviewed        Height  Weight  BMI  Weight Trend     Height: 167.6 cm (66\")  Weight: 80.3 kg (177 lb) (06/05/25 1424)  Body mass index is 28.57 kg/m².  Stable    Weight change: No significant changes       NFPE No clinical signs of muscle wasting or fat loss, Consented to exam, Date Completed: 6/6    NFPE completed and not consistent with nutrition diagnosis of malnutrition at this time using AND/ASPEN " criteria.          Nutrition Problem (PES) Problem: Altered GI Function  Etiology: Medical Diagnosis - diabetes mellitus   Signs/Symptoms: Biochemical       Intervention/Plan Monitor and encourage good PO intake.     Advance diet as pt tolerates and per provider recommendation.    RD to follow up per protocol.     Results from last 7 days   Lab Units 06/06/25  0616 06/05/25  1458 06/05/25  0712 06/04/25 1956   SODIUM mmol/L 141  --  142 139   POTASSIUM mmol/L 4.0 3.3* 3.3* 2.9*   CHLORIDE mmol/L 108*  --  107 104   CO2 mmol/L 24.0  --  26.9 26.6   BUN mg/dL 5.0*  --  7.0* 9.0   CREATININE mg/dL 0.71  --  0.74 0.84   CALCIUM mg/dL 8.6  --  8.6 9.1   BILIRUBIN mg/dL  --   --   --  0.2   ALK PHOS U/L  --   --   --  72   ALT (SGPT) U/L  --   --   --  16   AST (SGOT) U/L  --   --   --  24   GLUCOSE mg/dL 101*  --  106* 105*     Results from last 7 days   Lab Units 06/06/25  0616 06/05/25  0713 06/04/25 2140 06/04/25 1956   MAGNESIUM mg/dL  --   --  1.7 1.6   HEMOGLOBIN g/dL 11.3*   < >  --  12.5   HEMATOCRIT % 35.7   < >  --  39.5    < > = values in this interval not displayed.     Lab Results   Component Value Date    HGBA1C 5.60 06/05/2025     Wt Readings from Last 10 Encounters:   06/05/25 80.3 kg (177 lb)   01/28/25 80.7 kg (177 lb 14.4 oz)   06/26/24 80.9 kg (178 lb 6.4 oz)   05/16/24 81.1 kg (178 lb 12.7 oz)   10/09/23 77.1 kg (170 lb)   08/22/23 71.7 kg (158 lb)   01/17/23 72 kg (158 lb 11.7 oz)   08/10/22 72.6 kg (160 lb)   08/10/22 78.9 kg (174 lb)   07/29/22 79.1 kg (174 lb 6.4 oz)       Electronically signed by:  Noa Guzman RD  06/06/25 18:03 EDT

## 2025-06-06 NOTE — PLAN OF CARE
Goal Outcome Evaluation:  Plan of Care Reviewed With: patient        Progress: improving  Outcome Evaluation: BP can be elevated at times, other vitals stable, falls precautions maintained, up in chair, PO pain medication given with relief, up to bedside commode with assist x1 and walker, worked with PT, BM today, saline locked

## 2025-06-06 NOTE — PROGRESS NOTES
Continued Stay Note  [facility]     Patient Name: Wendie Houser  MRN: [id number]  Today's Date: 6/6/2025    Admit Date: 6/4/2025    Plan: From/return to [facility] with outpatient therapy at facility   Discharge Plan       Row Name 06/06/25 1253       Plan    Plan From/return to [facility] with outpatient therapy at facility    Plan Comments Spoke to patient and sons at bedside regarding [facility]. Discussed patient is in Observation status and SNF would be pvt pay. Discussed poss Home ([facility]) with Outpatient therapy there at the facility. Sons and patient in agreement. Discussed with Eloina with [facility] who is following.                   Discharge Codes    No documentation.                 Expected Discharge Date and Time       Expected Discharge Date Expected Discharge Time    Jun 7, 2025               Haydee Wells RN

## 2025-06-06 NOTE — PROGRESS NOTES
Name: Wendie Houser ADMIT: 2025   : 1935  PCP: Pallares, Clara Ann, MD    MRN: [id number] LOS: 0 days   AGE/SEX: 89 y.o. female  ROOM: [facility]     Subjective   Subjective   Patient feels better.  Improved dizziness and weakness.  No loss of consciousness.  No headache.  No focal neurological symptoms.  Positive arthralgia.  Positive myalgia.  No fever or chills.    Review of Systems  Cardio vascular/respiratory.  No chest pain/no shortness of breath/no palpitations/no ankle edema/no cough.  .  No dysuria or hematuria but positive for old urge incontinence.  GI.  Continues with loose stool.  No abdominal pain.  No nausea or vomiting.  No fresh bright blood per rectum or melena.     Objective   Objective   Vital Signs  Temp:  [97 °F (36.1 °C)-98.2 °F (36.8 °C)] 97 °F (36.1 °C)  Heart Rate:  [66-76] 74  Resp:  [16] 16  BP: (137-186)/() 174/84  SpO2:  [95 %-98 %] 95 %  on   ;   Device (Oxygen Therapy): room air    Intake/Output Summary (Last 24 hours) at 2025 1237  Last data filed at 2025 0915  Gross per 24 hour   Intake 1881.67 ml   Output 600 ml   Net 1281.67 ml     Body mass index is 28.57 kg/m².      25  1424   Weight: 80.3 kg (177 lb)     Physical Exam  Improved orthostasis.  General.  Elderly female.  She is alert and oriented times 4 out of 4.  In no apparent pain/distress/diaphoresis.  Normal mood and affect.  Eyes.  Status post bilateral cataract surgery.  Pupils equal round and reactive.  Intact extraocular musculature.  No pallor or jaundice  Oral cavity.  Moist mucous membrane.  Neck.  Supple.  No JVD.  No lymphadenopathy or thyromegaly  Cardiac vascular.  Regular rate and rhythm with no gallops or murmurs  Chest.  Clear to auscultation bilaterally with no added sounds.  Abdomen.  Soft lax.  No tenderness.  No organomegaly.  No guarding or rebound  Extremities.  No clubbing/cyanosis/edema  CNS.  No acute focal neurological deficits.      Results Review:      Results  "from last 7 days   Lab Units 06/06/25  0616 06/05/25  1458 06/05/25  0712 06/04/25 1956   SODIUM mmol/L 141  --  142 139   POTASSIUM mmol/L 4.0 3.3* 3.3* 2.9*   CHLORIDE mmol/L 108*  --  107 104   CO2 mmol/L 24.0  --  26.9 26.6   BUN mg/dL 5.0*  --  7.0* 9.0   CREATININE mg/dL 0.71  --  0.74 0.84   GLUCOSE mg/dL 101*  --  106* 105*   CALCIUM mg/dL 8.6  --  8.6 9.1   AST (SGOT) U/L  --   --   --  24   ALT (SGPT) U/L  --   --   --  16     Estimated Creatinine Clearance: 58.5 mL/min (by C-G formula based on SCr of 0.71 mg/dL).  Results from last 7 days   Lab Units 06/05/25 0713   HEMOGLOBIN A1C % 5.60         Results from last 7 days   Lab Units 06/06/25 0616 06/04/25 2140 06/04/25 1956   CK TOTAL U/L 89  --   --    HSTROP T ng/L  --  12 11         Results from last 7 days   Lab Units 06/05/25 0712   TSH uIU/mL 1.120     Results from last 7 days   Lab Units 06/04/25 2140 06/04/25 1956   MAGNESIUM mg/dL 1.7 1.6           Invalid input(s): \"LDLCALC\"  Results from last 7 days   Lab Units 06/06/25 0616 06/05/25 0713 06/04/25 1956   WBC 10*3/mm3 8.62 9.18 10.27   HEMOGLOBIN g/dL 11.3* 11.6* 12.5   HEMATOCRIT % 35.7 36.9 39.5   PLATELETS 10*3/mm3 235 211 220   MCV fL 88.4 89.3 90.6   MCH pg 28.0 28.1 28.7   MCHC g/dL 31.7 31.4* 31.6   RDW % 13.3 13.8 13.7   RDW-SD fl 42.5 44.4 45.3   MPV fL 10.2 10.1 10.4   NEUTROS ABS 10*3/mm3 4.31  4.35  --  3.75   EOS ABS 10*3/mm3 0.09  0.09  --  0.20   BASOS ABS 10*3/mm3 0.09  0.09  --  0.10   NRBC /100 WBC  --   --  0.0             Results from last 7 days   Lab Units 06/04/25 1956   PROCALCITONIN ng/mL 0.06   LACTATE mmol/L 1.6         Results from last 7 days   Lab Units 06/04/25 1956   LIPASE U/L 46         Results from last 7 days   Lab Units 06/05/25  0336 06/04/25 1957   ADENOVIRUS DETECTION BY PCR   --  Not Detected   ADENOVIRUS  Not Detected  --    CORONAVIRUS 229E   --  Not Detected   CORONAVIRUS HKU1   --  Not Detected   CORONAVIRUS NL63   --  Not Detected "   CORONAVIRUS OC43   --  Not Detected   HUMAN METAPNEUMOVIRUS   --  Not Detected   HUMAN RHINOVIRUS/ENTEROVIRUS   --  Not Detected   INFLUENZA B PCR   --  Not Detected   PARAINFLUENZA 1   --  Not Detected   PARAINFLUENZA VIRUS 2   --  Not Detected   PARAINFLUENZA VIRUS 3   --  Not Detected   PARAINFLUENZA VIRUS 4   --  Not Detected   BORDETELLA PERTUSSIS PCR   --  Not Detected   CHLAMYDOPHILA PNEUMONIAE PCR   --  Not Detected   MYCOPLAMA PNEUMO PCR   --  Not Detected   INFLUENZA A PCR   --  Not Detected   RSV, PCR   --  Not Detected     Results from last 7 days   Lab Units 06/04/25  2155   NITRITE UA  Negative   WBC UA /HPF 3-5*   BACTERIA UA /HPF None Seen   SQUAM EPITHEL UA /HPF 3-6*           Imaging:  Imaging Results (Last 24 Hours)       Procedure Component Value Units Date/Time    CT Head Without Contrast [[id number]] Collected: 06/06/25 1025     Updated: 06/06/25 1040    Narrative:      CT HEAD WO CONTRAST-     HISTORY:  Weakness; I95.1-Orthostatic hypotension; R19.7-Diarrhea,  unspecified; E87.6-Hypokalemia; R53.1-Weakness     COMPARISON: None     FINDINGS: The brain and ventricles are symmetrical. There is no evidence  of intracranial hemorrhage, hydrocephalus or of abnormal extra-axial  fluid. No focal area of decreased attenuation to suggest acute  infarction is identified. Moderate vascular calcification is noted.  Decreased attenuation involving the white matter of the cerebral  hemispheres is present bilaterally consistent with mild small vessel  ischemic disease. Further evaluation could be performed with MRI  examination of the brain as indicated.                 Radiation dose reduction techniques were utilized, including automated  exposure modulation based on body size.     This report was finalized on 6/6/2025 10:36 AM by Dr. Vidal Calvillo M.D  on Workstation: [id number]       CT Abdomen Pelvis With Contrast [[id number]] Collected: 06/06/25 0911     Updated: 06/06/25 0928    Narrative:      CT  ABDOMEN PELVIS W CONTRAST-     INDICATION: Diarrhea, abdominal pain, vomiting     COMPARISON: CT abdomen pelvis May 16, 2024     TECHNIQUE:  Routine CT abdomen and pelvis with IV contrast. Coronal and sagittal  reformats. Radiation dose reduction techniques were utilized, including  automated exposure control and exposure modulation based on body size.     FINDINGS:      Lung bases: Small amount of subsegmental atelectasis at the bases.  Calcified pulmonary nodule in the right middle lobe, consistent with  prior granulomatous infection. Coronary artery atherosclerotic  calcifications.     ABDOMEN: Right diaphragmatic eventration. No liver mass seen.  Cholecystectomy. Mild intrahepatic and extrahepatic biliary ductal  dilatation. Common bile duct measures 1.5 cm, unchanged. Spleen is  normal in size. No pancreatic mass or pancreatic ductal dilatation seen.  No adrenal nodules. No renal mass or hydronephrosis.     Pelvis: Underdistended bladder. No bladder calculus. Hysterectomy. Small  left ovarian cyst, series 2, axial mage 104, measures 1.3 cm, previously  1.7 cm, likely benign.     Bowel: Small hiatal hernia. Stomach is under distended. No small bowel  obstruction. Colonic diverticulosis. Colon is under distended. Normal  appendix. Small bowel diverticulosis.     Abdominal wall: Rectus diastases. Fat-containing umbilical hernia.     Retroperitoneum: No lymphadenopathy.     Vasculature: Patent. No abdominal aortic aneurysm.     Osseous structures: Severe right greater than left hip osteoarthritis  with bulky right hip osteophytosis and acetabular over coverage and with  near bone on bone articulation. Diffuse idiopathic skeletal hyperostosis  in the lower thoracic spine. Severe lumbar facet degenerative  arthropathy. Severe lumbar spondylosis/degenerative disc disease.  Multilevel spinal canal narrowing including severe canal narrowing at  L3/L4 and L4/L5. Grade 1 anterolisthesis of L3 on L4 and L4 on L5.        Impression:         1. No acute findings identified in the abdomen or pelvis.  2. Colonic and small bowel diverticulosis.  3. Stable mild intrahepatic and extrahepatic biliary ductal dilatation     This report was finalized on 6/6/2025 9:25 AM by Dr. Rigo Canseco M.D  on Workstation: [id number]                  I reviewed the patient's new clinical results / labs / tests / procedures      Assessment/Plan     Active Hospital Problems    Diagnosis  POA    **Weakness [R53.1]  Yes    Orthostatic hypotension [I95.1]  Yes    Diarrhea [R19.7]  Yes    Hypokalemia [E87.6]  Yes    Peptic ulcer disease [K27.9]  Yes    Diverticular disease [K57.90]  Yes    Hyperthyroidism [E05.90]  Yes    Panic disorder [F41.0]  Yes    IBS (irritable bowel syndrome) [K58.9]  Yes    Essential hypertension [I10]  Yes    Late onset Alzheimer's dementia without behavioral disturbance [G30.1, F02.80]  Yes    DM2 (diabetes mellitus, type 2) [E11.9]  Yes    GERD (gastroesophageal reflux disease) [K21.9]  Yes    Hyperlipidemia [E78.5]  Yes      Resolved Hospital Problems   No resolved problems to display.           Weakness leading to fall..  Multifactorial secondary to orthostasis/dehydration/hypokalemia   Orthostasis are borderline and has improved with IV fluid.. magnesium is normal.  Positive mild hemoglobin drop.  No clinical evidence of infection (normal procalcitonin, normal lactic acid, negative respiratory PCR, negative UTI by UA, negative chest x-ray for infiltrate).  Normal TSH/CK.  Negative CT scan of the brain for acute disease..  There is post potassium replacement and IV fluid with improvement of symptomatology.  Awaiting PT and OT.  Continue IV fluid.     Diarrhea in a patient with a history of IBS/peptic ulcer disease/GERD/diverticular disease..  Stool for C. difficile and PCR are negative.  GI examination is benign.  Liver function test and lipase are normal.  CT of the abdomen pelvis without acute disease and positive  diverticulosis continue Protonix.  Add cholestyramine.  Mostly functional diarrhea.  Hypertension-orthostasis..  Patient with borderline orthostasis secondary to volume depletion.  Blood pressure is on the elevated side.  Cardiology increased losartan.  2D echo was unremarkable.    Diet controlled type 2 diabetes.  A1c is normal at 5.6.   Dementia/panic disorder.  CNS examination without focal deficit.  CT scan of brain without acute disease normal TSH/B12/folate/RPR.  Continue Celexa and Aricept.  Hypothyroidism..  Continue Tapazole.  Normal TSH, free T4, total T3 on 5/22/2025.  Hyperlipidemia.  Continue Pravachol.  Normal CK and liver function test   VTE prophylaxis.  Sequential compression device.        Discussed my findings and plan of treatment with the patient/2 sons.  Disposition.  Anticipate discharge back to assisted living tomorrow after PT and OT evaluation and if blood pressure and orthostasis and weakness continues to improve.      Flaquito Robles MD  [facility]  06/06/25  12:37 EDT

## 2025-06-06 NOTE — THERAPY EVALUATION
Patient Name: Wendie Houser  : 1935    MRN: [id number]                              Today's Date: 2025       Admit Date: 2025    Visit Dx:     ICD-10-CM ICD-9-CM   1. Orthostatic hypotension  I95.1 458.0   2. Diarrhea, unspecified type  R19.7 787.91   3. Hypokalemia  E87.6 276.8   4. General weakness  R53.1 780.79   5. Decreased activities of daily living (ADL)  Z78.9 V49.89     Patient Active Problem List   Diagnosis    Arthritis    Late onset Alzheimer's dementia without behavioral disturbance    Weakness    GERD (gastroesophageal reflux disease)    Hyperlipidemia    Essential hypertension    Hypomagnesemia    Sleep apnea    DM2 (diabetes mellitus, type 2)    Renal insufficiency    Hypothyroidism    Lumbar canal stenosis    Shortness of breath    Disorder of electrolytes    Hiatal hernia    Hyperkalemia    Hypoglycemia    IBS (irritable bowel syndrome)    Allergic rhinitis    High risk medication use    Polypharmacy    Obesity (BMI 30-39.9)    Panic disorder    Weakness of left lower extremity    Gastroduodenitis    Generalized abdominal pain    Lactic acidosis    Upper abdominal pain    Nausea and vomiting    Abnormal CT of the abdomen    Orthostatic hypotension    Diarrhea    Hypokalemia    Peptic ulcer disease    Diverticular disease    Hyperthyroidism     Past Medical History:   Diagnosis Date    Anemia     Arthritis     CHF (congestive heart failure)     Congestive heart failure     Depression     Diabetes mellitus     type 2    Diverticulitis of colon 2016    Acute uncomplicated diverticulitis at the distal transverse colon    Early onset Alzheimer's dementia     GERD (gastroesophageal reflux disease)     Heart disease     Hyperlipidemia     Hypertension     Hypothyroidism     Kidney disease     Mixed 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 Cain    ENDOSCOPY  04/15/2011    ENDOSCOPY N/A 5/19/2024    Procedure: ESOPHAGOGASTRODUODENOSCOPY;  Surgeon: Homar Cain MD;  Location: [facility] ENDOSCOPY;  Service: Gastroenterology;  Laterality: N/A;  PRE - abnormal imaging  POST - duodenal ulcers    EYE SURGERY  2009    GALLBLADDER SURGERY  1960    Open    TOTAL KNEE ARTHROPLASTY Left 2012    Dr. Soler    VAGINAL HYSTERECTOMY  1999      General Information       Row Name 06/06/25 1450          Physical Therapy Time and Intention    Document Type evaluation  -     Mode of Treatment individual therapy;physical therapy  -       Row Name 06/06/25 1450          General Information    Patient Profile Reviewed yes  -     Prior Level of Function independent:  Ambulates with rollator, family assist with bathing  -     Existing Precautions/Restrictions fall  -     Barriers to Rehab none identified  -       Row Name 06/06/25 1450          Living Environment    Current Living Arrangements assisted living facility;residential facility  -     People in Home facility resident  -       Row Name 06/06/25 1450          Home Main Entrance    Number of Stairs, Main Entrance none  -       Row Name 06/06/25 1450          Cognition    Orientation Status (Cognition) oriented x 3  -       Row Name 06/06/25 1450          Safety Issues/Impairments Affecting Functional Mobility    Impairments Affecting Function (Mobility) balance;endurance/activity tolerance;strength  -               User Key  (r) = Recorded By, (t) = Taken By, (c) = Cosigned By      Initials Name Provider Type     Malka Buck, PT Physical Therapist                   Mobility       Row Name 06/06/25 1451          Bed Mobility    Comment, (Bed Mobility) Up in the chair  -       Row Name 06/06/25 1451          Sit-Stand Transfer    Sit-Stand [place] (Transfers) contact guard  -     Assistive Device (Sit-Stand Transfers) walker, front-wheeled  -        Row Name 06/06/25 1451          Gait/Stairs (Locomotion)    [place] Level (Gait) minimum assist (75% patient effort)  -     Assistive Device (Gait) walker, front-wheeled  -     Distance in Feet (Gait) 12  -     Deviations/Abnormal Patterns (Gait) antalgic;gait speed decreased;weight shifting decreased  -     Bilateral Gait Deviations forward flexed posture  -     Right Sided Gait Deviations heel strike decreased  -     Comment, (Gait/Stairs) Slow, forward flexed, right heel does not reach the floor-patient reports leg length difference, no brace or built-up shoe per patient report  -               User Key  (r) = Recorded By, (t) = Taken By, (c) = Cosigned By      Initials Name Provider Type    Malka Galvan, STEVEN Physical Therapist                   Obj/Interventions       [place] Name 06/06/25 1452          Range of Motion Comprehensive    Comment, General Range of Motion BLE WFL, antalgic knees  -       Row Name 06/06/25 1452          Strength Comprehensive (MMT)    Comment, General Manual Muscle Testing (MMT) Assessment Generalized weakness  -       Row Name 06/06/25 1452          Motor Skills    Motor Skills functional endurance  -       Row Name 06/06/25 1452          Balance    Static Standing Balance contact guard  -     Dynamic Standing Balance minimal assist  -     Position/Device Used, Standing Balance walker, rolling  -               User Key  (r) = Recorded By, (t) = Taken By, (c) = Cosigned By      Initials Name Provider Type    Malka Galvan, STEVEN Physical Therapist                   Goals/Plan       Row Name 06/06/25 1456          Bed Mobility Goal 1 (PT)    [place] Level/Cues Needed (Bed Mobility Goal 1, PT) standby assist  -     Time Frame (Bed Mobility Goal 1, PT) 10 days  -       Row Name 06/06/25 1456          Transfer Goal 1 (PT)    Activity/Assistive Device (Transfer Goal 1, PT)  sit-to-stand/stand-to-sit;bed-to-chair/chair-to-bed;walker, rolling  -     [place] Level/Cues Needed (Transfer Goal 1, PT) standby assist  -     Time Frame (Transfer Goal 1, PT) 10 days  -       Row Name 06/06/25 1456          Gait Training Goal 1 (PT)    Activity/Assistive Device (Gait Training Goal 1, PT) gait (walking locomotion);walker, rolling  -     [place] Level (Gait Training Goal 1, PT) standby assist  -     Distance (Gait Training Goal 1, PT) 50 ft  -     Time Frame (Gait Training Goal 1, PT) 10 days  -       Row Name 06/06/25 1456          Patient Education Goal (PT)    Activity (Patient Education Goal, PT) HEP  -     [place]/Cues/Accuracy (Memory Goal 2, PT) demonstrates adequately  -     Time Frame (Patient Education Goal, PT) 10 days  -       Row Name 06/06/25 1456          Therapy Assessment/Plan (PT)    Planned Therapy Interventions (PT) balance training;bed mobility training;gait training;home exercise program;transfer training;strengthening;patient/family education  -               User Key  (r) = Recorded By, (t) = Taken By, (c) = Cosigned By      Initials Name Provider Type     Malka Buck, PT Physical Therapist                   Clinical Impression       Row Name 06/06/25 1453          Pain    Pre/Posttreatment Pain Comment Knee pain-no number given, chronic  -       Row Name 06/06/25 1453          Plan of Care Review    Plan of Care Reviewed With patient;child  -     Outcome Evaluation Patient was admitted from assisted living facility with increased weakness and diarrhea for the past week.  Patient reports she is independently mobile and uses a rollator at assisted living facility at baseline.  Patient was up in the chair when PT arrived and she was agreeable to therapy.  2 of her children were present.  Patient stood with contact-guard assist and rolling walker.  She ambulated a short distance with min assist and Rwx.  Gait was slow,  antalgic and unsteady.  Patient ambulates with very forward flexed posture, decreased step length and no heel strike noted on right foot.  Patient reports right leg is shorter but denies use of a brace or built-up shoe.  Patient reports a recent fall last week.  She presents with generalized weakness, limited endurance, impaired balance and functional mobility below baseline.  Patient would benefit from PT to address these impairments.  Recommend SNF versus return to [facility] pending progress and level of assistance at [facility].  -       Row Name 06/06/25 1453          Therapy Assessment/Plan (PT)    Patient/Family Therapy Goals Statement (PT) Return to prior level of function  -     Criteria for Skilled Interventions Met (PT) yes  -TUAN     Therapy Frequency (PT) 6 times/wk  -       Row Name 06/06/25 1453          Positioning and Restraints    Pre-Treatment Position sitting in chair/recliner  -     Post Treatment Position chair  -KH     In Chair reclined;call light within reach;encouraged to call for assist;exit alarm on;with family/caregiver;notified ns  -               User Key  (r) = Recorded By, (t) = Taken By, (c) = Cosigned By      Initials Name Provider Type    Malka Galvan, PT Physical Therapist                   Outcome Measures       Row Name 06/06/25 1456 06/06/25 0921       How much help from another person do you currently need...    Turning from your back to your side while in flat bed without using bedrails? 3  -KH 3  -KS    Moving from lying on back to sitting on the side of a flat bed without bedrails? 3  -KH 3  -KS    Moving to and from a bed to a chair (including a wheelchair)? 3  -KH 2  -KS    Standing up from a chair using your arms (e.g., wheelchair, bedside chair)? 3  -KH 2  -KS    Climbing 3-5 steps with a railing? 2  -KH 2  -KS    To walk in hospital room? 3  -KH 2  -KS    AM-PAC 6 Clicks Score (PT) 17  -KH 14  -KS    Highest Level of Mobility Goal Stand (1 or More Minutes)-5   -TUAN Move to Chair/Commode-4  -KS      Row Name 06/06/25 1243          Modified [place] Scale    Modified [place] Scale 0 - No Symptoms at all.  -SARAH       Row Name 06/06/25 1456 06/06/25 1243       Functional Assessment    Outcome Measure Options AM-PAC 6 Clicks Basic Mobility (PT)  -TUAN AM-PAC 6 Clicks Daily Activity (OT);Modified [place]  -ES              User Key  (r) = Recorded By, (t) = Taken By, (c) = Cosigned By      Initials Name Provider Type    Malka Galvan, PT Physical Therapist    Phyllis Lainez, OTR/L, CSRS Occupational Therapist    Lesley Lee, RN Registered Nurse                                 Physical Therapy Education       Title: PT OT SLP Therapies (Not Started)       Topic: Physical Therapy (Not Started)       Point: Mobility training (Not Started)       Learner Progress:  Not documented in this visit.              Point: Home exercise program (Not Started)       Learner Progress:  Not documented in this visit.              Point: Body mechanics (Not Started)       Learner Progress:  Not documented in this visit.              Point: Precautions (Not Started)       Learner Progress:  Not documented in this visit.                                  PT Recommendation and Plan  Planned Therapy Interventions (PT): balance training, bed mobility training, gait training, home exercise program, transfer training, strengthening, patient/family education  Outcome Evaluation: Patient was admitted from assisted living facility with increased weakness and diarrhea for the past week.  Patient reports she is independently mobile and uses a rollator at assisted living facility at baseline.  Patient was up in the chair when PT arrived and she was agreeable to therapy.  2 of her children were present.  Patient stood with contact-guard assist and rolling walker.  She ambulated a short distance with min assist and Rwx.  Gait was slow, antalgic and unsteady.  Patient ambulates with very forward flexed  posture, decreased step length and no heel strike noted on right foot.  Patient reports right leg is shorter but denies use of a brace or built-up shoe.  Patient reports a recent fall last week.  She presents with generalized weakness, limited endurance, impaired balance and functional mobility below baseline.  Patient would benefit from PT to address these impairments.  Recommend SNF versus return to [facility] pending progress and level of assistance at [facility].     Time Calculation:         PT Charges       Row Name 06/06/25 1457             Time Calculation    Start Time 1155  -KH      Stop Time 1216  -KH      Time Calculation (min) 21 min  -KH      PT Received On 06/06/25  -      PT - Next Appointment 06/07/25  -      PT Goal Re-Cert Due Date 06/16/25  -KH         Time Calculation- PT    Total Timed Code Minutes- PT 21 minute(s)  -KH         Timed Charges    86179 - PT Therapeutic Exercise Minutes 8  -KH         Total Minutes    Timed Charges Total Minutes 8  -KH       Total Minutes 8  -KH                User Key  (r) = Recorded By, (t) = Taken By, (c) = Cosigned By      Initials Name Provider Type    Malka Galvan, STEVEN Physical Therapist                  Therapy Charges for Today       Code Description Service Date Service Provider Modifiers Qty    13698911033 HC PT THER PROC EA 15 MIN 6/6/2025 Malka Buck, PT GP 1    05471245619 HC PT EVAL MOD COMPLEXITY 2 6/6/2025 Malka Buck, PT GP 1            PT G-Codes  Outcome Measure Options: AM-PAC 6 Clicks Basic Mobility (PT)  AM-PAC 6 Clicks Score (PT): 17  AM-PAC 6 Clicks Score (OT): 19  Modified [place] Scale: 0 - No Symptoms at all.  PT Discharge Summary  Anticipated Discharge Disposition (PT): skilled nursing facility, home with home health, assisted living    Malka Buck PT  6/6/2025

## 2025-06-07 ENCOUNTER — READMISSION MANAGEMENT (OUTPATIENT)
Dept: CALL CENTER | Facility: HOSPITAL | Age: 89
End: 2025-06-07
Payer: MEDICARE

## 2025-06-07 VITALS
WEIGHT: 177 LBS | HEIGHT: 66 IN | DIASTOLIC BLOOD PRESSURE: 79 MMHG | TEMPERATURE: 98 F | OXYGEN SATURATION: 98 % | SYSTOLIC BLOOD PRESSURE: 163 MMHG | HEART RATE: 83 BPM | BODY MASS INDEX: 28.45 KG/M2 | RESPIRATION RATE: 16 BRPM

## 2025-06-07 PROBLEM — E87.6 HYPOKALEMIA: Status: RESOLVED | Noted: 2025-06-05 | Resolved: 2025-06-07

## 2025-06-07 PROBLEM — I95.1 ORTHOSTATIC HYPOTENSION: Status: RESOLVED | Noted: 2025-06-05 | Resolved: 2025-06-07

## 2025-06-07 PROBLEM — R19.7 DIARRHEA: Status: RESOLVED | Noted: 2025-06-05 | Resolved: 2025-06-07

## 2025-06-07 PROBLEM — D64.9 ANEMIA: Status: ACTIVE | Noted: 2025-06-07

## 2025-06-07 LAB
ANION GAP SERPL CALCULATED.3IONS-SCNC: 9 MMOL/L (ref 5–15)
BASOPHILS # BLD MANUAL: 0 10*3/MM3 (ref 0–0.2)
BASOPHILS NFR BLD MANUAL: 0 % (ref 0–1.5)
BUN SERPL-MCNC: 4 MG/DL (ref 8–23)
BUN/CREAT SERPL: 5.9 (ref 7–25)
CALCIUM SPEC-SCNC: 8.6 MG/DL (ref 8.6–10.5)
CHLORIDE SERPL-SCNC: 105 MMOL/L (ref 98–107)
CO2 SERPL-SCNC: 25 MMOL/L (ref 22–29)
CORTIS SERPL-MCNC: 3.96 MCG/DL
CREAT SERPL-MCNC: 0.68 MG/DL (ref 0.57–1)
DEPRECATED RDW RBC AUTO: 43.6 FL (ref 37–54)
EGFRCR SERPLBLD CKD-EPI 2021: 83.9 ML/MIN/1.73
EOSINOPHIL # BLD MANUAL: 0.08 10*3/MM3 (ref 0–0.4)
EOSINOPHIL NFR BLD MANUAL: 1 % (ref 0.3–6.2)
ERYTHROCYTE [DISTWIDTH] IN BLOOD BY AUTOMATED COUNT: 13.3 % (ref 12.3–15.4)
GLUCOSE SERPL-MCNC: 95 MG/DL (ref 65–99)
HCT VFR BLD AUTO: 35.9 % (ref 34–46.6)
HGB BLD-MCNC: 11.4 G/DL (ref 12–15.9)
LYMPHOCYTES # BLD MANUAL: 3.89 10*3/MM3 (ref 0.7–3.1)
LYMPHOCYTES NFR BLD MANUAL: 9 % (ref 5–12)
MCH RBC QN AUTO: 28.3 PG (ref 26.6–33)
MCHC RBC AUTO-ENTMCNC: 31.8 G/DL (ref 31.5–35.7)
MCV RBC AUTO: 89.1 FL (ref 79–97)
MONOCYTES # BLD: 0.74 10*3/MM3 (ref 0.1–0.9)
NEUTROPHILS # BLD AUTO: 3.56 10*3/MM3 (ref 1.7–7)
NEUTROPHILS NFR BLD MANUAL: 43 % (ref 42.7–76)
NRBC BLD AUTO-RTO: 0 /100 WBC (ref 0–0.2)
PLAT MORPH BLD: NORMAL
PLATELET # BLD AUTO: 233 10*3/MM3 (ref 140–450)
PMV BLD AUTO: 10.2 FL (ref 6–12)
POTASSIUM SERPL-SCNC: 3.5 MMOL/L (ref 3.5–5.2)
RBC # BLD AUTO: 4.03 10*6/MM3 (ref 3.77–5.28)
RBC MORPH BLD: NORMAL
SMUDGE CELLS BLD QL SMEAR: ABNORMAL
SODIUM SERPL-SCNC: 139 MMOL/L (ref 136–145)
VARIANT LYMPHS NFR BLD MANUAL: 47 % (ref 19.6–45.3)
WBC NRBC COR # BLD AUTO: 8.27 10*3/MM3 (ref 3.4–10.8)

## 2025-06-07 PROCEDURE — 80048 BASIC METABOLIC PNL TOTAL CA: CPT | Performed by: INTERNAL MEDICINE

## 2025-06-07 PROCEDURE — 85025 COMPLETE CBC W/AUTO DIFF WBC: CPT | Performed by: INTERNAL MEDICINE

## 2025-06-07 PROCEDURE — 82533 TOTAL CORTISOL: CPT | Performed by: INTERNAL MEDICINE

## 2025-06-07 PROCEDURE — G0378 HOSPITAL OBSERVATION PER HR: HCPCS

## 2025-06-07 PROCEDURE — 85007 BL SMEAR W/DIFF WBC COUNT: CPT | Performed by: INTERNAL MEDICINE

## 2025-06-07 RX ORDER — ACETAMINOPHEN 325 MG/1
650 TABLET ORAL EVERY 4 HOURS PRN
Qty: 120 TABLET | Refills: 3 | Status: SHIPPED | OUTPATIENT
Start: 2025-06-07

## 2025-06-07 RX ORDER — POTASSIUM CHLORIDE 1500 MG/1
40 TABLET, EXTENDED RELEASE ORAL EVERY 4 HOURS
Status: COMPLETED | OUTPATIENT
Start: 2025-06-07 | End: 2025-06-07

## 2025-06-07 RX ORDER — AMLODIPINE BESYLATE 2.5 MG/1
2.5 TABLET ORAL
Qty: 30 TABLET | Refills: 3 | Status: SHIPPED | OUTPATIENT
Start: 2025-06-07

## 2025-06-07 RX ORDER — CHOLESTYRAMINE 4 G/9G
1 POWDER, FOR SUSPENSION ORAL EVERY 12 HOURS SCHEDULED
Qty: 60 PACKET | Refills: 3 | Status: SHIPPED | OUTPATIENT
Start: 2025-06-07

## 2025-06-07 RX ORDER — LOSARTAN POTASSIUM 50 MG/1
50 TABLET ORAL 2 TIMES DAILY
Qty: 60 TABLET | Refills: 3 | Status: SHIPPED | OUTPATIENT
Start: 2025-06-07

## 2025-06-07 RX ORDER — AMLODIPINE BESYLATE 5 MG/1
2.5 TABLET ORAL
Status: DISCONTINUED | OUTPATIENT
Start: 2025-06-07 | End: 2025-06-07 | Stop reason: HOSPADM

## 2025-06-07 RX ADMIN — POTASSIUM CHLORIDE 40 MEQ: 1500 TABLET, EXTENDED RELEASE ORAL at 07:08

## 2025-06-07 RX ADMIN — CHOLESTYRAMINE 1 PACKET: 4 POWDER, FOR SUSPENSION ORAL at 12:04

## 2025-06-07 RX ADMIN — AMLODIPINE BESYLATE 2.5 MG: 5 TABLET ORAL at 10:52

## 2025-06-07 RX ADMIN — POTASSIUM CHLORIDE 40 MEQ: 1500 TABLET, EXTENDED RELEASE ORAL at 11:57

## 2025-06-07 RX ADMIN — LOSARTAN POTASSIUM 50 MG: 50 TABLET, FILM COATED ORAL at 10:52

## 2025-06-07 RX ADMIN — HYDROCODONE BITARTRATE AND ACETAMINOPHEN 1 TABLET: 5; 325 TABLET ORAL at 09:31

## 2025-06-07 NOTE — OUTREACH NOTE
Prep Survey      Flowsheet Row Responses   [facility] facility patient discharged from? [place]   Is LACE score < 7 ? No   Eligibility Readm Mgmt   Discharge diagnosis Weakness   Does the patient have one of the following disease processes/diagnoses(primary or secondary)? Other   Does the patient have Home health ordered? No   Is there a DME ordered? No   Medication alerts for this patient see AVS   General alerts for this patient [facility]   Prep survey completed? Yes            Fallon HERNANDEZ - Registered Nurse              Fallon HERNANDEZ - Registered Nurse

## 2025-06-07 NOTE — NURSING NOTE
Reviewed AVS with patient and her son, all questions answered, patient and son verbalized understanding, iv removed, received meds to bed.

## 2025-06-07 NOTE — PLAN OF CARE
Goal Outcome Evaluation:  Plan of Care Reviewed With: patient        Progress: improving  Outcome Evaluation: vdg freq, up with assist of 1, sal, scd, tylenol for pain, ortho static bp, falls protocol

## 2025-06-07 NOTE — TELEPHONE ENCOUNTER
Caller: Wendie Houser    Relationship to patient: Self    Best call back number: [phone]    Patient is needing: PATIENT IS CALLING TO STATE SHE HAS BEEN IN REHAB AT [facility] AT [facility].  SHE STATES THEY TOOK HER OFF OF POTASSIUM AND ZOLOFT.  SHE STATES SHE IS HAVING WITHDRAWAL FROM BEING OFF OF THE MEDICATIONS.  SHE IS WANTING TO KNOW IF DR. PAIZ WOULD BE WILLING TO PRESCRIBE SOMETHING TO HELP HER WEAN OFF OF THE ZOLOFT.  SHE IS ALSO WANTING TO KNOW IF DR. PAIZ THINKS THAT SHE NEEDS TO GO BACK ON THE POTASSIUM.    PATIENT IS WANTING DR. PAIZ TO RETURN CALL TO HER.    [id number] - [place], [place] - [address] AT [facility] - [phone]  - [phone]  [phone]    PLEASE ADVISE.        
(2) cough or sneeze

## 2025-06-07 NOTE — DISCHARGE SUMMARY
Patient Name: Wendie Houser  : 1935  MRN: [id number]    Date of Admission: 2025  Date of Discharge:  2025  Primary Care Physician: Pallares, Clara Ann, MD      Discharge Diagnoses     Active Hospital Problems    Diagnosis  POA    **Weakness [R53.1]  Yes    Anemia [D64.9]  Yes    Peptic ulcer disease [K27.9]  Yes    Diverticular disease [K57.90]  Yes    Hyperthyroidism [E05.90]  Yes    Panic disorder [F41.0]  Yes    IBS (irritable bowel syndrome) [K58.9]  Yes    Essential hypertension [I10]  Yes    Late onset Alzheimer's dementia without behavioral disturbance [G30.1, F02.80]  Yes    DM2 (diabetes mellitus, type 2) [E11.9]  Yes    GERD (gastroesophageal reflux disease) [K21.9]  Yes    Hyperlipidemia [E78.5]  Yes      Resolved Hospital Problems    Diagnosis Date Resolved POA    Orthostatic hypotension [I95.1] 2025 Yes    Diarrhea [R19.7] 2025 Yes    Hypokalemia [E87.6] 2025 Yes        Hospital Course     Brief admission history and physical.  Please refer to the H&P for full details.  A pleasant 89 years old female with a past history of hypertension/dementia/panic disorder/left lower extremity weakness/obstructive sleep apnea/DDD of the lumbar spine/IBS/hiatal hernia/GERD/peptic ulcer disease/diverticular disease/hypothyroidism/DM2/anemia who presents to the emergency department from assisted living on account of loose stool for 1 week that has been on and off for months/decreased appetite/weakness/dizziness/blurred vision.  Her physical examination on admission included an afebrile patient with stable vital signs except a blood pressure of 192/91.  Orientation times 3 out of 4.  Hospital course.  Initial ER evaluation included chest x-ray revealing no acute disease with chronic right hemidiaphragm elevation.  Troponin x 2 were negative.  Magnesium was normal.  Procalcitonin was normal.  CBC was normal.  Lactic acid was normal.  Lipase was normal.  CMP normal except a  potassium of 2.9 and a blood sugar of 105.  Respiratory PCR panel was negative.  UA was negative for UTI.  Stool for C. difficile is negative.  Stool for PCR was negative.  Patient was subsequently admitted.  Hospital course will be summarized in a problem-oriented manner as below.  1.  Weakness leading to fall.  The weakness was multifactorial secondary to orthostasis/dehydration/hypokalemia.  Orthostasis was borderline and has improved with correction of the dehydration by IV fluid.  Magnesium was normal.  Anemia was mild and not significant to contribute to the weakness.  There was no clinical evidence of infection with normal procalcitonin, normal lactic acid, normal respiratory PCR, negative UTI by UA, negative chest x-ray for infiltrate.  CK and TSH were normal.  A CT scan of the brain was done and revealed no acute disease.  Potassium was substituted and her weakness has improved.  Physical therapy recommended continuation of physical therapy as an outpatient.  2.  Diarrhea in a patient with a history of IBS/peptic ulcer disease/GERD/diverticular disease.  Stool for C. difficile and PCR were negative.  GI examination remained benign.  Liver function test and lipase were normal.  CT scan of the abdomen pelvis without contrast revealed no acute disease except diverticulosis without diverticulitis.  Protonix was maintained and cholestyramine was added to the patient as the diarrhea was thought to be functional and this has improved the diarrhea.  3.  Hypertension and orthostasis.  It was noticed that the patient blood pressure was uncontrolled and that she had orthostasis.  Orthostasis resolved with IV fluid.  TSH and cortisol level was normal.  2D echo was unremarkable.  Cardiology consulted and losartan was increased and low-dose Norvasc was added at the time of discharge.  Blood pressure should be monitored as an outpatient with adjustment of the medication based on the blood pressure and the orthostasis.  4.   Type 2 diabetes.  This is diet-controlled.  A1c was normal at 5.6.  5.  Dementia/panic disorder.  This remained stable on Aricept and Celexa.  CT scan of the brain was without acute disease.  CNS examination was without focal deficits.  TSH/B12/folate/RPR were normal.  6.  Thyroid disorder.  It looks like the patient has hyperthyroidism on Tapazole.  Thyroid function test was normal on 5/22/2025.  Tapazole was maintained.  7.  Hyperlipidemia.  This remained stable on Pravachol.  CK was normal.  Liver function test was normal.    At the time of discharge patient weakness has improved.  She was hemodynamically stable.  She is to follow-up with primary MD in 1 week.    Consultants     Consult Orders (all) (From admission, onward)       Start     Ordered    06/05/25 0234  Inpatient Spiritual Care Consult  Once        Provider:  (Not yet assigned)    06/05/25 0233    06/05/25 0232  Inpatient Nutrition Consult  Once        Provider:  (Not yet assigned)    06/05/25 0231    06/05/25 0231  Inpatient Case Management  Consult  Once        Provider:  (Not yet assigned)    06/05/25 0231    06/05/25 0133  Inpatient Cardiology Consult  Once        Specialty:  Cardiology  Provider:  Theo Campos Jr., MD    06/05/25 0133    06/05/25 0011  [facility] (on-call MD unless specified) Details  Once        Specialty:  Hospitalist  Provider:  (Not yet assigned)    06/05/25 0010                  Procedures     Imaging Results (All)       Procedure Component Value Units Date/Time    CT Head Without Contrast [[id number]] Collected: 06/06/25 1025     Updated: 06/06/25 1040    Narrative:      CT HEAD WO CONTRAST-     HISTORY:  Weakness; I95.1-Orthostatic hypotension; R19.7-Diarrhea,  unspecified; E87.6-Hypokalemia; R53.1-Weakness     COMPARISON: None     FINDINGS: The brain and ventricles are symmetrical. There is no evidence  of intracranial hemorrhage, hydrocephalus or of abnormal extra-axial  fluid. No focal area of decreased  attenuation to suggest acute  infarction is identified. Moderate vascular calcification is noted.  Decreased attenuation involving the white matter of the cerebral  hemispheres is present bilaterally consistent with mild small vessel  ischemic disease. Further evaluation could be performed with MRI  examination of the brain as indicated.                 Radiation dose reduction techniques were utilized, including automated  exposure modulation based on body size.     This report was finalized on 6/6/2025 10:36 AM by Dr. Vidal Calvillo M.D  on Workstation: [id number]       CT Abdomen Pelvis With Contrast [[id number]] Collected: 06/06/25 0911     Updated: 06/06/25 0928    Narrative:      CT ABDOMEN PELVIS W CONTRAST-     INDICATION: Diarrhea, abdominal pain, vomiting     COMPARISON: CT abdomen pelvis May 16, 2024     TECHNIQUE:  Routine CT abdomen and pelvis with IV contrast. Coronal and sagittal  reformats. Radiation dose reduction techniques were utilized, including  automated exposure control and exposure modulation based on body size.     FINDINGS:      Lung bases: Small amount of subsegmental atelectasis at the bases.  Calcified pulmonary nodule in the right middle lobe, consistent with  prior granulomatous infection. Coronary artery atherosclerotic  calcifications.     ABDOMEN: Right diaphragmatic eventration. No liver mass seen.  Cholecystectomy. Mild intrahepatic and extrahepatic biliary ductal  dilatation. Common bile duct measures 1.5 cm, unchanged. Spleen is  normal in size. No pancreatic mass or pancreatic ductal dilatation seen.  No adrenal nodules. No renal mass or hydronephrosis.     Pelvis: Underdistended bladder. No bladder calculus. Hysterectomy. Small  left ovarian cyst, series 2, axial mage 104, measures 1.3 cm, previously  1.7 cm, likely benign.     Bowel: Small hiatal hernia. Stomach is under distended. No small bowel  obstruction. Colonic diverticulosis. Colon is under distended.  Normal  appendix. Small bowel diverticulosis.     Abdominal wall: Rectus diastases. Fat-containing umbilical hernia.     Retroperitoneum: No lymphadenopathy.     Vasculature: Patent. No abdominal aortic aneurysm.     Osseous structures: Severe right greater than left hip osteoarthritis  with bulky right hip osteophytosis and acetabular over coverage and with  near bone on bone articulation. Diffuse idiopathic skeletal hyperostosis  in the lower thoracic spine. Severe lumbar facet degenerative  arthropathy. Severe lumbar spondylosis/degenerative disc disease.  Multilevel spinal canal narrowing including severe canal narrowing at  L3/L4 and L4/L5. Grade 1 anterolisthesis of L3 on L4 and L4 on L5.       Impression:         1. No acute findings identified in the abdomen or pelvis.  2. Colonic and small bowel diverticulosis.  3. Stable mild intrahepatic and extrahepatic biliary ductal dilatation     This report was finalized on 6/6/2025 9:25 AM by Dr. Rigo Canseco M.D  on Workstation: [id number]       XR Chest 1 View [[id number]] Collected: 06/04/25 2018     Updated: 06/04/25 2022    Narrative:      CXR ONE VIEW      HISTORY: cough     COMPARISON: Chest x-ray 8/10/2022     TECHNIQUE: single portable AP       Impression:         The heart size is within normal limits.     The left lung is normally aerated.     There is elevation of the right hemidiaphragm, but the right lung is  otherwise normally aerated.     This report was finalized on 6/4/2025 8:19 PM by Dr. James Bernabe M.D  on Workstation: [id number]               Pertinent Labs     Results from last 7 days   Lab Units 06/07/25  0452 06/06/25  0616 06/05/25  0713 06/04/25 1956   WBC 10*3/mm3 8.27 8.62 9.18 10.27   HEMOGLOBIN g/dL 11.4* 11.3* 11.6* 12.5   PLATELETS 10*3/mm3 233 235 211 220     Results from last 7 days   Lab Units 06/07/25  0452 06/06/25  0616 06/05/25  1458 06/05/25  0712 06/04/25 1956   SODIUM mmol/L 139 141  --  142 139   POTASSIUM  "mmol/L 3.5 4.0 3.3* 3.3* 2.9*   CHLORIDE mmol/L 105 108*  --  107 104   CO2 mmol/L 25.0 24.0  --  26.9 26.6   BUN mg/dL 4.0* 5.0*  --  7.0* 9.0   CREATININE mg/dL 0.68 0.71  --  0.74 0.84   GLUCOSE mg/dL 95 101*  --  106* 105*   Estimated Creatinine Clearance: 61.1 mL/min (by C-G formula based on SCr of 0.68 mg/dL).  Results from last 7 days   Lab Units 06/04/25 1956   ALBUMIN g/dL 3.6   BILIRUBIN mg/dL 0.2   ALK PHOS U/L 72   AST (SGOT) U/L 24   ALT (SGPT) U/L 16     Results from last 7 days   Lab Units 06/07/25  0452 06/06/25 0616 06/05/25  0712 06/04/25 2140 06/04/25 1956   CALCIUM mg/dL 8.6 8.6 8.6  --  9.1   ALBUMIN g/dL  --   --   --   --  3.6   MAGNESIUM mg/dL  --   --   --  1.7 1.6     Results from last 7 days   Lab Units 06/04/25 1956   LIPASE U/L 46     Results from last 7 days   Lab Units 06/06/25 0616 06/04/25 2140 06/04/25 1956   CK TOTAL U/L 89  --   --    HSTROP T ng/L  --  12 11           Invalid input(s): \"LDLCALC\"      Imaging Results (Last 24 Hours)       Procedure Component Value Units Date/Time    CT Head Without Contrast [[id number]] Collected: 06/06/25 1025     Updated: 06/06/25 1040    Narrative:      CT HEAD WO CONTRAST-     HISTORY:  Weakness; I95.1-Orthostatic hypotension; R19.7-Diarrhea,  unspecified; E87.6-Hypokalemia; R53.1-Weakness     COMPARISON: None     FINDINGS: The brain and ventricles are symmetrical. There is no evidence  of intracranial hemorrhage, hydrocephalus or of abnormal extra-axial  fluid. No focal area of decreased attenuation to suggest acute  infarction is identified. Moderate vascular calcification is noted.  Decreased attenuation involving the white matter of the cerebral  hemispheres is present bilaterally consistent with mild small vessel  ischemic disease. Further evaluation could be performed with MRI  examination of the brain as indicated.                 Radiation dose reduction techniques were utilized, including automated  exposure modulation based on " body size.     This report was finalized on 6/6/2025 10:36 AM by Dr. Vidal Calvillo M.D  on Workstation: [id number]       CT Abdomen Pelvis With Contrast [[id number]] Collected: 06/06/25 0911     Updated: 06/06/25 0928    Narrative:      CT ABDOMEN PELVIS W CONTRAST-     INDICATION: Diarrhea, abdominal pain, vomiting     COMPARISON: CT abdomen pelvis May 16, 2024     TECHNIQUE:  Routine CT abdomen and pelvis with IV contrast. Coronal and sagittal  reformats. Radiation dose reduction techniques were utilized, including  automated exposure control and exposure modulation based on body size.     FINDINGS:      Lung bases: Small amount of subsegmental atelectasis at the bases.  Calcified pulmonary nodule in the right middle lobe, consistent with  prior granulomatous infection. Coronary artery atherosclerotic  calcifications.     ABDOMEN: Right diaphragmatic eventration. No liver mass seen.  Cholecystectomy. Mild intrahepatic and extrahepatic biliary ductal  dilatation. Common bile duct measures 1.5 cm, unchanged. Spleen is  normal in size. No pancreatic mass or pancreatic ductal dilatation seen.  No adrenal nodules. No renal mass or hydronephrosis.     Pelvis: Underdistended bladder. No bladder calculus. Hysterectomy. Small  left ovarian cyst, series 2, axial mage 104, measures 1.3 cm, previously  1.7 cm, likely benign.     Bowel: Small hiatal hernia. Stomach is under distended. No small bowel  obstruction. Colonic diverticulosis. Colon is under distended. Normal  appendix. Small bowel diverticulosis.     Abdominal wall: Rectus diastases. Fat-containing umbilical hernia.     Retroperitoneum: No lymphadenopathy.     Vasculature: Patent. No abdominal aortic aneurysm.     Osseous structures: Severe right greater than left hip osteoarthritis  with bulky right hip osteophytosis and acetabular over coverage and with  near bone on bone articulation. Diffuse idiopathic skeletal hyperostosis  in the lower thoracic spine.  Severe lumbar facet degenerative  arthropathy. Severe lumbar spondylosis/degenerative disc disease.  Multilevel spinal canal narrowing including severe canal narrowing at  L3/L4 and L4/L5. Grade 1 anterolisthesis of L3 on L4 and L4 on L5.       Impression:         1. No acute findings identified in the abdomen or pelvis.  2. Colonic and small bowel diverticulosis.  3. Stable mild intrahepatic and extrahepatic biliary ductal dilatation     This report was finalized on 6/6/2025 9:25 AM by Dr. Rigo Canseco M.D  on Workstation: [id number]               Test Results Pending at Discharge         Discharge Exam   Physical Exam  Vitals.  Temperature 98.5 a pulse of 84 respiratory rate of 16 and blood pressure 160/80 with an O2 sats of 96% on room air.  Orthostasis were negative.  Norvasc was added today.  General.  Elderly female.  She is alert and oriented times 4 out of 4.  In no apparent pain/distress/diaphoresis.  Normal mood and affect.  Eyes.  Status post bilateral cataract surgery.  Pupils equal round and reactive.  Intact extraocular musculature.  No pallor or jaundice  Oral cavity.  Moist mucous membrane.  Neck.  Supple.  No JVD.  No lymphadenopathy or thyromegaly  Cardiac vascular.  Regular rate and rhythm with grade 2 systolic murmur.  Chest.  Clear to auscultation bilaterally with no added sounds.  Abdomen.  Soft lax.  No tenderness.  No organomegaly.  No guarding or rebound  Extremities.  No clubbing/cyanosis/edema  CNS.  No acute focal neurological deficits.     Discharge Details        Discharge Medications        New Medications        Instructions Start Date   amLODIPine 2.5 MG tablet  Commonly known as: NORVASC   2.5 mg, Oral, Every 24 Hours Scheduled      cholestyramine 4 g packet  Commonly known as: QUESTRAN   1 packet, Oral, Every 12 Hours Scheduled             Changes to Medications        Instructions Start Date   acetaminophen 325 MG tablet  Commonly known as: TYLENOL  What changed:    medication strength  how much to take  when to take this   650 mg, Oral, Every 4 Hours PRN      losartan 50 MG tablet  Commonly known as: COZAAR  What changed: how much to take   50 mg, Oral, 2 Times Daily      methIMAzole 5 MG tablet  Commonly known as: TAPAZOLE  What changed: See the new instructions.   Take one and one half pill daily      multivitamin tablet tablet  What changed: Another medication with the same name was removed. Continue taking this medication, and follow the directions you see here.   1 tablet, Oral, Daily             Continue These Medications        Instructions Start Date   citalopram 10 MG tablet  Commonly known as: CeleXA   10 mg, Daily      donepezil 10 MG tablet  Commonly known as: ARICEPT   10 mg, Oral, Every Night at Bedtime      glucose blood test strip   1 each, Other, Daily      glucose monitor monitoring kit   1 each, Not Applicable, Daily      HYDROcodone-acetaminophen 5-325 MG per tablet  Commonly known as: NORCO   1 tablet, Oral, 2 Times Daily PRN      Lancets misc   1 each, Not Applicable, Daily      lidocaine 5 %  Commonly known as: LIDODERM   1 patch, Transdermal, Every 24 Hours, Remove & Discard patch within 12 hours or as directed by MD      magnesium oxide 400 MG tablet  Commonly known as: MAG-OX   400 mg, Oral, Daily      methocarbamol 750 MG tablet  Commonly known as: ROBAXIN   750 mg, Oral, 3 Times Daily PRN      pantoprazole 40 MG EC tablet  Commonly known as: PROTONIX   40 mg, Oral, Daily      potassium chloride 10 MEQ CR tablet  Commonly known as: KLOR-CON M10   10 mEq, Oral, 2 Times Daily      pravastatin 40 MG tablet  Commonly known as: PRAVACHOL   40 mg, Oral, Nightly      vitamin D3 125 MCG (5000 UT) capsule capsule   2,000 Units, Daily             Stop These Medications      cetirizine 10 MG tablet  Commonly known as: zyrTEC     cyclobenzaprine 5 MG tablet  Commonly known as: FLEXERIL     metroNIDAZOLE 500 MG tablet  Commonly known as: FLAGYL     predniSONE  10 MG tablet  Commonly known as: DELTASONE     Senna 8.6 MG capsule              No Known Allergies      Discharge Disposition:  Condition: Stable    Diet:   Diet Order   Procedures    Diet: Liquid; Clear Liquid; Fluid Consistency: Thin (IDDSI 0)       Activity:   Activity Instructions       Activity as Tolerated      Other Activity Instructions      Activity Instructions: PT/OT to evaluate and treat    Up WIth Assist                CODE STATUS:    Code Status and Medical Interventions: CPR (Attempt to Resuscitate); Full   Ordered at: 06/05/25 0133     Code Status (Patient has no pulse and is not breathing):    CPR (Attempt to Resuscitate)     Medical Interventions (Patient has pulse or is breathing):    Full       No future appointments.  Additional Instructions for the Follow-ups that You Need to Schedule       Call MD With Problems / Concerns   As directed      Instructions: Call MD or return to ER if dizziness/loss of consciousness/worsening unsteadiness/focal neurological symptoms/chest pain/palpitation/shortness of breath/nausea or vomiting/severe diarrhea    Order Comments: Instructions: Call MD or return to ER if dizziness/loss of consciousness/worsening unsteadiness/focal neurological symptoms/chest pain/palpitation/shortness of breath/nausea or vomiting/severe diarrhea         Discharge Follow-up with PCP   As directed       Currently Documented PCP:    Pallares, Clara Ann, MD    PCP Phone Number:    [phone]     Follow Up Details: 1 week.  Weakness/falls/hypertension with orthostasis/hypokalemia/diarrhea/DM2/dementia/panic disorder/hypothyroidism/dyslipidemia/anemia               Follow-up Information       Pallares, Clara Ann, MD .    Specialty: Internal Medicine  Why: 1 week.  Weakness/falls/hypertension with orthostasis/hypokalemia/diarrhea/DM2/dementia/panic disorder/hypothyroidism/dyslipidemia/anemia  Contact information:  [address] [place] [id number]  [phone]                                Time Spent on Discharge:  Greater than 30 minutes      Flaquito Robles MD  [facility]  06/07/25  08:15 EDT

## 2025-06-09 NOTE — CASE MANAGEMENT/SOCIAL WORK
Case Management Discharge Note      Final Note: Return home; [facility] with outpatient therapy via family private vehicle    Provided Post Acute Provider List?: N/A  N/A Provider List Comment: Denies needs. Plan is to return home (Assisted Living)    Selected Continued Care - Discharged on 6/7/2025 Admission date: 6/4/2025 - Discharge disposition: Home or Self Care      Destination    No services have been selected for the patient.                Durable Medical Equipment    No services have been selected for the patient.                Dialysis/Infusion    No services have been selected for the patient.                Home Medical Care    No services have been selected for the patient.                Therapy    No services have been selected for the patient.                Community Resources    No services have been selected for the patient.                Community & DME    No services have been selected for the patient.                    Transportation Services  Private: Car    Final Discharge Disposition Code: 01 - home or self-care

## 2025-06-11 ENCOUNTER — READMISSION MANAGEMENT (OUTPATIENT)
Dept: CALL CENTER | Facility: HOSPITAL | Age: 89
End: 2025-06-11
Payer: MEDICARE

## 2025-06-11 NOTE — OUTREACH NOTE
Medical Week 1 Survey      Flowsheet Row Responses   [facility] patient discharged from? [place]   Does the patient have one of the following disease processes/diagnoses(primary or secondary)? Other   Week 1 attempt successful? No   Unsuccessful attempts Attempt 1            Cecelia Martinez Registered Nurse

## 2025-06-18 ENCOUNTER — READMISSION MANAGEMENT (OUTPATIENT)
Dept: CALL CENTER | Facility: HOSPITAL | Age: 89
End: 2025-06-18
Payer: MEDICARE

## 2025-06-18 NOTE — OUTREACH NOTE
Medical Week 2 Survey      Flowsheet Row Responses   [facility] patient discharged from? [place]   Does the patient have one of the following disease processes/diagnoses(primary or secondary)? Other   Week 2 attempt successful? Yes   Call start time 1418   General alerts for this patient [facility]   Discharge diagnosis Weakness   Call end time 1420   Meds reviewed with patient/caregiver? Yes   Is the patient taking all medications as directed (includes completed medication regime)? Yes   Does the patient have a primary care provider?  Yes   Has the patient kept scheduled appointments due by today? N/A   Has home health visited the patient within 72 hours of discharge? N/A   Psychosocial issues? No   Did the patient receive a copy of their discharge instructions? Yes   What is the patient's perception of their health status since discharge? Improving   Week 2 Call Completed? Yes   Graduated Yes   Graduated/Revoked comments Pt reports that she is feeling better. No needs.   Call end time 1420            KIMBERLYN YAN - Registered Nurse

## 2025-07-29 ENCOUNTER — TRANSCRIBE ORDERS (OUTPATIENT)
Dept: ADMINISTRATIVE | Facility: HOSPITAL | Age: 89
End: 2025-07-29
Payer: MEDICARE

## 2025-07-29 DIAGNOSIS — Z78.0 POSTMENOPAUSAL: Primary | ICD-10-CM

## (undated) DEVICE — MSK ENDO PORT O2 POM ELITE CURAPLEX A/

## (undated) DEVICE — LN SMPL CO2 SHTRM SD STREAM W/M LUER

## (undated) DEVICE — KT ORCA ORCAPOD DISP STRL

## (undated) DEVICE — TUBING, SUCTION, 1/4" X 10', STRAIGHT: Brand: MEDLINE

## (undated) DEVICE — BLCK/BITE BLOX W/DENTL/RIM W/STRAP 54F

## (undated) DEVICE — MSK PROC CURAPLEX O2 2/ADAPT 7FT

## (undated) DEVICE — FRCP BX RADJAW4 NDL 2.8 240CM LG OG BX40

## (undated) DEVICE — CANN O2 ETCO2 FITS ALL CONN CO2 SMPL A/ 7IN DISP LF

## (undated) DEVICE — SENSR O2 OXIMAX FNGR A/ 18IN NONSTR

## (undated) DEVICE — ADAPT CLN BIOGUARD AIR/H2O DISP